# Patient Record
Sex: MALE | Race: WHITE | NOT HISPANIC OR LATINO | Employment: UNEMPLOYED | URBAN - METROPOLITAN AREA
[De-identification: names, ages, dates, MRNs, and addresses within clinical notes are randomized per-mention and may not be internally consistent; named-entity substitution may affect disease eponyms.]

---

## 2017-05-25 ENCOUNTER — TELEPHONE (OUTPATIENT)
Dept: BEHAVIORAL HEALTH | Facility: CLINIC | Age: 27
End: 2017-05-25

## 2017-05-25 ENCOUNTER — HOSPITAL ENCOUNTER (INPATIENT)
Facility: CLINIC | Age: 27
LOS: 12 days | Discharge: HOME HEALTH | DRG: 885 | End: 2017-06-06
Attending: INTERNAL MEDICINE | Admitting: PSYCHIATRY & NEUROLOGY
Payer: MEDICAID

## 2017-05-25 DIAGNOSIS — F29 PSYCHOSIS, UNSPECIFIED PSYCHOSIS TYPE (H): Primary | ICD-10-CM

## 2017-05-25 DIAGNOSIS — F19.10 POLYSUBSTANCE ABUSE (H): ICD-10-CM

## 2017-05-25 DIAGNOSIS — F23 ACUTE PSYCHOSIS (H): ICD-10-CM

## 2017-05-25 PROCEDURE — 99285 EMERGENCY DEPT VISIT HI MDM: CPT | Performed by: INTERNAL MEDICINE

## 2017-05-25 PROCEDURE — 99285 EMERGENCY DEPT VISIT HI MDM: CPT | Mod: Z6 | Performed by: INTERNAL MEDICINE

## 2017-05-25 PROCEDURE — 25000132 ZZH RX MED GY IP 250 OP 250 PS 637: Performed by: CLINICAL NURSE SPECIALIST

## 2017-05-25 PROCEDURE — 12400001 ZZH R&B MH UMMC

## 2017-05-25 PROCEDURE — 25000132 ZZH RX MED GY IP 250 OP 250 PS 637: Performed by: INTERNAL MEDICINE

## 2017-05-25 RX ORDER — LORAZEPAM 1 MG/1
1-4 TABLET ORAL EVERY 30 MIN PRN
Status: DISCONTINUED | OUTPATIENT
Start: 2017-05-25 | End: 2017-05-28

## 2017-05-25 RX ORDER — ACETAMINOPHEN 325 MG/1
650 TABLET ORAL EVERY 4 HOURS PRN
Status: DISCONTINUED | OUTPATIENT
Start: 2017-05-25 | End: 2017-06-06 | Stop reason: HOSPADM

## 2017-05-25 RX ORDER — OLANZAPINE 10 MG/1
10 TABLET ORAL
Status: DISCONTINUED | OUTPATIENT
Start: 2017-05-25 | End: 2017-06-06 | Stop reason: HOSPADM

## 2017-05-25 RX ORDER — ALUMINA, MAGNESIA, AND SIMETHICONE 2400; 2400; 240 MG/30ML; MG/30ML; MG/30ML
30 SUSPENSION ORAL EVERY 4 HOURS PRN
Status: DISCONTINUED | OUTPATIENT
Start: 2017-05-25 | End: 2017-06-06 | Stop reason: HOSPADM

## 2017-05-25 RX ORDER — TRAZODONE HYDROCHLORIDE 50 MG/1
50 TABLET, FILM COATED ORAL
Status: DISCONTINUED | OUTPATIENT
Start: 2017-05-25 | End: 2017-06-06 | Stop reason: HOSPADM

## 2017-05-25 RX ORDER — IBUPROFEN 200 MG
200-400 TABLET ORAL EVERY 4 HOURS PRN
Status: DISCONTINUED | OUTPATIENT
Start: 2017-05-25 | End: 2017-05-28

## 2017-05-25 RX ORDER — HYDROXYZINE HYDROCHLORIDE 25 MG/1
25-50 TABLET, FILM COATED ORAL EVERY 4 HOURS PRN
Status: DISCONTINUED | OUTPATIENT
Start: 2017-05-25 | End: 2017-06-06 | Stop reason: HOSPADM

## 2017-05-25 RX ORDER — OLANZAPINE 10 MG/2ML
10 INJECTION, POWDER, FOR SOLUTION INTRAMUSCULAR
Status: DISCONTINUED | OUTPATIENT
Start: 2017-05-25 | End: 2017-06-06 | Stop reason: HOSPADM

## 2017-05-25 RX ORDER — OLANZAPINE 5 MG/1
10 TABLET, ORALLY DISINTEGRATING ORAL ONCE
Status: COMPLETED | OUTPATIENT
Start: 2017-05-25 | End: 2017-05-25

## 2017-05-25 RX ORDER — BISACODYL 10 MG
10 SUPPOSITORY, RECTAL RECTAL DAILY PRN
Status: DISCONTINUED | OUTPATIENT
Start: 2017-05-25 | End: 2017-06-06 | Stop reason: HOSPADM

## 2017-05-25 RX ORDER — OLANZAPINE 10 MG/1
20 TABLET, ORALLY DISINTEGRATING ORAL AT BEDTIME
Status: DISCONTINUED | OUTPATIENT
Start: 2017-05-25 | End: 2017-05-29

## 2017-05-25 RX ADMIN — IBUPROFEN 400 MG: 200 TABLET, FILM COATED ORAL at 18:33

## 2017-05-25 RX ADMIN — OLANZAPINE 10 MG: 5 TABLET, ORALLY DISINTEGRATING ORAL at 13:10

## 2017-05-25 RX ADMIN — OLANZAPINE 10 MG: 10 TABLET, FILM COATED ORAL at 18:33

## 2017-05-25 RX ADMIN — OLANZAPINE 20 MG: 10 TABLET, ORALLY DISINTEGRATING ORAL at 21:24

## 2017-05-25 RX ADMIN — OLANZAPINE 10 MG: 5 TABLET, ORALLY DISINTEGRATING ORAL at 16:31

## 2017-05-25 RX ADMIN — OLANZAPINE 10 MG: 5 TABLET, ORALLY DISINTEGRATING ORAL at 14:37

## 2017-05-25 ASSESSMENT — ENCOUNTER SYMPTOMS
HALLUCINATIONS: 1
AGITATION: 1
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
## 2017-05-25 NOTE — IP AVS SNAPSHOT
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 family room of the home, but parents stated that they have no guns in the home.\"      On the day of admission, he was on the golf course with his father, \"riding a cart all over the course.\"      Once he arrived in the Emergency Department, continued to convey to the emergency room physician that \"he had been in intermediate, called the [place] and was possibly there because his name was Raul.\"      PREMORBID AND PAST MEDICAL AND PSYCHIATRIC HISTORY:  Does include the history of abusing drugs including THC and Adderall.      THE PATIENT'S PREVIOUS PAST MEDICAL AND PSYCHIATRIC DIAGNOSES:  Include the followin.  " "Hospitalization in  under Dr. Zambrano on station 22.   2.  Joshua Head, PhD,  and Dr. Salazar and Dr. Jay Anders, [facility] until 2016 when he never returned.  The patient indicates he was going 5 days per week for 3 or 4 hours per day over a period of 1 month.      PAST MEDICAL AND SURGICAL HISTORY:  Include the following:  No medical history.  No surgical history.      MEDICATIONS:  None at this time.      ALLERGIES:  Have not been defined.      REVIEW OF SYSTEMS:  From a medical standpoint has been negative, but for the admission history and physical examination.      VITAL SIGNS:  Blood pressure 187/91, heart rate 122, temperature 95.6, oxygen saturation 96.      It should be noted that the patient's symptoms are chemical use and abuse have shown with the following.  The drug screen thus far has been negative for conventional street drugs.  It is positive for cannabinoids.  That was on 2013.  No other updated drug screen apparently available.      IMAGING:  She had x-ray of the knee done in .  No acute fracture or dislocation.  No evidence of fracture.      FAMILY CONSTELLATION AND SOCIAL HISTORY, INCLUDING FAMILY HISTORY:  Single, never , no dependents.  No relationship.  Grew up in a Evangelical family with his mother and father.  The parents have been  for 2 decades.  He was the 3rd to the youngest of 15 children and these include 4 that are \"foster or adoptive children.\"  Sister  in motor vehicle accident when the patient was in his teens.  Mother is an RN and works at [employer] on a full-time basis and is available to the patient.  The patient often resides at this household.  Mother is Catalina.  She is an RN at [employer].  The patient verbalizes anger at her, \"forcing me into a setting like this.\"  Additionally, the patient's father has his own \"business.\"  This includes taking care of adult men and identifies the father is in his " "40s.      HIS CURRENT LIVING SITUATION, EDUCATIONAL, OCCUPATIONAL AND FINANCIAL HISTORY:  Shows the following:  He lives alone in \"broken down apartment in [place].\"  His mother did get him an apartment.  At the time he was working 3 days a week and helping with rent, but really stays with her most of the time.  High school graduate, \"many\" is his answer.  Occupation:  Unemployed now, but for several months was working at [employer] 3 days per week while in college.  College at [employer] and also [employer].  Finances:  Uninsured.  He relies on his parents or other people for financial help.  Legal:  Conducted multiple boyd misdemeanors, last one in 2014.  Ethnic and spiritual:  Views himself as a Adventism.      REVIEW OF SYSTEMS:  From a psychiatric standpoint:  Mood disorder, depression, no manic.  Psychotic symptoms including hallucinations and then delusion.  Chemical dependency in the past, none present.  Impulsive compulsive eating disorder issues not present.  ADHD, developmental delay, organicity not in evidence.      FORMAL MENTAL STATUS EXAMINATION:  Shows the patient tall, readily comes to the interview room and gives a very firm handshake.  His eye contact whereby is \"try to out stare.\"  General behavior seemed to be guarded and oppositional.  His speech is normal rate, rhythm and volume, some degree of intensity in the volume that was reduced articulation.  English is his primary language.  Mood and affect certainly shows some degree of flatness but thus very readily shows irritability and his mood is certainly one of being dysphoric.  His sensorium:  He is alert and oriented in all 3 spheres to person, place and time.  Concentration seems to be poor.  Memory recent and remote is intact.  Intellectual functioning:  He has a fund of knowledge and able to abstract in thinking.  This individual shows limited insight in introspective ability and insight is rather concrete.  Thought " content shows psychotic symptoms of overt paranoid and suspiciousness.  No active psychotic hallucinatory experiences.  Suicidal and homicidal or self-injurious behavior has been denounced.      DIAGNOSES, DSM-5/ICD 10:  Include the followin.  Psychosis, not otherwise specified.   2.  Delusion, paranoid type.   3.  Past history of polysubstance use and abuse.   4.  General anxiety with posttraumatic stress.   5.  Chemical dependency in the past.   6.  Premorbid personality schizoid.      TREATMENT:  The patient has been taken since the time of arrival and has received Zyprexa 20 mg each night at bedtime.  He indicates he feels comfortable and allowed him to get some sleep and rest.      Did offer additionally the patient also to be on p.r.n. Zyprexa Zydis 10 mg and Zyprexa 5 mg b.i.d., gummi vitamins.      The patient is very keen about defining his discharge date and time.  This has been delineated to him.      We will reassess the patient every 24 hours to consider the need for petition for court commitment.      The patient at this point is showing compliance and adherence to his prescribed medications.         LIANA OLIVO MD             D: 2017 14:58   T: 2017 18:11   MT:       Name:     JAIME MORA   MRN:      [id number]        Account:      [id number]   :      1990           Admitted:     [id number]      Document: [id number][DP1.1]         Revision History        User Key Date/Time User Provider Type Action    > DP1.1 2017 10:49 AM Liana Olivo MD Physician Sign     [N/A] 2017  6:12 PM Liana Olivo MD Physician Edit            H&P by Liana Olivo MD at 2017  2:34 PM     Author:  Liana Olivo MD Service:  Psychiatry Author Type:  Physician    Filed:  2017  2:34 PM Date of Service:  2017  2:34 PM Creation Time:  2017  2:34 PM    Status:  Signed :  Liana Olivo MD (Physician)          ADMISSION DONE : DICTATION COMPLETED[DP1.1]      Revision History        User Key Date/Time User Provider Type Action    > DP1.1 5/26/2017  2:34 PM Kirt Sherwood MD Physician Sign                  Discharge Summaries     No notes of this type exist for this encounter.      Consult Notes     No notes of this type exist for this encounter.      Progress Notes - Physician (Notes for yesterday and today)     No notes of this type exist for this encounter.      Procedure Notes     No notes of this type exist for this encounter.      Progress Notes - Therapies (Notes from 06/02/17 through 06/05/17)     No notes of this type exist for this encounter.                                          INTERAGENCY TRANSFER FORM - LAB / IMAGING / EKG / EMG RESULTS   5/25/2017                       [facility] 10NB: [phone]            Unresulted Labs     None      Encounter-Level Documents:     There are no encounter-level documents.      Order-Level Documents:     There are no order-level documents.

## 2017-05-25 NOTE — PHARMACY-ADMISSION MEDICATION HISTORY
"Admission medication history interview status for the 5/25/2017 admission is complete. See Epic admission navigator for allergy information, pharmacy, prior to admission medications and immunization status.     Medication history interview sources:  patient     Changes made to PTA medication list (reason)  Added: multivitamin gummy 2 tab po qday  Deleted: olazapine 5 mg ODT 5 tabs po qHS, nasal dilator strips  Changed: ibuprofen from 400 mg prn to 200-400 mg q4h prn headache    Additional medication history information (including reliability of information, actions taken by pharmacist):  - Patient reports that olazapine did not help his symptoms, only made him \"fat and lazy\"      Prior to Admission medications    Medication Sig Last Dose Taking? Auth Provider   Multiple Vitamins-Minerals (ADULT GUMMY PO) Take 2 tablets by mouth daily  Yes Unknown, Entered By History   IBUPROFEN PO Take 200-400 mg by mouth every 4 hours as needed for other (headache)    Reported, Patient         Medication history completed by: Orlando Juan, PharmD    "

## 2017-05-25 NOTE — PLAN OF CARE
Problem: Psychotic Symptoms  Goal: Psychotic Symptoms  Signs and symptoms of listed problems will be absent or manageable.  26 yo male admitted from the ED on a 72 hour hold. During the clothing search he was threatening with staff. He complains of a head ache and asked for motrin as soon as possible. He has answered some questions for the admit behavioral profile but he states he is in too much pain to continue. His eyes are bright, he does have good eye contract and he states he was on 30mg of zyprexa at bedtime, which he stopped several weeks ago. The intake report states that he is hearing voices, paranoid and delusional. He wants to be taken off the 72 hr. Hold. He has not been threatening to me.   The intake reports states that he has been drinking ETOH. He told me that he has been sober for quite a while. A Utox has been ordered.

## 2017-05-25 NOTE — IP AVS SNAPSHOT
MRN:[id number]                      After Visit Summary   5/25/2017    Guero Carter    MRN: [id number]           Thank you!     Thank you for choosing [facility] for your care. Our goal is always to provide you with excellent care.        Patient Information     Date Of Birth          1990        Designated Caregiver       Most Recent Value    Caregiver    Will someone help with your care after discharge? no      About your hospital stay     You were admitted on:  May 25, 2017 You last received care in the:  [facility] 30NR    You were discharged on:  June 6, 2017       Who to Call     For medical emergencies, please call 911.  For non-urgent questions about your medical care, please call your primary care provider or clinic, None          Attending Provider     Provider Specialty    Stacie Mann MD Internal Medicine    [facility] Kirt Ross MD Psychiatry    Brody Ruby MD Psychiatry       Primary Care Provider    Physician No Ref-Primary      Your next 10 appointments already scheduled     Jun 07, 2017  9:00 AM CDT   Evaluation with AP Braga   [facility] ([facility])    [address] [place] [id number] [phone]            Jun 08, 2017  9:20 AM CDT   Office Visit with Melodie Hobbs PA-C   [facility]    [address] [place] [id number] [phone]           Bring a current list of meds and any records pertaining to this visit.  For Physicals, please bring immunization records and any forms needing to be filled out.  Please arrive 10 minutes early to complete paperwork.              Additional Services     Home Care Referral       **Order classes of: [facility], [facility] and [facility] will route to the Home Care and Hospice Referral Pool.  Home Care or Hospice will then contact the patient to  schedule their appointment.**    If you do not hear from Home Care and Hospice, or you would like to call to schedule, please call the referring place of service that your provider has listed below.  ______________________________________________________________________    Your provider has referred you to: [facility]: [facility] [phone]   [email or web address]    Extended Emergency Contact Information  Primary Emergency Contact: nica carter  Address: [address] [facility] [address]  Home Phone: [phone]  Relation: Mother    Patient Anticipated Discharge Date: 6/06/2017   RN, PT, HHA to begin 24 - 48 hours after discharge.  PLEASE EVALUATE AND TREAT (Evaluation timeline is 24 - 48 hrs. Please call if there is need for a variance to this timeline).    REASON FOR REFERRAL: Assessment & Treatment: RN    ADDITIONAL SERVICES NEEDED: medication monitoring.     OTHER PERTINENT INFORMATION: Patient was last seen by provider on 6/05/2017 for assessment.    Current Outpatient Prescriptions:  [START ON 6/6/2017] OLANZapine (ZYPREXA) 10 MG tablet, Take 1 tablet (10 mg) by mouth every morning, Disp: 30 tablet, Rfl: 0  OLANZapine (ZYPREXA) 15 MG tablet, Take 2 tablets (30 mg) by mouth At Bedtime, Disp: 60 tablet, Rfl: 0      Patient Active Problem List:     Psychiatric disorder     MENTAL HEALTH     Psychosis      Documentation of Face to Face and Certification for Home Health Services    I certify that patient, Guero Carter is under my care and that I, or a Nurse Practitioner or Physician's Assistant working with me, had a face-to-face encounter that meets the physician face-to-face encounter requirements with this patient on: 6/5/2017.    This encounter with the patient was in whole, or in part, for the following medical condition, which is the primary reason for Home Health Care: psychosis.    I certify that, based on my  findings, the following services are medically necessary Home Health Services: Nursing    My clinical findings support the need for the above services because: Nurse is needed: To provide assessment and oversight required in the home to assure adherence to the medical plan due to: history of noncompliance.   Further, I certify that my clinical findings support that this patient is homebound (i.e. absences from home require considerable and taxing effort and are for medical reasons or Buddhism services or infrequently or of short duration when for other reasons)    Based on the above findings, I certify that this patient is confined to the home and needs intermittent skilled nursing care, physical therapy and/or speech therapy.  The patient is under my care, and I have initiated the establishment of the plan of care.  This patient will be followed by a physician who will periodically review the plan of care.    Physician/Provider to provide follow up care: No Ref-Primary, Physician    Responsible PECOS certified Physician at time of discharge    Please be aware that coverage of these services is subject to the terms and limitations of your health insurance plan.  Call member services at your health plan with any benefit or coverage questions.                  Further instructions from your care team       Behavioral Discharge Planning and Instructions    Summary: You were admitted with increasing delusional thoughts, paranoia, and auditory hallucinations. Non-compliant with your medications as well.  During your hospitalization, you met daily with the staff and were encouraged to attend all unit programming. A family meeting was held to discuss concerns and treatment planning. You have now been stabilized on medications and are being discharged today to live with your mother.    Main Diagnosis: Schizophrenia, Paranoid Type vs. Psychotic Disorder; History of THC Abuse    Major Treatments, Procedures and Findings:  Take all medications as prescribed and keep all of the appointments that have been set up for you.    Symptoms to Report: feeling more aggressive, increased confusion, losing more sleep, mood getting worse or thoughts of suicide.    Psychiatry Follow-up:   Dr. Plasencia (Psychiatrist) - New Appointment on June 28th at 9:40am  (MAR & H&P faxed over successfully on 6/5/17).  [facility]  [phone]     Dr. Melodie Hobbs - New Appointment on June 8th at 9:20am  [facility]  [facility]  [address]  [place], [place]  [phone]    [facility] ([phone])  Intake with Therapist Olinda Mitchell on Wednesday, June 7th at 9am  [address] at [address]  [facility] ([phone]) - A Psychiatric Nurse will call you to set up a time to meet with you to discuss medication set-up, questions you may have, etc.  either later in the day today (Tuesday) or Wednesday.    [employer] for Transportation--  Please call [phone] to register and set up your rides once you have your day treatment schedule. Your new Medical Assistance Member ID is [id number].  You need this to register.    Resources:   Crisis Intervention: 443.183.3287 or 422-680-5112 (TTY: 228.278.2211).  Call anytime for help.  National [place] [employer] Mental Illness (www.mn.maritza.org): 212.777.7362 or 664-052-5820.    General Medication Instructions:   See your medication sheet(s) for instructions.   Take all medicines as directed.  Make no changes unless your doctor suggests them.   Go to all your doctor visits.  Be sure to have all your required lab tests. This way, your medicines can be refilled on time.  Do not use any drugs not prescribed by your doctor.  Avoid alcohol.    The treatment team has appreciated the opportunity to work with you.  We wish you the best in the future. If you have any  "questions or concerns our unit number is [phone].     Pending Results     No orders found from 2017 to 2017.            Admission Information     Date & Time Provider Department Dept. Phone    2017 Brody Ruby MD UR 30NR [phone]      Your Vitals Were     Blood Pressure Pulse Temperature Respirations Weight Pulse Oximetry    138/71 65 97  F (36.1  C) (Oral) 16 98.9 kg (218 lb) 96%    BMI (Body Mass Index)                   27.25 kg/m2           [employer] Information     [facility] lets you send messages to your doctor, view your test results, renew your prescriptions, schedule appointments and more. To sign up, go to [email or web address] . Click on \"Log in\" on the left side of the screen, which will take you to the Welcome page. Then click on \"Sign up Now\" on the right side of the page.     You will be asked to enter the access code listed below, as well as some personal information. Please follow the directions to create your username and password.     Your access code is: [email or web address]  Expires: 2017  1:21 PM     Your access code will  in 90 days. If you need help or a new code, please call your [facility] clinic or [phone].        Care EveryWhere ID     This is your Care EveryWhere ID. This could be used by other organizations to access your [facility] medical records  [id number]           Review of your medicines      START taking        Dose / Directions    * OLANZapine 15 MG tablet   Commonly known as:  zyPREXA   Used for:  Psychosis, unspecified psychosis type        Dose:  30 mg   Take 2 tablets (30 mg) by mouth At Bedtime   Quantity:  60 tablet   Refills:  0       * OLANZapine 10 MG tablet   Commonly known as:  zyPREXA   Used for:  Psychosis, unspecified psychosis type        Dose:  10 mg   Take 1 tablet (10 mg) by mouth every morning   Quantity:  30 tablet   Refills:  0       * Notice:  This list has 2 medication(s) that are the same as other medications prescribed for " you. Read the directions carefully, and ask your doctor or other care provider to review them with you.      CONTINUE these medicines which have NOT CHANGED        Dose / Directions    ADULT GUMMY PO        Dose:  2 tablet   Take 2 tablets by mouth daily   Refills:  0       IBUPROFEN PO        Dose:  200-400 mg   Take 200-400 mg by mouth every 4 hours as needed for other (headache)   Refills:  0            Where to get your medicines      These medications were sent to [facility] [place], [place] - [address]  [address] [place] [address], [facility] [address]     Phone:  [phone]     OLANZapine 10 MG tablet    OLANZapine 15 MG tablet                Protect others around you: Learn how to safely use, store and throw away your medicines at www.disposemymeds.org.             Medication List: This is a list of all your medications and when to take them. Check marks below indicate your daily home schedule. Keep this list as a reference.      Medications           Morning Afternoon Evening Bedtime As Needed    ADULT GUMMY PO   Take 2 tablets by mouth daily                                IBUPROFEN PO   Take 200-400 mg by mouth every 4 hours as needed for other (headache)   Last time this was given:  400 mg on 5/28/2017 10:19 AM                                * OLANZapine 15 MG tablet   Commonly known as:  zyPREXA   Take 2 tablets (30 mg) by mouth At Bedtime   Last time this was given:  30 mg on 6/5/2017  8:19 PM                                * OLANZapine 10 MG tablet   Commonly known as:  zyPREXA   Take 1 tablet (10 mg) by mouth every morning   Last time this was given:  30 mg on 6/5/2017  8:19 PM                                * Notice:  This list has 2 medication(s) that are the same as other medications prescribed for you. Read the directions carefully, and ask your doctor or other care provider to review them with you.
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
## 2017-05-25 NOTE — SUMMARY OF CARE
Patient search completed by security; pt wearing scrub top, pt wanded with metal detector and belongings given to security to be stored. Explained to patient that they would be evaluated by a nurse here in the ER and then would go over to the [facility] when a bed is available where they will meet with a doctor and an accessor; plan will be determined from there.

## 2017-05-25 NOTE — SUMMARY OF CARE
Pt states he does not trust security or PA and does not want to be looked at or talked to.  Pt put table in front of door, then shortly after opened the door and is seated in the doorway looking into the hallway.

## 2017-05-25 NOTE — IP AVS SNAPSHOT
30    [address]    [place] [address]    Phone:  [phone]                                       After Visit Summary   5/25/2017    Guero Carter    MRN: [id number]           After Visit Summary Signature Page     I have received my discharge instructions, and my questions have been answered. I have discussed any challenges I see with this plan with the nurse or doctor.    ..........................................................................................................................................  Patient/Patient Representative Signature      ..........................................................................................................................................  Patient Representative Print Name and Relationship to Patient    ..................................................               ................................................  Date                                            Time    ..........................................................................................................................................  Reviewed by Signature/Title    ...................................................              ..............................................  Date                                                            Time

## 2017-05-26 LAB
ALBUMIN SERPL-MCNC: 3.8 G/DL (ref 3.4–5)
ALP SERPL-CCNC: 102 U/L (ref 40–150)
ALT SERPL W P-5'-P-CCNC: 37 U/L (ref 0–70)
ANION GAP SERPL CALCULATED.3IONS-SCNC: 6 MMOL/L (ref 3–14)
AST SERPL W P-5'-P-CCNC: 22 U/L (ref 0–45)
BASOPHILS # BLD AUTO: 0 10E9/L (ref 0–0.2)
BASOPHILS NFR BLD AUTO: 0.5 %
BILIRUB SERPL-MCNC: 0.8 MG/DL (ref 0.2–1.3)
BUN SERPL-MCNC: 12 MG/DL (ref 7–30)
CALCIUM SERPL-MCNC: 9 MG/DL (ref 8.5–10.1)
CHLORIDE SERPL-SCNC: 109 MMOL/L (ref 94–109)
CHOLEST SERPL-MCNC: 192 MG/DL
CO2 SERPL-SCNC: 26 MMOL/L (ref 20–32)
CREAT SERPL-MCNC: 0.74 MG/DL (ref 0.66–1.25)
DIFFERENTIAL METHOD BLD: NORMAL
EOSINOPHIL # BLD AUTO: 0.2 10E9/L (ref 0–0.7)
EOSINOPHIL NFR BLD AUTO: 3.2 %
ERYTHROCYTE [DISTWIDTH] IN BLOOD BY AUTOMATED COUNT: 13.2 % (ref 10–15)
GFR SERPL CREATININE-BSD FRML MDRD: NORMAL ML/MIN/1.7M2
GLUCOSE SERPL-MCNC: 87 MG/DL (ref 70–99)
HCT VFR BLD AUTO: 49.9 % (ref 40–53)
HDLC SERPL-MCNC: 49 MG/DL
HGB BLD-MCNC: 16.6 G/DL (ref 13.3–17.7)
IMM GRANULOCYTES # BLD: 0 10E9/L (ref 0–0.4)
IMM GRANULOCYTES NFR BLD: 0.2 %
LDLC SERPL CALC-MCNC: 113 MG/DL
LYMPHOCYTES # BLD AUTO: 2.2 10E9/L (ref 0.8–5.3)
LYMPHOCYTES NFR BLD AUTO: 35.8 %
MCH RBC QN AUTO: 29.4 PG (ref 26.5–33)
MCHC RBC AUTO-ENTMCNC: 33.3 G/DL (ref 31.5–36.5)
MCV RBC AUTO: 88 FL (ref 78–100)
MONOCYTES # BLD AUTO: 0.5 10E9/L (ref 0–1.3)
MONOCYTES NFR BLD AUTO: 7.7 %
NEUTROPHILS # BLD AUTO: 3.2 10E9/L (ref 1.6–8.3)
NEUTROPHILS NFR BLD AUTO: 52.6 %
NONHDLC SERPL-MCNC: 143 MG/DL
NRBC # BLD AUTO: 0 10*3/UL
NRBC BLD AUTO-RTO: 0 /100
PLATELET # BLD AUTO: 228 10E9/L (ref 150–450)
POTASSIUM SERPL-SCNC: 4.1 MMOL/L (ref 3.4–5.3)
PROT SERPL-MCNC: 7.3 G/DL (ref 6.8–8.8)
RBC # BLD AUTO: 5.65 10E12/L (ref 4.4–5.9)
SODIUM SERPL-SCNC: 141 MMOL/L (ref 133–144)
TRIGL SERPL-MCNC: 149 MG/DL
TSH SERPL DL<=0.005 MIU/L-ACNC: 1.21 MU/L (ref 0.4–4)
WBC # BLD AUTO: 6 10E9/L (ref 4–11)

## 2017-05-26 PROCEDURE — 80061 LIPID PANEL: CPT | Performed by: CLINICAL NURSE SPECIALIST

## 2017-05-26 PROCEDURE — 12400001 ZZH R&B MH UMMC

## 2017-05-26 PROCEDURE — 25000132 ZZH RX MED GY IP 250 OP 250 PS 637: Performed by: PSYCHIATRY & NEUROLOGY

## 2017-05-26 PROCEDURE — 85025 COMPLETE CBC W/AUTO DIFF WBC: CPT | Performed by: CLINICAL NURSE SPECIALIST

## 2017-05-26 PROCEDURE — 25000132 ZZH RX MED GY IP 250 OP 250 PS 637: Performed by: CLINICAL NURSE SPECIALIST

## 2017-05-26 PROCEDURE — 80053 COMPREHEN METABOLIC PANEL: CPT | Performed by: CLINICAL NURSE SPECIALIST

## 2017-05-26 PROCEDURE — 36415 COLL VENOUS BLD VENIPUNCTURE: CPT | Performed by: CLINICAL NURSE SPECIALIST

## 2017-05-26 PROCEDURE — 84443 ASSAY THYROID STIM HORMONE: CPT | Performed by: CLINICAL NURSE SPECIALIST

## 2017-05-26 RX ORDER — OLANZAPINE 5 MG/1
5 TABLET, ORALLY DISINTEGRATING ORAL 2 TIMES DAILY
Status: DISCONTINUED | OUTPATIENT
Start: 2017-05-26 | End: 2017-05-29

## 2017-05-26 RX ADMIN — LORAZEPAM 1 MG: 1 TABLET ORAL at 13:40

## 2017-05-26 RX ADMIN — OLANZAPINE 5 MG: 5 TABLET, ORALLY DISINTEGRATING ORAL at 20:34

## 2017-05-26 ASSESSMENT — ACTIVITIES OF DAILY LIVING (ADL)
ORAL_HYGIENE: INDEPENDENT
LAUNDRY: WITH SUPERVISION
DRESS: SCRUBS (BEHAVIORAL HEALTH)
GROOMING: INDEPENDENT
DRESS: SCRUBS (BEHAVIORAL HEALTH)
GROOMING: INDEPENDENT
ORAL_HYGIENE: INDEPENDENT
LAUNDRY: WITH SUPERVISION
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
## 2017-05-26 NOTE — PROGRESS NOTES
"In milieu to retrieve meals only, otherwise isolative to room, said to staff \"you're probably a nazi or communist\" but thanked same staff for offering to      05/26/17 1434   Behavioral Health   Thinking distractable   Orientation situation, disoriented;person: oriented   Eye Contact at examiner   Mood mood is calm   Physical Appearance/Attire attire appropriate to age and situation   Activity isolative   Speech clear   Psychomotor / Gait balanced;steady   Activities of Daily Living   Hygiene/Grooming independent   Oral Hygiene independent   Dress scrubs (behavioral health)   Laundry with supervision   Room Organization independent   give him toiletries and other supplies.  "

## 2017-05-26 NOTE — PROGRESS NOTES
"Initial Psychosocial Assessment    I have reviewed the chart, met with the patient, and developed Care Plan.      Presenting Problem:  The patient is a 27 year-old male accompanied by his father and admitted with worsening paranoid delusions and auditory hallucinations. Patient has been non-compliant with his medications. His Utox is (+) for Benzodiazepines. Parents reporting that patient feels he is being chased by terrorists and feels threatened. Patient wanted to shoot up the family room of the home but parents state there are no guns in the home.  Patient was out on the golf course with his father yesterday and riding a cart all over the course. Patient told the ED Physician that he had been in a care home called the [facility] and was possibly there because he name was Raul.  Hx of abusing drugs including THC and Adderall. Patient on a 72 Hour Hold placed 5/25/17 at 1300 and will end on Wed 5/31 at 1300).      Met with his mother Catalina who showed up at the door with personal items for him.  Catalina is his biological mother and works as an RN at [employer]. She said that at present, the patient is angry with her for having him admitted to the hospital.  She said that patient has been off of all medications for about six months.   He became very uncontrollable this past weekend so she called his father who took him in.  Father told her he would put the patient to work because \"idleness is the Devil's handiwork.\"  She warned him that patient was psychotic but apparently father did not believe her until he tried to take the patient golfing.  The patient jumped into a golf cart and was driving irratically all over the course.  Catalina did say that if the patient can be stabilized by next week, she will take him home but plans to look for a more structure place for him.  She said that up until a few months ago, patient was at [employer] finishing up a medical tech degree when he suddenly quit.  School kicked him out most likely " "because of his irratic behaviors at the time.      History of Mental Health and Chemical Dependency:  Last here on  with Dr. Zambrano on Station 22.  Had been seeing Psychologist Joshua Head, Dr. Zambrano, Dr. Cruz in [facility] until 2016 when he apparently never returned.     Family Description (Constellation, Family Psychiatric History):  The patient is single, never  and has no children.  He grew up in a Islam household with his mother and father then they .  He is third to the youngest of 15 children made up of adopted and foster care children. One sister  in an MVA when patient was a teen.  Mother is an RN and father remarried and provides foster care for adult men.      Significant Life Events (Illness, Abuse, Trauma, Death):  One sister  in an MVA at age 22 when patient was a teen.      Living Situation:  Patient reports today that he lives alone in \"a broken down apartment in [place].\" His mother said she did get him an apartment and at the time, he was working three days a week and helping with the rent but he really stayed with her most of the time.    Educational Background:  High School graduate with some     Occupational History:  Unemployed now.  Up until several months ago, the patient was working at [employer] 3 days per week while in college.    Financial Status:  Uninsured at present time.  Patient states he relies on parents and other people for financial help.    Legal Issues:  Convicted of multiple boyd misdemeanors last one in .    Ethnic/Cultural Issues:  None    Spiritual Orientation:  Islam     Service History:  None    Social Functioning (organization, interests):  Nothing provided    Current Treatment Providers are:  \"the last psychiatrist I had only wanted to talk to me so I stopped seeing him.\"    Social Service Assessment/Plan:  Patient needs to be restarted on his medications and stabilized.  He will be " encouraged to attend all unit programming and let the staff know if he has questions or concerns.  Obtain collateral information from his family.  He will be encouraged when more stabilized to attend all unit programming.  CTC to ensure he has a comprehensive discharge plan in place.

## 2017-05-26 NOTE — PROGRESS NOTES
05/26/17 0137   Patient Belongings   Patient Belongings other (see comments)   Disposition of Belongings In locker and to security   Belongings Search Yes     In locker:  2 hats  Jacket  Cigarettes  2 pairs of pants  Sunglasses  Shoes  Phone  Belt  Wallet  Bag  3 pairs of shorts  3 shirts  3 pairs of underwear  7 pairs of socks    Headphones    To security:  Amazon gift card  Visa debit card-[id number]  Visa credit card-[phone]    ADMISSION:  I am responsible for any personal items that are not sent to the safe or pharmacy. [facility] is not responsible for loss, theft or damage of any property in my possession.    Patient Signature _____________________ Date/Time _____________________    Staff Signature _______________________ Date/Time _____________________    2nd Staff person, if patient is unable/unwilling to sign  ___________________________________ Date/Time _____________________    DISCHARGE:  My personal items have been returned to me.   Patient Signature _____________________ Date/Time _____________________

## 2017-05-26 NOTE — PLAN OF CARE
Problem: General Plan of Care (Inpatient Behavioral)  Goal: Individualization/Patient Specific Goal (IP Behavioral)  The patient and/or their representative will achieve their patient-specific goals related to the plan of care.    The patient-specific goals include:  Illness Management Recovery model: Objectives    Patient will identify reason(s) for hospitalization from their perspective.  Patient will identify a minimum of three goals for discharge.  Patient will identify a minimum of three triggers that may increase their symptoms.  Patient will identify a minimum of three coping skills they can do to stay well.   Patient will identify their support system to demonstrate readiness for discharge.   Outcome: No Change  Illness Management Recovery model:  Taking Action.     Patient identified the following actions they can take when early warning signs are present in order to prevent relapse:     1. refused  2. refused  3. refused

## 2017-05-26 NOTE — PLAN OF CARE
Problem: General Plan of Care (Inpatient Behavioral)  Goal: Team Discussion  Team Plan:   BEHAVIORAL TEAM DISCUSSION     Continued Stay Criteria/Rationale: New admit     Plan: The patient needs to be restared on his medications and stabilized.  He will be encouraged to attend all programming and report any questions or concerns to staff.      Participants: Dr. Kirt Sherwood; Lauren Langston LICSW: Krystyna Gomez RN     Summary/Recommendation: See Plan     Medical/Physical: See Consult     Progress: Isolating in his room

## 2017-05-26 NOTE — PLAN OF CARE
Problem: Psychotic Symptoms  Goal: Psychotic Symptoms  -Pt will deny SI  -Pt will report absence of self-harm thoughts  -Pt will remain free of self-injurious behaviors on unit  -Pt will report rationale for abstaining from chemical substances  -Pt will participate in 50% of groups on unit    -Pt speech will be regular rate and volume  -Pt will report stabilization of mood  -Pt will verbalize improved sleep  -Pt will maintain appropriate boundaries on unit    -Pt will report absence of AH/VH  -Pt speech will be less remarkable for paranoid/delusional themes  -Pt will verbalize absence of HISigns and symptoms of listed problems will be absent or manageable.   Outcome: No Change  48 hour nursing assessment continues:     Patient has been sleeping. He is calm and isolative. He does not socialize with peers or staff. He has not been visible in the milieu. He does not have a goal today. His affect is flat. He did not attend any groups today. He rates his anxiety and depression a 6. He denies racing thoughts but states he has paranoid thoughts. He states he thinks we are holding him against his will. His concentration he states is low. He is not feeling hopeful. His appetite is fine. He denies SI and SIB. He has no issues with sleeping. He denies pain. His medication makes him drowsy. He states he has no concerns.  He is independent with ADL's. Continue with POC.

## 2017-05-26 NOTE — PROGRESS NOTES
"Patient's mother brought some things.  No release signed for her but she did offer some information:  She confirmed patient has been drinking, and using marijuana.  Concerned that the patient will need a longer stay than provided by a 72 hour hold--will relay to MD and VINAYAK.  She declined to visit with her son as she wants to maintain some boundaries currently--this writer concurred.  But states she will be involved as indicated.  Ate breakfast, no overt tremors noted, skin appears dry: no overt visual signs of withdrawal.  Noon: patient did allow this writer to do apical HR.  No tremors. Ate lunch.  Describes his feelings as \"trapped\".    Lying rigidly on his bed with hands folded. Did not answer when asked if   "

## 2017-05-26 NOTE — PROGRESS NOTES
"The patient's mother Catalina Carter need to have an insurance form completed by Dr. Sherwood and faxed to [employer].  It is called \"Request for Extended Coverage.\"  She did not have the fax number and Marta Oropeza in the  Business Office is working with [facility] to get the right fax number.  Once Dr. Sherwood sees him and fill it out, we can have our HUC fax it on to [employer] if we have the right number by day's end.  I explained to mother that the [facility] business office would work on this as well as we will need to get paid for his stay here.    "

## 2017-05-27 LAB
AMPHETAMINES UR QL SCN: ABNORMAL
BARBITURATES UR QL: ABNORMAL
BENZODIAZ UR QL: ABNORMAL
CANNABINOIDS UR QL SCN: ABNORMAL
COCAINE UR QL: ABNORMAL
ETHANOL UR QL SCN: ABNORMAL
OPIATES UR QL SCN: ABNORMAL
PCP UR QL SCN: ABNORMAL

## 2017-05-27 PROCEDURE — 25000132 ZZH RX MED GY IP 250 OP 250 PS 637: Performed by: PSYCHIATRY & NEUROLOGY

## 2017-05-27 PROCEDURE — 25000132 ZZH RX MED GY IP 250 OP 250 PS 637: Performed by: CLINICAL NURSE SPECIALIST

## 2017-05-27 PROCEDURE — 80307 DRUG TEST PRSMV CHEM ANLYZR: CPT | Performed by: CLINICAL NURSE SPECIALIST

## 2017-05-27 PROCEDURE — 80320 DRUG SCREEN QUANTALCOHOLS: CPT | Performed by: CLINICAL NURSE SPECIALIST

## 2017-05-27 PROCEDURE — 12400001 ZZH R&B MH UMMC

## 2017-05-27 RX ADMIN — TRAZODONE HYDROCHLORIDE 50 MG: 50 TABLET ORAL at 21:55

## 2017-05-27 RX ADMIN — OLANZAPINE 5 MG: 5 TABLET, ORALLY DISINTEGRATING ORAL at 10:45

## 2017-05-27 RX ADMIN — OLANZAPINE 20 MG: 10 TABLET, ORALLY DISINTEGRATING ORAL at 20:43

## 2017-05-27 RX ADMIN — Medication 2 TABLET: at 10:45

## 2017-05-27 RX ADMIN — OLANZAPINE 5 MG: 5 TABLET, ORALLY DISINTEGRATING ORAL at 16:36

## 2017-05-27 ASSESSMENT — ACTIVITIES OF DAILY LIVING (ADL)
DRESS: SCRUBS (BEHAVIORAL HEALTH)
GROOMING: INDEPENDENT
LAUNDRY: WITH SUPERVISION
LAUNDRY: WITH SUPERVISION
ORAL_HYGIENE: INDEPENDENT
ORAL_HYGIENE: INDEPENDENT;PROMPTS
GROOMING: SHOWER;INDEPENDENT
DRESS: SCRUBS (BEHAVIORAL HEALTH)

## 2017-05-27 NOTE — PLAN OF CARE
Problem: General Plan of Care (Inpatient Behavioral)  Goal: Individualization/Patient Specific Goal (IP Behavioral)  The patient and/or their representative will achieve their patient-specific goals related to the plan of care.    The patient-specific goals include:  Illness Management Recovery model: Objectives    Patient will identify reason(s) for hospitalization from their perspective.  Patient will identify a minimum of three goals for discharge.  Patient will identify a minimum of three triggers that may increase their symptoms.  Patient will identify a minimum of three coping skills they can do to stay well.   Patient will identify their support system to demonstrate readiness for discharge.   Illness Management Recovery model:  Ways to [place].     Patient identified the following specific strategies to cope with their symptoms  Pt is not ready to process yet

## 2017-05-27 NOTE — PROGRESS NOTES
"Pt was anxious this morning and was in group and started laughing at someone at the med window. Pt was asked who he would like to meet and pt said \"Putin. He is the most powerful man on earth with lots of weapons. Or Manuel. Or that lachelle from [place].\" Pt said \"I have been here a year and my mother wants me to stay here for ever. I should be free to go out of any door I want\" Pt did visit with mother for about 10 minutes. Pt also gave urine for a utox. Pt later asked for some gummie bears from his locker and said \"I'm sorry I am crabby but I would rather be in hell than here.\" Pt did sit in the lounge for a while but then went back to his room to sleep.     05/27/17 1349   Behavioral Health   Hallucinations auditory;appears responding;denies / not responding to hallucinations   Thinking distractable;delusional;paranoid   Orientation person: oriented;place: oriented;date: oriented   Memory baseline memory   Insight denial of illness;poor   Judgement impaired   Eye Contact at examiner   Affect angry;blunted, flat;sad;tense;full range affect;irritable   Mood anxious;labile;irritable   Physical Appearance/Attire neat   Hygiene well groomed   Suicidality other (see comments)   Self Injury other (see comment)  (denies)   Activity isolative;withdrawn;hyperactive (agitated, impulsive);restless   Speech pressured   Psychomotor / Gait agitated;balanced;steady   Sleep/Rest/Relaxation   Sleep/Rest/Relaxation sleeping between care   Coping/Psychosocial   Verbalized Emotional State anger;anxiety;disbelief;fear;frustration;hopelessness;powerlessness   Psycho Education   Type of Intervention 1:1 intervention   Response refuses   Hours 0.5   Treatment Detail (ways of coping)   Activities of Daily Living   Hygiene/Grooming shower;independent   Oral Hygiene independent;prompts   Dress scrubs (behavioral health)   Laundry with supervision   Room Organization prompts   Behavioral Health Interventions   Psychotic Symptoms maintain safety " precautions;monitor need to revise level of observation;maintain safe secure environment;reality orientation;simple, clear language;decrease environmental stimulation;redirection of intrusive behaviors;redirection of aggressive behaviors;assist patient in developing safety plan;assist patient in following safety plan;encourage nutrition and hydration;encourage participation / independence with adls;provide emotional support   Social and Therapeutic Interventions (Psychotic Symptoms) encourage socialization with peers;encourage participation in therapeutic groups and milieu activities

## 2017-05-28 PROCEDURE — 25000132 ZZH RX MED GY IP 250 OP 250 PS 637: Performed by: PSYCHIATRY & NEUROLOGY

## 2017-05-28 PROCEDURE — 25000132 ZZH RX MED GY IP 250 OP 250 PS 637: Performed by: CLINICAL NURSE SPECIALIST

## 2017-05-28 PROCEDURE — 12400001 ZZH R&B MH UMMC

## 2017-05-28 RX ORDER — IBUPROFEN 800 MG/1
800 TABLET, FILM COATED ORAL EVERY 8 HOURS PRN
Status: DISCONTINUED | OUTPATIENT
Start: 2017-05-28 | End: 2017-06-06 | Stop reason: HOSPADM

## 2017-05-28 RX ORDER — SUMATRIPTAN 50 MG/1
50 TABLET, FILM COATED ORAL DAILY PRN
Status: DISCONTINUED | OUTPATIENT
Start: 2017-05-28 | End: 2017-06-06 | Stop reason: HOSPADM

## 2017-05-28 RX ADMIN — SUMATRIPTAN 50 MG: 50 TABLET, FILM COATED ORAL at 11:36

## 2017-05-28 RX ADMIN — OLANZAPINE 5 MG: 5 TABLET, ORALLY DISINTEGRATING ORAL at 14:28

## 2017-05-28 RX ADMIN — Medication 2 TABLET: at 10:18

## 2017-05-28 RX ADMIN — IBUPROFEN 400 MG: 200 TABLET, FILM COATED ORAL at 10:19

## 2017-05-28 RX ADMIN — OLANZAPINE 20 MG: 10 TABLET, ORALLY DISINTEGRATING ORAL at 20:06

## 2017-05-28 RX ADMIN — OLANZAPINE 5 MG: 5 TABLET, ORALLY DISINTEGRATING ORAL at 10:19

## 2017-05-28 ASSESSMENT — ACTIVITIES OF DAILY LIVING (ADL)
GROOMING: INDEPENDENT
ORAL_HYGIENE: INDEPENDENT
GROOMING: INDEPENDENT
DRESS: SCRUBS (BEHAVIORAL HEALTH)
LAUNDRY: WITH SUPERVISION
DRESS: SCRUBS (BEHAVIORAL HEALTH);INDEPENDENT
ORAL_HYGIENE: INDEPENDENT
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
## 2017-05-28 NOTE — PLAN OF CARE
"Problem: Psychotic Symptoms  Goal: Psychotic Symptoms  -Pt will deny SI  -Pt will report absence of self-harm thoughts  -Pt will remain free of self-injurious behaviors on unit  -Pt will report rationale for abstaining from chemical substances  -Pt will participate in 50% of groups on unit    -Pt speech will be regular rate and volume  -Pt will report stabilization of mood  -Pt will verbalize improved sleep  -Pt will maintain appropriate boundaries on unit    -Pt will report absence of AH/VH  -Pt speech will be less remarkable for paranoid/delusional themes  -Pt will verbalize absence of HISigns and symptoms of listed problems will be absent or manageable.   Outcome: No Change  48 hour nursing observation      Polysubstance abuse [F19.10]  Acute psychosis [F23]     Admit Date: 5/25/2017     Patient evaluation continues. Assessed mood,anxiety,thoughts and behavior. Patient is not progressing towards goals. Patient is encouraged to participate in groups and assisted to develop healthy coping skills.       /71  Pulse 65  Temp 95.1  F (35.1  C)  Resp 16  SpO2 96%     Patient reports depression level of 2 and anxiety level of  0 with 10 being the worst. Patient's affect is flat, blunted, guarded. Patient denies SI, denies SIB, denies HI and when asked if feeling hopeful about the future, pt replied, \"no,  because I feel like you are going to keep me here forever\".  \"I have a scarred memory and I believe in brainwashing\".  \"I feel enslaved here by you guys\".  When writer asked pt if he knew why he was brought to the hospital, pt replied, \"because I hated everyone\".  When asked if he is still hating everyone, pt replied, \"not now\".  Pt's mother visited for short period this AM and mother reported pt gave her a hug when she left.  Mother made comment that she thought that was a good sign.  Father visited this afternoon and seemed to go fairly well. Patient states concentration is \"bad because of the meds you are " "giving me\" but denied racing thoughts.  Patient denies auditory or visual hallucinations but remains delusional with very poor insight and denial of MH issues. Patient reports sleeping 10 hours last night and appetite \"good\".       Patient is compliant with medications and side effects reported are as noted above re: affecting his conc from his perspective. Patient mostly isolative to his room.  Pt displayed no agitation and cooperative. Pt c/o HA and given Motrin 400 mg at 1020 with no relief-pain was getting  worse, pt holding his head in his hands at the desk & very sensitive to light.  Pt states he does get HA's at times, \"about once a week\" but doesn't recall every being told he has migraines.  Dr Sherwood notified and given Imitrex 50 mg at 1140 with good relief. ADLs are good, showers daily.  Refer to daily team meeting notes for individualized plan of care. Nursing will continue to observe and assess.                   "

## 2017-05-28 NOTE — PLAN OF CARE
Problem: General Plan of Care (Inpatient Behavioral)  Goal: Individualization/Patient Specific Goal (IP Behavioral)  The patient and/or their representative will achieve their patient-specific goals related to the plan of care.    The patient-specific goals include:  Illness Management Recovery model: Objectives    Patient will identify reason(s) for hospitalization from their perspective.  Patient will identify a minimum of three goals for discharge.  Patient will identify a minimum of three triggers that may increase their symptoms.  Patient will identify a minimum of three coping skills they can do to stay well.   Patient will identify their support system to demonstrate readiness for discharge.   Illness Management Recovery model:  Self-Reflection & Planning.     Assessed patient's progress completing forms related to Illness Management Recovery (including Personal Plan of Care, Adult Coping Plan, and My Support and Coping Plan) and assisted as needed.     Encouraged patient to continue to consider triggers, wellness strategies, early warning signs, feedback from others, actions to take to prevent relapse, and coping strategies as part of a plan to remain well after leaving the hospital.     Pt unable to process this information at this time due to delusional thoughts.  Staff will re-attempt at a later date.     CANDELARIO LOFTON RN-5/28/2017

## 2017-05-29 PROCEDURE — 12400001 ZZH R&B MH UMMC

## 2017-05-29 PROCEDURE — 25000132 ZZH RX MED GY IP 250 OP 250 PS 637: Performed by: PSYCHIATRY & NEUROLOGY

## 2017-05-29 PROCEDURE — 25000132 ZZH RX MED GY IP 250 OP 250 PS 637: Performed by: CLINICAL NURSE SPECIALIST

## 2017-05-29 RX ORDER — OLANZAPINE 10 MG/1
10 TABLET, ORALLY DISINTEGRATING ORAL EVERY MORNING
Status: DISCONTINUED | OUTPATIENT
Start: 2017-05-30 | End: 2017-06-05

## 2017-05-29 RX ORDER — OLANZAPINE 15 MG/1
30 TABLET, ORALLY DISINTEGRATING ORAL AT BEDTIME
Status: DISCONTINUED | OUTPATIENT
Start: 2017-05-29 | End: 2017-06-05

## 2017-05-29 RX ADMIN — Medication 2 TABLET: at 09:40

## 2017-05-29 RX ADMIN — OLANZAPINE 5 MG: 5 TABLET, ORALLY DISINTEGRATING ORAL at 14:29

## 2017-05-29 RX ADMIN — OLANZAPINE 30 MG: 15 TABLET, ORALLY DISINTEGRATING ORAL at 20:13

## 2017-05-29 RX ADMIN — SUMATRIPTAN 50 MG: 50 TABLET, FILM COATED ORAL at 10:31

## 2017-05-29 RX ADMIN — TRAZODONE HYDROCHLORIDE 50 MG: 50 TABLET ORAL at 20:36

## 2017-05-29 RX ADMIN — OLANZAPINE 5 MG: 5 TABLET, ORALLY DISINTEGRATING ORAL at 09:40

## 2017-05-29 ASSESSMENT — ACTIVITIES OF DAILY LIVING (ADL)
ORAL_HYGIENE: INDEPENDENT
GROOMING: INDEPENDENT
LAUNDRY: UNABLE TO COMPLETE
DRESS: INDEPENDENT

## 2017-05-29 NOTE — PROGRESS NOTES
"Patient mother came to visit. She asked to speak to his nurse. This writer stated I am his nurse. She asked me \"what is your name?\". This writer stated \"Sindi\". She stated she wanted to go buy him a pop and to let her out. This writer stated I could not leave the unit for safety reasons the [facility] will let you out. She stated \"you need to let me out\". I again told her the [facility] would let you out. \"I need to speak with you\". I stated she could but I could not leave the unit for safety reasons. She left the unit and came back and approached me and stated \"Who are you?\" I again stated I was Sindi. She then said \" I know you are irritated with me but I want to tell you something\". This writer stated I did not know why she thought I was irritated  with her. \"Well you seem busy\" \"I have 15 children but you probably already know that don't you. One of my children Jhonny Carter found out Guero was here and said he was going to kill him so don't let him visit. She then walked away.   "

## 2017-05-29 NOTE — PROGRESS NOTES
"Pt has been calm all shift. Pt's sister visited; as she was leaving she said that this is the best she's seen him in a long time. The visit seemed to go well; they played cards. Pt made a couple odd statements in community meeting but not necessarily delusional. Pt said that he had a couple bad dreams last night: \"I dreamt [place] burned down\" and then \"I dreamt that I wanted to throw a baby against the wall\". He said this after watching a video about a woman with post-partum who had those same thoughts. Pt denies SI.     05/29/17 4550   Behavioral Health   Hallucinations denies / not responding to hallucinations   Thinking distractable;delusional   Orientation person: oriented;place: oriented;date: oriented;time: oriented   Memory baseline memory   Insight denial of illness;poor   Judgement impaired   Eye Contact at examiner   Affect (neutral with minimal range)   Mood mood is calm   Physical Appearance/Attire appears stated age;attire appropriate to age and situation   Hygiene well groomed   Suicidality (denies)   Self Injury (denies)   Activity (WDL) WDL   Activity (minimal socializing with peers and staff unless approached)   Speech clear;coherent   Medication Sensitivity no stated side effects;no observed side effects   Psychomotor / Gait balanced;steady   Psycho Education   Type of Intervention 1:1 intervention   Response unavailable   Hours 0.5   Treatment Detail (triggers)   Behavioral Health Interventions   Psychotic Symptoms maintain safety precautions;monitor need to revise level of observation;maintain safe secure environment;reality orientation;simple, clear language;encourage nutrition and hydration;encourage participation / independence with adls;provide emotional support;establish therapeutic relationship;assist with developing & utilizing healthy coping strategies;build upon strengths;assess patient response to medication;assess medication adherance   Social and Therapeutic Interventions (Psychotic " Symptoms) encourage socialization with peers;encourage effective boundaries with peers;encourage participation in therapeutic groups and milieu activities

## 2017-05-29 NOTE — PROGRESS NOTES
"Pt was visible in the milieu with peers for most of this shift. Pt denies SI/SIB, depression, and anxiety at this time. Pt reports, \"I'm a lot calmer today. I've accepted that I can't leave.\" Pt is less irritable and more flat today.       05/28/17 2081   Behavioral Health   Hallucinations denies / not responding to hallucinations   Thinking distractable   Orientation place: oriented;date: oriented;time: oriented   Memory baseline memory   Insight poor   Judgement impaired   Affect blunted, flat   Mood mood is calm   Physical Appearance/Attire attire appropriate to age and situation;appears stated age   Hygiene well groomed   Suicidality other (see comments)  (Denies)   Self Injury other (see comment)  (Denies)   Activity other (see comment)  (Visible in milieu, sitting in lounge with peers)   Speech clear;coherent   Medication Sensitivity no stated side effects   Psychomotor / Gait balanced;steady   Psycho Education   Type of Intervention 1:1 intervention   Response participates, initiates socially appropriate   Hours 0.5   Treatment Detail Self-Reflection & Planning, MH Check-in   Activities of Daily Living   Hygiene/Grooming independent   Oral Hygiene independent   Dress scrubs (behavioral health)   Laundry with supervision   Room Organization independent   Behavioral Health Interventions   Psychotic Symptoms maintain safety precautions;monitor need to revise level of observation;maintain safe secure environment;reality orientation;simple, clear language;decrease environmental stimulation   Social and Therapeutic Interventions (Psychotic Symptoms) encourage socialization with peers;encourage effective boundaries with peers;encourage participation in therapeutic groups and milieu activities     "

## 2017-05-30 PROCEDURE — 25000132 ZZH RX MED GY IP 250 OP 250 PS 637: Performed by: CLINICAL NURSE SPECIALIST

## 2017-05-30 PROCEDURE — 25000132 ZZH RX MED GY IP 250 OP 250 PS 637: Performed by: PSYCHIATRY & NEUROLOGY

## 2017-05-30 PROCEDURE — 12400001 ZZH R&B MH UMMC

## 2017-05-30 RX ADMIN — Medication 2 TABLET: at 09:08

## 2017-05-30 RX ADMIN — OLANZAPINE 30 MG: 15 TABLET, ORALLY DISINTEGRATING ORAL at 21:44

## 2017-05-30 RX ADMIN — OLANZAPINE 10 MG: 10 TABLET, ORALLY DISINTEGRATING ORAL at 09:08

## 2017-05-30 ASSESSMENT — ACTIVITIES OF DAILY LIVING (ADL)
LAUNDRY: WITH SUPERVISION
ORAL_HYGIENE: INDEPENDENT
DRESS: STREET CLOTHES
GROOMING: INDEPENDENT

## 2017-05-30 NOTE — PROGRESS NOTES
"Mother (who is an RN) on the unit and upset that patient is being given Zyprexa.  \"That medication doesn't work.  My son gained 50lbs on it as well.\"  Wants the doctor to try something else because she feels it isn't helping him.  Wants him referred to the [employer] [facility] which this writer can do once his insurance is in place.  She planned to call Marta in the [employer] to let Marta know that she had gotten the [employer] form to the office on time.  Wants the doctor to give her a call (Catalina Carter at [phone]).  "

## 2017-05-30 NOTE — PLAN OF CARE
"Problem: Psychotic Symptoms  Goal: Psychotic Symptoms  -Pt will deny SI  -Pt will report absence of self-harm thoughts  -Pt will remain free of self-injurious behaviors on unit  -Pt will report rationale for abstaining from chemical substances  -Pt will participate in 50% of groups on unit    -Pt speech will be regular rate and volume  -Pt will report stabilization of mood  -Pt will verbalize improved sleep  -Pt will maintain appropriate boundaries on unit    -Pt will report absence of AH/VH  -Pt speech will be less remarkable for paranoid/delusional themes  -Pt will verbalize absence of HISigns and symptoms of listed problems will be absent or manageable.   Outcome: No Change  Pt mostly isolative to his room, mom visited this morning to help pt sign in consent for treatment. Pt did appeared irritable on/off while mom here and super vigilante on what mom was discussing with staff. Appears easily frustrated by mom reading consent form over and over and insisting she talk  to  and that she understands form well before pt could sign. After mom left, pt appears much calmer though remains isolative with c/o headache. Denies all mental health symptoms but tells writer will go to treatment so \"I can move on with my life and get a job like I did last time\". Reports feels a \"little hopeful\". Denies SI, SIB. No overt delusional comments. Denies s/e from medications today.      "

## 2017-05-30 NOTE — PROGRESS NOTES
[facility]   Dr. CHAPARRITA OLIVO'S  Psychiatric Progress Note    Patient:  Guero Carter   Medical Record Number:  [id number]  Room:  02/N102-01  :  1990  Date Seen:  May 29, 2017        Interim History:   The patient's care was discussed with the treatment team and chart notes were reviewed.    Seen with x2 brothers ;Magan and Ed; they are prepared to be available and provide support   Patient is willing to sign on voluntary basis   Has been in agreement to stay on for minimum of x8 days   Is aware of the fact that for now he is converted to voluntary basis ; thus the hold is Dc     Is electing that I not call the mother     Is still struggling with level of overt paranoia and low trust   Is requesting to transfer to PCP that he does and can trust ; patient encouraged to pursue this through the staff ;     Psychiatric ROS:  Mood:depressed and anxious  Sleep:No concerns, sleeps well through night  Appetite:decreased  Eating:normal  Energy Level:LOW  Concentration/Memory Problems:Yes  Suicidal Thoughts:No  Homicidal Thoughts:No  Psychotic Symptoms: Yes marked degree of paranoia  Medication Side Effects:No  Medication Compliance:Yes   Physical Complaints:normal         Medications:     Current Facility-Administered Medications   Medication     OLANZapine zydis (zyPREXA) ODT tab 30 mg     [START ON 2017] OLANZapine zydis (zyPREXA) ODT tab 10 mg     SUMAtriptan (IMITREX) tablet 50 mg     ibuprofen (ADVIL/MOTRIN) tablet 800 mg     GUMMY VITAMINS & MINERALS chewable tablet 2 tablet     hydrOXYzine (ATARAX) tablet 25-50 mg     acetaminophen (TYLENOL) tablet 650 mg     alum & mag hydroxide-simethicone (MYLANTA ES/MAALOX  ES) suspension 30 mL     magnesium hydroxide (MILK OF MAGNESIA) suspension 30 mL     bisacodyl (DULCOLAX) Suppository 10 mg     traZODone (DESYREL) tablet 50 mg     OLANZapine (zyPREXA) tablet 10 mg    Or     OLANZapine (zyPREXA) injection 10 mg      nicotine polacrilex (NICORETTE) gum 2 mg             Allergies:   No Known Allergies         Psychiatric Examination:   /71  Pulse 65  Temp 96.2  F (35.7  C)  Resp 16  SpO2 96%  Weight is 0 lbs 0 oz  There is no height or weight on file to calculate BMI.    Appearance:  adequately groomed  Attitude:  cooperative and guarded  Eye Contact:  intense  Mood:  angry and anxious  Affect:  intensity is exaggerated  Speech:  clear, coherent  Psychomotor Behavior:  no evidence of tardive dyskinesia, dystonia, or tics  Throught Process:  evidence of thought blocking present and illogical  Associations:  no loose associations  Thought Content:  no auditory hallucinations present and no visual hallucinations present  Insight:  limited  Judgement:  limited  Oriented to:  time, person, and place  Attention Span and Concentration:  limited  Recent and Remote Memory:  limited  Gait:Slow    Risk/Potential for Dangerousness:  Multiple Active Diagnoses:LOW  Self Care:LOW  Suicide:LOW  Assault:LOW  Self Injurious Behaviors:LOW  Inappropriate Sexual Behavior:LOW         Labs:   No results found for this or any previous visit (from the past 24 hour(s)).       Impression:   This is a 27 year old male  Per admission          DIagnoses:   DIAGNOSES, DSM-5/ICD 10:  Include the followin.  Psychosis, not otherwise specified.   2.  Delusion, paranoid type.   3.  Past history of polysubstance use and abuse.   4.  General anxiety with posttraumatic stress.   5.  Chemical dependency in the past.   6.  Premorbid personality schizoid.    ICD-10-CM    1. Acute psychosis F23 Drug abuse screen 8 urine     Comprehensive metabolic panel     CBC with platelets differential     TSH with free T4 reflex and/or T3 as indicated     Lipid panel   2. Polysubstance abuse F19.10                 Plan:     Med Changes as Follows:none  Discussed Risks/Benefits/Side Effects/Alternatives: YES  Able to give informed consent:  YES   Precautions:none     Code:1  Legal Status:off the hold ;trial of voluntary   Expected Disposition:per patient and staff    Kirt Sherwood

## 2017-05-30 NOTE — PROGRESS NOTES
"Patient did have a \"goodshift\" , stated daily goal to \" attend every group, socialize,get better,improve and not to be derpressed\"   Pt. plans towards discharge include\" waiting to talk to a doctor and CTC\".  Pt .attended, participate in groups,socialized and  was visible in the milieu.   Pt appears normal for age, alert and calm  Pt indicated feeling depressed, anxious and paranoid  Patient had a poor  concentration  Patient is cooperative, pleasant and calm, affect is subdued,flat but is hopeful.   Patient denies pain. Patient ate 100%.  Patient denies current or recent suicidal ideation ,Pt denies SIB  HI: denies current or recent homicidal ideation or behaviors.  Hallucinations: NO  auditory and visual hallucination  Patient is progressing toward goals.   Concerns (explain) - \"Making a living without someone feeling sympathy for me due to my mental illness\"  Patient evaluation continues as patient is encouraged to participate in groups an develop healthy coping skills.   05/29/17 9925   Behavioral Health   Hallucinations denies / not responding to hallucinations   Thinking distractable;paranoid;poor concentration   Orientation person: oriented;place: oriented;date: oriented;time: oriented   Memory confabulation;baseline memory   Insight admits / accepts;poor   Judgement impaired   Eye Contact at examiner   Affect blunted, flat;full range affect   Mood depressed;anxious;mood is calm   Physical Appearance/Attire attire appropriate to age and situation;neat   Suicidality (Pt denies SI/SIB)   Self Injury (Pt denies SI/SIB)   Activity (Pt participated in grps,socialized, visible in the milieu)   Speech tangential;clear;coherent   Medication Sensitivity no stated side effects;no observed side effects   Psychomotor / Gait balanced;steady   Psycho Education   Type of Intervention 1:1 intervention   Response participates, initiates socially appropriate   Hours 0.5   Activities of Daily Living   Hygiene/Grooming " independent   Oral Hygiene independent   Dress independent   Laundry unable to complete   Activity   Activity Level of Assistance independent   Behavioral Health Interventions   Psychotic Symptoms encourage nutrition and hydration;encourage participation / independence with adls;provide emotional support;establish therapeutic relationship;assist with developing & utilizing healthy coping strategies;build upon strengths

## 2017-05-30 NOTE — PLAN OF CARE
Problem: General Plan of Care (Inpatient Behavioral)  Goal: Individualization/Patient Specific Goal (IP Behavioral)  The patient and/or their representative will achieve their patient-specific goals related to the plan of care.    The patient-specific goals include:  Illness Management Recovery model: Objectives    Patient will identify reason(s) for hospitalization from their perspective.  Patient will identify a minimum of three goals for discharge.  Patient will identify a minimum of three triggers that may increase their symptoms.  Patient will identify a minimum of three coping skills they can do to stay well.   Patient will identify their support system to demonstrate readiness for discharge.   Outcome: No Change  Illness Management Recovery model:  Wellness Strategies.     Patient identified the following actions/activities they can do to stay well.     1. Go to treatment  2. Get a job  3. Move on with my life.
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
## 2017-05-31 PROCEDURE — 12400001 ZZH R&B MH UMMC

## 2017-05-31 PROCEDURE — 25000132 ZZH RX MED GY IP 250 OP 250 PS 637: Performed by: CLINICAL NURSE SPECIALIST

## 2017-05-31 PROCEDURE — 25000132 ZZH RX MED GY IP 250 OP 250 PS 637: Performed by: PSYCHIATRY & NEUROLOGY

## 2017-05-31 RX ADMIN — Medication 2 TABLET: at 10:33

## 2017-05-31 RX ADMIN — OLANZAPINE 10 MG: 10 TABLET, ORALLY DISINTEGRATING ORAL at 10:33

## 2017-05-31 RX ADMIN — OLANZAPINE 30 MG: 15 TABLET, ORALLY DISINTEGRATING ORAL at 19:49

## 2017-05-31 ASSESSMENT — ACTIVITIES OF DAILY LIVING (ADL)
ORAL_HYGIENE: INDEPENDENT
DRESS: STREET CLOTHES;INDEPENDENT
LAUNDRY: WITH SUPERVISION
HYGIENE/GROOMING: HANDWASHING;SHOWER;INDEPENDENT

## 2017-05-31 NOTE — PROGRESS NOTES
"[facility]   Dr. CHAPARRITA OLIVO'S  Psychiatric Progress Note    Patient:  Guero Carter   Medical Record Number:  [id number]  Room:  02/N102-  :  1990  Date Seen:  May 31, 2017        Interim History:   The patient's care was discussed with the treatment team and chart notes were reviewed.    Patient seen in room ;then brought down to interview room     Allowing mother to join ; mother had plans to call and come in to meet ; has not shown    Has been calling ; with mother voicing concern that the zyprexa has not been effective in the past       Patient offers that the zypexa has allowed him to fall asleep and get some rest     That \" his mind is feeling calmer and not so racing \"   That in fact he does feel more at peace and is ready to start a new day :    did apoligize for being so negative in the last meeting :   With patient correcting the fact that he feels he cannot trust people :   He does emphasize the following :      He feels that generally and overall that he can trust them ; that includes x2 brother : Ronaldo and Analisa ;and that his mother \" I feel fine with my mother that she is trying to do her best \"     Has been attending some of the groups ;    One day went to them all     Has missed some in the morning ;     Has requested that staff actively engage him and that he is more sleepy in that he is on more day time medications ;    As to meeting with mother ; patient mother on the phone ;  Is open to meeting on unit ; in next x2 day ;   note mothers concern      Psychiatric ROS:  Mood:depressed and anxious  Sleep:No concerns, sleeps well through night  Appetite:decreased  Eating:normal  Energy Level:LOW  Concentration/Memory Problems:Yes  Suicidal Thoughts:No  Homicidal Thoughts:No  Psychotic Symptoms: Yes marked degree of paranoia  Medication Side Effects:No  Medication Compliance:Yes   Physical Complaints:normal         Medications:     Current " Facility-Administered Medications   Medication     OLANZapine zydis (zyPREXA) ODT tab 30 mg     OLANZapine zydis (zyPREXA) ODT tab 10 mg     SUMAtriptan (IMITREX) tablet 50 mg     ibuprofen (ADVIL/MOTRIN) tablet 800 mg     GUMMY VITAMINS & MINERALS chewable tablet 2 tablet     hydrOXYzine (ATARAX) tablet 25-50 mg     acetaminophen (TYLENOL) tablet 650 mg     alum & mag hydroxide-simethicone (MYLANTA ES/MAALOX  ES) suspension 30 mL     magnesium hydroxide (MILK OF MAGNESIA) suspension 30 mL     bisacodyl (DULCOLAX) Suppository 10 mg     traZODone (DESYREL) tablet 50 mg     OLANZapine (zyPREXA) tablet 10 mg    Or     OLANZapine (zyPREXA) injection 10 mg     nicotine polacrilex (NICORETTE) gum 2 mg             Allergies:   No Known Allergies         Psychiatric Examination:   /71  Pulse 65  Temp 97.5  F (36.4  C)  Resp 16  SpO2 96%  Weight is 0 lbs 0 oz  There is no height or weight on file to calculate BMI.    Appearance:  adequately groomed  Attitude:  cooperative and guarded  Eye Contact:  intense  Mood:  angry and anxious  Affect:  intensity is exaggerated  Speech:  clear, coherent  Psychomotor Behavior:  no evidence of tardive dyskinesia, dystonia, or tics  Throught Process:  evidence of thought blocking present and illogical  Associations:  no loose associations  Thought Content:  no auditory hallucinations present and no visual hallucinations present  Insight:  limited  Judgement:  limited  Oriented to:  time, person, and place  Attention Span and Concentration:  limited  Recent and Remote Memory:  limited  Gait:Slow    Risk/Potential for Dangerousness:  Multiple Active Diagnoses:LOW  Self Care:LOW  Suicide:LOW  Assault:LOW  Self Injurious Behaviors:LOW  Inappropriate Sexual Behavior:LOW         Labs:   No results found for this or any previous visit (from the past 24 hour(s)).       Impression:   This is a 27 year old male  Per admission          DIagnoses:   DIAGNOSES, DSM-5/ICD 10:  Include the  followin.  Psychosis, not otherwise specified.   2.  Delusion, paranoid type.   3.  Past history of polysubstance use and abuse.   4.  General anxiety with posttraumatic stress.   5.  Chemical dependency in the past.   6.  Premorbid personality schizoid.    ICD-10-CM    1. Acute psychosis F23 Drug abuse screen 8 urine     Comprehensive metabolic panel     CBC with platelets differential     TSH with free T4 reflex and/or T3 as indicated     Lipid panel   2. Polysubstance abuse F19.10                 Plan:     Med Changes as Follows:none  Discussed Risks/Benefits/Side Effects/Alternatives: YES  Able to give informed consent:  YES   Precautions:none    Code:1  Legal Status:off the hold ;trial of voluntary   Expected Disposition:per patient and staff    Kirt Sherwood

## 2017-05-31 NOTE — PROGRESS NOTES
05/30/17 2973   Behavioral Health   Hallucinations denies / not responding to hallucinations   Thinking paranoid;distractable   Orientation person: oriented;place: oriented   Memory baseline memory   Insight admits / accepts   Judgement impaired   Eye Contact at examiner   Affect blunted, flat   Mood depressed   Physical Appearance/Attire attire appropriate to age and situation   Hygiene well groomed   Suicidality other (see comments)  (denies currently)   Self Injury other (see comment)  (denies currently)   Activity withdrawn   Speech coherent;clear     Pt was intermittently in his room and in the milieu. Pt was mostly withdrawn with minimal social interaction. Pt appeared to be exhibiting some paranoia. Mood was calm and affect is flat. Pt denies SI/SIB. No behavioral concerns.

## 2017-05-31 NOTE — PROGRESS NOTES
Pt has been very isolative and quiet today, coming out of his room only for meals. Pt declined a 1:1 with his staff but did deny SI and SIB thoughts. Pt also denied hallucinations. Pt's affect has been blunted and never changing.     05/31/17 1516   Behavioral Health   Hallucinations denies / not responding to hallucinations   Thinking poor concentration;distractable   Orientation person: oriented;place: oriented   Memory baseline memory   Insight poor   Judgement impaired   Eye Contact at examiner  (brief)   Affect blunted, flat   Mood mood is calm   Physical Appearance/Attire appears stated age;attire appropriate to age and situation   Hygiene (adequate)   Suicidality (denies)   Self Injury (denies)   Activity isolative;withdrawn   Speech coherent;clear   Medication Sensitivity no stated side effects;no observed side effects   Psychomotor / Gait balanced;steady   Psycho Education   Type of Intervention 1:1 intervention   Response (declined)   Hours 0.5   Treatment Detail (warning signs)   Behavioral Health Interventions   Psychotic Symptoms maintain safety precautions;monitor need to revise level of observation;maintain safe secure environment;reality orientation;simple, clear language;encourage nutrition and hydration;encourage participation / independence with adls;provide emotional support;establish therapeutic relationship;assist with developing & utilizing healthy coping strategies;assess patient response to medication;assess medication adherance;monitor confusion, memory loss, decision making ability and reorient / intervent as needed   Social and Therapeutic Interventions (Psychotic Symptoms) encourage socialization with peers;encourage effective boundaries with peers;encourage participation in therapeutic groups and milieu activities

## 2017-06-01 PROCEDURE — 12400001 ZZH R&B MH UMMC

## 2017-06-01 PROCEDURE — 25000132 ZZH RX MED GY IP 250 OP 250 PS 637: Performed by: PSYCHIATRY & NEUROLOGY

## 2017-06-01 PROCEDURE — 25000132 ZZH RX MED GY IP 250 OP 250 PS 637: Performed by: CLINICAL NURSE SPECIALIST

## 2017-06-01 RX ADMIN — OLANZAPINE 10 MG: 10 TABLET, ORALLY DISINTEGRATING ORAL at 09:33

## 2017-06-01 RX ADMIN — Medication 2 TABLET: at 09:32

## 2017-06-01 RX ADMIN — OLANZAPINE 30 MG: 15 TABLET, ORALLY DISINTEGRATING ORAL at 19:06

## 2017-06-01 ASSESSMENT — ACTIVITIES OF DAILY LIVING (ADL)
LAUNDRY: WITH SUPERVISION
DRESS: INDEPENDENT
GROOMING: PROMPTS
ORAL_HYGIENE: PROMPTS
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
## 2017-06-01 NOTE — PROGRESS NOTES
[facility]   Dr. CHAPARRITA OLIVO'S  Psychiatric Progress Note    Patient:  Guero Carter   Medical Record Number:  [id number]  Room:  N102/N102-01  :  1990  Date Seen:  2017        Interim History:   The patient's care was discussed with the treatment team and chart notes were reviewed.    Patient seen independently ; also conjoint meeting held ; that included patient then patient mother ;  Note mother sister available for support with patient electing for this individual not be part of the meeting   SW  Of the unit joined in latter part of meeting ;     Out lined at the meeting included the following fact :   Patient had agreed to sign on voluntary basis ;   Patient has been in agreement to remain in hospital for period of x8/9 days to prepare for discharge ( started 17)   That he is accepting of all the medications prescribed ; and attend all of the adjunctive therapies of the unit   That he will follow up on the recommendation for Day treatment program at  starting 17   That he will follow up with out patient mental health providers ;including psychiatry and therapist     That he had been in agreement for supervision to his medications ; including the support that had been offered to include x2 brother Luh and Ronaldo     Mother had been concerned as to the lack of benefit from the zyprexa and that previously this too had been ineffective ;   In this regard one is open to converting to second neuroleptic ; with patient in full agreement     Mother is concern for placement at the time of discharge ; with her offering her household ;  With proviso of total sobriety ; and full adherence and full compliance     Note patient remains in contentious stance during most of the interactions with his mother ;   Did mention that patient has asked for alternative psychiatrist ; in last x1 visit ;thus to seek second opinion and asked for staff to schedule second  opinion and also opportunity for DR BK Vilchis to handle case .     Psychiatric ROS:  Mood:depressed, anxious, labile and with ongoing fluctuation to patient presentation   Sleep:No concerns, sleeps well through night  Appetite:normal  Eating:normal  Energy Level:LOW  Concentration/Memory Problems:Yes  Suicidal Thoughts:No  Homicidal Thoughts:No  Psychotic Symptoms: No: demonstrating residual concern in paranoidal manner ;   Medication Side Effects:No  Medication Compliance:Yes   Physical Complaints:normal         Medications:     Current Facility-Administered Medications   Medication     OLANZapine zydis (zyPREXA) ODT tab 30 mg     OLANZapine zydis (zyPREXA) ODT tab 10 mg     SUMAtriptan (IMITREX) tablet 50 mg     ibuprofen (ADVIL/MOTRIN) tablet 800 mg     GUMMY VITAMINS & MINERALS chewable tablet 2 tablet     hydrOXYzine (ATARAX) tablet 25-50 mg     acetaminophen (TYLENOL) tablet 650 mg     alum & mag hydroxide-simethicone (MYLANTA ES/MAALOX  ES) suspension 30 mL     magnesium hydroxide (MILK OF MAGNESIA) suspension 30 mL     bisacodyl (DULCOLAX) Suppository 10 mg     traZODone (DESYREL) tablet 50 mg     OLANZapine (zyPREXA) tablet 10 mg    Or     OLANZapine (zyPREXA) injection 10 mg     nicotine polacrilex (NICORETTE) gum 2 mg             Allergies:   No Known Allergies         Psychiatric Examination:   /71  Pulse 65  Temp 96.1  F (35.6  C)  Resp 16  Wt 98.9 kg (218 lb)  SpO2 96%  BMI 27.25 kg/m2  Weight is 218 lbs 0 oz  Body mass index is 27.25 kg/(m^2).    Appearance:  adequately groomed and appeared as age stated  Attitude:  cooperative and guarded  Eye Contact:  intense  Mood:  angry, anxious and sad   Affect:  intensity is exaggerated  Speech:  pressured speech  Psychomotor Behavior:  no evidence of tardive dyskinesia, dystonia, or tics  Throught Process:  tangental and seems to perseverate on the same issues ;   Associations:  no loose associations  Thought Content:  no auditory hallucinations  present, no visual hallucinations present and obsessions present  Insight:  limited  Judgement:  limited  Oriented to:  time, person, and place  Attention Span and Concentration:  fair  Recent and Remote Memory:  fair  Gait:Normal    Risk/Potential for Dangerousness:  Multiple Active Diagnoses:LOW  Self Care:MODERATE  Suicide:LOW  Assault:LOW  Self Injurious Behaviors:LOW  Inappropriate Sexual Behavior:LOW         Labs:   No results found for this or any previous visit (from the past 24 hour(s)).       Impression:   This is a 27 year old male  Has been fully compliant and in fully adherence ; yet with residual symptoms of paranoia and then distrust ; with hostility directed at the family members ;in particular the mother .          DIagnoses:       ICD-10-CM    1. Acute psychosis F23 Drug abuse screen 8 urine     Comprehensive metabolic panel     CBC with platelets differential     TSH with free T4 reflex and/or T3 as indicated     Lipid panel   2. Polysubstance abuse F19.10    DIAGNOSES, DSM-5/ICD 10:  Include the followin.  Psychosis, not otherwise specified.   2.  Delusion, paranoid type.   3.  Past history of polysubstance use and abuse.   4.  General anxiety with posttraumatic stress.   5.  Chemical dependency in the past.   6.  Premorbid personality schizoid.      ICD-10-CM     1. Acute psychosis F23 Drug abuse screen 8 urine       Comprehensive metabolic panel       CBC with platelets differential       TSH with free T4 reflex and/or T3 as indicated       Lipid panel   2. Polysubstance abuse F19.10                    Plan:     Med Changes as Follows:none  Discussed Risks/Benefits/Side Effects/Alternatives: YES  Able to give informed consent:  YES   Precautions:none    Code:1  Legal Status:remains on voluntary basis   Expected Disposition:per second opinion ; foresee transferring care to alternative PCP /psychiatrist      Kirt Sherwood

## 2017-06-01 NOTE — PLAN OF CARE
"Problem: Psychotic Symptoms  Goal: Psychotic Symptoms  -Pt will deny SI  -Pt will report absence of self-harm thoughts  -Pt will remain free of self-injurious behaviors on unit  -Pt will report rationale for abstaining from chemical substances  -Pt will participate in 50% of groups on unit    -Pt speech will be regular rate and volume  -Pt will report stabilization of mood  -Pt will verbalize improved sleep  -Pt will maintain appropriate boundaries on unit    -Pt will report absence of AH/VH  -Pt speech will be less remarkable for paranoid/delusional themes  -Pt will verbalize absence of HISigns and symptoms of listed problems will be absent or manageable.   Outcome: No Change  Patient up for meals only, otherwise sleeping and resting in bed all morning. Brother came to visit at lunchtime and patient momentarily brightened with this encounter. Mood reported as \"fine\". Affect flat, tense, guarded, slightly irritable (more irritable this am, more pleasant after brother visited). He denies any current AH or VH. He denies any SI or self harm ideation. No overt paranoid statements made. He denied that sedation and sleeping all day is a SE of medications. \"I wasn't really sleeping before I came in, only four hours or less a night, so\", explaining that he feels he might be \"catching up\". Stated appetite intact. He verbalized that he hopes to find out about his discharge plan in the meeting this afternoon with his mother and the MD.    "

## 2017-06-01 NOTE — PROGRESS NOTES
Patient's mother requesting a structured day program for patient after discharge.  Generated an order to the [facility] today since the patient now has MA.  Dr. Sherwood set up a meeting with the patient's mother and two brothers at 4:30pm today to discuss progress and medications.     Order sent in for a [facility] intake.  Now scheduled for next Wednesday, Jun 7th at 9am with Olinda Mitchell.  If patient is still here, she will see him on the unit otherwise he will have to come back in.

## 2017-06-02 PROCEDURE — 99207 ZZC CDG-MDM COMPONENT: MEETS LOW - DOWN CODED: CPT | Performed by: PSYCHIATRY & NEUROLOGY

## 2017-06-02 PROCEDURE — 90847 FAMILY PSYTX W/PT 50 MIN: CPT

## 2017-06-02 PROCEDURE — 25000132 ZZH RX MED GY IP 250 OP 250 PS 637: Performed by: CLINICAL NURSE SPECIALIST

## 2017-06-02 PROCEDURE — 99232 SBSQ HOSP IP/OBS MODERATE 35: CPT | Performed by: PSYCHIATRY & NEUROLOGY

## 2017-06-02 PROCEDURE — 25000132 ZZH RX MED GY IP 250 OP 250 PS 637: Performed by: PSYCHIATRY & NEUROLOGY

## 2017-06-02 PROCEDURE — 12400001 ZZH R&B MH UMMC

## 2017-06-02 RX ADMIN — OLANZAPINE 10 MG: 10 TABLET, ORALLY DISINTEGRATING ORAL at 08:43

## 2017-06-02 RX ADMIN — OLANZAPINE 30 MG: 15 TABLET, ORALLY DISINTEGRATING ORAL at 20:05

## 2017-06-02 RX ADMIN — Medication 2 TABLET: at 08:43

## 2017-06-02 ASSESSMENT — ACTIVITIES OF DAILY LIVING (ADL)
DRESS: INDEPENDENT
GROOMING: INDEPENDENT
ORAL_HYGIENE: INDEPENDENT

## 2017-06-02 NOTE — PROGRESS NOTES
Spoke with patient's mother Catalina who called for an update on the second opinion.  Let her know that Dr. Vilchis saw the patient and felt he was doing well on the Zyprexa so had no plans to change medications.  Let her know that we will order  Home Health Nursing to set up the patient's medications and she was agreeable.  Mother plans to come in on Monday at 2pm to meet with patient in presence of this writer to have him sign a behavioral contract.  Patient said he can leave on Tuesday and she wants to make sure she has his signed contract prior to him arriving home.     Issues with  Home Health who are at capacity for psychiatric home care.  Patient's have to be seeing a  Primary Care.  Set up the patient with Dr. Melodie Hobbs at [facility] on Thursday, June 8th at 9:20am.  Had multiple discussions with patient's mother Catalina who is an RN at [employer].  She had given us a referral to a psychiatrist in the [facility] but their intake would not take patient because he did was not on his mother's insurance.  Let Catalina know about this.  Told her patient has the appt at the end of next week with the [facility] and she said she would like to try and get him into a psychiatrist at [facility] which would be ideal.  This writer had called around to psychiatric providers but most had waiting lists for MA patients or they were not accepting new patients at this time.

## 2017-06-02 NOTE — PROGRESS NOTES
06/02/17 1532   Behavioral Health   Hallucinations denies / not responding to hallucinations   Thinking paranoid;poor concentration   Orientation person: oriented;date: oriented;place: oriented;time: oriented   Memory baseline memory   Insight poor   Judgement impaired   Eye Contact at examiner   Affect blunted, flat;tense   Mood mood is calm   Physical Appearance/Attire attire appropriate to age and situation;appears stated age   Hygiene well groomed   Suicidality other (see comments)  (denies currently)   Self Injury other (see comment)  (denies currently)   Activity isolative;withdrawn   Speech coherent;clear   Medication Sensitivity no stated side effects;no observed side effects   Psychomotor / Gait balanced;steady     Pt was withdrawn and isolative today. Pt's mood was calm, and affect flat and tense. Pt denies mental health symptoms. No SI/SIB. Pt reports that the plan is for him to be discharged Tuesday of next week.

## 2017-06-03 PROCEDURE — 12400001 ZZH R&B MH UMMC

## 2017-06-03 PROCEDURE — 25000132 ZZH RX MED GY IP 250 OP 250 PS 637: Performed by: CLINICAL NURSE SPECIALIST

## 2017-06-03 PROCEDURE — 25000132 ZZH RX MED GY IP 250 OP 250 PS 637: Performed by: PSYCHIATRY & NEUROLOGY

## 2017-06-03 RX ADMIN — OLANZAPINE 30 MG: 15 TABLET, ORALLY DISINTEGRATING ORAL at 20:08

## 2017-06-03 RX ADMIN — Medication 2 TABLET: at 08:29

## 2017-06-03 RX ADMIN — OLANZAPINE 10 MG: 10 TABLET, ORALLY DISINTEGRATING ORAL at 08:29

## 2017-06-03 NOTE — PROGRESS NOTES
"[facility], [place]   Psychiatric Progress Note        Interim History:   The patient's care was discussed with the treatment team during the daily team meeting and/or staff's chart notes were reviewed.  Staff report patient has been isolative, calm, cooperative.  I spoke with pt's CTC and Dr. Sherwood prior to completing the 2nd opinion.  Dr. Sherwood believes pt has improved considerably since admission and is reluctant to change his meds (as pt's mother wishes) given the plan to discharge into Day Tx next week.  Pt's mother apparently worries that Zyprexa will cause weight gain like it did 2 years ago, which may lead pt to stop taking it (again, as is what happened 2 years ago; thus this is a reasonable concern).  Pt has never tried any other antipsychotics.  Pt tells me today that he likes the Zyprexa and is feeling well currently.  When asked what has improved on the Zyprexa, he says \"I'm sleeping better; I was sleeping really badly for most of the last year.\"  He says also \"my mood is more calm.\"  When asked about paranoia, pt denies but says when he wasn't sleeping well he had poor memory and couldn't finish tasks.  When asked if he is worried about weight gain, he acknowledges that this was a problem 2 years ago but he says \"I was living in a building that had free donuts every morning and I was eating way too many of them.\"  He says he plans to eat salads, fruits and vegetables and \"leave out deserts\" so he does not believe the weight gain will be as much of a problem.  He says he also exercises (push ups, situps, biking).  He prefers to stay on Zyprexa rather than switch to another medication.  He says he will attend the Day Tx program here, says he found the Day Tx program at [facility] \"somewhat helpful\" and so he anticipates the same.  The patient had no further complaints or requests.          Medications:       OLANZapine zydis  30 mg Oral At Bedtime     OLANZapine zydis  " 10 mg Oral QAM     GUMMY VITAMINS & MINERALS  2 tablet Oral Daily          Allergies:   No Known Allergies       Labs:   No results found for this or any previous visit (from the past 24 hour(s)).       Psychiatric Examination:     /71  Pulse 65  Temp 96.1  F (35.6  C)  Resp 16  Wt 98.9 kg (218 lb)  SpO2 96%  BMI 27.25 kg/m2  Weight is 218 lbs 0 oz  Body mass index is 27.25 kg/(m^2).                Sitting Orthostatic BP: 140/85      Sitting Orthostatic Pulse: 58 bpm      Standing Orthostatic BP: 136/88      Standing Orthostatic Pulse: 83 bpm         Appearance: alert  Attitude:  cooperative  Eye Contact:  fair  Mood:  anxious  Affect:  constricted and mood congruent  Speech:  no speech abnormalities  Psychomotor Behavior:  no evidence of tardive dyskinesia, dystonia, or tics, and intact station, gait and muscle tone, some fidgeting  Throught Process:  goal oriented  Associations:  no loose associations  Thought Content:  no evidence of suicidal ideation or homicidal ideation and no evidence of psychotic thought  Insight:  fair  Judgement:  Intact  Language: Appropriate  Fund of Knowledge: Average  Oriented to:  time, person, and place  Attention Span and Concentration:  intact  Recent and Remote Memory:  intact              Precautions:     Behavioral Orders   Procedures     Assault precautions     Code 1 - Restrict to Unit     Routine Programming     As clinically indicated     Status 15     Every 15 minutes.          DIagnoses:     Schizophrenia vs psychotic disorder NOS  Alcohol and cannabis abuse by history         Plan:     Continue Zyprexa for psychosis; pt appears to improved significantly since admission and based on trajectory of improvement it seems likely that he will be ready to transition to the Day Tx program as planned next week.  There, his medication compliance and weight can be monitored and the medication can be adjusted as needed.  A change in meds at this point could destabilize pt  just prior to his planned discharge, and pt is not willing to stay longer to accomplish a med change (pt does not want a med change at all as he feels the Zyprexa is working well and he plans to control his weight with healthy food choices and exercise).

## 2017-06-03 NOTE — PLAN OF CARE
Problem: General Plan of Care (Inpatient Behavioral)  Goal: Individualization/Patient Specific Goal (IP Behavioral)  The patient and/or their representative will achieve their patient-specific goals related to the plan of care.    The patient-specific goals include:  Illness Management Recovery model: Objectives    Patient will identify reason(s) for hospitalization from their perspective.  Patient will identify a minimum of three goals for discharge.  Patient will identify a minimum of three triggers that may increase their symptoms.  Patient will identify a minimum of three coping skills they can do to stay well.   Patient will identify their support system to demonstrate readiness for discharge.   Outcome: No Change  Illness Management Recovery model:  Ways to [place].     Patient identified the following specific strategies to cope with their symptoms:     1. Out pt services  2. read  3. sleep

## 2017-06-04 PROCEDURE — 25000132 ZZH RX MED GY IP 250 OP 250 PS 637: Performed by: PSYCHIATRY & NEUROLOGY

## 2017-06-04 PROCEDURE — 25000132 ZZH RX MED GY IP 250 OP 250 PS 637: Performed by: CLINICAL NURSE SPECIALIST

## 2017-06-04 PROCEDURE — 12400001 ZZH R&B MH UMMC

## 2017-06-04 RX ADMIN — OLANZAPINE 10 MG: 10 TABLET, ORALLY DISINTEGRATING ORAL at 08:51

## 2017-06-04 RX ADMIN — Medication 2 TABLET: at 08:51

## 2017-06-04 RX ADMIN — OLANZAPINE 30 MG: 15 TABLET, ORALLY DISINTEGRATING ORAL at 19:13

## 2017-06-04 ASSESSMENT — ACTIVITIES OF DAILY LIVING (ADL)
RETIRED_COMMUNICATION: 0-->UNDERSTANDS/COMMUNICATES WITHOUT DIFFICULTY
GROOMING: INDEPENDENT
LAUNDRY: WITH SUPERVISION
SWALLOWING: 0-->SWALLOWS FOODS/LIQUIDS WITHOUT DIFFICULTY
TRANSFERRING: 0-->INDEPENDENT
BATHING: 0-->INDEPENDENT
PRIOR_FUNCTIONAL_LEVEL_COMMENT: INDEP
TOILETING: 0-->INDEPENDENT
FALL_HISTORY_WITHIN_LAST_SIX_MONTHS: NO
ORAL_HYGIENE: INDEPENDENT
COGNITION: 0 - NO COGNITION ISSUES REPORTED
DRESS: INDEPENDENT
DRESS: 0-->INDEPENDENT
RETIRED_EATING: 0-->INDEPENDENT
AMBULATION: 0-->INDEPENDENT

## 2017-06-04 NOTE — PLAN OF CARE
"Problem: Psychotic Symptoms  Goal: Psychotic Symptoms  -Pt will deny SI  -Pt will report absence of self-harm thoughts  -Pt will remain free of self-injurious behaviors on unit  -Pt will report rationale for abstaining from chemical substances  -Pt will participate in 50% of groups on unit    -Pt speech will be regular rate and volume  -Pt will report stabilization of mood  -Pt will verbalize improved sleep  -Pt will maintain appropriate boundaries on unit    -Pt will report absence of AH/VH  -Pt speech will be less remarkable for paranoid/delusional themes  -Pt will verbalize absence of HISigns and symptoms of listed problems will be absent or manageable.   Outcome: No Change  Pt had no goal for today.  Pts plans on discharging on Tuesday to [facility]. Pt states he will move there but would rather have his own apartment. Pt states he will out pt services. Pt did not attend group and has kept to himself.  Denies depression and states \"not really\" for anxiety.  Denies psychotic symptoms. Concentration \"ok\" and no racing thoughts. When pt stated no to being hopeful and went on to say that he did want to move in with his mother and he wanted to be more independent and feels he could be.  Pt denies SI and SIB.  Appetite and sleep are\"ok\" Pt has a headache but denies any need for medications or intervention.  States side effects of \"sleepiness\" then goes on say he wants to sleep because he was not sleeping well prior to admission.  Medication compliant.      "

## 2017-06-04 NOTE — PLAN OF CARE
"Problem: Psychotic Symptoms  Goal: Psychotic Symptoms  -Pt will deny SI  -Pt will report absence of self-harm thoughts  -Pt will remain free of self-injurious behaviors on unit  -Pt will report rationale for abstaining from chemical substances  -Pt will participate in 50% of groups on unit    -Pt speech will be regular rate and volume  -Pt will report stabilization of mood  -Pt will verbalize improved sleep  -Pt will maintain appropriate boundaries on unit    -Pt will report absence of AH/VH  -Pt speech will be less remarkable for paranoid/delusional themes  -Pt will verbalize absence of HISigns and symptoms of listed problems will be absent or manageable.   Outcome: Improving     RN ASSESSMENT     Met with patient to complete admission profile. Pt was cooperative and pleasant. Denies dep, anx, hallucinations, delusional thinking, racing or intrusive thoughts. States his sleep is much improved since starting Zyprexa and he is grateful for that and feels it is contributing to his recovery. Admits to having delusional thoughts and paranoia on admission, specifically that \"everyone was targeting me\" and he states he is able to recognize them as such presently. Pt seems sad about the prospect of living with mother at discharge, stating \"it's hard because I was living in my own apartment, now I'll be with her, and I won't have any money of my own.\" Also states he has some conflict with the sister currently also living with their mother and feels she infringes on his boundaries by taking the space he wants. He states \"they're redoing the basement for me, but I'll bet you anything she will try to take it from me.\" States she did so with his upstairs bedroom in the past. Encouraged pt to try and work out mutually agreeable plan with both mother and sister regarding living spaces. When asked what makes him happy in life, pt stated \"nothing really, shooting guns.\" States he does not have access to guns however. When pressed, " "he states he also likes \"dancing, music and girls.\" States he has no hobbies and no friends and was encouraged to begin trying to reach out and establish a social network after discharge. Affect flat. Stated at one point \"I believe in equalism, that's a word I invented. It means everybody deserves the same thing.\" Reassurance and support offered. Pt is med-compliant. Appetite, sleep, hygiene good. Continue with current treatment plan and recommendations. Continue to monitor and reassess symptoms. Monitor response to medications. Monitor progress towards treatment goals. Encourage groups and participation.      "

## 2017-06-04 NOTE — PLAN OF CARE
Problem: General Plan of Care (Inpatient Behavioral)  Goal: Individualization/Patient Specific Goal (IP Behavioral)  The patient and/or their representative will achieve their patient-specific goals related to the plan of care.    The patient-specific goals include:  Illness Management Recovery model: Objectives    Patient will identify reason(s) for hospitalization from their perspective.  Patient will identify a minimum of three goals for discharge.  Patient will identify a minimum of three triggers that may increase their symptoms.  Patient will identify a minimum of three coping skills they can do to stay well.   Patient will identify their support system to demonstrate readiness for discharge.   Outcome: No Change  Illness Management Recovery model:  Self-Reflection & Planning.     Assessed patient's progress completing forms related to Illness Management Recovery (including Personal Plan of Care, Adult Coping Plan, and My Support and Coping Plan) and assisted as needed.     Encouraged patient to continue to consider triggers, wellness strategies, early warning signs, feedback from others, actions to take to prevent relapse, and coping strategies as part of a plan to remain well after leaving the hospital.

## 2017-06-05 PROCEDURE — 12400001 ZZH R&B MH UMMC

## 2017-06-05 PROCEDURE — 25000132 ZZH RX MED GY IP 250 OP 250 PS 637: Performed by: CLINICAL NURSE SPECIALIST

## 2017-06-05 PROCEDURE — 25000132 ZZH RX MED GY IP 250 OP 250 PS 637: Performed by: PSYCHIATRY & NEUROLOGY

## 2017-06-05 PROCEDURE — 99238 HOSP IP/OBS DSCHRG MGMT 30/<: CPT | Performed by: PSYCHIATRY & NEUROLOGY

## 2017-06-05 RX ORDER — OLANZAPINE 15 MG/1
30 TABLET ORAL AT BEDTIME
Qty: 60 TABLET | Refills: 0 | Status: SHIPPED | OUTPATIENT
Start: 2017-06-05 | End: 2017-08-04

## 2017-06-05 RX ORDER — OLANZAPINE 10 MG/1
10 TABLET ORAL EVERY MORNING
Qty: 30 TABLET | Refills: 0 | Status: SHIPPED | OUTPATIENT
Start: 2017-06-06 | End: 2017-08-04

## 2017-06-05 RX ORDER — OLANZAPINE 15 MG/1
30 TABLET ORAL AT BEDTIME
Status: DISCONTINUED | OUTPATIENT
Start: 2017-06-05 | End: 2017-06-06 | Stop reason: HOSPADM

## 2017-06-05 RX ORDER — OLANZAPINE 10 MG/1
10 TABLET ORAL EVERY MORNING
Status: DISCONTINUED | OUTPATIENT
Start: 2017-06-06 | End: 2017-06-06 | Stop reason: HOSPADM

## 2017-06-05 RX ADMIN — OLANZAPINE 30 MG: 15 TABLET, FILM COATED ORAL at 20:19

## 2017-06-05 RX ADMIN — Medication 2 TABLET: at 08:49

## 2017-06-05 RX ADMIN — OLANZAPINE 10 MG: 10 TABLET, ORALLY DISINTEGRATING ORAL at 08:54

## 2017-06-05 NOTE — PROGRESS NOTES
Met with the patient and his mother to go over discharge appointments and her expectations for him while he lives with her.  This writer has set up a PCP appointment 6/8 through [facility] so he can access [facility] Psychiatric medication set-up.  Patient has a  Day Treatment Intake set up for 6/7.  Patient cannot return to [facility] until he writes an appeal letter to Dr. Hernandez since he self-terminated care to say why he did that and why he wants to come back.  Dr. Hernandez will decide if he can return to their offices.  Will also provide the patient with the [employer] phone number so he can arrange for rides to and from day programming.  Dr. Ruby ordered the discharge medications and put in an order for  home health.  This writer called  Home Health and they have the order and will call the patient and his mother either later tomorrow or Thursday to set up a time to come to the house.  Provided  Home Health with mother's address which is [address] [place] [address]  [phone] phone of [phone] (# on facesheet).  Patient's mother called unit to report she had gotten him in to see Psychiatrist Dr. Plasencia at [facility]. On June 28th at 9:48am and requested we send the MAR and H&P by fax to [phone].  They were successfully faxed over.
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
## 2017-06-05 NOTE — DISCHARGE INSTRUCTIONS
Behavioral Discharge Planning and Instructions    Summary: You were admitted with increasing delusional thoughts, paranoia, and auditory hallucinations. Non-compliant with your medications as well.  During your hospitalization, you met daily with the staff and were encouraged to attend all unit programming. A family meeting was held to discuss concerns and treatment planning. You have now been stabilized on medications and are being discharged today to live with your mother.    Main Diagnosis: Schizophrenia, Paranoid Type vs. Psychotic Disorder; History of THC Abuse    Major Treatments, Procedures and Findings: Take all medications as prescribed and keep all of the appointments that have been set up for you.    Symptoms to Report: feeling more aggressive, increased confusion, losing more sleep, mood getting worse or thoughts of suicide.    Psychiatry Follow-up:   Dr. Plasencia (Psychiatrist) - New Appointment on June 28th at 9:40am  (MAR & H&P faxed over successfully on 6/5/17).  [facility]  [phone]     Dr. Melodie Hobbs - New Appointment on June 8th at 9:20am  [facility]  Professional Building [facility]  [address]  [place], [place]  [phone]    [facility] ([phone])  Intake with Therapist Olinda Mitchell on Wednesday, June 7th at 9am  [address] at [facility] [address]  [facility] ([phone]) - A Psychiatric Nurse will call you to set up a time to meet with you to discuss medication set-up, questions you may have, etc.  either later in the day today (Tuesday) or Wednesday.    [employer] for Transportation--  Please call [phone] to register and set up your rides once you have your day treatment schedule. Your new Medical Assistance Member ID is [id number].  You need this to register.    Resources:   Crisis Intervention: 647.733.2095 or 607-515-9614 (TTY:  992.680.6183).  Call anytime for help.  National [place] on Mental Illness (www.[place].maritza.org): 106.290.3560 or 734-353-0624.    General Medication Instructions:   See your medication sheet(s) for instructions.   Take all medicines as directed.  Make no changes unless your doctor suggests them.   Go to all your doctor visits.  Be sure to have all your required lab tests. This way, your medicines can be refilled on time.  Do not use any drugs not prescribed by your doctor.  Avoid alcohol.    The treatment team has appreciated the opportunity to work with you.  We wish you the best in the future. If you have any questions or concerns our unit number is [phone].

## 2017-06-05 NOTE — PROGRESS NOTES
Patient's mother Catalina coming at 2:30pm to meet with patient and this writer.  She wants him to sign a behavioral contract since he will be living with her.  He has a new PCP appointment with [facility] with Dr. Melodie Hobbs on 6/8.   We needed this so he can access [facility] medication management since they are only accepting patients who have PCP at [place] at this time.  Patient has [facility] Intake at 9am on Wednesday 6/7

## 2017-06-05 NOTE — PROGRESS NOTES
Mostly isolative to room--however was agreeable to roommate, and now somewhat more visible. Denies mental health concerns--no overt delusional statements for this writer.  Pleasant and cooperative; planning for discharge in the morning.

## 2017-06-06 VITALS
DIASTOLIC BLOOD PRESSURE: 71 MMHG | TEMPERATURE: 97 F | SYSTOLIC BLOOD PRESSURE: 138 MMHG | HEART RATE: 65 BPM | OXYGEN SATURATION: 96 % | BODY MASS INDEX: 27.25 KG/M2 | WEIGHT: 218 LBS | RESPIRATION RATE: 16 BRPM

## 2017-06-06 PROCEDURE — 25000132 ZZH RX MED GY IP 250 OP 250 PS 637: Performed by: PSYCHIATRY & NEUROLOGY

## 2017-06-06 PROCEDURE — 25000132 ZZH RX MED GY IP 250 OP 250 PS 637: Performed by: CLINICAL NURSE SPECIALIST

## 2017-06-06 RX ADMIN — Medication 1 TABLET: at 09:26

## 2017-06-06 RX ADMIN — OLANZAPINE 10 MG: 10 TABLET, FILM COATED ORAL at 09:26
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
## 2017-06-06 NOTE — PLAN OF CARE
Pt was discharged from stn.30 this morning picked up by his father.  Discharge teaching, including f/u care and medication teaching, completed with pt and pt verbalized understanding.  Pt sent home with all his belongings.  Pt reports feeling safe to return home.  Pt denies SI/SIB/HI.
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
## 2017-06-06 NOTE — PROGRESS NOTES
Updated attending Dr. Ruby about incident on night shift and pt subsequent transfer to [facility] 30.
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
## 2017-06-07 ENCOUNTER — BEH TREATMENT PLAN (OUTPATIENT)
Dept: BEHAVIORAL HEALTH | Facility: CLINIC | Age: 27
End: 2017-06-07

## 2017-06-07 NOTE — PROGRESS NOTES
Acknowledgement of Current Treatment Plan       I have reviewed my treatment plan with my therapist / counselor on 8/10/2017. I agree with the plan as it is written in the electronic health record.    Name Signature   Guero Carter    Name of Therapist / Counselor    Olinda Davis, MSW, [facility]

## 2017-06-08 ENCOUNTER — OFFICE VISIT (OUTPATIENT)
Dept: FAMILY MEDICINE | Facility: CLINIC | Age: 27
End: 2017-06-08
Payer: MEDICAID

## 2017-06-08 VITALS
WEIGHT: 226.6 LBS | HEIGHT: 75 IN | BODY MASS INDEX: 28.17 KG/M2 | TEMPERATURE: 97.1 F | OXYGEN SATURATION: 96 % | DIASTOLIC BLOOD PRESSURE: 84 MMHG | SYSTOLIC BLOOD PRESSURE: 135 MMHG | HEART RATE: 96 BPM

## 2017-06-08 DIAGNOSIS — F20.9 SCHIZOPHRENIA, UNSPECIFIED TYPE (H): Primary | ICD-10-CM

## 2017-06-08 PROCEDURE — 99202 OFFICE O/P NEW SF 15 MIN: CPT | Performed by: PHYSICIAN ASSISTANT

## 2017-06-08 NOTE — MR AVS SNAPSHOT
After Visit Summary   6/8/2017    Guero Carter    MRN: [id number]           Patient Information     Date Of Birth          1990        Visit Information        Provider Department      6/8/2017 9:20 AM Melodie Hobbs PA-C [facility]        Today's Diagnoses     Schizophrenia, unspecified type (H)    -  1      Care Instructions    Follow up with psychiatry  Schedule general physical  Return to clinic for any new or worsening symptoms or go to ER Urgent care in off hours            Follow-ups after your visit        Additional Services     MENTAL HEALTH REFERRAL       Your provider has referred you to: [facility]: Psychiatry Clinic [facility] [phone]   [email or web address]    All scheduling is subject to the client's specific insurance plan & benefits, provider/location availability, and provider clinical specialities.  Please arrive 15 minutes early for your first appointment and bring your completed paperwork.    Please be aware that coverage of these services is subject to the terms and limitations of your health insurance plan.  Call member services at your health plan with any benefit or coverage questions.                  Who to contact     If you have questions or need follow up information about today's clinic visit or your schedule please contact [facility] directly at [phone].  Normal or non-critical lab and imaging results will be communicated to you by MyChart, letter or phone within 4 business days after the clinic has received the results. If you do not hear from us within 7 days, please contact the clinic through MyChart or phone. If you have a critical or abnormal lab result, we will notify you by phone as soon as possible.  Submit refill requests through [employer] or call your pharmacy and they will forward the refill request to us. Please allow 3 business days for your refill to be completed.  "         Additional Information About Your Visit        MyChart Information     [employer] lets you send messages to your doctor, view your test results, renew your prescriptions, schedule appointments and more. To sign up, go to [email or web address] . Click on \"Log in\" on the left side of the screen, which will take you to the Welcome page. Then click on \"Sign up Now\" on the right side of the page.     You will be asked to enter the access code listed below, as well as some personal information. Please follow the directions to create your username and password.     Your access code is: [email or web address]  Expires: 2017  1:21 PM     Your access code will  in 90 days. If you need help or a new code, please call your [facility] clinic or [phone].        Care EveryWhere ID     This is your Care EveryWhere ID. This could be used by other organizations to access your [facility] medical records  [id number]        Your Vitals Were     Pulse Temperature Height Pulse Oximetry BMI (Body Mass Index)       96 97.1  F (36.2  C) (Oral) 6' 3\" (1.905 m) 96% 28.32 kg/m2        Blood Pressure from Last 3 Encounters:   17 135/84   17 138/71   10/11/13 135/89    Weight from Last 3 Encounters:   17 226 lb 9.6 oz (102.8 kg)   17 218 lb (98.9 kg)   10/10/13 174 lb 8 oz (79.2 kg)              We Performed the Following     MENTAL HEALTH REFERRAL        Primary Care Provider    Physician No Ref-Primary       No address on file        Thank you!     Thank you for choosing [facility]  for your care. Our goal is always to provide you with excellent care. Hearing back from our patients is one way we can continue to improve our services. Please take a few minutes to complete the written survey that you may receive in the mail after your visit with us. Thank you!             Your Updated Medication List - Protect others around you: Learn how to safely use, store and throw away your medicines at " www.disposemymeds.org.          This list is accurate as of: 6/8/17 10:49 AM.  Always use your most recent med list.                   Brand Name Dispense Instructions for use    ADULT GUMMY PO      Take 2 tablets by mouth daily       IBUPROFEN PO      Take 200-400 mg by mouth every 4 hours as needed for other (headache)       * OLANZapine 15 MG tablet    zyPREXA    60 tablet    Take 2 tablets (30 mg) by mouth At Bedtime       * OLANZapine 10 MG tablet    zyPREXA    30 tablet    Take 1 tablet (10 mg) by mouth every morning       * Notice:  This list has 2 medication(s) that are the same as other medications prescribed for you. Read the directions carefully, and ask your doctor or other care provider to review them with you.

## 2017-06-08 NOTE — PATIENT INSTRUCTIONS
Follow up with psychiatry  Schedule general physical  Return to clinic for any new or worsening symptoms or go to ER Urgent care in off hours

## 2017-06-08 NOTE — NURSING NOTE
"Chief Complaint   Patient presents with     ER F/U       Initial /90  Pulse 98  Temp 97.1  F (36.2  C) (Oral)  Ht 6' 3\" (1.905 m)  Wt 226 lb 9.6 oz (102.8 kg)  SpO2 98%  BMI 28.32 kg/m2 Estimated body mass index is 28.32 kg/(m^2) as calculated from the following:    Height as of this encounter: 6' 3\" (1.905 m).    Weight as of this encounter: 226 lb 9.6 oz (102.8 kg).  Medication Reconciliation: complete     Nicolasa Main MA      "

## 2017-06-08 NOTE — NURSING NOTE
Pt has an appointment set up with psychiatrist at [facility] [phone] Dr. Plasencia for 6/28/17 at 9:40. Reviewed appointment information with pt.    hSyla Baez RN  [facility]

## 2017-06-08 NOTE — PROGRESS NOTES
"  SUBJECTIVE:                                                    Guero Carter is a 27 year old male who presents to clinic today for the following health issues:    ED/UC Followup:    Facility:  [facility] ED  Date of visit: 5/25/17  Reason for visit: \"Delusional thinking\"  Current Status: \"Fine\"     No complaints or concerns. Tolerating medications well.   Doesn't have psychiatry appointment set up  Used to be seen a [facility] but doesn't want to be seen there anymore  No SI/HI      Problem list and histories reviewed & adjusted, as indicated.  Additional history: as documented    ROS:  C: NEGATIVE for fever, chills, change in weight  E/M: NEGATIVE for ear, mouth and throat problems  R: NEGATIVE for significant cough or SOB  CV: NEGATIVE for chest pain, palpitations or peripheral edema    Patient Active Problem List   Diagnosis     Psychiatric disorder     MENTAL HEALTH     Psychosis     No past surgical history on file.    Social History   Substance Use Topics     Smoking status: Current Some Day Smoker     Smokeless tobacco: Not on file     Alcohol use Yes      Comment: Patient drinks alcohol weekly     No family history on file.        Problem list, Medication list, Allergies, and Medical/Social/Surgical histories reviewed in [facility] and updated as appropriate.  Labs reviewed in EPIC    OBJECTIVE:                                                    /90  Pulse 98  Temp 97.1  F (36.2  C) (Oral)  Ht 6' 3\" (1.905 m)  Wt 226 lb 9.6 oz (102.8 kg)  SpO2 98%  BMI 28.32 kg/m2 Body mass index is 28.32 kg/(m^2).   GENERAL:: healthy, alert and no distress         ASSESSMENT/PLAN:                                                        ICD-10-CM    1. Schizophrenia, unspecified type (H) F20.9 MENTAL HEALTH REFERRAL     The majority of time was spent trying to coordinate care with a psychiatrist. He is now scheduled with a psychiatrist at [facility]. He had not other complaints or concerns. Return for general " "physical to address blood pressure and weight. Return to clinic for any new or worsening symptoms or go to ER Urgent care in off hours  > 20 minutes were spent with the patient and / or family present in education and / or counseling regarding the above issues.  This represented more than 50% of the time spent interacting with the patient during this visit.       Patient Instructions   Follow up with psychiatry  Schedule general physical  Return to clinic for any new or worsening symptoms or go to ER Urgent care in off hours          Estimated body mass index is 28.32 kg/(m^2) as calculated from the following:    Height as of this encounter: 6' 3\" (1.905 m).    Weight as of this encounter: 226 lb 9.6 oz (102.8 kg).       [facility]  [facility]    "

## 2017-06-09 ENCOUNTER — TELEPHONE (OUTPATIENT)
Dept: FAMILY MEDICINE | Facility: CLINIC | Age: 27
End: 2017-06-09

## 2017-06-09 NOTE — TELEPHONE ENCOUNTER
Home care nurse Olinda needs updated orders for med set up starting on Monday    Olinda can be reached @ [phone] fei

## 2017-06-12 ENCOUNTER — TELEPHONE (OUTPATIENT)
Dept: NURSING | Facility: CLINIC | Age: 27
End: 2017-06-12

## 2017-06-12 ENCOUNTER — HOSPITAL ENCOUNTER (OUTPATIENT)
Dept: BEHAVIORAL HEALTH | Facility: CLINIC | Age: 27
End: 2017-06-12
Attending: PSYCHIATRY & NEUROLOGY
Payer: COMMERCIAL

## 2017-06-12 PROCEDURE — H2012 BEHAV HLTH DAY TREAT, PER HR: HCPCS

## 2017-06-12 PROCEDURE — 97150 GROUP THERAPEUTIC PROCEDURES: CPT | Mod: GO

## 2017-06-12 NOTE — TELEPHONE ENCOUNTER
Alberto Henderson with [facility] called requesting ok to admit to home care for skilled nursing visit. Called [phone] with verbal ok per RN protocol. Closing encounter. No further action needed.    Shyla Baez RN  [facility] [place]

## 2017-06-13 ENCOUNTER — HOSPITAL ENCOUNTER (OUTPATIENT)
Dept: BEHAVIORAL HEALTH | Facility: CLINIC | Age: 27
End: 2017-06-13
Attending: PSYCHIATRY & NEUROLOGY
Payer: COMMERCIAL

## 2017-06-13 PROCEDURE — H2012 BEHAV HLTH DAY TREAT, PER HR: HCPCS

## 2017-06-13 PROCEDURE — 97150 GROUP THERAPEUTIC PROCEDURES: CPT | Mod: GO

## 2017-06-13 ASSESSMENT — ANXIETY QUESTIONNAIRES
IF YOU CHECKED OFF ANY PROBLEMS ON THIS QUESTIONNAIRE, HOW DIFFICULT HAVE THESE PROBLEMS MADE IT FOR YOU TO DO YOUR WORK, TAKE CARE OF THINGS AT HOME, OR GET ALONG WITH OTHER PEOPLE: NOT DIFFICULT AT ALL
6. BECOMING EASILY ANNOYED OR IRRITABLE: NOT AT ALL
1. FEELING NERVOUS, ANXIOUS, OR ON EDGE: NOT AT ALL
GAD7 TOTAL SCORE: 0
3. WORRYING TOO MUCH ABOUT DIFFERENT THINGS: NOT AT ALL
5. BEING SO RESTLESS THAT IT IS HARD TO SIT STILL: NOT AT ALL
7. FEELING AFRAID AS IF SOMETHING AWFUL MIGHT HAPPEN: NOT AT ALL
2. NOT BEING ABLE TO STOP OR CONTROL WORRYING: NOT AT ALL

## 2017-06-13 ASSESSMENT — PATIENT HEALTH QUESTIONNAIRE - PHQ9: 5. POOR APPETITE OR OVEREATING: NOT AT ALL

## 2017-06-13 NOTE — PROGRESS NOTES
Adult Mental Health Outpatient Group Therapy Progress Note     Client Initial Individualized Goals for Treatment: I was recommended to the program by inpatient team.  I would be open to working on the delusional thoughts.  I would like to prepare for a job.    See Initial Treatment suggestions for the client during the time between Diagnostic Assessment and completion of the Master Individualized Treatment Plan.    Treatment Goals:     As above     Area of Treatment Focus:  Symptom Management, Personal Safety and Community Resources/Discharge Planning    Therapeutic Interventions/Treatment Strategies:  Support, Feedback, Safety Assessments and Cognitive Behavioral Therapy    Response to Treatment Strategies:  Accepted Feedback, Gave Feedback and Listened    Name of Group:  Group Psychotherapy     Description and Outcome:  Delroy stated that he had calm mood, energy level at 7 on a 10 point scale, and 10 hours of sleep.  He denied difficulty with paranoia, hallucinations, or delusional thoughts.  Client denied suicidal ideation, intent and plan. He stated that he is looking forward to seeing his sister and dad when they visit him this week.  Writer reviewed group guidelines, confidentiality, and limitations of confidentiality.    Client would benefit from additional opportunities to practice and implement content from this session.  Client shared current symptoms levels.  He did not request feedback today about his concerns.  He was able to share anxiety coping skills with peers.     Is this a Weekly Review of the Progress on the Treatment Plan?  No

## 2017-06-14 VITALS — BODY MASS INDEX: 28.88 KG/M2 | HEIGHT: 74 IN | WEIGHT: 225 LBS

## 2017-06-14 ASSESSMENT — ANXIETY QUESTIONNAIRES: GAD7 TOTAL SCORE: 0

## 2017-06-14 ASSESSMENT — PATIENT HEALTH QUESTIONNAIRE - PHQ9: SUM OF ALL RESPONSES TO PHQ QUESTIONS 1-9: 0

## 2017-06-14 NOTE — PROGRESS NOTES
Adult Mental Health Outpatient Group Therapy Progress Note     Client Initial Individualized Goals for Treatment: I was recommended to the program by inpatient team.  I would be open to working on the delusional thoughts.  I would like to prepare for a job.    See Initial Treatment suggestions for the client during the time between Diagnostic Assessment and completion of the Master Individualized Treatment Plan.    Treatment Goals:     see above.     Area of Treatment Focus:  Symptom Management, Develop / Improve Independent Living Skills and Develop Socialization / Interpersonal Relationship Skills    Therapeutic Interventions/Treatment Strategies:  Support, Feedback, Safety Assessments, Structured Activity and Problem Solving    Response to Treatment Strategies:  Accepted Feedback, Gave Feedback, Listened, Focused on Goals, Attentive and Accepted Support    Name of Group:  OT Clinic     Description and Outcome:  Pt attended and participated in a structured occupational therapy group where intervention focused on coping through structured hands-on activities to improve function in valued roles, routines, and independent living skills. Client had a constricted affect in session. He selected a familiar calming activity with minimal encouragement. Adequate task focus to complete task. Interacted with mostly one word responses when addressed directly from writer. Client would benefit from additional opportunities to practice and implement content from this session. Client addressed ITP goal number initial goal in this session.     Is this a Weekly Review of the Progress on the Treatment Plan?  No

## 2017-06-14 NOTE — PROGRESS NOTES
RN Review of Medical History / Physical Health Screen  Outpatient Behavioral Programs      CLIENT'S NAME: Guero Carter  MRN:   [id number]  :   1990 AGE:27 year old SEX: male    DATE OF DIAGNOSTIC ASSESSMENT: 17  DATE OF ADMISSION: 17   PROGRAM: Day Treatment (DT)      Following admission, the RN reviewed the following:    - Medical History / Physical Health Screen completed during the DA noted above.  - Immediate Health Concerns as noted on the Initial Individual Treatment Plan.    Client height and weight recorded by RN in epic: yes    BMI Review:  Was the patient informed of BMI? yes      Findings  28.95 Above       RN Recommendations include: RN to provide general education around health and wellness to maintain BMI and talk to PCP for further assessment if needed.    Cece Braga  2017

## 2017-06-15 ENCOUNTER — HOSPITAL ENCOUNTER (OUTPATIENT)
Dept: BEHAVIORAL HEALTH | Facility: CLINIC | Age: 27
End: 2017-06-15
Attending: PSYCHIATRY & NEUROLOGY
Payer: COMMERCIAL

## 2017-06-15 PROCEDURE — 97150 GROUP THERAPEUTIC PROCEDURES: CPT | Mod: GO

## 2017-06-15 PROCEDURE — H2012 BEHAV HLTH DAY TREAT, PER HR: HCPCS

## 2017-06-15 NOTE — PROGRESS NOTES
Adult Mental Health Outpatient Group Therapy Progress Note     Client Initial Individualized Goals for Treatment: I was recommended to the program by inpatient team.  I would be open to working on the delusional thoughts.  I would like to prepare for a job.    See Initial Treatment suggestions for the client during the time between Diagnostic Assessment and completion of the Master Individualized Treatment Plan.    Treatment Goals:     see above.     Area of Treatment Focus:  Symptom Management, Develop / Improve Independent Living Skills and Develop Socialization / Interpersonal Relationship Skills    Therapeutic Interventions/Treatment Strategies:  Support, Feedback, Safety Assessments, Structured Activity and Problem Solving    Response to Treatment Strategies:  Accepted Feedback, Gave Feedback, Listened, Focused on Goals, Attentive and Accepted Support    Name of Group:  OT Clinic     Description and Outcome:  Pt attended and participated in a structured occupational therapy group where intervention focused on coping through structured hands-on activities to improve function in valued roles, routines, and independent living skills. Client had a restricted affect in session. He independently selected a familiar activity and focused on it with variable task focus. Interacted minimally with peers in session. Offered short, generic responses to questions from this writer. Client would benefit from additional opportunities to practice and implement content from this session. Client addressed initial goal in this session.    Is this a Weekly Review of the Progress on the Treatment Plan?  No

## 2017-06-15 NOTE — PROGRESS NOTES
"Adult Mental Health Outpatient Group Therapy Progress Note     Client Initial Individualized Goals for Treatment: I was recommended to the program by inpatient team.  I would be open to working on the delusional thoughts.  I would like to prepare for a job.    See Initial Treatment suggestions for the client during the time between Diagnostic Assessment and completion of the Master Individualized Treatment Plan.    Treatment Goals:     As above     Area of Treatment Focus:  Symptom Management, Personal Safety and Community Resources/Discharge Planning    Therapeutic Interventions/Treatment Strategies:  Support, Feedback, Safety Assessments and Cognitive Behavioral Therapy    Response to Treatment Strategies:  Accepted Feedback, Gave Feedback and Listened    Name of Group:  Group Psychotherapy     Description and Outcome:  Delroy stated that he had calm mood, 10 hours of sleep, some daytime tiredness, and some increased appetite. He denied difficulty with delusional thoughts. However, he did share that he did not like going to parties as he \"knows the statistics about parties that others do not know\".  He shared that there is a high percentage of bad things happening at parties including being bullied, assaulted, drugged, or raped.  Client denied suicidal ideation, intent and plan. He stated that he is looking forward to \"taking this month off then starting to look for a job\".  He stated that he ws frustrated when his mom asked him to not only abstain from drinking alcohol in her home, as they had agreed, but also from using alcohol elsewhere.  Client stated that he finds alcohol \"takes the edge off\" and his misses it.     Client would benefit from additional opportunities to practice and implement content from this session.  Client shared current symptoms and interpersonal stressors.  Client denied having delusional thoughts then shared concerns about safety risks involved in attending parties.      Is this a Weekly " Review of the Progress on the Treatment Plan?  Yes.      Are Treatment Plan Goals being addressed?  Yes, continue treatment goals      Are Treatment Plan Strategies to Address Goals Effective?  Yes, continue treatment strategies      Are there any current contracts in place?  No

## 2017-06-16 NOTE — PROGRESS NOTES
Adult Mental Health Outpatient Group Therapy Progress Note     Client Initial Individualized Goals for Treatment: I was recommended to the program by inpatient team.  I would be open to working on the delusional thoughts.  I would like to prepare for a job.     See Initial Treatment suggestions for the client during the time between Diagnostic Assessment and completion of the Master Individualized Treatment Plan.     Treatment Goals:      See above      Area of Treatment Focus:  Symptom Management, Personal Safety and Community Resources/Discharge Planning     Therapeutic Interventions/Treatment Strategies:  Support, Feedback, Safety Assessments and Cognitive Behavioral Therapy     Response to Treatment Strategies:  Accepted Feedback, Gave Feedback and Listened     Name of Group:  Group Psychotherapy      Description and Outcome:  Client was new to group, he reports that he had an overall good weekend stating he spent most of it with his father doing outside yard work. He reports that his sleep has been good and he is beginning to look for possible employment opportunities.  Client was fairly quite during group, did not provide much feedback to peers, will likely need time to get to know peers better.  He was open to feedback/suggestion from other peers. Client would benefit from additional opportunities to practice and implement content from this session.    Is this a Weekly Review of the Progress on the Treatment Plan?  No

## 2017-06-19 ENCOUNTER — HOSPITAL ENCOUNTER (OUTPATIENT)
Dept: BEHAVIORAL HEALTH | Facility: CLINIC | Age: 27
End: 2017-06-19
Attending: PSYCHIATRY & NEUROLOGY
Payer: COMMERCIAL

## 2017-06-19 PROCEDURE — H2012 BEHAV HLTH DAY TREAT, PER HR: HCPCS

## 2017-06-19 PROCEDURE — 97150 GROUP THERAPEUTIC PROCEDURES: CPT | Mod: GO

## 2017-06-20 ENCOUNTER — HOSPITAL ENCOUNTER (OUTPATIENT)
Dept: BEHAVIORAL HEALTH | Facility: CLINIC | Age: 27
End: 2017-06-20
Attending: PSYCHIATRY & NEUROLOGY
Payer: COMMERCIAL

## 2017-06-20 PROCEDURE — 97150 GROUP THERAPEUTIC PROCEDURES: CPT | Mod: GO

## 2017-06-20 PROCEDURE — H2012 BEHAV HLTH DAY TREAT, PER HR: HCPCS

## 2017-06-20 NOTE — PROGRESS NOTES
Occupational Therapy Assessment     Patient: Guero Carter    MRN: [id number]     :1990    Age: 27 year old    Sex:male     Assessment     Mood: Euthymic    Affect: Flat    Thought Content:  Delusions  Paranoia    Verbal Content: No observed deficiencies    Concentration: Distracted    Energy Level:  Needs encouragement    Memory:  Delayed & immediate recall intact    Following Directions: Independently follows multi-step directions    Decision Making:  Needs choices limited    Motivation/Procrastination:  Needs external cues to initiate activities  Needs prompts to initiate activities  Procrastinates engaging in activties    Planning & Problem Solving:  Needs assistance  Needs structure    Judgment:  Recognizes errors    Frustration/Stress Management:  Independently identifies stressors/symptoms  Needs assistance to manage frustrations/identify & apply coping skills    Self Awareness:  Demonstrates/espressess negative view of self    Interpersonal Skills: Difficulty/ineffective in expressing feelings  Difficulty/ineffective in managing conflict  Demonstrates/reports difficulty with trust /personal boundaries    Social Supports:  Identifies adequate social supports  Large family Identifies parents and siblings as positive supports    Time Management:  Needs assistance to organize use of time and/or structure daily activities effectively    Leisure:  Difficulty planning & following through with leisure activities  Would like to find more enjoyable activities    Self-Care:  Needs assistance to establish plan for adequate nutrition, sleep, exercise, ADL's    Home Management:  Dependent on others for managing meal preparation, cleaning, laundry, organization, personal, financial paperowrk  Recently moved into mother's basement, is satisfied with this situation    Community Resources:  Needs assisstnace to identify & follow-through with use of resources &  transportation  Finances are a barriers, would like money to be able to use his car    Employment & Education:  Unemployed  Client is very focused on exploring job options    Additional Comments/Plan of Treatment Functional Goals:  Writer provided an orientation to OT Life Skills group and clinic and administered the [place] Occupational Performance Measure (COPM). The COPM is an individualized measure designed to detect self-perceived change in occupational performance over time. Using a likert scale of 1-10 with 1 being low and 10 being high, results below for client's self-perceptions in occupational areas that are important to them.     Delroy identified the following growth areas regarding their occupational performance.    1. Exploring job options  2. Eating well  3. Improving exercise routine    In collaboration with occupational therapist, Delroy assessed their perceived performance and satisfaction in the above listed growth areas using the likert scale referenced previously and collaborated with therapist to develop life skills goal focused on addressing the above identified occupational performance problems.    COPM initial results (mean performance and satisfaction scores):    Performance score: 4.0     Satisfaction score: 3.0      OTR/L Signature: MILLY Gonzales/L    Date/Time: 6/20/2017

## 2017-06-20 NOTE — PROGRESS NOTES
"Adult Mental Health Outpatient Group Therapy Progress Note     Client Initial Individualized Goals for Treatment: I was recommended to the program by inpatient team.  I would be open to working on the delusional thoughts.  I would like to prepare for a job.    See Initial Treatment suggestions for the client during the time between Diagnostic Assessment and completion of the Master Individualized Treatment Plan.    Treatment Goals:     As above     Area of Treatment Focus:  Symptom Management, Personal Safety and Community Resources/Discharge Planning    Therapeutic Interventions/Treatment Strategies:  Support, Feedback, Safety Assessments and Cognitive Behavioral Therapy    Response to Treatment Strategies:  Accepted Feedback, Gave Feedback and Listened    Name of Group:  Group Psychotherapy     Description and Outcome:  Delroy stated that he had calm mood.  Affect was flat.  He denied difficulty with paranoia, hallucinations, or delusional thoughts.  Client denied suicidal ideation, intent and plan. He stated that he enjoyed working with siblings to paint and prepare new bedroom in the basement of his Mom's home.  He stated that he felt pleased about his efforts.  He voiced frustration at requirement to not drink alcohol while in his Mom's home, but added that he is willing to comply.  Writer reviewed the benefit from abstinence from alcohol while getting newly treated for a psychotic episode.  Writer reviewed need to work with psychiatrist about recommendations for future abstinence form alcohol or \"safe\" level of alcohol use.    Client would benefit from additional opportunities to practice and implement content from this session.  Client expressed conflicting feelings and beliefs about benefits from abstaining from alcohol to assist with management of psychotic symptoms.      Is this a Weekly Review of the Progress on the Treatment Plan?  No  "

## 2017-06-20 NOTE — PROGRESS NOTES
Adult Mental Health Outpatient Group Therapy Progress Note     Client Initial Individualized Goals for Treatment: I was recommended to the program by inpatient team.  I would be open to working on the delusional thoughts.  I would like to prepare for a job.    See Initial Treatment suggestions for the client during the time between Diagnostic Assessment and completion of the Master Individualized Treatment Plan.    Treatment Goals:     see above.     Area of Treatment Focus:  Symptom Management, Develop / Improve Independent Living Skills and Develop Socialization / Interpersonal Relationship Skills    Therapeutic Interventions/Treatment Strategies:  Support, Feedback, Safety Assessments, Structured Activity and Problem Solving    Response to Treatment Strategies:  Accepted Feedback, Gave Feedback, Listened, Focused on Goals, Attentive and Accepted Support    Name of Group:  OT Clinic     Description and Outcome:  Pt attended and participated in a structured occupational therapy group where intervention focused on coping through structured hands-on activities to improve function in valued roles, routines, and independent living skills. Client had a calm, even affect. He selected a novel multi step task and focused with fair task focus on this activity. Completed COPM to identify areas of occupational improvement. Details to follow in OT assessment. Client would benefit from additional opportunities to practice and implement content from this session. Client addressed initial goal in this session.     Is this a Weekly Review of the Progress on the Treatment Plan?  Yes.      Are Treatment Plan Goals being addressed?  Yes, continue treatment goals      Are Treatment Plan Strategies to Address Goals Effective?  Yes, continue treatment strategies      Are there any current contracts in place?  No

## 2017-06-20 NOTE — PROGRESS NOTES
Group Therapy Progress Notes     Area of Treatment Focus:  Symptom Management, Community Resources/Discharge Planning, Abstinence/Relapse Prevention and Develop / Improve Independent Living Skills    Therapeutic Interventions/Treatment Strategies:  Support, Feedback, Clarification and Education    Response to Treatment Strategies:  Accepted Feedback, Gave Feedback, Listened, Focused on Goals, Attentive, Accepted Support and Alert    Name of Group:  Psychotherapy     Progress Note  Guero reports feeling good about having his own space and privacy at his mother's house. He looks forward to looking for a job and using his training. He reports using several different strategies to try to ease anxiety when it comes and manage symptoms. He is also staying active through sports activities. He says that if he experiences stigma from others, he keeps in mind that he is loved and that there are kind people who see him as a person, not an illness.          Is this a Weekly Review of the Progress on the Treatment Plan?  No

## 2017-06-21 NOTE — PROGRESS NOTES
Adult Mental Health Outpatient Group Therapy Progress Note     Client Initial Individualized Goals for Treatment:  I was recommended to the program by inpatient team.  I would be open to working on the delusional thoughts.  I would like to prepare for a job.       See Initial Treatment suggestions for the client during the time between Diagnostic Assessment and completion of the Master Individualized Treatment Plan.    Treatment Goals:     See above    Area of Treatment Focus:  Symptom Management    Therapeutic Interventions/Treatment Strategies:  Support, Feedback, Safety Assessments, Structured Activity and Education    Response to Treatment Strategies:  Accepted Feedback, Listened, Attentive, Accepted Support and Alert    Name of Group:  Self Support Skills     Description and Outcome:  Client presented with calm,even mood.Good focus and concentration during a discussion related to communication skills. Thought process clear,goal directed and reality based.  Client would benefit from additional opportunities to practice and implement content from this session in his interpersonal relationships.    Is this a Weekly Review of the Progress on the Treatment Plan?  Yes.      Are Treatment Plan Goals being addressed?  Yes, continue treatment goals      Are Treatment Plan Strategies to Address Goals Effective?  Yes, continue treatment strategies      Are there any current contracts in place?  No

## 2017-06-22 NOTE — PROGRESS NOTES
Adult Mental Health Outpatient Group Therapy Progress Note     Client Initial Individualized Goals for Treatment: I was recommended to the program by inpatient team.  I would be open to working on the delusional thoughts.  I would like to prepare for a job.    See Initial Treatment suggestions for the client during the time between Diagnostic Assessment and completion of the Master Individualized Treatment Plan.    Treatment Goals:     see above.     Area of Treatment Focus:  Symptom Management, Develop / Improve Independent Living Skills and Develop Socialization / Interpersonal Relationship Skills    Therapeutic Interventions/Treatment Strategies:  Support, Feedback, Safety Assessments, Structured Activity and Problem Solving    Response to Treatment Strategies:  Accepted Feedback, Gave Feedback, Listened, Focused on Goals, Attentive and Accepted Support    Name of Group:  [facility]     Description and Outcome:  Pt attended and participated in a structured occupational therapy group where intervention focused on coping through structured hands-on activities to improve function in valued roles, routines, and independent living skills. Client presented to group with a calm, even affect. He reported that he is interested in exploring taking the certification exam for a certificate program he previously completed. Developed a plan to explore this. Client plans to bring any information that he has from the school he attended to next session. Client demonstrated understanding of session content by problem solving to address vocational plans/problems. Client addressed initial goal in this session.    Is this a Weekly Review of the Progress on the Treatment Plan?  No

## 2017-06-22 NOTE — PROGRESS NOTES
Adult Mental Health Outpatient Group Therapy Progress Note     Client Initial Individualized Goals for Treatment: I was recommended to the program by inpatient team.  I would be open to working on the delusional thoughts.  I would like to prepare for a job.    See Initial Treatment suggestions for the client during the time between Diagnostic Assessment and completion of the Master Individualized Treatment Plan.    Treatment Goals:     see above.     Area of Treatment Focus:  Symptom Management, Develop / Improve Independent Living Skills and Develop Socialization / Interpersonal Relationship Skills    Therapeutic Interventions/Treatment Strategies:  Support, Feedback, Safety Assessments, Structured Activity and Problem Solving    Response to Treatment Strategies:  Accepted Feedback, Gave Feedback, Listened, Focused on Goals, Attentive and Accepted Support    Name of Group:  [facility]     Description and Outcome:  Pt attended and participated in a structured occupational therapy group where intervention focused on coping through structured hands-on activities to improve function in valued roles, routines, and independent living skills. Client had a calm, even affect in session. He demonstrated greater range of affect today compared to last week. He worked on a gratitude activity to show his appreciation for his mother's support. Reported that he had a lot of family time over the weekend and this was enjoyable for him. Client demonstrated understanding of session content by increased range of affect, goal focused activity in session. Client addressed initial goal in this session.     Is this a Weekly Review of the Progress on the Treatment Plan?  No

## 2017-06-26 ENCOUNTER — HOSPITAL ENCOUNTER (OUTPATIENT)
Dept: BEHAVIORAL HEALTH | Facility: CLINIC | Age: 27
End: 2017-06-26
Attending: PSYCHIATRY & NEUROLOGY
Payer: COMMERCIAL

## 2017-06-26 PROCEDURE — H2012 BEHAV HLTH DAY TREAT, PER HR: HCPCS

## 2017-06-26 PROCEDURE — 97150 GROUP THERAPEUTIC PROCEDURES: CPT | Mod: GO

## 2017-06-27 ENCOUNTER — HOSPITAL ENCOUNTER (OUTPATIENT)
Dept: BEHAVIORAL HEALTH | Facility: CLINIC | Age: 27
End: 2017-06-27
Attending: PSYCHIATRY & NEUROLOGY
Payer: COMMERCIAL

## 2017-06-27 PROCEDURE — 97150 GROUP THERAPEUTIC PROCEDURES: CPT | Mod: GO

## 2017-06-27 PROCEDURE — H2012 BEHAV HLTH DAY TREAT, PER HR: HCPCS

## 2017-06-27 NOTE — PROGRESS NOTES
"Adult Mental Health Outpatient Group Therapy Progress Note     Client Initial Individualized Goals for Treatment: I was recommended to the program by inpatient team.  I would be open to working on the delusional thoughts.  I would like to prepare for a job.    See Initial Treatment suggestions for the client during the time between Diagnostic Assessment and completion of the Master Individualized Treatment Plan.    Treatment Goals:     As above     Area of Treatment Focus:  Symptom Management, Personal Safety and Community Resources/Discharge Planning    Therapeutic Interventions/Treatment Strategies:  Support, Feedback, Safety Assessments and Cognitive Behavioral Therapy    Response to Treatment Strategies:  Accepted Feedback, Gave Feedback and Listened    Name of Group:  Group Psychotherapy     Description and Outcome:  Delroy denied difficulty with paranoia, hallucinations, or delusional thoughts.  Client denied suicidal ideation, intent and plan. He stated that his mood was \"melancholy\", and he rated it at a 5 on a 10 point scale.  He stated that he is not enjoying activities as much as he would like.  He shared interest in attending [employer] and a July 4th social event, but is concerned that it will not be as enjoyable as before.  Peers offered feedback about creating an event that works with current level of motivation and mood.  Delroy stated that he was practicing skills to limit \"frightening stimulus\" by avoiding watching scary movies.  He stated that he is limiting himself to cartoons and documentaries on streaming TV services to help with this.      Client was able to identify level of mood and symptoms.  HE was able to participate in generating alternatives that would allow him to participate in holiday activities.      Is this a Weekly Review of the Progress on the Treatment Plan?  No  "

## 2017-06-27 NOTE — PROGRESS NOTES
Adult Mental Health Outpatient Group Therapy Progress Note     Client Initial Individualized Goals for Treatment: I was recommended to the program by inpatient team.  I would be open to working on the delusional thoughts.  I would like to prepare for a job.    See Initial Treatment suggestions for the client during the time between Diagnostic Assessment and completion of the Master Individualized Treatment Plan.    Treatment Goals:     As above     Area of Treatment Focus:  Symptom Management, Personal Safety and Community Resources/Discharge Planning    Therapeutic Interventions/Treatment Strategies:  Support, Feedback, Safety Assessments and Cognitive Behavioral Therapy    Response to Treatment Strategies:  Accepted Feedback, Gave Feedback and Listened    Name of Group:  Group Psychotherapy     Description and Outcome:  Delroy stated that he had calm mood.  Affect was flat.  He denied difficulty with paranoia, hallucinations, or delusional thoughts.  Client denied suicidal ideation, intent and plan. Delroy stated that he enjoyed finishing moving into his new basement bedroom at his Mom's house.  He stated that he then spent the remainder of the weekend at his Dad's home.  He stated that he was able to enjoy helping his Dad with home and lawn maintenance projects.  He stated that his concentration was adequate for this task.  He stated that he had some difficulty with daytime sleep, sleeping 12 hours one day.  He stated that he got 10 hours of sleep the other two days, which felt better for him.      Client reported using structured activities to help him increase daytime awake hours and manage sleepiness.    Is this a Weekly Review of the Progress on the Treatment Plan?  No

## 2017-06-27 NOTE — PROGRESS NOTES
Adult Mental Health Outpatient Group Therapy Progress Note     Client Initial Individualized Goals for Treatment: I was recommended to the program by inpatient team.  I would be open to working on the delusional thoughts.  I would like to prepare for a job.    See Initial Treatment suggestions for the client during the time between Diagnostic Assessment and completion of the Master Individualized Treatment Plan.    Treatment Goals:     see above.     Area of Treatment Focus:  Symptom Management, Develop / Improve Independent Living Skills and Develop Socialization / Interpersonal Relationship Skills    Therapeutic Interventions/Treatment Strategies:  Support, Feedback, Safety Assessments, Structured Activity and Problem Solving    Response to Treatment Strategies:  Accepted Feedback, Gave Feedback, Listened, Focused on Goals, Attentive and Accepted Support    Name of Group:  [facility]     Description and Outcome:  Pt attended and participated in a structured occupational therapy group where intervention focused on coping through structured hands-on activities to improve function in valued roles, routines, and independent living skills. Client had a calm, even affect in session. He demonstrated greater range of affect since last week. He had more spontaneous speech with peers in session, was self directed with a goal focused activity in session. Client demonstrated understanding of session content by increased independence in session. Client addressed initial goal in this session.       Is this a Weekly Review of the Progress on the Treatment Plan?  No

## 2017-06-28 NOTE — PROGRESS NOTES
Adult Mental Health Outpatient Group Therapy Progress Note     Client Initial Individualized Goals for Treatment: I was recommended to the program by inpatient team.  I would be open to working on the delusional thoughts.  I would like to prepare for a job.    See Initial Treatment suggestions for the client during the time between Diagnostic Assessment and completion of the Master Individualized Treatment Plan.    Treatment Goals:     see above.     Area of Treatment Focus:  Symptom Management, Develop / Improve Independent Living Skills and Develop Socialization / Interpersonal Relationship Skills    Therapeutic Interventions/Treatment Strategies:  Support, Feedback, Safety Assessments, Structured Activity and Problem Solving    Response to Treatment Strategies:  Accepted Feedback, Gave Feedback, Listened, Focused on Goals, Attentive and Accepted Support    Name of Group:  [facility]     Description and Outcome:  Pt attended and participated in a structured occupational therapy group where intervention focused on coping through structured hands-on activities to improve function in valued roles, routines, and independent living skills. Client had a constricted affect in session. He identified a novel multi step activity to focus on during session. He independently planned and problem solved activity using only written directions to assist. Interacted minimally with peers in session. Reported that he forgot to look for information from college regarding certification exam. Client demonstrated understanding of session content by increased independence in session. Client addressed initial goal in this session.     Is this a Weekly Review of the Progress on the Treatment Plan?  No

## 2017-06-29 ENCOUNTER — TELEPHONE (OUTPATIENT)
Dept: BEHAVIORAL HEALTH | Facility: CLINIC | Age: 27
End: 2017-06-29

## 2017-06-30 NOTE — TELEPHONE ENCOUNTER
Clients mother Catalina called and client will not be here today due to a psychiatry appt, he will miss group and treatment planning meeting at 1245. Reminded clients mother that program is closed Monday/Tuesday due to holiday.

## 2017-07-06 ENCOUNTER — HOSPITAL ENCOUNTER (OUTPATIENT)
Dept: BEHAVIORAL HEALTH | Facility: CLINIC | Age: 27
End: 2017-07-06
Attending: PSYCHIATRY & NEUROLOGY
Payer: COMMERCIAL

## 2017-07-06 PROCEDURE — H2012 BEHAV HLTH DAY TREAT, PER HR: HCPCS

## 2017-07-06 PROCEDURE — 97150 GROUP THERAPEUTIC PROCEDURES: CPT | Mod: GO

## 2017-07-06 NOTE — PROGRESS NOTES
Adult Mental Health Outpatient Group Therapy Progress Note     Client Initial Individualized Goals for Treatment: I was recommended to the program by inpatient team.  I would be open to working on the delusional thoughts.  I would like to prepare for a job.    See Initial Treatment suggestions for the client during the time between Diagnostic Assessment and completion of the Master Individualized Treatment Plan.    Treatment Goals:     As above     Area of Treatment Focus:  Symptom Management, Personal Safety and Community Resources/Discharge Planning    Therapeutic Interventions/Treatment Strategies:  Support, Feedback, Safety Assessments and Cognitive Behavioral Therapy    Response to Treatment Strategies:  Accepted Feedback, Gave Feedback and Listened    Name of Group:  Group Psych[facility]     Description and Outcome:  Delroy denied difficulty with paranoia, hallucinations, or delusional thoughts.  Client denied suicidal ideation, intent and plan.  He stated that he was able to attend a large family gathering despite feeling uncomfortable in that setting.  He stated that he would feel more comfortable if he was working or in class currently.  He stated that he was able to say hello to people and was able to leave with his Dad and another relative as planned.  He stated that he helped with lawn chores.  Motivation was somewhat improved.  He denied distress.      Client was able to identify level of mood and symptoms.  Client was able to identify coping skills used.      Is this a Weekly Review of the Progress on the Treatment Plan?  Yes.      Are Treatment Plan Goals being addressed?  Yes, continue treatment goals      Are Treatment Plan Strategies to Address Goals Effective?  Yes, continue treatment strategies      Are there any current contracts in place?  No

## 2017-07-06 NOTE — TREATMENT PLAN
Individualized Treatment Plan     Date of Plan: 17    Name: Guero Carter MRN: [id number]    : 1990    Programs:  Day Treatment (DT)     Clinical Track (if applicable):  3C    DSM5 Diagnosis  295.90 Schizophrenia   Alcohol and cannabis abuse by history    Team Members Contributing to Plan:  Olinda Davis, Melodie Ramírez  and Cece Braga    Client Strengths:  caring, creative, educated, empathetic, goal-focused, good listener, intelligent, motivated, open to learning, open to suggestions / feedback, supportive, wants to learn, willing to relate to others, work history and longevity    Client Participation in Plan:  Contributed to goals and plan   Attended individual treatment plan meeting on 17, 8/10/17  Agrees with plan   Received copy of treatment plan     Areas of Vulnerability:  Delusional thoughts     Long-Term Goals:  Knowledge about illness and management of symptoms   Maintenance of personal safety   Maintenance of sobriety     Abuse Prevention Plan:  Safe, therapeutic environment   Safety coping plan as needed   Education regarding illness and skill development   Coordination with care providers     Discharge Criteria:  Satisfactory progress toward treatment goals   Improvement re: identified problems and symptoms   Ability to continue recovery at next level of service   Has a discharge plan in place   Has safety/coping plan in place   Ability to maintain sobriety  Regular attendance as scheduled     Tentative discharge date: 17    Areas of Treatment Focus            Area of Treatment Focus:   Personal Safety  Start Date:    17    Goal:  Target Date: 8/10/17, 17 Status: Active  Client will notify staff when needing assistance to develop or implement a coping plan to manage suicidal or self injurious urges.      Progress:  8/10/17: No safety concerns.  17:        Treatment Strategies:   Engage in safety planning when indicated  Facilitate increased self awareness  Teach  adaptive coping skills and communication skills  Use reality based supportive approach        Area of Treatment Focus:   Symptom Stabilization and Management  Start Date:    7/6/17    Goal:                                         Target Date: 8/10/17, 8/31/17 Status: Completed  Client will use time in OT to explore employment options and needs.      Progress:   8/17/17:  This goal had been met, Delroy has met with [employer] and is working with a  on finding employment.        Treatment Strategies:   Facilitate increased self awareness  Provide feedback about social skills  Teach adaptive coping skills and communication skills  Use reality based supportive approach      Area of Treatment Focus:   Symptom Stabilization and Management  Start Date:    8/10/17    Goal:  Target Date: 8/31/17 Status: Active  Delroy will use time in OT to identify ways to add structure to his down time outside of work and appointments.      Progress:   8/31/17:        Treatment Strategies:   Facilitate increased self awareness  Use reality based supportive approach      Area of Treatment Focus:   Symptom Stabilization and Management  Start Date:    7/6/17    Goal:  Target Date: 8/10/17, 8/31/17Status: Completed  Client will use time in Group Therapy to identify level of symptoms and coping skills to manage them.      Progress:   8/10/17:  Goal met, Delroy has done a good job of identifying symptoms and skills to manage mood and psychotic symptoms which Delroy feels is fairly stable at this time.        Treatment Strategies:   Facilitate increased self awareness  Provide education regarding mental health symptoms and coping skills to manage them  Teach adaptive coping skills and communication skills  Use reality based supportive approach      Area of Treatment Focus:   Symptom Stabilization and Management  Start Date:    8/10/17    Goal:  Target Date: 8/31/17 Status: Active  Delroy will use time in Group Therapy to discuss a relapse management  plan including a stress management plan.      Progress:   8/31/17:        Treatment Strategies:   Facilitate increased self awareness  Teach adaptive coping skills and communication skills  Use reality based supportive approach      Area of Treatment Focus:   Community Resources / Support and Discharge Planning  Start Date:    7/6/17    Goal:  Target Date: 8/10/17, 8/31/17 Status: Active  Delroy will begin to develop an aftercare plan by exploring community resources such as [facility] support group and the  [facility] program for individual therapy and assistance with employment options.       Progress:  8/10/17:  Delroy has established with [facility] and plans to work with them with  and individual therapy, Delroy also switched to medication management with [facility]. Delroy is still encouraged to check out a [facility] connection group.  8/31/17:        Treatment Strategies:   Assist clients in establishing / strengthening support network  Assist with discharge planning  Facilitate increased self awareness

## 2017-07-06 NOTE — ADDENDUM NOTE
Encounter addended by: Cece Braga RN on: 7/6/2017 10:07 AM<BR>     Actions taken: Flowsheet data copied forward, Flowsheet accepted

## 2017-07-10 ENCOUNTER — HOSPITAL ENCOUNTER (OUTPATIENT)
Dept: BEHAVIORAL HEALTH | Facility: CLINIC | Age: 27
End: 2017-07-10
Attending: PSYCHIATRY & NEUROLOGY
Payer: COMMERCIAL

## 2017-07-10 PROCEDURE — 97150 GROUP THERAPEUTIC PROCEDURES: CPT | Mod: GO

## 2017-07-10 PROCEDURE — H2012 BEHAV HLTH DAY TREAT, PER HR: HCPCS

## 2017-07-11 ENCOUNTER — HOSPITAL ENCOUNTER (OUTPATIENT)
Dept: BEHAVIORAL HEALTH | Facility: CLINIC | Age: 27
End: 2017-07-11
Attending: PSYCHIATRY & NEUROLOGY
Payer: COMMERCIAL

## 2017-07-11 PROCEDURE — 97150 GROUP THERAPEUTIC PROCEDURES: CPT | Mod: GO

## 2017-07-11 PROCEDURE — H2012 BEHAV HLTH DAY TREAT, PER HR: HCPCS

## 2017-07-11 NOTE — PROGRESS NOTES
Adult Mental Health Outpatient Group Therapy Progress Note     Client Initial Individualized Goals for Treatment: I was recommended to the program by inpatient team.  I would be open to working on the delusional thoughts.  I would like to prepare for a job.    See Initial Treatment suggestions for the client during the time between Diagnostic Assessment and completion of the Master Individualized Treatment Plan.    Treatment Goals:     see above.     Area of Treatment Focus:  Symptom Management, Develop / Improve Independent Living Skills and Develop Socialization / Interpersonal Relationship Skills    Therapeutic Interventions/Treatment Strategies:  Support, Feedback, Safety Assessments, Structured Activity and Problem Solving    Response to Treatment Strategies:  Accepted Feedback, Gave Feedback, Listened, Focused on Goals, Attentive and Accepted Support    Name of Group:  [facility]     Description and Outcome:  Pt attended and participated in a structured occupational therapy group where intervention focused on coping through structured hands-on activities to improve function in valued roles, routines, and independent living skills. Client had a calm, even affect in session. He was self directed with a novel multi step task. Adequate focus and problem solving to independently complete activity. Client demonstrated understanding of session content by increased independence in session. Client addressed initial goal in this session.    Is this a Weekly Review of the Progress on the Treatment Plan?  Yes.      Are Treatment Plan Goals being addressed?  Yes, continue treatment goals      Are Treatment Plan Strategies to Address Goals Effective?  Yes, continue treatment strategies      Are there any current contracts in place?  No

## 2017-07-11 NOTE — PROGRESS NOTES
"Adult Mental Health Outpatient Group Therapy Progress Note     Client Initial Individualized Goals for Treatment: I was recommended to the program by inpatient team.  I would be open to working on the delusional thoughts.  I would like to prepare for a job.    See Initial Treatment suggestions for the client during the time between Diagnostic Assessment and completion of the Master Individualized Treatment Plan.    Treatment Goals:     As above     Area of Treatment Focus:  Symptom Management, Personal Safety and Community Resources/Discharge Planning    Therapeutic Interventions/Treatment Strategies:  Support, Feedback, Safety Assessments and Cognitive Behavioral Therapy    Response to Treatment Strategies:  Accepted Feedback, Gave Feedback and Listened    Name of Group:  Group Psychotherapy     Description and Outcome:  Delroy denied difficulty with paranoia, hallucinations, or delusional thoughts.  Client denied suicidal ideation, intent and plan. He stated that his mood was less depressed.   He stated that he was able to complete yard maintenance and landscaping tasks at both parent's homes over the weekend.  He stated that he otherwise \"did not do much\".  He stated that his energy continued to be low in the morning.      Client was able to identify level of mood and symptoms.He would benefit from additional strategies to mange low energy and low motivation.    Is this a Weekly Review of the Progress on the Treatment Plan?  No  "

## 2017-07-12 NOTE — PROGRESS NOTES
Adult Mental Health Outpatient Group Therapy Progress Note     Client Initial Individualized Goals for Treatment: I was recommended to the program by inpatient team.  I would be open to working on the delusional thoughts.  I would like to prepare for a job.    See Initial Treatment suggestions for the client during the time between Diagnostic Assessment and completion of the Master Individualized Treatment Plan.    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  He stated that he is having adequate concentration and energy to complete these tasks.  denied difficulty with paranoia, hallucinations, or delusional thoughts.  Client denied suicidal ideation, intent and plan. He stated that his mood was less depressed.      Client was able to identify level of mood and symptoms.He would benefit from additional strategies to mange low energy and low motivation.    Is this a Weekly Review of the Progress on the Treatment Plan?  No

## 2017-07-12 NOTE — PROGRESS NOTES
Adult Mental Health Outpatient Group Therapy Progress Note     Client Initial Individualized Goals for Treatment: I was recommended to the program by inpatient team.  I would be open to working on the delusional thoughts.  I would like to prepare for a job.    See Initial Treatment suggestions for the client during the time between Diagnostic Assessment and completion of the Master Individualized Treatment Plan.    Treatment Goals:    1. Client will notify staff when needing assistance to develop or implement a coping plan to manage suicidal or self injurious urges.    2. Client will use time in OT to explore employment options and needs.    3. Client will use time in Group Therapy to identify level of symptoms and coping skills to manage them.    4. Delroy will begin to develop an aftercare plan by exploring community resources such as [employer] support group and the  [employer] NAVIGATE program for individual therapy and assistance with employment options.      Area of Treatment Focus:  Symptom Management, Develop / Improve Independent Living Skills and Develop Socialization / Interpersonal Relationship Skills    Therapeutic Interventions/Treatment Strategies:  Support, Feedback, Safety Assessments, Structured Activity and Problem Solving    Response to Treatment Strategies:  Accepted Feedback, Gave Feedback, Listened, Focused on Goals, Attentive and Accepted Support    Name of Group:  OT Clinic     Description and Outcome:  Pt attended and participated in a structured occupational therapy group where intervention focused on coping through structured hands-on activities to improve function in valued roles, routines, and independent living skills. Client presented to group with a calm, even affect. He was self directed with a novel multi step task. Fair problem solve during activity. Client demonstrated understanding of session content by improved independent problem solving during session. Client addressed ITP goal number  2 in this session.     Is this a Weekly Review of the Progress on the Treatment Plan?  No

## 2017-07-13 ENCOUNTER — HOSPITAL ENCOUNTER (OUTPATIENT)
Dept: BEHAVIORAL HEALTH | Facility: CLINIC | Age: 27
End: 2017-07-13
Attending: PSYCHIATRY & NEUROLOGY
Payer: COMMERCIAL

## 2017-07-13 PROCEDURE — H2012 BEHAV HLTH DAY TREAT, PER HR: HCPCS

## 2017-07-13 PROCEDURE — 97150 GROUP THERAPEUTIC PROCEDURES: CPT | Mod: GO

## 2017-07-13 NOTE — PROGRESS NOTES
Adult Mental Health Outpatient Group Therapy Progress Note     Client Initial Individualized Goals for Treatment: I was recommended to the program by inpatient team.  I would be open to working on the delusional thoughts.  I would like to prepare for a job.    See Initial Treatment suggestions for the client during the time between Diagnostic Assessment and completion of the Master Individualized Treatment Plan.    Treatment Goals:     As above     Area of Treatment Focus:  Symptom Management, Personal Safety and Community Resources/Discharge Planning    Therapeutic Interventions/Treatment Strategies:  Support, Feedback, Safety Assessments and Cognitive Behavioral Therapy    Response to Treatment Strategies:  Accepted Feedback, Gave Feedback and Listened    Name of Group:  Group Psychotherapy     Description and Outcome:  Delroy denied difficulty with paranoia, hallucinations, or delusional thoughts.  Client denied suicidal ideation, intent and plan.  Delroy stated that he had some temporary work over the weekend.  He stated that he also cleaned his room and planned to work on it over the weekend.  He stated that he was having trouble with boredom.  When writer prompted client to identify other interests he stated that he wanted to build a drawer to convert a shelf into a dresser.  He stated that he would like to decrease the amount of time he is sleeping as he is sleeping between 11-12 hours per day.  Writer suggested strategies to decrease sleep time.   Motivation was somewhat improved.  He denied distress.      Client was able to identify level of mood and symptoms.  Client was able to identify coping skills used.      Is this a Weekly Review of the Progress on the Treatment Plan?  Yes.      Are Treatment Plan Goals being addressed?  Yes, continue treatment goals      Are Treatment Plan Strategies to Address Goals Effective?  Yes, continue treatment strategies      Are there any current contracts in place?  No

## 2017-07-13 NOTE — PROGRESS NOTES
Adult Mental Health Outpatient Group Therapy Progress Note     Client Initial Individualized Goals for Treatment: I was recommended to the program by inpatient team.  I would be open to working on the delusional thoughts.  I would like to prepare for a job.    See Initial Treatment suggestions for the client during the time between Diagnostic Assessment and completion of the Master Individualized Treatment Plan.    Treatment Goals:     See above     Area of Treatment Focus:  Symptom Management    Therapeutic Interventions/Treatment Strategies:  Support, Feedback, Safety Assessments, Structured Activity and Education    Response to Treatment Strategies:  Accepted Feedback, Listened, Attentive, Accepted Support and Alert    Name of Group:  OT Clinic     Description and Outcome:  Client presented with calm,even mood .Good focus and concentration while working on a structured task. Minimal interaction with peers. Pleasant and friendly upon approach.  Client would benefit from additional opportunities to practice and implement content from this session in his everyday life and mental health recovery.    Is this a Weekly Review of the Progress on the Treatment Plan?  Yes.      Are Treatment Plan Goals being addressed?  Yes, continue treatment goals      Are Treatment Plan Strategies to Address Goals Effective?  Yes, continue treatment strategies      Are there any current contracts in place?  No

## 2017-07-17 ENCOUNTER — HOSPITAL ENCOUNTER (OUTPATIENT)
Dept: BEHAVIORAL HEALTH | Facility: CLINIC | Age: 27
End: 2017-07-17
Attending: PSYCHIATRY & NEUROLOGY
Payer: COMMERCIAL

## 2017-07-17 PROCEDURE — H2012 BEHAV HLTH DAY TREAT, PER HR: HCPCS

## 2017-07-17 PROCEDURE — 97150 GROUP THERAPEUTIC PROCEDURES: CPT | Mod: GO

## 2017-07-18 ENCOUNTER — TELEPHONE (OUTPATIENT)
Dept: PSYCHIATRY | Facility: CLINIC | Age: 27
End: 2017-07-18

## 2017-07-18 ENCOUNTER — HOSPITAL ENCOUNTER (OUTPATIENT)
Dept: BEHAVIORAL HEALTH | Facility: CLINIC | Age: 27
End: 2017-07-18
Attending: PSYCHIATRY & NEUROLOGY
Payer: COMMERCIAL

## 2017-07-18 PROCEDURE — H2012 BEHAV HLTH DAY TREAT, PER HR: HCPCS

## 2017-07-18 PROCEDURE — 97150 GROUP THERAPEUTIC PROCEDURES: CPT | Mod: GO

## 2017-07-18 NOTE — PROGRESS NOTES
"  Adult Mental Health Outpatient Group Therapy Progress Note     Client Initial Individualized Goals for Treatment: I was recommended to the program by inpatient team.  I would be open to working on the delusional thoughts.  I would like to prepare for a job.    See Initial Treatment suggestions for the client during the time between Diagnostic Assessment and completion of the Master Individualized Treatment Plan.    Treatment Goals:    1. Client will notify staff when needing assistance to develop or implement a coping plan to manage suicidal or self injurious urges.    2. Client will use time in OT to explore employment options and needs.    3. Client will use time in Group Therapy to identify level of symptoms and coping skills to manage them.    4. Delroy will begin to develop an aftercare plan by exploring community resources such as [employer] support group and the  [employer] NAVIGATE program for individual therapy and assistance with employment options.      Area of Treatment Focus:  Symptom Management, Develop / Improve Independent Living Skills and Develop Socialization / Interpersonal Relationship Skills    Therapeutic Interventions/Treatment Strategies:  Support, Feedback, Safety Assessments, Structured Activity and Problem Solving    Response to Treatment Strategies:  Accepted Feedback, Gave Feedback, Listened, Focused on Goals, Attentive and Accepted Support    Name of Group:  OT Clinic     Description and Outcome:  Pt attended and participated in a structured occupational therapy group where intervention focused on coping through structured hands-on activities to improve function in valued roles, routines, and independent living skills. Client had low energy in session. He was self directed with a novel multi step task in session. He demonstrated fair task focus during session. He stated that he did not do much over the weekend. He stated that he has been \"browsing\" jobs but is not interested in applying for any " yet. Client demonstrated understanding of session content by improved ability to problem solve during novel task. Client addressed ITP goal number 2 in this session.     Is this a Weekly Review of the Progress on the Treatment Plan?  No

## 2017-07-18 NOTE — PROGRESS NOTES
Adult Mental Health Outpatient Group Therapy Progress Note     Client Initial Individualized Goals for Treatment: I was recommended to the program by inpatient team.  I would be open to working on the delusional thoughts.  I would like to prepare for a job.    See Initial Treatment suggestions for the client during the time between Diagnostic Assessment and completion of the Master Individualized Treatment Plan.    Treatment Goals:     As above     Area of Treatment Focus:  Symptom Management, Personal Safety and Community Resources/Discharge Planning    Therapeutic Interventions/Treatment Strategies:  Support, Feedback, Safety Assessments and Cognitive Behavioral Therapy    Response to Treatment Strategies:  Accepted Feedback, Gave Feedback and Listened    Name of Group:  Group Psychotherapy     Description and Outcome:  Delroy reported wanting to return to full time employment as soon as possible.  He stated that his mother has asked him to wait to go back to work full time until the end of this program.  He stated that he was okay to wait but is anxious to have a regular income.  He reported excessive sleep last night with low energy today.  He  denied difficulty with paranoia, hallucinations, or delusional thoughts.  Client denied suicidal ideation, intent and plan. He stated that his mood was less depressed.      Client actively used to group to share symptoms.  He worked on skills to learn and practice skills to manage anxiety and worry.    Is this a Weekly Review of the Progress on the Treatment Plan?  No

## 2017-07-18 NOTE — PROGRESS NOTES
Adult Mental Health Outpatient Group Therapy Progress Note     Client Initial Individualized Goals for Treatment: I was recommended to the program by inpatient team.  I would be open to working on the delusional thoughts.  I would like to prepare for a job.    See Initial Treatment suggestions for the client during the time between Diagnostic Assessment and completion of the Master Individualized Treatment Plan.    Treatment Goals:     As above     Area of Treatment Focus:  Symptom Management, Personal Safety and Community Resources/Discharge Planning    Therapeutic Interventions/Treatment Strategies:  Support, Feedback, Safety Assessments and Cognitive Behavioral Therapy    Response to Treatment Strategies:  Accepted Feedback, Gave Feedback and Listened    Name of Group:  Group Psychotherapy     Description and Outcome:  Delroy reported not having the opportunity to help with [employer] projects due to the hot weather.  He reported some improvement in ability to wake up in the morning.  He stated that overall his energy level was improving.  He reported that his mood was good.  He stated that he did have one episode of high anxiety when worried about being able to get a job and pay for car insurance.  He stated that he was having a high level of worry with catastrophic thinking.  Writer provided education information on skills to manage worry and catastrophic thinking.  He  denied difficulty with paranoia, hallucinations, or delusional thoughts.  Client denied suicidal ideation, intent and plan. He stated that his mood was less depressed.      Client actively used to group to share symptoms.  He worked on skills to learn and practice skills to manage anxiety and worry.    Is this a Weekly Review of the Progress on the Treatment Plan?  No

## 2017-07-19 ENCOUNTER — TELEPHONE (OUTPATIENT)
Dept: PSYCHIATRY | Facility: CLINIC | Age: 27
End: 2017-07-19

## 2017-07-19 NOTE — TELEPHONE ENCOUNTER
[facility] Outreach  A Part of the [facility] First Episode of Psychosis Program     Patient Name: Guero Carter  /Age:  1990 (27 year old)    Writer received call from Karis Carter, client's mother, following up on phone call yesterday re NAVIGATE services. She reported speaking with client last night and that he is interested in the program and that he is willing to transfer prescriber services to [facility], reporting he only met with [facility] [place] [place] APRN 1x. She said client is on 20 mg zyprexa at bedtime and that he has been hesitant to switch medications. She reported he is still sleeping but that writer can call him later this morning. She described improvements she is seeing in his symptoms of paranoia and delusions, though they are still present. She reported he had to leave college due to his mental health symptoms and knows he eventually wants to return to school. Discussed how having structure is helpful and how day treatment has been helpful with this. She expressed that client will want to start SEE services right away. Writer reported plan to talk with client about starting services as well as coordinate with day treatment staff about the transition. Reported writer will discuss his referral with team and have the clinic call them to schedule an intake.     Writer left  msg for client regarding the above and left contact information, requesting call back.     Savannah Patel MSW, LGSW  [facility] Individual Resiliency Trainer

## 2017-07-19 NOTE — TELEPHONE ENCOUNTER
NAVIGATE Outreach  A Part of the [facility] First Episode of Psychosis Program     Patient Name: Guero aCrter  /Age:  1990 (27 year old)    Contacted client to discuss eligibility and interest in NAVIGATE program. Client's mother Catalina, answered the phone and reported client was not home and is currently at day treatment. She reported some knowledge of NAVIGATE and writer provided further information on the services.   NAVIGATE Services:  -Medication Management (Psychiatry)  -Individual Resiliency Training/IRT (Counseling)  -Supported Employment and Education/SEE  -Family Psychoeducation and Support  -Case Management/CM    Asked about client's medication history. Catalina reported client was prescribed medications for the past 4 years but was not taking them consistently at all. She said he just started taking them consistently during his last hospitalization about 4 weeks ago. She reported writer should attempt to call client tomorrow as he will be home. Reported Wed and  are best days for appts for him since he does not have day treatment those days. She reported he is doing the best he has been in a long time. She reported he is very interested in working with SEE services re getting a job and eventually going back to school and may need some assistance with finding housing eventually. She also reported she thinks he will want to keep his current prescriber, an APRN at [facility]. Writer reported plan to call client tomorrow to discuss his interest in services. Provided contact info.     Savannah Patel, MSW, LGSW  [facility] Individual Resiliency Trainer

## 2017-07-20 ENCOUNTER — HOSPITAL ENCOUNTER (OUTPATIENT)
Dept: BEHAVIORAL HEALTH | Facility: CLINIC | Age: 27
End: 2017-07-20
Attending: PSYCHIATRY & NEUROLOGY
Payer: COMMERCIAL

## 2017-07-20 PROCEDURE — H2012 BEHAV HLTH DAY TREAT, PER HR: HCPCS

## 2017-07-20 PROCEDURE — 97150 GROUP THERAPEUTIC PROCEDURES: CPT | Mod: GO

## 2017-07-20 NOTE — PROGRESS NOTES
Adult Mental Health Outpatient Group Therapy Progress Note     Client Initial Individualized Goals for Treatment: I was recommended to the program by inpatient team.  I would be open to working on the delusional thoughts.  I would like to prepare for a job.    See Initial Treatment suggestions for the client during the time between Diagnostic Assessment and completion of the Master Individualized Treatment Plan.    Treatment Goals:     As above     Area of Treatment Focus:  Symptom Management, Personal Safety and Community Resources/Discharge Planning    Therapeutic Interventions/Treatment Strategies:  Support, Feedback, Safety Assessments and Cognitive Behavioral Therapy    Response to Treatment Strategies:  Accepted Feedback, Gave Feedback and Listened    Name of Group:  Group Psychotherapy     Description and Outcome:  Delroy denied difficulty with paranoia, hallucinations, or delusional thoughts.  Client denied suicidal ideation, intent and plan.  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  He stated that he enjoyed watching the thunderstorms and rain yesterday.  He reported overall low energy and low motivation. He denied distress.      Client was able to identify level of mood and symptoms.  Client was able to identify coping skills used.      Is this a Weekly Review of the Progress on the Treatment Plan?  Yes.      Are Treatment Plan Goals being addressed?  Yes, continue treatment goals      Are Treatment Plan Strategies to Address Goals Effective?  Yes, continue treatment strategies      Are there any current contracts in place?  No

## 2017-07-21 DIAGNOSIS — Z53.9 DIAGNOSIS NOT YET DEFINED: Primary | ICD-10-CM

## 2017-07-21 PROCEDURE — G0179 MD RECERTIFICATION HHA PT: HCPCS | Performed by: FAMILY MEDICINE

## 2017-07-21 NOTE — PROGRESS NOTES
Adult Mental Health Outpatient Group Therapy Progress Note     Client Initial Individualized Goals for Treatment: I was recommended to the program by inpatient team.  I would be open to working on the delusional thoughts.  I would like to prepare for a job.    See Initial Treatment suggestions for the client during the time between Diagnostic Assessment and completion of the Master Individualized Treatment Plan.    Treatment Goals:    1. Client will notify staff when needing assistance to develop or implement a coping plan to manage suicidal or self injurious urges.    2. Client will use time in OT to explore employment options and needs.    3. Client will use time in Group Therapy to identify level of symptoms and coping skills to manage them.    4. Delroy will begin to develop an aftercare plan by exploring community resources such as [employer] support group and the  [employer] program for individual therapy and assistance with employment options.      Area of Treatment Focus:  Symptom Management, Develop / Improve Independent Living Skills and Develop Socialization / Interpersonal Relationship Skills    Therapeutic Interventions/Treatment Strategies:  Support, Feedback, Safety Assessments, Structured Activity and Problem Solving    Response to Treatment Strategies:  Accepted Feedback, Gave Feedback, Listened, Focused on Goals, Attentive and Accepted Support    Name of Group:  OT Clinic     Description and Outcome:  Pt attended and participated in a structured occupational therapy group where intervention focused on coping through structured hands-on activities to improve function in valued roles, routines, and independent living skills. Client reported concern for boredom this weekend. He spent time problem solving to identify activities to increase structure. He identified yard work, and calling a sibling to play tennis or basketball are appealing to him. Fair task focus with ongoing goal focused activity in  session. Client demonstrated understanding of session content by increased independence in session. Client addressed ITP goal number 2 in this session.     Is this a Weekly Review of the Progress on the Treatment Plan?  Yes.      Are Treatment Plan Goals being addressed?  Yes, continue treatment goals      Are Treatment Plan Strategies to Address Goals Effective?  Yes, continue treatment strategies      Are there any current contracts in place?  No

## 2017-07-21 NOTE — PROGRESS NOTES
Psychiatry staffing: case discussed  Diagnosis:   Schizophrenia, here about 2 months, stabilizing and doing well.    Current Outpatient Prescriptions   Medication     OLANZapine (ZYPREXA) 10 MG tablet     Multiple Vitamins-Minerals (ADULT GUMMY PO)     IBUPROFEN PO     No current facility-administered medications for this encounter.      Past Medical History:   Diagnosis Date     Schizophrenia (H)

## 2017-07-24 ENCOUNTER — HOSPITAL ENCOUNTER (OUTPATIENT)
Dept: BEHAVIORAL HEALTH | Facility: CLINIC | Age: 27
End: 2017-07-24
Attending: PSYCHIATRY & NEUROLOGY
Payer: COMMERCIAL

## 2017-07-24 PROCEDURE — H2012 BEHAV HLTH DAY TREAT, PER HR: HCPCS

## 2017-07-24 PROCEDURE — 97150 GROUP THERAPEUTIC PROCEDURES: CPT | Mod: GO

## 2017-07-24 NOTE — PROGRESS NOTES
"Group Therapy Progress Notes     Area of Treatment Focus:  Symptom Management, Personal Safety and Develop / Improve Independent Living Skills    Therapeutic Interventions/Treatment Strategies:  Support, Redirection, Feedback, Safety Assessments, Structured Activity, Clarification, Education and Motivational Enhancement Therapy    Response to Treatment Strategies:  Accepted Feedback, Gave Feedback, Listened, Focused on Goals, Accepted Support, Alert and Demonstrates Behavior Change    Name of Group:  Group Psychotherapy    Progress Note  Denied suicidal thoughts. Delroy reported he has been eating an increase in \"junk food\" and will try to eat fruit in the afternoon rather than something with sugar. He reported that he has been sleeping more, up to 12 hours. He is doing callisthenics at his home for exercise. His mom and Tayior are helping him with finding a new job. He will work on creating more structure and less isolation for himself by inviting family members to do things with him.     Is this a Weekly Review of the Progress on the Treatment Plan?  No  "

## 2017-07-25 ENCOUNTER — HOSPITAL ENCOUNTER (OUTPATIENT)
Dept: BEHAVIORAL HEALTH | Facility: CLINIC | Age: 27
End: 2017-07-25
Attending: PSYCHIATRY & NEUROLOGY
Payer: COMMERCIAL

## 2017-07-25 PROCEDURE — H2012 BEHAV HLTH DAY TREAT, PER HR: HCPCS

## 2017-07-25 PROCEDURE — 97150 GROUP THERAPEUTIC PROCEDURES: CPT | Mod: GO

## 2017-07-26 NOTE — PROGRESS NOTES
Adult Mental Health Outpatient Group Therapy Progress Note     Client Initial Individualized Goals for Treatment: I was recommended to the program by inpatient team.  I would be open to working on the delusional thoughts.  I would like to prepare for a job.     See Initial Treatment suggestions for the client during the time between Diagnostic Assessment and completion of the Master Individualized Treatment Plan.     Treatment Goals:     1. Client will notify staff when needing assistance to develop or implement a coping plan to manage suicidal or self injurious urges.     2. Client will use time in OT to explore employment options and needs.     3. Client will use time in Group Therapy to identify level of symptoms and coping skills to manage them.     4. Derloy will begin to develop an aftercare plan by exploring community resources such as [employer] support group and the  [employer] program for individual therapy and assistance with employment options.      Area of Treatment Focus:  Symptom Management, Personal Safety and Develop / Improve Independent Living Skills    Therapeutic Interventions/Treatment Strategies:  Support, Feedback, Safety Assessments, Structured Activity and Problem Solving    Response to Treatment Strategies:  Accepted Feedback, Gave Feedback, Listened, Focused on Goals, Attentive, Accepted Support and Alert    Name of Group:  OT Clinic      Description and Outcome:  Delroy  attended and participated in a structured occupational therapy group where intervention focused on coping through structured hands-on activities.  Delroy selected and began work on a new structured task of his own design.  He was assertive in asking for assistance as needed.  He was organized in his work and had good attention to detail. He was generally quiet in group.  He demonstrated understanding of session content by engaging in mindful activity to help manage symptoms and stressors.       Is this a Weekly Review of the  Progress on the Treatment Plan?  No

## 2017-07-26 NOTE — ADDENDUM NOTE
Encounter addended by: Yamilet Flaherty OTR/L on: 7/26/2017  2:27 PM<BR>     Actions taken: Sign clinical note

## 2017-07-26 NOTE — PROGRESS NOTES
"Adult Mental Health Outpatient Group Therapy Progress Note     Client Initial Individualized Goals for Treatment: I was recommended to the program by inpatient team.  I would be open to working on the delusional thoughts.  I would like to prepare for a job.    See Initial Treatment suggestions for the client during the time between Diagnostic Assessment and completion of the Master Individualized Treatment Plan.    Treatment Goals:  See above     Area of Treatment Focus:  Symptom Management, Develop / Improve Independent Living Skills and Physical Health  And discharge planning    Therapeutic Interventions/Treatment Strategies:  Support, Feedback and Problem Solving    Response to Treatment Strategies:  Accepted Feedback, Gave Feedback, Listened and Focused on Goals    Name of Group:  Psychotherapy     Description and Outcome:  Delroy reported he was \"doing alright.\"  He reported sleeping 10 hours despite desire to sleep about 8 hours.  However, he noted this was less sleep than usual.  Aided in identifying skills used to reduce hours of sleep to 10 and provided suggestions as to how he might continue to reduce sleep.  He reported he wants to start looking for a job soon but stated his mom wanted him to wait until he was done with day treatment.  Encouraged Delroy to consider applying for jobs before he leaves so he has some positive structure lined up upon discharge.  He reported symptoms are low.  He noted eating is balanced, he is exercising, doing yard work, and spending time with his brother.  He did not endorse safety concerns.     Client verbalized understanding of session content by stating plan to begin exploring job opportunities to help prepare for discharge.      Is this a Weekly Review of the Progress on the Treatment Plan?  No    "

## 2017-07-26 NOTE — PROGRESS NOTES
Adult Mental Health Outpatient Group Therapy Progress Note     Client Initial Individualized Goals for Treatment: I was recommended to the program by inpatient team.  I would be open to working on the delusional thoughts.  I would like to prepare for a job.    See Initial Treatment suggestions for the client during the time between Diagnostic Assessment and completion of the Master Individualized Treatment Plan.    Treatment Goals:    1. Client will notify staff when needing assistance to develop or implement a coping plan to manage suicidal or self injurious urges.    2. Client will use time in OT to explore employment options and needs.    3. Client will use time in Group Therapy to identify level of symptoms and coping skills to manage them.    4. Delroy will begin to develop an aftercare plan by exploring community resources such as [employer] support group and the  [employer] program for individual therapy and assistance with employment options.      Area of Treatment Focus:  Symptom Management, Develop / Improve Independent Living Skills and Develop Socialization / Interpersonal Relationship Skills    Therapeutic Interventions/Treatment Strategies:  Support, Feedback, Safety Assessments, Structured Activity and Problem Solving    Response to Treatment Strategies:  Accepted Feedback, Gave Feedback, Listened, Focused on Goals, Attentive and Accepted Support    Name of Group:  OT Clinic     Description and Outcome:  Pt attended and participated in a structured occupational therapy group where intervention focused on coping through structured hands-on activities to improve function in valued roles, routines, and independent living skills. Client had a constricted affect in session. He reported feeling tired. With a verbal cue, he selected a novel problem solving activity. Fair task focus throughout session. Client verbalized understanding of session content by identifying ways he structured his weekend. Client  addressed ITP goal number 2 in this session.    Is this a Weekly Review of the Progress on the Treatment Plan?  No

## 2017-07-27 ENCOUNTER — HOSPITAL ENCOUNTER (OUTPATIENT)
Dept: BEHAVIORAL HEALTH | Facility: CLINIC | Age: 27
End: 2017-07-27
Attending: PSYCHIATRY & NEUROLOGY
Payer: COMMERCIAL

## 2017-07-27 PROCEDURE — 97150 GROUP THERAPEUTIC PROCEDURES: CPT | Mod: GO

## 2017-07-27 PROCEDURE — H2012 BEHAV HLTH DAY TREAT, PER HR: HCPCS

## 2017-07-27 NOTE — PROGRESS NOTES
Adult Mental Health Outpatient Group Therapy Progress Note     Client Initial Individualized Goals for Treatment: I was recommended to the program by inpatient team.  I would be open to working on the delusional thoughts.  I would like to prepare for a job.    See Initial Treatment suggestions for the client during the time between Diagnostic Assessment and completion of the Master Individualized Treatment Plan.    Treatment Goals:     As above     Area of Treatment Focus:  Symptom Management, Personal Safety and Community Resources/Discharge Planning    Therapeutic Interventions/Treatment Strategies:  Support, Feedback, Safety Assessments and Cognitive Behavioral Therapy    Response to Treatment Strategies:  Accepted Feedback, Gave Feedback and Listened    Name of Group:  Group Psychotherapy     Description and Outcome:  Delroy denied difficulty with paranoia, hallucinations, or delusional thoughts.  Client denied suicidal ideation, intent and plan.  Delroy shared plans to help with building photo frames for a relatives upcoming wedding.  He stated that he was anxious about pursuing work, but plans to apply to a temporary agency to look for work closer to discharge from this program.      Client was able to identify level of mood and symptoms.  Client was able to identify coping skills used.      Is this a Weekly Review of the Progress on the Treatment Plan?  Yes.      Are Treatment Plan Goals being addressed?  Yes, continue treatment goals      Are Treatment Plan Strategies to Address Goals Effective?  Yes, continue treatment strategies      Are there any current contracts in place?  No

## 2017-07-27 NOTE — PROGRESS NOTES
Adult Mental Health Outpatient Group Therapy Progress Note     Client Initial Individualized Goals for Treatment: I was recommended to the program by inpatient team.  I would be open to working on the delusional thoughts.  I would like to prepare for a job.    See Initial Treatment suggestions for the client during the time between Diagnostic Assessment and completion of the Master Individualized Treatment Plan.    Treatment Goals:    1. Client will notify staff when needing assistance to develop or implement a coping plan to manage suicidal or self injurious urges.    2. Client will use time in OT to explore employment options and needs.    3. Client will use time in Group Therapy to identify level of symptoms and coping skills to manage them.    4. Delroy will begin to develop an aftercare plan by exploring community resources such as [employer] support group and the  "[employer]ATE program for individual therapy and assistance with employment options.      Area of Treatment Focus:  Symptom Management, Develop / Improve Independent Living Skills and Develop Socialization / Interpersonal Relationship Skills    Therapeutic Interventions/Treatment Strategies:  Support, Feedback, Safety Assessments, Structured Activity and Problem Solving    Response to Treatment Strategies:  Accepted Feedback, Gave Feedback, Listened, Focused on Goals, Attentive and Accepted Support    Name of Group:  OT Clinic     Description and Outcome:  Pt attended and participated in a structured occupational therapy group where intervention focused on coping through structured hands-on activities to improve function in valued roles, routines, and independent living skills. Client had a constricted affect in session. He was self directed with a structured individual activity. He interacted minimally with peers in session. Reported feeling low energy today. He reported that overall his sleep is improving, sleeping an average of ten hours per night  versus 12 hours previously. Variable task focus throughout session. Client verbalized understanding of session content by reporting improvement in sleep. Client addressed ITP goal number 2 in this session.     Is this a Weekly Review of the Progress on the Treatment Plan?  No

## 2017-07-27 NOTE — PROGRESS NOTES
Adult Mental Health Outpatient Group Therapy Progress Note     Client Initial Individualized Goals for Treatment: I was recommended to the program by inpatient team.  I would be open to working on the delusional thoughts.  I would like to prepare for a job.    See Initial Treatment suggestions for the client during the time between Diagnostic Assessment and completion of the Master Individualized Treatment Plan.    Treatment Goals:    1. Client will notify staff when needing assistance to develop or implement a coping plan to manage suicidal or self injurious urges.    2. Client will use time in OT to explore employment options and needs.    3. Client will use time in Group Therapy to identify level of symptoms and coping skills to manage them.    4. Delroy will begin to develop an aftercare plan by exploring community resources such as [employer] support group and the  [employer] program for individual therapy and assistance with employment options.      Area of Treatment Focus:  Symptom Management, Develop / Improve Independent Living Skills and Develop Socialization / Interpersonal Relationship Skills    Therapeutic Interventions/Treatment Strategies:  Support, Feedback, Safety Assessments, Structured Activity and Problem Solving    Response to Treatment Strategies:  Accepted Feedback, Gave Feedback, Listened, Focused on Goals, Attentive and Accepted Support    Name of Group:  OT Clinic     Description and Outcome:  Pt attended and participated in a structured occupational therapy group where intervention focused on coping through structured hands-on activities to improve function in valued roles, routines, and independent living skills. Client had a constricted affect in session. He vacillated on activity options for considerable time in session, was able to selected a novel activity with cue from writer. Was able to accurately follow a two step directive in session. Attentive to details of task. Client would  benefit from additional opportunities to practice and implement content from this session. Client addressed ITP goal number 2 in this session.     Is this a Weekly Review of the Progress on the Treatment Plan?  Yes.      Are Treatment Plan Goals being addressed?  Yes, continue treatment goals      Are Treatment Plan Strategies to Address Goals Effective?  Yes, continue treatment strategies      Are there any current contracts in place?  No

## 2017-07-28 ENCOUNTER — APPOINTMENT (OUTPATIENT)
Dept: PSYCHIATRY | Facility: CLINIC | Age: 27
End: 2017-07-28
Payer: COMMERCIAL

## 2017-07-28 ENCOUNTER — OFFICE VISIT (OUTPATIENT)
Dept: PSYCHIATRY | Facility: CLINIC | Age: 27
End: 2017-07-28
Attending: NURSE PRACTITIONER
Payer: COMMERCIAL

## 2017-07-28 ENCOUNTER — OFFICE VISIT (OUTPATIENT)
Dept: PSYCHIATRY | Facility: CLINIC | Age: 27
End: 2017-07-28
Attending: SOCIAL WORKER
Payer: COMMERCIAL

## 2017-07-28 DIAGNOSIS — F20.9 SCHIZOPHRENIA (H): Primary | ICD-10-CM

## 2017-07-28 DIAGNOSIS — F29 PSYCHOSIS, UNSPECIFIED PSYCHOSIS TYPE (H): Primary | ICD-10-CM

## 2017-07-28 NOTE — Clinical Note
Amandeep Arrieta,   This is the DA for a new NAVIGATE patient who is scheduled to see you Friday 8/4 at 10:30AM. Friendly reminder that the billing code for that visit will be a new med visit. He recently saw an [facility] APRN and you can see her note in Care Everywhere. He is hoping to finish up day treatment at the end of August. He is also scheduled to see Micah this week.   Please let me know if you have questions! Thanks, Denise

## 2017-07-28 NOTE — PROGRESS NOTES
NAVIGATE Care Coordination  A Part of the [facility] First Episode of Psychosis Program     Patient Name: Guero Carter  /Age:  1990 (27 year old)     1. Type of contact:    Introduction to IRT as part of client's intake evaluation with NAVIGATE Director, Denise Rogers    2. People present:   Client: Yes  NAVIGATE Director/Family Clinician: ADELFO Hodges, LICSW  NAVIGATE IRT: ADELFO Terrell, LGYOSI/writer  NAVIGATE Supported : Felix Forrester    3. Total number of persons who participated in contact: 4, including NAVIGATE team member(s)    4. Length of Actual Contact: 15 minutes, Record Minutes Travel Time: 0 minutes    5. Location of contact:  [facility], [place]       6. Assessment/Progress Note:     Writer joined client's intake evaluation session with NAVIGATE Director, Denise Rogers, along with NAVIGATE SEE, Felix Forrester, to introduce self and IRT. Client reported he would be interested in meeting for IRT on a monthly basis and agreed to meet with  for first appt next week, Thursday 8/3. Client reported he has a month left of day treatment and that he likes the variety of the schedule in the program.     Please see prescriber's documentation for further details.     7. Plan/Referrals:     Milesr will meet client on 8/3 at 11:30am at the [facility] for first IRT session.     This is a non-billable encounter as it was solely for the purposes of outreach and/or care coordination.      Savannah Patel

## 2017-07-28 NOTE — MR AVS SNAPSHOT
After Visit Summary   7/28/2017    Guero Carter    MRN: [id number]           Patient Information     Date Of Birth          1990        Visit Information        Provider Department      7/28/2017 10:00 AM Denise Rogers, [facility]        Today's Diagnoses     Schizophrenia (H)    -  1       Follow-ups after your visit        Your next 10 appointments already scheduled     Jul 31, 2017  1:00 PM CDT   Return Visit with ADULT  DAY 3C [facility] ([facility])    [address] [place] [address]   [phone]            Aug 01, 2017  1:00 PM CDT   Return Visit with ADULT  DAY 3C [facility] ([facility])    [address] [place] [address]   [phone]            Aug 03, 2017 11:30 AM CDT   Navigate Psychotherapy with Savannah Patel, [facility]   Psychiatry Clinic ([facility])    [address]  [address] [facility] [address]   [phone]            Aug 03, 2017  1:00 PM CDT   Return Visit with ADULT  DAY 3C [facility] ([facility])    [facility] [address] [address]   [phone]            Aug 04, 2017 10:30 AM CDT   Navigate New with LOTTIE Jon CNP   Psychiatry Clinic ([facility])    [address]  [address] [facility] [address]   [phone]            Aug 07, 2017  1:00 PM CDT   [facility] Visit with ADULT [address] [facility] ([facility])    [address] [place] [address]   [phone]            Aug 08, 2017  1:00 PM CDT   Return Visit with ADULT [address] [facility] ([facility])    [place] [address] [address]   [phone]            Aug 10, 2017  1:00 PM CDT   Return Visit with ADULT  DAY 3C   [facility] ([facility])    [place] [address] [address]   [phone]            Aug 14, 2017  1:00 PM CDT   Return Visit with ADULT  DAY 3C   [facility] ([facility])    [place] [address] [address]   [phone]            Aug 15, 2017  1:00 PM CDT   Return Visit with ADULT  DAY 3C   [facility] ([facility])    [place] [address] [address]   [phone]              Who to contact     Please call your clinic at [phone] to:    Ask questions about your health    Make or cancel appointments    Discuss your medicines    Learn about your test results    Speak to your doctor   If you have compliments or concerns about an experience at your clinic, or if you wish to file a complaint, please contact [facility] Patient Relations at [phone] or email us at [email or web address]         Additional Information About Your Visit        Pixafyhart Information     VivoText is an electronic gateway that provides easy, online access to your medical records. With VivoText, you can request a clinic appointment, read your test results, renew a prescription or communicate with your care team.     To sign up for [employer] visit the website at [email or web address]   You will be asked to enter the access code listed below, as well as some personal information. Please follow the directions to create your username and password.     Your access code is: [email or web address]  Expires: 2017  1:21 PM     Your access code will  in 90 days. If you need help or a new code, please contact your  [facility] or call [phone] for assistance.        Care EveryWhere ID     This is your Care EveryWhere ID. This could be used by other organizations to access your [facility] medical records  [id number]         Blood Pressure from Last 3 Encounters:   06/08/17 135/84   05/28/17 138/71   10/11/13 135/89    Weight from Last 3 Encounters:   06/14/17 102.1 kg (225 lb)   06/08/17 102.8 kg (226 lb 9.6 oz)   06/06/17 98.9 kg (218 lb)              Today, you had the following     No orders found for display       Primary Care Provider    Physician No Ref-Primary       No address on file        Equal Access to Services     MARI VELASCO : Libertad Amaya, mehnaz navarrete, anselmo blue, moe garcia . So [facility] [phone].    ATENCIÓN: Si habla español, tiene a bruce disposición servicios gratuitos de asistencia lingüística. Llame al [phone].    We comply with applicable federal civil rights laws and [place] laws. We do not discriminate on the basis of race, color, national origin, age, disability sex, sexual orientation or gender identity.            Thank you!     Thank you for choosing PSYCHIATRY CLINIC  for your care. Our goal is always to provide you with excellent care. Hearing back from our patients is one way we can continue to improve our services. Please take a few minutes to complete the written survey that you may receive in the mail after your visit with us. Thank you!             Your Updated Medication List - Protect others around you: Learn how to safely use, store and throw away your medicines at www.disposemymeds.org.          This list is accurate as of: 7/28/17 11:59 PM.  Always use your most recent med list.                   Brand Name Dispense Instructions for use Diagnosis    ADULT GUMMY PO      Take 2 tablets by mouth daily        IBUPROFEN PO      Take 200-400 mg by mouth every 4 hours as needed for other  (headache)        OLANZapine 10 MG tablet    zyPREXA    30 tablet    Take 1 tablet (10 mg) by mouth every morning    Psychosis, unspecified psychosis type

## 2017-07-28 NOTE — MR AVS SNAPSHOT
After Visit Summary   7/28/2017    Guero Carter    MRN: [id number]           Patient Information     Date Of Birth          1990        Visit Information        Provider Department      7/28/2017 11:30 AM [facility]        Today's Diagnoses     Psychosis, unspecified psychosis type    -  1       Follow-ups after your visit        Your next 10 appointments already scheduled     Jul 31, 2017  1:00 PM CDT   Return Visit with ADULT  DAY [address] [place] [address] [facility] ([facility])    [address] [place] [address]   [phone]            Aug 01, 2017  1:00 PM CDT   Return Visit with ADULT  DAY [address] [place] [address] [facility] ([facility])    [address] [place] [address]   [phone]            Aug 03, 2017  1:00 PM CDT   Return Visit with ADULT  DAY [address] [place] [address] [facility] ([facility])    [address] [place] [address]   [phone]            Aug 07, 2017  1:00 PM CDT   Return Visit with ADULT  DAY [address] [place] [address] [facility] ([facility])    [address] [place] [address]   [phone]            Aug 08, 2017  1:00 PM CDT   Return Visit with ADULT  DAY [address] [place] [address] [facility] ([facility])    [address] [place] [address]   [phone]            Aug 10, 2017  1:00 PM CDT   Return Visit with ADULT [address] [facility] ([facility])    [address] [place] [address]   [phone]            Aug 14, 2017  1:00 PM CDT   Return Visit with ADULT [address] [facility] ([facility] [place]  [facility])    [place] [address] [address]   [phone]            Aug 15, 2017  1:00 PM CDT   Return Visit with ADULT  DAY 3C   [facility] ([facility])    [place] [address] [address]   [phone]            Aug 17, 2017  1:00 PM CDT   Return Visit with ADULT  DAY 3C   [facility] ([facility])    [place] [address] [address]   [phone]            Aug 21, 2017  1:00 PM CDT   Return Visit with ADULT  DAY 3C   [facility] ([facility])    [place] [address] [address]   [phone]              Who to contact     Please call your clinic at [phone] to:    Ask questions about your health    Make or cancel appointments    Discuss your medicines    Learn about your test results    Speak to your doctor   If you have compliments or concerns about an experience at your clinic, or if you wish to file a complaint, please contact [facility] Patient Relations at [phone] or email us at [email or web address]         Additional Information About Your Visit        mPowahart Information     [employer] is an electronic gateway that provides easy, online access to your medical records. With [employer], you can request a clinic appointment, read your test results, renew a prescription or communicate with your care team.     To sign up for [email or web address] visit the website at [email or web address]   You will be asked to enter the access code listed below, as well as some personal information. Please follow the directions to create your username and password.     Your access code is: [email or web address]  Expires: 2017  1:21 PM     Your access code will  in 90 days. If you need help or a new code, please contact  your [facility] or call [phone] for assistance.        Care EveryWhere ID     This is your Care EveryWhere ID. This could be used by other organizations to access your [facility] medical records  [id number]         Blood Pressure from Last 3 Encounters:   06/08/17 135/84   05/28/17 138/71   10/11/13 135/89    Weight from Last 3 Encounters:   06/14/17 102.1 kg (225 lb)   06/08/17 102.8 kg (226 lb 9.6 oz)   06/06/17 98.9 kg (218 lb)              Today, you had the following     No orders found for display       Primary Care Provider    Physician No Ref-Primary       No address on file        Equal Access to Services     MARI VELASCO : Libertad Amaya, mehnaz navarrete, anselmo blue, moe garcia . So [facility] [phone].    ATENCIÓN: Si habla español, tiene a bruce disposición servicios gratuitos de asistencia lingüística. Llame al [phone].    We comply with applicable federal civil rights laws and [place] laws. We do not discriminate on the basis of race, color, national origin, age, disability sex, sexual orientation or gender identity.            Thank you!     Thank you for choosing [facility]  for your care. Our goal is always to provide you with excellent care. Hearing back from our patients is one way we can continue to improve our services. Please take a few minutes to complete the written survey that you may receive in the mail after your visit with us. Thank you!             Your Updated Medication List - Protect others around you: Learn how to safely use, store and throw away your medicines at www.disposemymeds.org.          This list is accurate as of: 7/28/17 12:30 PM.  Always use your most recent med list.                   Brand Name Dispense Instructions for use Diagnosis    ADULT GUMMY PO      Take 2 tablets by mouth daily        IBUPROFEN PO      Take 200-400 mg by mouth every 4 hours as needed for  other (headache)        OLANZapine 10 MG tablet    zyPREXA    30 tablet    Take 1 tablet (10 mg) by mouth every morning    Psychosis, unspecified psychosis type
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
## 2017-07-31 ENCOUNTER — HOSPITAL ENCOUNTER (OUTPATIENT)
Dept: BEHAVIORAL HEALTH | Facility: CLINIC | Age: 27
End: 2017-07-31
Attending: PSYCHIATRY & NEUROLOGY
Payer: COMMERCIAL

## 2017-07-31 PROCEDURE — H2012 BEHAV HLTH DAY TREAT, PER HR: HCPCS

## 2017-07-31 PROCEDURE — 97150 GROUP THERAPEUTIC PROCEDURES: CPT | Mod: GO

## 2017-08-01 ENCOUNTER — TELEPHONE (OUTPATIENT)
Dept: PSYCHIATRY | Facility: CLINIC | Age: 27
End: 2017-08-01

## 2017-08-01 ENCOUNTER — HOSPITAL ENCOUNTER (OUTPATIENT)
Dept: BEHAVIORAL HEALTH | Facility: CLINIC | Age: 27
End: 2017-08-01
Attending: PSYCHIATRY & NEUROLOGY
Payer: COMMERCIAL

## 2017-08-01 PROCEDURE — H2012 BEHAV HLTH DAY TREAT, PER HR: HCPCS

## 2017-08-01 PROCEDURE — 97150 GROUP THERAPEUTIC PROCEDURES: CPT | Mod: GO

## 2017-08-01 NOTE — PROGRESS NOTES
Adult Mental Health Outpatient Group Therapy Progress Note     Client Initial Individualized Goals for Treatment: I was recommended to the program by inpatient team.  I would be open to working on the delusional thoughts.  I would like to prepare for a job.    See Initial Treatment suggestions for the client during the time between Diagnostic Assessment and completion of the Master Individualized Treatment Plan.    Treatment Goals:    1. Client will notify staff when needing assistance to develop or implement a coping plan to manage suicidal or self injurious urges.    2. Client will use time in OT to explore employment options and needs.    3. Client will use time in Group Therapy to identify level of symptoms and coping skills to manage them.    4. Delroy will begin to develop an aftercare plan by exploring community resources such as [facility] support group and the  [employer] program for individual therapy and assistance with employment options.      Area of Treatment Focus:  Symptom Management, Develop / Improve Independent Living Skills and Develop Socialization / Interpersonal Relationship Skills    Therapeutic Interventions/Treatment Strategies:  Support, Feedback, Safety Assessments, Structured Activity and Problem Solving    Response to Treatment Strategies:  Accepted Feedback, Gave Feedback, Listened, Focused on Goals, Attentive and Accepted Support    Name of Group:  OT Clinic     Description and Outcome:  Pt attended and participated in a structured occupational therapy group where intervention focused on coping through structured hands-on activities to improve function in valued roles, routines, and independent living skills. Client had a calm,even affect in session. He reported feeling tired today. He was self directed with an ongoing goal focused activity in session. Attentive to details of the task. In second half of session, client learned a novel multi step sequencing task with verbal direction.  Client demonstrated understanding of session content by active engagement in session. Client addressed ITP goal number 2 in this session.     Is this a Weekly Review of the Progress on the Treatment Plan?  No

## 2017-08-02 NOTE — PROGRESS NOTES
Adult Mental Health Outpatient Group Therapy Progress Note     Client Initial Individualized Goals for Treatment: I was recommended to the program by inpatient team.  I would be open to working on the delusional thoughts.  I would like to prepare for a job.    See Initial Treatment suggestions for the client during the time between Diagnostic Assessment and completion of the Master Individualized Treatment Plan.    Treatment Goals:    1. Client will notify staff when needing assistance to develop or implement a coping plan to manage suicidal or self injurious urges.    2. Client will use time in OT to explore employment options and needs.    3. Client will use time in Group Therapy to identify level of symptoms and coping skills to manage them.    4. Delroy will begin to develop an aftercare plan by exploring community resources such as [employer] support group and the  [employer] program for individual therapy and assistance with employment options.      Area of Treatment Focus:  Symptom Management, Develop / Improve Independent Living Skills and Develop Socialization / Interpersonal Relationship Skills    Therapeutic Interventions/Treatment Strategies:  Support, Feedback, Safety Assessments, Structured Activity and Problem Solving    Response to Treatment Strategies:  Accepted Feedback, Gave Feedback, Listened, Focused on Goals, Attentive and Accepted Support    Name of Group:  OT Clinic     Description and Outcome:  Pt attended and participated in a structured occupational therapy group where intervention focused on coping through structured hands-on activities to improve function in valued roles, routines, and independent living skills. Client had a calm,even affect in session. 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 He is beginning to meet with a vocational  through the [employer] program. Continued work on a structured goal focused activity. Client verbalized  understanding of session content by providing an update of his vocational goals. Client addressed ITP goal number 2 in this session.    Is this a Weekly Review of the Progress on the Treatment Plan?  No

## 2017-08-02 NOTE — TELEPHONE ENCOUNTER
NAVIGATE SEE Incoming Telephone Call  For Supported Employment & Education    NAVIGATE Enrollee: Gueor Carter (1990)     MRN: [id number]  Date of Call: 8/1/2017  Contacted: Yes    Discussed:   Guero called to reschedule community appointment, saying that he would feel more comfortable meeting at the [facility]. Appointment rescheduled for next Monday, August 7.     Felix Forrester

## 2017-08-03 ENCOUNTER — HOSPITAL ENCOUNTER (OUTPATIENT)
Dept: BEHAVIORAL HEALTH | Facility: CLINIC | Age: 27
End: 2017-08-03
Attending: PSYCHIATRY & NEUROLOGY
Payer: COMMERCIAL

## 2017-08-03 ENCOUNTER — OFFICE VISIT (OUTPATIENT)
Dept: PSYCHIATRY | Facility: CLINIC | Age: 27
End: 2017-08-03
Attending: SOCIAL WORKER
Payer: COMMERCIAL

## 2017-08-03 DIAGNOSIS — F20.9 SCHIZOPHRENIA, UNSPECIFIED TYPE (H): Primary | ICD-10-CM

## 2017-08-03 PROCEDURE — 97150 GROUP THERAPEUTIC PROCEDURES: CPT | Mod: GO

## 2017-08-03 PROCEDURE — H2012 BEHAV HLTH DAY TREAT, PER HR: HCPCS

## 2017-08-03 NOTE — PROGRESS NOTES
Adult Mental Health Outpatient Group Therapy Progress Note     Client Initial Individualized Goals for Treatment: I was recommended to the program by inpatient team.  I would be open to working on the delusional thoughts.  I would like to prepare for a job.    See Initial Treatment suggestions for the client during the time between Diagnostic Assessment and completion of the Master Individualized Treatment Plan.    Treatment Goals:     As above     Area of Treatment Focus:  Symptom Management, Personal Safety and Community Resources/Discharge Planning    Therapeutic Interventions/Treatment Strategies:  Support, Feedback, Safety Assessments and Cognitive Behavioral Therapy    Response to Treatment Strategies:  Accepted Feedback, Gave Feedback and Listened    Name of Group:  Group Psychotherapy     Description and Outcome:  Delroy reported continuing to have improved concentration, motivation, and eagerness to prepare for return to employment.  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 He  denied difficulty with paranoia, hallucinations, or delusional thoughts.  Client denied suicidal ideation, intent and plan. He stated that his mood was less depressed.      Client actively used to group to share symptoms.  He worked on skills to learn and practice skills to manage anxiety and worry.    Is this a Weekly Review of the Progress on the Treatment Plan?  No

## 2017-08-03 NOTE — PROGRESS NOTES
Adult Mental Health Outpatient Group Therapy Progress Note     Client Initial Individualized Goals for Treatment: I was recommended to the program by inpatient team.  I would be open to working on the delusional thoughts.  I would like to prepare for a job.    See Initial Treatment suggestions for the client during the time between Diagnostic Assessment and completion of the Master Individualized Treatment Plan.    Treatment Goals:     As above     Area of Treatment Focus:  Symptom Management, Personal Safety and Community Resources/Discharge Planning    Therapeutic Interventions/Treatment Strategies:  Support, Feedback, Safety Assessments and Cognitive Behavioral Therapy    Response to Treatment Strategies:  Accepted Feedback, Gave Feedback and Listened    Name of Group:  Group Psychotherapy     Description and Outcome:  Delroy reported improved energy and motivation.  He reported improved concentration.  He stated that he was excited about starting to work with the [facility].  He is looking forward to the  sessions.  He asked if he could discharge earlier if he gets a job before his scheduled discharge date.   Writer reviewed options to do a reduced schedule to overlap or to discharge early.  He  denied difficulty with paranoia, hallucinations, or delusional thoughts.  Client denied suicidal ideation, intent and plan. He stated that his mood was less depressed.      Client actively used to group to share symptoms.  He worked on skills to learn and practice skills to manage anxiety and worry.    Is this a Weekly Review of the Progress on the Treatment Plan?  No

## 2017-08-03 NOTE — PROGRESS NOTES
Adult Mental Health Outpatient Group Therapy Progress Note     Client Initial Individualized Goals for Treatment: I was recommended to the program by inpatient team.  I would be open to working on the delusional thoughts.  I would like to prepare for a job.    See Initial Treatment suggestions for the client during the time between Diagnostic Assessment and completion of the Master Individualized Treatment Plan.    Treatment Goals:     As above     Area of Treatment Focus:  Symptom Management, Personal Safety and Community Resources/Discharge Planning    Therapeutic Interventions/Treatment Strategies:  Support, Feedback, Safety Assessments and Cognitive Behavioral Therapy    Response to Treatment Strategies:  Accepted Feedback, Gave Feedback and Listened    Name of Group:  Group Psychotherapy     Description and Outcome:  Delroy denied difficulty with paranoia, hallucinations, or delusional thoughts.  Client denied suicidal ideation, intent and plan.  Delroy stated that he had an appointment with the [facility] program prior to this session, which he enjoyed.  He reported excitement about preparing for empolyment.   He reported that he was having more energy and motivation.  Concentration is improved.  Client's affect was more full and he reacted to humor in the group.    Writer reviewed guidelines and expectations for social contact outside of group time with group members.    Client was able to identify level of mood and symptoms.  Client was able to identify coping skills used.      Is this a Weekly Review of the Progress on the Treatment Plan?  Yes.      Are Treatment Plan Goals being addressed?  Yes, continue treatment goals      Are Treatment Plan Strategies to Address Goals Effective?  Yes, continue treatment strategies      Are there any current contracts in place?  No

## 2017-08-03 NOTE — MR AVS SNAPSHOT
After Visit Summary   8/3/2017    Guero Carter    MRN: [id number]           Patient Information     Date Of Birth          1990        Visit Information        Provider Department      8/3/2017 11:30 AM Savannah Patel LGSW [facility]        Today's Diagnoses     Schizophrenia, unspecified type (H)    -  1       Follow-ups after your visit        Your next 10 appointments already scheduled     Aug 10, 2017  1:00 PM CDT   Return Visit with ADULT  DAY 3C   [facility] ([facility])    [facility] [address] [address]   [phone]            Aug 14, 2017 11:30 AM CDT   [facility] ([facility]    [address] [place] [address]  [facility] [address]   [phone]            Aug 14, 2017  1:00 PM CDT   Return Visit with ADULT  DAY [address] [place] [address] [facility] ([facility])    [address] [address]   [phone]            Aug 15, 2017  1:00 PM CDT   Return Visit with ADULT  DAY 3C   [facility] ([facility])    [address] [address]   [phone]            Aug 17, 2017 11:30 AM CDT   Navigate Psychotherapy with KHRIS Terrell   Psychiatry Clinic ([facility])    [address] [place] [address]  [facility] [address]   [phone]            Aug 17, 2017  1:00 PM CDT   Return Visit with ADULT [address] [facility] ([facility])    [address] [address]   [phone]            Aug 21, 2017  1:00 PM CDT   Return Visit with ADULT [address] [facility] ([facility]  [place])    [place] [address] [address]   [phone]            Aug 22, 2017  1:00 PM CDT   Return Visit with ADULT  DAY 3C   [facility] ([facility])    [place] [address] [address]   [phone]            Aug 24, 2017  1:00 PM CDT   Return Visit with ADULT  DAY 3C   [facility] ([facility])    [place] [address] [address]   [phone]            Aug 28, 2017  1:00 PM CDT   Return Visit with ADULT  DAY 3C   [facility] ([facility])    [place] [address] [address]   [phone]              Who to contact     Please call your clinic at [phone] to:    Ask questions about your health    Make or cancel appointments    Discuss your medicines    Learn about your test results    Speak to your doctor   If you have compliments or concerns about an experience at your clinic, or if you wish to file a complaint, please contact [facility] Patient Relations at [phone] or email us at [email or web address]         Additional Information About Your Visit        Interactivohart Information     Bueroservice24t is an electronic gateway that provides easy, online access to your medical records. With Klatcher, you can request a clinic appointment, read your test results, renew a prescription or communicate with your care team.     To sign up for B[employer]24[employer] visit the website at [email or web address]   You will be asked to enter the access code listed below, as well as some personal information. Please follow the directions to create your username and password.     Your access code is: [email or web address]  Expires: 2017  1:21 PM     Your access code will  in 90 days. If you need help or a new code, please contact your [facility] [facility] or call [phone] for assistance.        Care EveryWhere ID     This is your Care EveryWhere ID. This could be used by other organizations to access your [place] medical records  [id number]         Blood Pressure from Last 3 Encounters:   08/04/17 (!) 146/98   06/08/17 135/84   05/28/17 138/71    Weight from Last 3 Encounters:   08/04/17 109 kg (240 lb 6.4 oz)   06/14/17 102.1 kg (225 lb)   06/08/17 102.8 kg (226 lb 9.6 oz)              Today, you had the following     No orders found for display       Primary Care Provider    Physician No Ref-Primary       No address on file        Equal Access to Services     MARI VELASCO : Libertad Amaya, mehnaz navarrete, anselmo kaalmanas blue, moe garcia . So [facility] [phone].    ATENCIÓN: Si habla español, tiene a bruce disposición servicios gratuitos de asistencia lingüística. Llame al [phone].    We comply with applicable federal civil rights laws and [place] laws. We do not discriminate on the basis of race, color, national origin, age, disability sex, sexual orientation or gender identity.            Thank you!     Thank you for choosing PSYCHIATRY CLINIC  for your care. Our goal is always to provide you with excellent care. Hearing back from our patients is one way we can continue to improve our services. Please take a few minutes to complete the written survey that you may receive in the mail after your visit with us. Thank you!             Your Updated Medication List - Protect others around you: Learn how to safely use, store and throw away your medicines at www.disposemymeds.org.          This list is accurate as of: 8/3/17 11:59 PM.  Always use your most recent med list.                   Brand Name Dispense Instructions for use Diagnosis    ADULT GUMMY PO      Take 2 tablets by mouth daily        IBUPROFEN PO      Take 200-400 mg by mouth every 4 hours as needed for other  (headache)        OLANZapine 10 MG tablet    zyPREXA    30 tablet    Take 1 tablet (10 mg) by mouth every morning    Psychosis, unspecified psychosis type

## 2017-08-04 ENCOUNTER — OFFICE VISIT (OUTPATIENT)
Dept: PSYCHIATRY | Facility: CLINIC | Age: 27
End: 2017-08-04
Attending: NURSE PRACTITIONER
Payer: COMMERCIAL

## 2017-08-04 VITALS
WEIGHT: 240.4 LBS | BODY MASS INDEX: 30.87 KG/M2 | SYSTOLIC BLOOD PRESSURE: 146 MMHG | DIASTOLIC BLOOD PRESSURE: 98 MMHG | HEART RATE: 75 BPM

## 2017-08-04 DIAGNOSIS — F20.3 UNDIFFERENTIATED SCHIZOPHRENIA (H): Primary | ICD-10-CM

## 2017-08-04 PROCEDURE — 99212 OFFICE O/P EST SF 10 MIN: CPT | Mod: ZF

## 2017-08-04 RX ORDER — OLANZAPINE 15 MG/1
15 TABLET ORAL AT BEDTIME
Qty: 30 TABLET | Refills: 0 | Status: SHIPPED | OUTPATIENT
Start: 2017-08-04 | End: 2017-09-05

## 2017-08-04 ASSESSMENT — ANXIETY QUESTIONNAIRES
3. WORRYING TOO MUCH ABOUT DIFFERENT THINGS: NOT AT ALL
7. FEELING AFRAID AS IF SOMETHING AWFUL MIGHT HAPPEN: NOT AT ALL
2. NOT BEING ABLE TO STOP OR CONTROL WORRYING: NOT AT ALL
5. BEING SO RESTLESS THAT IT IS HARD TO SIT STILL: NOT AT ALL
IF YOU CHECKED OFF ANY PROBLEMS ON THIS QUESTIONNAIRE, HOW DIFFICULT HAVE THESE PROBLEMS MADE IT FOR YOU TO DO YOUR WORK, TAKE CARE OF THINGS AT HOME, OR GET ALONG WITH OTHER PEOPLE: NOT DIFFICULT AT ALL
1. FEELING NERVOUS, ANXIOUS, OR ON EDGE: NOT AT ALL

## 2017-08-04 ASSESSMENT — PATIENT HEALTH QUESTIONNAIRE - PHQ9
SUM OF ALL RESPONSES TO PHQ QUESTIONS 1-9: 0
5. POOR APPETITE OR OVEREATING: NOT AT ALL

## 2017-08-04 NOTE — PROGRESS NOTES
Adult Mental Health Outpatient Group Therapy Progress Note     Client Initial Individualized Goals for Treatment: I was recommended to the program by inpatient team.  I would be open to working on the delusional thoughts.  I would like to prepare for a job.    See Initial Treatment suggestions for the client during the time between Diagnostic Assessment and completion of the Master Individualized Treatment Plan.    Treatment Goals:     See above     Area of Treatment Focus:  Symptom Management    Therapeutic Interventions/Treatment Strategies:  Support, Feedback, Safety Assessments, Structured Activity and Education    Response to Treatment Strategies:  Accepted Feedback, Listened, Attentive, Accepted Support and Alert    Name of Group:  OT Clinic     Description and Outcome:  Client presented with calm,even mood .Good focus and concentration while working on a structured task.Thought process clear,goal directed and reality based. Per check in client repeorted that his mood/thought process was good. Per his goal client talked that he mentioned the [facility] program(A comprehensive early treatment program for people with first episode psychosis). He reported no specific issues or stressors. Client reported no specific weekend plans.  Client would benefit from additional opportunities to practice and implement content from this session in his everyday life and mental health recovery.    Is this a Weekly Review of the Progress on the Treatment Plan?  Yes.      Are Treatment Plan Goals being addressed?  Yes, continue treatment goals      Are Treatment Plan Strategies to Address Goals Effective?  Yes, continue treatment strategies      Are there any current contracts in place?  No

## 2017-08-04 NOTE — NURSING NOTE
Chief Complaint   Patient presents with     Psychiatric Evaluation     Schizophrenia     Reviewed allergies, smoking status, and pharmacy preference  Obtained weight, blood pressure and heart rate
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
## 2017-08-05 ASSESSMENT — PATIENT HEALTH QUESTIONNAIRE - PHQ9: SUM OF ALL RESPONSES TO PHQ QUESTIONS 1-9: 0

## 2017-08-07 ENCOUNTER — OFFICE VISIT (OUTPATIENT)
Dept: PSYCHIATRY | Facility: CLINIC | Age: 27
End: 2017-08-07
Payer: COMMERCIAL

## 2017-08-07 ENCOUNTER — HOSPITAL ENCOUNTER (OUTPATIENT)
Dept: BEHAVIORAL HEALTH | Facility: CLINIC | Age: 27
End: 2017-08-07
Attending: PSYCHIATRY & NEUROLOGY
Payer: COMMERCIAL

## 2017-08-07 DIAGNOSIS — F20.3 UNDIFFERENTIATED SCHIZOPHRENIA (H): Primary | ICD-10-CM

## 2017-08-07 PROCEDURE — H2012 BEHAV HLTH DAY TREAT, PER HR: HCPCS

## 2017-08-07 PROCEDURE — 97150 GROUP THERAPEUTIC PROCEDURES: CPT | Mod: GO

## 2017-08-07 NOTE — MR AVS SNAPSHOT
After Visit Summary   8/7/2017    Guero Carter    MRN: [id number]           Patient Information     Date Of Birth          1990        Visit Information        Provider Department      8/7/2017 11:00 AM [facility]        Today's Diagnoses     Undifferentiated schizophrenia (H)    -  1       Follow-ups after your visit        Your next 10 appointments already scheduled     Aug 10, 2017  1:00 PM CDT   Return Visit with ADULT MH DAY 3C [facility] ([facility])    [address] [place] [address]   [phone]            Aug 14, 2017  1:00 PM CDT   Return Visit with ADULT MH DAY 3C [facility] ([facility])    [address] [place] [address]   [phone]            Aug 15, 2017  1:00 PM CDT   Return Visit with ADULT MH DAY 3C [facility] ([facility])    [address] [place] [address]   [phone]            Aug 17, 2017 11:30 AM CDT   Navigate Psychotherapy with KHRIS Terrell   Psychiatry Clinic ([facility])    [address]  [address] [facility] [address]   [phone]            Aug 17, 2017  1:00 PM CDT   Return Visit with ADULT MH DAY 3C [facility] ([facility])    [address] [place] [address]   [phone]            Aug 21, 2017  1:00 PM CDT   [facility] Visit with [facility]ULT [address] [facility] ([facility])    [address] [place] [address]   [phone]            Aug 22, 2017  1:00 PM CDT   Return Visit with ADULT [address] [facility] ([facility])    [place] [address] [address]   [phone]            Aug 24, 2017  1:00 PM CDT   Return Visit with ADULT  DAY 3C   [facility] ([facility])    [place] [address] [address]   [phone]            Aug 28, 2017  1:00 PM CDT   Return Visit with ADULT  DAY 3C   [facility] ([facility])    [place] [address] [address]   [phone]            Aug 29, 2017  1:00 PM CDT   Return Visit with ADULT  DAY 3C   [facility] ([facility])    [place] [address] [address]   [phone]              Who to contact     Please call your clinic at [phone] to:    Ask questions about your health    Make or cancel appointments    Discuss your medicines    Learn about your test results    Speak to your doctor   If you have compliments or concerns about an experience at your clinic, or if you wish to file a complaint, please contact [facility] Patient Relations at [phone] or email us at [email or web address]         Additional Information About Your Visit        Mygenihart Information     [employer] is an electronic gateway that provides easy, online access to your medical records. With [employer], you can request a clinic appointment, read your test results, renew a prescription or communicate with your care team.     To sign up for [employer] visit the website at [email or web address]   You will be asked to enter the access code listed below, as well as some personal information. Please follow the directions to create your username and password.     Your access code is: [email or web address]  Expires: 2017  1:21 PM     Your access code will  in 90 days. If you need help or a new code, please contact your  [facility] or call [phone] for assistance.        Care EveryWhere ID     This is your Care EveryWhere ID. This could be used by other organizations to access your [facility] medical records  [id number]         Blood Pressure from Last 3 Encounters:   08/04/17 (!) 146/98   06/08/17 135/84   05/28/17 138/71    Weight from Last 3 Encounters:   08/04/17 109 kg (240 lb 6.4 oz)   06/14/17 102.1 kg (225 lb)   06/08/17 102.8 kg (226 lb 9.6 oz)              Today, you had the following     No orders found for display       Primary Care Provider    Physician No Ref-Primary       No address on file        Equal Access to Services     MARI VELASCO : Libertad Amaya, wanoe navarrete, qaybaaron kaalmada mirza, moe garcia . So [facility] [phone].    ATENCIÓN: Si habla español, tiene a bruce disposición servicios gratuitos de asistencia lingüística. Llame al [phone].    We comply with applicable federal civil rights laws and [place] laws. We do not discriminate on the basis of race, color, national origin, age, disability sex, sexual orientation or gender identity.            Thank you!     Thank you for choosing PSYCHIATRY CLINIC  for your care. Our goal is always to provide you with excellent care. Hearing back from our patients is one way we can continue to improve our services. Please take a few minutes to complete the written survey that you may receive in the mail after your visit with us. Thank you!             Your Updated Medication List - Protect others around you: Learn how to safely use, store and throw away your medicines at www.disposemymeds.org.          This list is accurate as of: 8/7/17 11:59 PM.  Always use your most recent med list.                   Brand Name Dispense Instructions for use Diagnosis    ADULT GUMMY PO      Take 2 tablets by mouth daily        IBUPROFEN PO      Take 200-400 mg by mouth every 4 hours as needed for  other (headache)        OLANZapine 15 MG tablet    zyPREXA    30 tablet    Take 1 tablet (15 mg) by mouth At Bedtime    Undifferentiated schizophrenia (H)

## 2017-08-08 ENCOUNTER — HOSPITAL ENCOUNTER (OUTPATIENT)
Dept: BEHAVIORAL HEALTH | Facility: CLINIC | Age: 27
End: 2017-08-08
Attending: PSYCHIATRY & NEUROLOGY
Payer: COMMERCIAL

## 2017-08-08 PROCEDURE — H2012 BEHAV HLTH DAY TREAT, PER HR: HCPCS

## 2017-08-08 PROCEDURE — 97150 GROUP THERAPEUTIC PROCEDURES: CPT | Mod: GO

## 2017-08-08 NOTE — PROGRESS NOTES
Adult Mental Health Outpatient Group Therapy Progress Note     Client Initial Individualized Goals for Treatment: I was recommended to the program by inpatient team.  I would be open to working on the delusional thoughts.  I would like to prepare for a job.    See Initial Treatment suggestions for the client during the time between Diagnostic Assessment and completion of the Master Individualized Treatment Plan.    Treatment Goals:     As above     Area of Treatment Focus:  Symptom Management, Personal Safety and Community Resources/Discharge Planning    Therapeutic Interventions/Treatment Strategies:  Support, Feedback, Safety Assessments and Cognitive Behavioral Therapy    Response to Treatment Strategies:  Accepted Feedback, Gave Feedback and Listened    Name of Group:  Group Psychotherapy     Description and Outcome:  Delroy reported continuing to have improved concentration, motivation, and eagerness to prepare for return to employment.  He stated that the yard work never ends, but he is enjoying some of the tasks.   He is looking forward to meeting his brother for golf or a movie.  He  denied difficulty with paranoia, hallucinations, or delusional thoughts.  Client denied suicidal ideation, intent and plan. He stated that his mood was less depressed.      Client actively used to group to share symptoms.  He worked on skills to learn and practice skills to manage anxiety and worry.    Is this a Weekly Review of the Progress on the Treatment Plan?  No

## 2017-08-08 NOTE — ADDENDUM NOTE
Encounter addended by: Cece Braga RN on: 8/8/2017  8:36 AM<BR>     Actions taken: Flowsheet data copied forward, Flowsheet accepted

## 2017-08-08 NOTE — PROGRESS NOTES
Adult Mental Health Outpatient Group Therapy Progress Note     Client Initial Individualized Goals for Treatment: I was recommended to the program by inpatient team.  I would be open to working on the delusional thoughts.  I would like to prepare for a job.    See Initial Treatment suggestions for the client during the time between Diagnostic Assessment and completion of the Master Individualized Treatment Plan.    Treatment Goals:    1. Client will notify staff when needing assistance to develop or implement a coping plan to manage suicidal or self injurious urges.    2. Client will use time in OT to explore employment options and needs.    3. Client will use time in Group Therapy to identify level of symptoms and coping skills to manage them.    4. Delroy will begin to develop an aftercare plan by exploring community resources such as [employer] support group and the  [employer] program for individual therapy and assistance with employment options.      Area of Treatment Focus:  Symptom Management, Develop / Improve Independent Living Skills and Develop Socialization / Interpersonal Relationship Skills    Therapeutic Interventions/Treatment Strategies:  Support, Feedback, Safety Assessments, Structured Activity and Problem Solving    Response to Treatment Strategies:  Accepted Feedback, Gave Feedback, Listened, Focused on Goals, Attentive and Accepted Support    Name of Group:  OT Clinic     Description and Outcome:  Pt attended and participated in a structured occupational therapy group where intervention focused on coping through structured hands-on activities to improve function in valued roles, routines, and independent living skills. Client presented to group with a calm, even affect. He was self directed with an ongoing goal focused activity in session. He was attentive to details of the task. Discussed positive structure he had over the weekend including dog sitting and completing yard work. Client  demonstrated understanding of session content by greater range of affect and improved task focus in session. Client addressed ITP goal number 2 in this session.     Is this a Weekly Review of the Progress on the Treatment Plan?  No

## 2017-08-08 NOTE — PROGRESS NOTES
Adult Mental Health Outpatient Group Therapy Progress Note     Client Initial Individualized Goals for Treatment: I was recommended to the program by inpatient team.  I would be open to working on the delusional thoughts.  I would like to prepare for a job.    See Initial Treatment suggestions for the client during the time between Diagnostic Assessment and completion of the Master Individualized Treatment Plan.    Treatment Goals:     As above     Area of Treatment Focus:  Symptom Management, Personal Safety and Community Resources/Discharge Planning    Therapeutic Interventions/Treatment Strategies:  Support, Feedback, Safety Assessments and Cognitive Behavioral Therapy    Response to Treatment Strategies:  Accepted Feedback, Gave Feedback and Listened    Name of Group:  Group Psychotherapy     Description and Outcome:  Delroy reported continuing to have improved concentration, motivation, and eagerness to prepare for return to employment.  He stated that he enjoyed the meeting with Felix from the [employer] program earlier today to help him prepare for employment.  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  He  denied difficulty with paranoia, hallucinations, or delusional thoughts.  Client denied suicidal ideation, intent and plan. He stated that his mood was less depressed.      Client actively used to group to share symptoms.  He worked on skills to learn and practice skills to manage anxiety and worry.    Is this a Weekly Review of the Progress on the Treatment Plan?  No

## 2017-08-09 NOTE — PROGRESS NOTES
Adult Mental Health Outpatient Group Therapy Progress Note     Client Initial Individualized Goals for Treatment: I was recommended to the program by inpatient team.  I would be open to working on the delusional thoughts.  I would like to prepare for a job.    See Initial Treatment suggestions for the client during the time between Diagnostic Assessment and completion of the Master Individualized Treatment Plan.    Treatment Goals:    1. Client will notify staff when needing assistance to develop or implement a coping plan to manage suicidal or self injurious urges.    2. Client will use time in OT to explore employment options and needs.    3. Client will use time in Group Therapy to identify level of symptoms and coping skills to manage them.    4. Delroy will begin to develop an aftercare plan by exploring community resources such as [employer] support group and the  [employer] program for individual therapy and assistance with employment options.      Area of Treatment Focus:  Symptom Management, Develop / Improve Independent Living Skills and Develop Socialization / Interpersonal Relationship Skills    Therapeutic Interventions/Treatment Strategies:  Support, Feedback, Safety Assessments, Structured Activity and Problem Solving    Response to Treatment Strategies:  Accepted Feedback, Gave Feedback, Listened, Focused on Goals, Attentive and Accepted Support    Name of Group:  OT Clinic     Description and Outcome:  Pt attended and participated in a structured occupational therapy group where intervention focused on coping through structured hands-on activities to improve function in valued roles, routines, and independent living skills. Client presented to group with a calm, even affect. He was self directed with an ongoing goal focused activity, focusing on it through completion. He was attentive to details of the task. He started a novel mindfulness task and reported it to be relaxing. He developed a plan to  use next OT session to prepare for meeting with vocational training with [employer] program. Client demonstrated understanding of session content by adequate focus to complete a goal. Client addressed ITP goal number 2 in this session.    Is this a Weekly Review of the Progress on the Treatment Plan?  No

## 2017-08-10 ENCOUNTER — HOSPITAL ENCOUNTER (OUTPATIENT)
Dept: BEHAVIORAL HEALTH | Facility: CLINIC | Age: 27
End: 2017-08-10
Attending: PSYCHIATRY & NEUROLOGY
Payer: COMMERCIAL

## 2017-08-10 PROCEDURE — 97150 GROUP THERAPEUTIC PROCEDURES: CPT | Mod: GO

## 2017-08-10 PROCEDURE — H2012 BEHAV HLTH DAY TREAT, PER HR: HCPCS

## 2017-08-10 ASSESSMENT — PATIENT HEALTH QUESTIONNAIRE - PHQ9: SUM OF ALL RESPONSES TO PHQ QUESTIONS 1-9: 0

## 2017-08-10 NOTE — PROGRESS NOTES
"Adult Mental Health Outpatient Group Therapy Progress Note     Client Initial Individualized Goals for Treatment: I was recommended to the program by inpatient team.  I would be open to working on the delusional thoughts.  I would like to prepare for a job.    See Initial Treatment suggestions for the client during the time between Diagnostic Assessment and completion of the Master Individualized Treatment Plan.    Treatment Goals:     As above     Area of Treatment Focus:  Symptom Management, Personal Safety and Community Resources/Discharge Planning    Therapeutic Interventions/Treatment Strategies:  Support, Feedback, Safety Assessments and Cognitive Behavioral Therapy    Response to Treatment Strategies:  Accepted Feedback, Gave Feedback and Listened    Name of Group:  Group Psychotherapy     Description and Outcome:  Delroy denied difficulty with paranoia, hallucinations, or delusional thoughts.  Client denied suicidal ideation, intent and plan.  Delroy stated that he \"felt lazy\" when not working.  Writer reviewed ways for client to decrease negative self-judgment and focus on productive unpaid tasks he is completing.  Client reported feeling optimistic about job search preparations and work with the [employer] program.      Client was able to identify level of mood and symptoms.  Client was able to identify coping skills used.      Is this a Weekly Review of the Progress on the Treatment Plan?  Yes.      Are Treatment Plan Goals being addressed?  Yes, continue treatment goals      Are Treatment Plan Strategies to Address Goals Effective?  Yes, continue treatment strategies      Are there any current contracts in place?  No      "

## 2017-08-14 ENCOUNTER — HOSPITAL ENCOUNTER (OUTPATIENT)
Dept: BEHAVIORAL HEALTH | Facility: CLINIC | Age: 27
End: 2017-08-14
Attending: PSYCHIATRY & NEUROLOGY
Payer: COMMERCIAL

## 2017-08-14 ENCOUNTER — OFFICE VISIT (OUTPATIENT)
Dept: PSYCHIATRY | Facility: CLINIC | Age: 27
End: 2017-08-14
Payer: COMMERCIAL

## 2017-08-14 DIAGNOSIS — F20.3 UNDIFFERENTIATED SCHIZOPHRENIA (H): Primary | ICD-10-CM

## 2017-08-14 PROCEDURE — 97150 GROUP THERAPEUTIC PROCEDURES: CPT | Mod: GO

## 2017-08-14 PROCEDURE — H2012 BEHAV HLTH DAY TREAT, PER HR: HCPCS

## 2017-08-14 NOTE — PROGRESS NOTES
Adult Mental Health Outpatient Group Therapy Progress Note         Client Initial Individualized Goals for Treatment: I was recommended to the program by inpatient team.  I would be open to working on the delusional thoughts.  I would like to prepare for a job.     See Initial Treatment suggestions for the client during the time between Diagnostic Assessment and completion of the Master Individualized Treatment Plan.     Treatment Goals:      As above      Area of Treatment Focus:  Symptom Management, Personal Safety and Community Resources/Discharge Planning     Therapeutic Interventions/Treatment Strategies:  Support, Feedback, Safety Assessments and Cognitive Behavioral Therapy     Response to Treatment Strategies:  Accepted Feedback, Gave Feedback and Listened     Name of Group:  Group Psychotherapy      Description and Outcome:  Delroy reported being safe today.  He  denied difficulty with paranoia, hallucinations, or delusional thoughts.  Client denied suicidal ideation, intent and plan.  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  .  Client reported feeling optimistic about job search preparations and work with the [employer] program.  He discussed anxiety and depression levels with the group and they problem-solved together on different strategies.     Client was able to identify level of mood and symptoms.  Client was able to identify coping skills used.       Is this a Weekly Review of the Progress on the Treatment Plan?  Yes.       Are Treatment Plan Goals being addressed?  Yes, continue treatment goals        Are Treatment Plan Strategies to Address Goals Effective?  Yes, continue treatment strategies        Are there any current contracts in place?  No

## 2017-08-14 NOTE — MR AVS SNAPSHOT
After Visit Summary   8/14/2017    Guero Carter    MRN: [id number]           Patient Information     Date Of Birth          1990        Visit Information        Provider Department      8/14/2017 11:30 AM [facility]        Today's Diagnoses     Undifferentiated schizophrenia (H)    -  1       Follow-ups after your visit        Your next 10 appointments already scheduled     Aug 22, 2017  1:00 PM CDT   Return Visit with ADULT MH DAY 3C [facility] ([facility])    [address] [place] [id number]   [phone]            Aug 24, 2017  1:00 PM CDT   Return Visit with ADULT MH DAY 3[facility] ([facility])    [address] [place] [address]   [phone]            Aug 28, 2017 11:30 AM CDT   Navigate See with Felix Forrester   Psychiatry Clinic ([facility])    [address] [facility] [address]   [phone]            Aug 28, 2017  1:00 PM CDT   Return Visit with ADULT MH DAY 3C [facility] ([facility])    [address] [place] [address]   [phone]            Aug 29, 2017  1:00 PM CDT   Return Visit with ADULT MH DAY 3C [facility] ([facility])    [address] [place] [address]   [phone]            Aug 31, 2017 11:30 AM CDT   Navigate Psychotherapy with KHRIS Terrell   Psychiatry Clinic ([facility])    [address] [facility] [address]   [phone]              Who to contact     Please call your clinic at [phone] to:    Ask questions about your health    Make or cancel appointments    Discuss your  medicines    Learn about your test results    Speak to your doctor   If you have compliments or concerns about an experience at your clinic, or if you wish to file a complaint, please contact [facility] Patient Relations at [phone] or email us at [email or web address]         Additional Information About Your Visit        [email or web address] Information     [employer] is an electronic gateway that provides easy, online access to your medical records. With [employer], you can request a clinic appointment, read your test results, renew a prescription or communicate with your care team.     To sign up for [employer]usion visit the website at [email or web address]   You will be asked to enter the access code listed below, as well as some personal information. Please follow the directions to create your username and password.     Your access code is: [email or web address]  Expires: 2017  1:21 PM     Your access code will  in 90 days. If you need help or a new code, please contact your [facility] Clinic or call [phone] for assistance.        Care EveryWhere ID     This is your Care EveryWhere ID. This could be used by other organizations to access your [facility] medical records  [id number]         Blood Pressure from Last 3 Encounters:   17 (!) 146/98   17 135/84   17 138/71    Weight from Last 3 Encounters:   17 109 kg (240 lb 6.4 oz)   17 102.1 kg (225 lb)   17 102.8 kg (226 lb 9.6 oz)              Today, you had the following     No orders found for display       Primary Care Provider    Physician No Ref-Primary       No address on file        Equal Access to Services     MARI VELASCO : Hadii ralph Amaya, mehnaz navarrete, moe terry. So [facility] [phone].    ATENCIÓN: Si habla español, tiene a bruce disposición servicios gratuitos de asistencia lingüística. Llame al  [phone].    We comply with applicable federal civil rights laws and [place] laws. We do not discriminate on the basis of race, color, national origin, age, disability sex, sexual orientation or gender identity.            Thank you!     Thank you for choosing PSYCHIATRY CLINIC  for your care. Our goal is always to provide you with excellent care. Hearing back from our patients is one way we can continue to improve our services. Please take a few minutes to complete the written survey that you may receive in the mail after your visit with us. Thank you!             Your Updated Medication List - Protect others around you: Learn how to safely use, store and throw away your medicines at www.disposemymeds.org.          This list is accurate as of: 8/14/17 11:59 PM.  Always use your most recent med list.                   Brand Name Dispense Instructions for use Diagnosis    ADULT GUMMY PO      Take 2 tablets by mouth daily        IBUPROFEN PO      Take 200-400 mg by mouth every 4 hours as needed for other (headache)        OLANZapine 15 MG tablet    zyPREXA    30 tablet    Take 1 tablet (15 mg) by mouth At Bedtime    Undifferentiated schizophrenia (H)

## 2017-08-14 NOTE — PROGRESS NOTES
Adult Mental Health Outpatient Group Therapy Progress Note     Client Initial Individualized Goals for Treatment: I was recommended to the program by inpatient team.  I would be open to working on the delusional thoughts.  I would like to prepare for a job.    See Initial Treatment suggestions for the client during the time between Diagnostic Assessment and completion of the Master Individualized Treatment Plan.    Treatment Goals:    1. Client will notify staff when needing assistance to develop or implement a coping plan to manage suicidal or self injurious urges.    2. Client will use time in OT to explore employment options and needs.    3. Client will use time in Group Therapy to identify level of symptoms and coping skills to manage them.    4. Delroy will begin to develop an aftercare plan by exploring community resources such as [employer] support group and the  [employer] program for individual therapy and assistance with employment options.      Area of Treatment Focus:  Symptom Management, Develop / Improve Independent Living Skills and Develop Socialization / Interpersonal Relationship Skills    Therapeutic Interventions/Treatment Strategies:  Support, Feedback, Safety Assessments, Structured Activity and Problem Solving    Response to Treatment Strategies:  Accepted Feedback, Gave Feedback, Listened, Focused on Goals, Attentive and Accepted Support    Name of Group:  OT Clinic     Description and Outcome:  Pt attended and participated in a structured occupational therapy group where intervention focused on coping through structured hands-on activities to improve function in valued roles, routines, and independent living skills. Client had a calm, even affect in session. He independently initiated a novel multi step task in session. He assertively requested assistance as needed to complete activity. Fair task focus. Reviewed his goal for upcoming meeting with [employer] . Client  demonstrated understanding of session content by improved task focus and problem solving during session. Client addressed ITP goal number 2 in this session.     Is this a Weekly Review of the Progress on the Treatment Plan?  Yes.      Are Treatment Plan Goals being addressed?  Yes, continue treatment goals      Are Treatment Plan Strategies to Address Goals Effective?  Yes, continue treatment strategies      Are there any current contracts in place?  No

## 2017-08-15 ENCOUNTER — HOSPITAL ENCOUNTER (OUTPATIENT)
Dept: BEHAVIORAL HEALTH | Facility: CLINIC | Age: 27
End: 2017-08-15
Attending: PSYCHIATRY & NEUROLOGY
Payer: COMMERCIAL

## 2017-08-15 PROCEDURE — H2012 BEHAV HLTH DAY TREAT, PER HR: HCPCS

## 2017-08-15 PROCEDURE — 97150 GROUP THERAPEUTIC PROCEDURES: CPT | Mod: GO

## 2017-08-15 NOTE — PROGRESS NOTES
Adult Mental Health Outpatient Group Therapy Progress Note           Client Initial Individualized Goals for Treatment: I was recommended to the program by inpatient team.  I would be open to working on the delusional thoughts.  I would like to prepare for a job.      See Initial Treatment suggestions for the client during the time between Diagnostic Assessment and completion of the Master Individualized Treatment Plan.      Treatment Goals:       As above      Area of Treatment Focus:  Symptom Management, Personal Safety and Community Resources/Discharge Planning      Therapeutic Interventions/Treatment Strategies:  Support, Feedback, Safety Assessments and Cognitive Behavioral Therapy      Response to Treatment Strategies:  Accepted Feedback, Gave Feedback and Listened      Name of Group:  Group Psychotherapy       Description and Outcome:  Delroy reported being safe today.  He  denied difficulty with paranoia, hallucinations, or delusional thoughts.  Client denied suicidal ideation, intent and plan.  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    Client reported feeling optimistic about job search preparations and work with the [employer] program.  He discussed anxiety and depression levels with the group and they problem-solved together on different strategies.  He reported that he works out during the week at his home, keeps his appointments, is eating health foods, concentration is ok, takes his medications as prescribed, watches sports on TV, uses mindfulness to help calm his mind, and plans to clean his room.  He stated that he feels bored, lately, and then reported feeling anxious about job hunting.       Client was able to identify level of mood and symptoms.  Client was able to identify coping skills used.        Is this a Weekly Review of the Progress on the Treatment Plan?  Yes.        Are Treatment Plan Goals being addressed?  Yes, continue treatment  goals          Are Treatment Plan Strategies to Address Goals Effective?  Yes, continue treatment strategies          Are there any current contracts in place?  No

## 2017-08-15 NOTE — PROGRESS NOTES
Adult Mental Health Outpatient Group Therapy Progress Note     Client Initial Individualized Goals for Treatment: I was recommended to the program by inpatient team.  I would be open to working on the delusional thoughts.  I would like to prepare for a job.    See Initial Treatment suggestions for the client during the time between Diagnostic Assessment and completion of the Master Individualized Treatment Plan.    Treatment Goals:    1. Client will notify staff when needing assistance to develop or implement a coping plan to manage suicidal or self injurious urges.    2. Client will use time in OT to explore employment options and needs.    3. Client will use time in Group Therapy to identify level of symptoms and coping skills to manage them.    4. Delroy will begin to develop an aftercare plan by exploring community resources such as [employer] support group and the  [facility] [employer] program for individual therapy and assistance with employment options.      Area of Treatment Focus:  Symptom Management, Develop / Improve Independent Living Skills and Develop Socialization / Interpersonal Relationship Skills    Therapeutic Interventions/Treatment Strategies:  Support, Feedback, Safety Assessments, Structured Activity and Problem Solving    Response to Treatment Strategies:  Accepted Feedback, Gave Feedback, Listened, Focused on Goals, Attentive and Accepted Support    Name of Group:  OT Clinic     Description and Outcome:  Pt attended and participated in a structured occupational therapy group where intervention focused on coping through structured hands-on activities to improve function in valued roles, routines, and independent living skills. Client had a calm, even affect. Demonstrated full range of affect in session. Self directed with goal focused activity. He stated that he brought the wrong form to his employment meeting. Initially some negative talk around this but he was able to identify how he was able to make  the meeting useful despite this. He had fair task focus throughout session. Writer encouraged client to consider aftercare supports to promote wellness. Client demonstrated understanding of session content by challenging negative thoughts and increased independence in session. Client addressed ITP goal number 2 in this session.     Is this a Weekly Review of the Progress on the Treatment Plan?  No

## 2017-08-16 NOTE — PROGRESS NOTES
Adult Mental Health Outpatient Group Therapy Progress Note     Client Initial Individualized Goals for Treatment: I was recommended to the program by inpatient team.  I would be open to working on the delusional thoughts.  I would like to prepare for a job.    See Initial Treatment suggestions for the client during the time between Diagnostic Assessment and completion of the Master Individualized Treatment Plan.    Treatment Goals:    1. Client will notify staff when needing assistance to develop or implement a coping plan to manage suicidal or self injurious urges.    2. Client will use time in OT to explore employment options and needs.    3. Client will use time in Group Therapy to identify level of symptoms and coping skills to manage them.    4. Delroy will begin to develop an aftercare plan by exploring community resources such as [facility] support group and the  [facility] program for individual therapy and assistance with employment options.      Area of Treatment Focus:  Symptom Management, Develop / Improve Independent Living Skills and Develop Socialization / Interpersonal Relationship Skills    Therapeutic Interventions/Treatment Strategies:  Support, Feedback, Safety Assessments, Structured Activity and Problem Solving    Response to Treatment Strategies:  Accepted Feedback, Gave Feedback, Listened, Focused on Goals, Attentive and Accepted Support    Name of Group:  OT Clinic     Description and Outcome:  Pt attended and participated in a structured occupational therapy group where intervention focused on coping through structured hands-on activities to improve function in valued roles, routines, and independent living skills. Client had a calm, even affect. Self directed on a novel multi step task. He learned a novel activity with written direction. Attentive to details of the task. Client demonstrated understanding of session content by adequate concentration and problem solving to learn new task  independently. Client addressed ITP goal number 2 in this session.     Is this a Weekly Review of the Progress on the Treatment Plan?  No

## 2017-08-16 NOTE — PROGRESS NOTES
[facility] NAVIGATE Program Treatment Plan Summary  A Part of the [facility] First Episode of Psychosis Program     NAVIGATE Enrollee: Guero Carter  /Age:  1990 (27 year old)    Date of Treatment Plan: 2017   90-Day Review Date: 2017  Date of Initial Service: 2017    Participants at Collaborative Treatment Planning Meeting:   Client   NAVIGATE IRT Clinician: ADELFO Terrell, KHRIS     1. DSM-V Diagnosis (include numeric code)  schizophrenia, unspecified type; F20.9    2. Current symptoms and circumstances that substantiate the diagnosis:  Diagnosis of schizophrenia seems supported by present positive symptoms (delusional thoughts, suspiciousness, possible response to internal stimuli) and negative symptoms (avolition, affective flattening, anhedonia, alogia, apathy), as well as past positive (AH, delusional thoughts) and negative symptoms (avolition, affective flattening, anhedonia, alogia, apathy). Guero did not report hx of mood disorder symptoms such as depressive and manic symptoms.     3. How symptoms and/or behaviors are affecting level of function:   Guero had evidence of social and academic decline, including conflict with family members and a failed grade and discontinuation of college courses.    4. Risk Assessment:  Suicide:  Assessed Level of Immediate Risk: Low- per chart review, Guero presented with SI when hospitalized in   Ideation: No  Plan:  No  Means: No  Intent: No    Homicide/Violence:  Assessed Level of Immediate Risk: Low- hx of aggressivness toward mom prior to hospitalization  Ideation: No  Plan: No  Means: No  Intent: No    If on a medication, please include name and dosage:    Current Outpatient Prescriptions   Medication     OLANZapine (ZYPREXA) 15 MG tablet     Multiple Vitamins-Minerals (ADULT GUMMY PO)     IBUPROFEN PO     No current facility-administered medications for this visit.        5. Treatment Goals    Domain: Illness Management & Recovery  Goal:  Identify and engage possible areas of improvement related to +/- symptoms, ability to manage illness, medications, and/or substance use/abuse, SI/SIB/HI    Objectives & Target Date     - Continue with mediation recommendations; Target date: 11/17/2017    - Complete a safety plan with therapist and share with support system; Target date: 11/17/2017  - Verbally report and demonstrate an increased sense of hope for self; Target date: 11/17/2017      - Identify positive traits and talents about self; Target date: 11/17/2017   - Identify negative automatic thoughts and replace them with positive self-talk messages to build self-esteem ; Target date: 11/17/2017  - Verbalize an understanding of the underlying needs, conflicts, and emotions that support irrational beliefs; Target date: 11/17/2017    - Verbally identify the stressors that contribute to the reactive psychosis ; Target date: 11/17/2017  - Report a diminishing or absence of hallucinations and/or delusions ; Target date: 11/17/2017  - Develop and implement relapse prevention strategies for managing possible future episodes of psychosis; Target date: 11/17/2017    - Increase communication with the parents, resulting in feeling attended to and understood; Target date: 11/17/2017  - Family members verbalize increased understanding of an knowledge about Guero s illness and treatment ; Target date: 11/17/2017  - Family members increase positive support of Guero to reduce the chances of acute exacerbation of the psychotic episode ; Target date: 11/17/2017  - Family members share their feelings of guilt, frustration, and fear associated with Guero s mental illness ; Target date: 11/17/2017    - Family members identify specific activities for Guero that will facilitate development of positive self-esteem ; Target date: 11/17/2017  - Family members verbalize realistic expectations and discipline methods Guero; Target date: 11/17/2017  - Family members verbally  reinforce Guero's active attempts to build self-esteem ; Target date: 11/17/2017    Strengths   - Sobriety  - Capacity to love and be loved    - Hope, Optimism, & future-mindedness    - Honest, Authentic, Genuine    - Industry, diligence, & perseverance    - Medication adherence (P)  - Supportive family, recovery environment (P)    Barriers   - Drug/Alcohol use- history  - Functional decline, large degree of illness-related deterioration  - Isolation (S)  - Male (S)  - Recent loss of social support (S)  - SI- history  - Single (S)  - Symptoms of psychosis, positive (delusions, hallucinations)  - Symptoms of psychosis, negative (flat affect, avolition, anhedonia, alogia, and/or apathy)  - Symptoms of psychosis, cognitive (memory, attention and concentration, and/or executive functioning difficulties)  - Treatment Non-Adherence - history    Provider & Intervention   IRT  - Motivational Interviewing (Connect info and skills with personal goals, Promote hope and positive expectations, Explore pros and cons of change, Re-frame experiences in a positive light)  - Educational Teaching Strategies (Review written material/education on: Assessment/Initial Goal Setting, Education about Psychosis, Relapse Prevention Planning, Processing the Psychotic Episode, Developing Resiliency -Standard Sessions, Building a Bridging to Your Goals, Dealing with Negative Feelings, Coping with Symptoms, Substance Use, Having Fun and Developing Good Relationships, Making Choices about Smoking, Nutrition and Exercise, Developing Resiliency)  - CBT (Reinforcement and shaping, Social skills training, Relapse prevention planning, Coping skills training, Relaxation training, Cognitive restructuring, Behavioral tailoring)  Prescriber  - Medication Evaluation   - Ongoing Medication Management  - Side Effect Monitoring  - Medication Education  - Adherence Monitoring  - Lab work  Family Therapy  - Motivational Interviewing (Connect info and skills with  personal goals, Promote hope and positive expectations, Explore pros and cons of change, Re-frame experiences in a positive light)  - Educational Teaching Strategies (Review written material/education on: Psychosis, Medications for psychosis, Coping with stress, Strategies to build resiliency, Relapse prevention planning, Developing a collaboration with mental health professionals, Effective communication, A relative s guide to supporting recovery from psychosis, Basic facts about alcohol and drugs)  - CBT (Reinforcement and shaping, Social skills training, Relapse prevention planning, Coping skills training, Relaxation training, Cognitive restructuring, Behavioral tailoring)      Domain: Health & Basic Living Needs  Goal: Identify and engage possible areas of improvement related to basic needs being met and maintaining or improving overall health and well-being    Objectives & Target Date     - Verbalize an accurate understanding of factors influencing eating, health, overweight, and obesity; Target Date: 2/17/2018  - Identify changes in daily lifestyle activity conducive to improved health and good weight management; Target Date: 2/17/2018  - Learn and implement healthy nutritional practices; Target Date: 2/17/2018  - Reestablish a consistent eating and sleeping pattern ; Target Date: 2/17/2018  - Establish a plan to increase physical exercise; Target Date: 2/17/2018    - Disclose any history of substance use that may contribute to and complicate the treatment of the psychosis; Target Date: 2/17/2018  - Explore motivation for treatment toward making a commitment to change ; Target Date: 2/17/2018  - Verbalize increased knowledge of substance use and the process of recovery  ; Target Date: 2/17/2018  - Verbalize a commitment to a harm reduction approach to using substances ; Target Date: 2/17/2018  - Verbalize an understanding of personal, social, and family factors that can contribute to development of chemical  dependence and pose risks for relapse ; Target Date: 2/17/2018  - Identify and make changes in social relationships that will support recovery  ; Target Date: 2/17/2018  - Implement relapse prevention strategies for managing possible future situations with high risk for relapse ; Target Date: 2/17/2018    - Family members verbally reinforce Guero's active attempts to decrease substance by providing praise, encouragement, and affirmations ; Target Date: 2/17/2018    Strengths  - Absence of drug alcohol use currently (P)  - Caution, Prudence, & Discretion    - Hope, Optimism, & future-mindedness    - Industry, diligence, & perseverance    - Medication adherence (P)  - Supportive friends, family, recovery environment (P)    Barriers   - Drug/Alcohol abuse- history  - Recent loss of social support (S)  - Symptoms of psychosis, positive (delusions, hallucinations)  - Symptoms of psychosis, negative (flat affect, avolition, anhedonia, alogia, and/or apathy)  - Symptoms of psychosis, cognitive (memory, attention and concentration, and/or executive functioning difficulties)  - Treatment Non-Adherence -history      Provider & Intervention   IRT  - Motivational Interviewing   - Educational Teaching Strategies (Review written material/education on: Substance Use, Nutrition and Exercise, Developing Resiliency)  - CBT  Prescriber  - Medication Evaluation   - Ongoing Medication Management  - Side Effect Monitoring  - Medication Education  - Adherence Monitoring  - Lab work  Family Therapy  - Motivational Interviewing  - Educational Teaching Strategies ( Basic facts about alcohol and drugs)  - CBT   Domain: Family & Other Supports  Goal: Identify and engage possible areas of improvement related to engaging family, friends and other supports    Objectives & Target Date     - Identify strengths and interests that can be used to initiate social contacts and develop peer friendships; Target date: 11/17/17   - Identify, challenge, and  "replace fearful self-talk and beliefs with reality-based, positive self-talk and beliefs: Target date: 11/17/17  - Learn and implement calming and coping strategies to manage anxiety symptoms and focus attention usefully during moments of social anxiety: Target date: 11/17/17  - Strengthen the social support network with friends by initiating social contact and participating in social activities with peers: Target date: 11/17/17    - Consider participating in family therapy: Target date: 11/17/17  - Increase communication with the parents, resulting in feeling attended to and understood: Target date: 11/17/17    - Participate in group therapy  : Target date: 11/17/17  - Increase participation in interpersonal or peer group activities : Target date: 11/17/17    - Family members demonstrate support for Guero as he/she participates in treatment: Target date: 11/17/17    Strengths    - Capacity to love and be loved    - Hope, Optimism, & future-mindedness    - Kind & Generous    - Supportive family, recovery environment (P)    Barriers  - Isolation  - Recent loss of social support (S)  - Symptoms of psychosis, positive (delusions, hallucinations)  - Symptoms of psychosis, negative (flat affect, avolition, anhedonia, alogia, and/or apathy)  - Symptoms of psychosis, cognitive (memory, attention and concentration, and/or executive functioning difficulties)    Provider & Intervention   IRT  - Motivational Interviewing  - Educational Teaching Strategies (Review written material/education on:  Having Fun and Developing Good Relationships)  - CBT   Family Therapy  - Motivational Interviewing   - Educational Teaching Strategies   - CBT   SEE  - Employment Assistance & Supports (Information gathering/planning re: \"benefits applications\" or \"work incentive planning\", Job searching, Interview preparation/skills training, Complete resume, Complete applications, Follow along supports for requesting accommodations, development and " use of natural supports, problem solving difficulties, stress management, develop/use of coping skills for symptoms, develop/use of coping skills for cognitive difficulties, social skills training)    Domain: Social, Academic, & Employment  Goal: Identify and engage possible areas of improvement related to education, employment, and social activities     Objectives & Target Date  - Explore areas of interest for continued educational opportunities : Target date: 11/17/17    - Explore areas of interest for employment purposes: Target date: 11/17/17  - Obtain/maintain gainful employment : Target date: 11/17/17    - Increase participation in interpersonal or peer group activities: Target date: 11/17/17  - Increase frequency of completion of school/work assignments, chores, and household responsibilities: Target date: 11/17/17       Strengths     - Hope, Optimism, & future-mindedness     - Industry, diligence, & perseverance    - Medication adherence (P)  - Supportive family, recovery environment (P)   - Work history/experience   - motivated to return to employment    Barriers   - Symptoms of psychosis, positive (delusions, hallucinations)  - Symptoms of psychosis, negative (flat affect, avolition, anhedonia, alogia, and/or apathy)  - Symptoms of psychosis, cognitive (memory, attention and concentration, and/or executive functioning difficulties)  - Treatment Non-Adherence -hx   - Hx of drug and alcohol abuse    Provider & Intervention   SEE  - Career Inventory  - Goal Setting  - Identify Level of Disclosure   - Education Assistance & Supports (Discuss/plan use of student disability services, School searching, Interview preparation/skills training, Complete forms/applications, Follow along supports for requesting accommodations, development and use of natural supports, problem solving difficulties, stress management, develop/use of coping skills for symptoms, develop/use of coping skills for cognitive difficulties,  "social skills training)  - Employment Assistance & Supports (Information gathering/planning re: \"benefits applications\" or \"work incentive planning\", Job searching, Interview preparation/skills training, Complete resume, Complete applications, Follow along supports for requesting accommodations, development and use of natural supports, problem solving difficulties, stress management, develop/use of coping skills for symptoms, develop/use of coping skills for cognitive difficulties, social skills training)  IRT  - Motivational Interviewing (Connect info and skills with personal goals, Promote hope and positive expectations, Explore pros and cons of change, Re-frame experiences in a positive light)  - Educational Teaching Strategies (Review written material/education on: Building a Bridging to Your Goals, Dealing with Negative Feelings, Coping with Symptoms)  - CBT    6. Frequency of Sessions: Weekly    7. Expected duration of treatment:  2-4x monthly SEE services for 6 months; 1-2x monthly prescriber services for 6 months; 2-4x per month IRT services for 6 months followed by 12-18 months monthly sessions; Consider family education 1-4x/month for 6 months    8. Participants in therapy plan (family, friends, support network): Family members as able      See scanned document for Acknowledgement of Current Treatment Plan    Regulatory Guidelines for Updating Treatment Plan  [place] Medical Assistance: Reviewed & signed at least every 90 days  Medicare:  Update per policy    "

## 2017-08-17 ENCOUNTER — HOSPITAL ENCOUNTER (OUTPATIENT)
Dept: BEHAVIORAL HEALTH | Facility: CLINIC | Age: 27
End: 2017-08-17
Attending: PSYCHIATRY & NEUROLOGY
Payer: COMMERCIAL

## 2017-08-17 ENCOUNTER — BEH TREATMENT PLAN (OUTPATIENT)
Dept: PSYCHIATRY | Facility: CLINIC | Age: 27
End: 2017-08-17

## 2017-08-17 ENCOUNTER — OFFICE VISIT (OUTPATIENT)
Dept: PSYCHIATRY | Facility: CLINIC | Age: 27
End: 2017-08-17
Attending: PSYCHOLOGIST
Payer: COMMERCIAL

## 2017-08-17 DIAGNOSIS — F20.9 SCHIZOPHRENIA, UNSPECIFIED TYPE (H): Primary | ICD-10-CM

## 2017-08-17 PROCEDURE — H2012 BEHAV HLTH DAY TREAT, PER HR: HCPCS

## 2017-08-17 PROCEDURE — 97150 GROUP THERAPEUTIC PROCEDURES: CPT | Mod: GO

## 2017-08-17 NOTE — MR AVS SNAPSHOT
After Visit Summary   8/17/2017    Guero Carter    MRN: [id number]           Patient Information     Date Of Birth          1990        Visit Information        Provider Department      8/17/2017 11:30 AM [facility] Patel LGS[facility]        Today's Diagnoses     Schizophrenia, unspecified type (H)    -  1       Follow-ups after your visit        Your next 10 appointments already scheduled     Aug 21, 2017  1:00 PM CDT   Return Visit with ADULT MH DAY 3C [facility] ([facility])    [address] [place] [id number]   [phone]            Aug 22, 2017  1:00 PM CDT   Return Visit with ADULT [facility] DAY [facility] ([facility])    [address] [place] [address]   [phone]            Aug 24, 2017  1:00 PM CDT   Return Visit with ADULT MH DAY [facility] ([facility])    [address] [place] [address]   [phone]            Aug 28, 2017  1:00 PM CDT   Return Visit with ADULT MH DAY 3[facility] ([facility])    [address] [place] [address]   [phone]            Aug 29, 2017  1:00 PM CDT   Return Visit with ADULT MH DAY 3[facility] ([facility])    [address] [place] [id number] [phone]            Aug 31, 2017 11:30 AM CDT   Navigate Psychotherapy with KHRIS Terrell   [facility] ([facility])    [address] [facility] [id number] [phone]              Who to contact     Please call your clinic at [phone] to:    Ask questions about your health    Make or cancel  appointments    Discuss your medicines    Learn about your test results    Speak to your doctor   If you have compliments or concerns about an experience at your clinic, or if you wish to file a complaint, please contact [facility] Patient Relations at [phone] or email us at [email or web address]         Additional Information About Your Visit        Upfront Chromatographyhart Information     [place] [employer] is an electronic gateway that provides easy, online access to your medical records. With [employer], you can request a clinic appointment, read your test results, renew a prescription or communicate with your care team.     To sign up for [employer] visit the website at [email or web address]   You will be asked to enter the access code listed below, as well as some personal information. Please follow the directions to create your username and password.     Your access code is: [email or web address]  Expires: 2017  1:21 PM     Your access code will  in 90 days. If you need help or a new code, please contact your [facility] or call [phone] for assistance.        Care EveryWhere ID     This is your Care EveryWhere ID. This could be used by other organizations to access your [place] medical records  [id number]         Blood Pressure from Last 3 Encounters:   17 (!) 146/98   17 135/84   17 138/71    Weight from Last 3 Encounters:   17 109 kg (240 lb 6.4 oz)   17 102.1 kg (225 lb)   17 102.8 kg (226 lb 9.6 oz)              Today, you had the following     No orders found for display       Primary Care Provider    Physician No Ref-Primary       No address on file        Equal Access to Services     MARI VELASCO : Libert[place] Amaya, mehnaz navarrete, moe terry. So [facility] [phone].    ATENCIÓN: Si habla español, tiene a bruce disposición servicios gratuitos de  asistencia lingüística. Elda al [phone].    We comply with applicable federal civil rights laws and [place] laws. We do not discriminate on the basis of race, color, national origin, age, disability sex, sexual orientation or gender identity.            Thank you!     Thank you for choosing PSYCHIATRY CLINIC  for your care. Our goal is always to provide you with excellent care. Hearing back from our patients is one way we can continue to improve our services. Please take a few minutes to complete the written survey that you may receive in the mail after your visit with us. Thank you!             Your Updated Medication List - Protect others around you: Learn how to safely use, store and throw away your medicines at www.disposemymeds.org.          This list is accurate as of: 8/17/17 11:59 PM.  Always use your most recent med list.                   Brand Name Dispense Instructions for use Diagnosis    ADULT GUMMY PO      Take 2 tablets by mouth daily        IBUPROFEN PO      Take 200-400 mg by mouth every 4 hours as needed for other (headache)        OLANZapine 15 MG tablet    zyPREXA    30 tablet    Take 1 tablet (15 mg) by mouth At Bedtime    Undifferentiated schizophrenia (H)

## 2017-08-18 NOTE — PROGRESS NOTES
Adult Mental Health Outpatient Group Therapy Progress Note     Client Initial Individualized Goals for Treatment: I was recommended to the program by inpatient team.  I would be open to working on the delusional thoughts.  I would like to prepare for a job.    See Initial Treatment suggestions for the client during the time between Diagnostic Assessment and completion of the Master Individualized Treatment Plan.    Treatment Goals:    1. Client will notify staff when needing assistance to develop or implement a coping plan to manage suicidal or self injurious urges.    2. Client will use time in OT to explore employment options and needs.    3. Client will use time in Group Therapy to identify level of symptoms and coping skills to manage them.    4. Delroy will begin to develop an aftercare plan by exploring community resources such as [employer] support group and the  [employer] program for individual therapy and assistance with employment options.      Area of Treatment Focus:  Symptom Management, Develop / Improve Independent Living Skills and Develop Socialization / Interpersonal Relationship Skills    Therapeutic Interventions/Treatment Strategies:  Support, Feedback, Safety Assessments, Structured Activity and Problem Solving    Response to Treatment Strategies:  Accepted Feedback, Gave Feedback, Listened, Focused on Goals, Attentive and Accepted Support    Name of Group:  OT Clinic     Description and Outcome:  Pt attended and participated in a structured occupational therapy group where intervention focused on coping through structured hands-on activities to improve function in valued roles, routines, and independent living skills. Client had a calm, even affect in session. He independently selected a novel multi step task in session. He learned novel technique with verbal written instructions and demonstration. He demonstrated adequate task focus throughout session. He was attentive to details of the task.  Client demonstrated understanding of session content by high degree of independence with increasingly complex task in session. Client addressed ITP goal number 2 in this session.     Is this a Weekly Review of the Progress on the Treatment Plan?  Yes.      Are Treatment Plan Goals being addressed?  Yes, continue treatment goals      Are Treatment Plan Strategies to Address Goals Effective?  Yes, continue treatment strategies      Are there any current contracts in place?  No

## 2017-08-21 ENCOUNTER — OFFICE VISIT (OUTPATIENT)
Dept: PSYCHIATRY | Facility: CLINIC | Age: 27
End: 2017-08-21
Payer: COMMERCIAL

## 2017-08-21 ENCOUNTER — HOSPITAL ENCOUNTER (OUTPATIENT)
Dept: BEHAVIORAL HEALTH | Facility: CLINIC | Age: 27
End: 2017-08-21
Attending: PSYCHIATRY & NEUROLOGY
Payer: COMMERCIAL

## 2017-08-21 ENCOUNTER — TELEPHONE (OUTPATIENT)
Dept: PSYCHIATRY | Facility: CLINIC | Age: 27
End: 2017-08-21

## 2017-08-21 DIAGNOSIS — F20.9 SCHIZOPHRENIA, UNSPECIFIED TYPE (H): Primary | ICD-10-CM

## 2017-08-21 PROCEDURE — 97150 GROUP THERAPEUTIC PROCEDURES: CPT | Mod: GO

## 2017-08-21 PROCEDURE — H2012 BEHAV HLTH DAY TREAT, PER HR: HCPCS

## 2017-08-21 NOTE — PROGRESS NOTES
Adult Mental Health Outpatient Group Therapy Progress Note     Client Initial Individualized Goals for Treatment: I was recommended to the program by inpatient team.  I would be open to working on the delusional thoughts.  I would like to prepare for a job.    See Initial Treatment suggestions for the client during the time between Diagnostic Assessment and completion of the Master Individualized Treatment Plan.    Treatment Goals:     As above     Area of Treatment Focus:  Symptom Management, Personal Safety and Community Resources/Discharge Planning    Therapeutic Interventions/Treatment Strategies:  Support, Feedback, Safety Assessments and Cognitive Behavioral Therapy    Response to Treatment Strategies:  Accepted Feedback, Gave Feedback and Listened    Name of Group:  Group Psychotherapy     Description and Outcome:  Delroy states he doesn't have a lot to share today. He reports a stable mood, and no concerning symptoms. 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 He will be visiting with his father for the weekend and looking forward to that. 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 He did not express any concerns or stress. He did not report any safety concerns today.     Client was able to identify level of mood and symptoms.  Client was able to identify coping skills used.      Is this a Weekly Review of the Progress on the Treatment Plan?  No

## 2017-08-21 NOTE — PROGRESS NOTES
NAVIGATE SEE Progress Note   For Supported Employment & Education     NAVIGATE Enrollee: Guero Carter (1990)     MRN: [id number]  Date:  8/21/2017  Any Client Contact?: Yes  Any Status Changes?: No  Clinician: LIANET Supported Employment & , Felix Forrester  Client contact in the past 30 days? Yes     Client Status Update      1a. Education - Guero Carter is interested in education: No                         1A1. Orientation to SEE services completed                         1A3. Client completed Career Profile             1b. Employment - Guero Carter is interested in employment: Yes                         1B1. Orientation to SEE services completed                         1B3. Client completed Career Profile     2. People present:                          Client: Guero Carter               NAVIGATE Supported : Felix Forrester     3. Total number of persons who participated in contact: 1     4. Length of Actual Contact: 60 minutes, Record Minutes Travel Time: 30 minutes     5. [facility]      6. Brief description of session, contact, or client status (include: strategies, interventions, client reaction to meeting, next steps, etc.):  Writer met with Guero for third weekly visit at [facility]. 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 Writer offered meeting agenda to include contacting the school to gather more information related to retrieving his certificate, finalizing resume, and finishing career and education inventory. Guero was amenable to this plan, so the meeting began with writer contacting [facility] to obtain more information regarding printing a new certificate and finding out when the certification expires. After talking to two different staff at the [facility], including the [employer], Margarita Garcia,  "who reported that the certification does not . The  suggested going to the records department to request a new copy of certification, which is in room .  offered to meet Guero at the school to help NAVIGATE the department and to offer extra support, but he decided to go alone before the next meeting.      and Guero reviewed resume and added \"Hobbies and Interests\" section to fill more gaps in the resume. Writer also assisted Guero in registering for a new (more professional sounding) [employer] account to add to his resume. The remaining minutes of the the meeting were used to complete the career and education inventory, which included the self-assessment portion. In response to the question, \"what do you think other people might misunderstand about you?\" Guero said, some people think that I might look or be angry because of my squinty face and eyes, but really I am a nice lachelle. Both shared a laugh in response to this question. In terms of what areas he needs the most help in, from his SEE, Guero said, continue to help motivate me to obtain employment and education, it's been helpful so far. Guero reported no Alcohol or marijuana use in his inventory.         7. Completion of mutually agreed upon client task from previous meeting:  Not Completed - Guero was tasked with bringing in his Central Service Certification, but forgot.      8. Orientation and Treatment Planning:  SEE orientation meeting  Developing SEE treatment plan goals      9. Assessment:  Gathering SEE information/inventory regarding work and/or education history, skills, goals, and preferences with client     10. Placement:                         N/A     11. Follow Along Supports:                          N/A     12. Mutually agreed upon client task for next meeting:                          Guero will attempt to go to [facility] to retrieve certification for next week.       13. Next Meeting Scheduled for: Monday, " August 28     Felix MARTINI   Supported Employment &

## 2017-08-21 NOTE — MR AVS SNAPSHOT
After Visit Summary   8/21/2017    Guero Carter    MRN: [id number]           Patient Information     Date Of Birth          1990        Visit Information        Provider Department      8/21/2017 11:30 AM [facility]        Today's Diagnoses     Schizophrenia, unspecified type (H)    -  1       Follow-ups after your visit        Your next 10 appointments already scheduled     Aug 22, 2017  1:00 PM CDT   Return Visit with ADULT [address] [facility] ([facility])    [address] [place] [address]   [phone]            Aug 24, 2017  1:00 PM CDT   Return Visit with ADULT MH DAY 3C [facility] ([facility])    [address] [place] [address]   [phone]            Aug 28, 2017 11:30 AM CDT   Navigate See with Felix Forrester   Psychiatry Clinic ([facility])    [address] [facility] [address]   [phone]            Aug 28, 2017  1:00 PM CDT   Return Visit with ADULT MH DAY 3C [facility] ([facility])    [address] [place] [address]   [phone]            Aug 29, 2017  1:00 PM CDT   Return Visit with ADULT MH DAY 3C [facility] ([facility])    [address] [place] [address]   [phone]            Aug 31, 2017 11:30 AM CDT   Navigate Psychotherapy with KHRIS Terrell   Psychiatry Clinic ([facility])    [address] [place] [address] [facility] [address]   [phone]              Who to contact     Please call your clinic at [phone] to:    Ask questions about your health    Make or cancel appointments    Discuss your  medicines    Learn about your test results    Speak to your doctor   If you have compliments or concerns about an experience at your clinic, or if you wish to file a complaint, please contact [facility] Patient Relations at [phone] or email us at [email or web address]         Additional Information About Your Visit        [employer] Operations Information     Glydet is an electronic gateway that provides easy, online access to your medical records. With Glad to Have You, you can request a clinic appointment, read your test results, renew a prescription or communicate with your care team.     To sign up for [employer] Have You visit the website at [email or web address]   You will be asked to enter the access code listed below, as well as some personal information. Please follow the directions to create your username and password.     Your access code is: [email or web address]  Expires: 2017  1:21 PM     Your access code will  in 90 days. If you need help or a new code, please contact your [facility] Clinic or call [phone] for assistance.        Care EveryWhere ID     This is your Care EveryWhere ID. This could be used by other organizations to access your [facility] medical records  [id number]         Blood Pressure from Last 3 Encounters:   17 (!) 146/98   17 135/84   17 138/71    Weight from Last 3 Encounters:   17 109 kg (240 lb 6.4 oz)   17 102.1 kg (225 lb)   17 102.8 kg (226 lb 9.6 oz)              Today, you had the following     No orders found for display       Primary Care Provider    Physician No Ref-Primary       No address on file        Equal Access to Services     MARI VELASCO : Hadii ralph Amaya, mehnaz navarrete, moe terry. So [facility] [phone].    ATENCIÓN: Si habla español, tiene a bruce disposición servicios gratuitos de asistencia lingüística. Llame al  [phone].    We comply with applicable federal civil rights laws and [place] laws. We do not discriminate on the basis of race, color, national origin, age, disability sex, sexual orientation or gender identity.            Thank you!     Thank you for choosing PSYCHIATRY CLINIC  for your care. Our goal is always to provide you with excellent care. Hearing back from our patients is one way we can continue to improve our services. Please take a few minutes to complete the written survey that you may receive in the mail after your visit with us. Thank you!             Your Updated Medication List - Protect others around you: Learn how to safely use, store and throw away your medicines at www.disposemymeds.org.          This list is accurate as of: 8/21/17  4:06 PM.  Always use your most recent med list.                   Brand Name Dispense Instructions for use Diagnosis    ADULT GUMMY PO      Take 2 tablets by mouth daily        IBUPROFEN PO      Take 200-400 mg by mouth every 4 hours as needed for other (headache)        OLANZapine 15 MG tablet    zyPREXA    30 tablet    Take 1 tablet (15 mg) by mouth At Bedtime    Undifferentiated schizophrenia (H)

## 2017-08-21 NOTE — TELEPHONE ENCOUNTER
"NAVIGATE SEE Incoming Telephone Call  For Supported Employment & Education    NAVIGATE Enrollee: Guero Carter (1990)     MRN: [id number]  Date of Call: 8/21/2017  Contacted: Yes    Discussed:   Guero's mother, Catalina, contacted me with a concern regarding her son. First, she reported only having my number, so requested that I pass the information on to the rest of the team. Ultimately, she is worried that Guero is drinking again as she found an empty bottle of E&J amos in the trash; and furthermore, that he has not been accurately reporting his past dependency of marijuana and alcohol. She reported that at one point, he was drinking an entire bottle of E&J amos each day. Catalina seemed distraught over the phone, mostly because she does not want Guero to find out that she called about his drinking. I validated her concern, but let her know that per the nature of our interdisciplinary team, it might be helpful to schedule a meeting with the entire NAVIGATE team to address this issue. She agrees and is especially concerned that he will get worse once he stops attending the  day treatment program. Also, she is concerned that he recently changed medication with the stipulation that he does not drink.     Catalina went on to explain that she believes Guero has not been not actively engaged in his SEE meetings, as he contacts her right before the meeting to scrounge together his work materials (i.e., Central Service materials and resume). I explained that we spoke this morning about going to [facility] [employer] together to acquire his certification, and she thinks there's a chance that he never actually completed the program. Even though \"he is doing better and helping around the house,\" she is concerned that his drinking might negatively affect his progression.     I explained that we will talk as a team and try to find a time for everyone to meet before Guero exits the day treatment program.     Felix MARTINI " - SEE

## 2017-08-21 NOTE — PROGRESS NOTES
"Adult Mental Health Outpatient Group Therapy Progress Note     Client Initial Individualized Goals for Treatment: I was recommended to the program by inpatient team.  I would be open to working on the delusional thoughts.  I would like to prepare for a job.    See Initial Treatment suggestions for the client during the time between Diagnostic Assessment and completion of the Master Individualized Treatment Plan.    Treatment Goals:     As above     Area of Treatment Focus:  Symptom Management, Personal Safety and Community Resources/Discharge Planning    Therapeutic Interventions/Treatment Strategies:  Support, Feedback, Safety Assessments and Cognitive Behavioral Therapy    Response to Treatment Strategies:  Accepted Feedback, Gave Feedback and Listened    Name of Group:  Group Psychotherapy     Description and Outcome:  Delroy says he had a calm weekend. He did have difficulty falling asleep last night. He says it happens on and off. He has been consistently taking his medication at 8:00 every day. He said he was excited because he met with Felix from [employer] today and updated his resume. He will start applying in the next week. He is looking for factory work. Delroy denied any safety concerns for today. His overall mood feels \"good\".     Client was able to identify level of mood and symptoms.  Client was able to identify coping skills used.      Is this a Weekly Review of the Progress on the Treatment Plan?  No      "

## 2017-08-21 NOTE — PROGRESS NOTES
NAVIGATE SEE Progress Note   For Supported Employment & Education    NAVIGATE Enrollee: Guero Carter (1990)     MRN: [id number]  Date:  8/14/2017  Any Client Contact?: Yes  Any Status Changes?: No  Clinician: LIANET Supported Employment & , Felix Forrester  Client contact in the past 30 days? Yes    Client Status Update     1a. Education - Guero Carter is interested in education: No   1A1. Orientation to SEE services completed   1A2. Client working on completing Career Profile    1b. Employment - Guero Carter is interested in employment: Yes   1B1. Orientation to SEE services completed   1B2. Client working on completing Career Profile    2. People present:    Client: Guero Carter  LIANET Supported : Felix Forrester    3. Total number of persons who participated in contact: 1    4. Length of Actual Contact: 60 minutes, Record Minutes Travel Time: 30 minutes    5. Location of contact:  Psychiatry [facility]     6. Brief description of session, contact, or client status (include: strategies, interventions, client reaction to meeting, next steps, etc.):  Milesr met with Guero for second weekly meeting at [facility]. Meeting agenda included reviewing Central Service Certification, updating resume, and discussing job opportunities, if time allowed. Guero first brought in a certificate that had some information regarding [employer], including a website login and password information. Writer assumed this to be the correct certificate, until navigating the site for a few minutes and realizing that Guero brought in the wrong certificate. After reviewing his transcripts, it became clear that Guero completed the classes for the Central Service Technician certification back in the summer of 2015, but there is no evidence of a certification or completion of the course, especially in order to show future employers. Writer gave Guero credit for  bringing in a packet of information, but suggested that he go home to look for the correct certificate for next meeting.     The remainder of this meeting was devoted to gathering all of the proper information of past work and educational experience to complete a resume. Most of this discussion included targeting employment dates, job tasks, contact information, and extracting work experiences that were not present on the resume. A lot was accomplished to edit the rough draft resume. Writer agreed to bring a final/rough draft of the resume for next meeting to review. Guero agreed to bring the correct certification.     7. Completion of mutually agreed upon client task from previous meeting:  Partially Completed - Guero was tasked with bringing in his Central Service Certification, but brought in the wrong form.     8. Orientation and Treatment Planning:  SEE orientation meeting  Developing SEE treatment plan goals    9. Assessment:  Gathering SEE information/inventory regarding work and/or education history, skills, goals, and preferences with client    10. Placement:   N/A    11. Follow Along Supports:    N/A    12. Mutually agreed upon client task for next meeting:    Guero will try to bring the correct certification for next week.      13. Next Meeting Scheduled for: Monday, August 21    Felix MARTINI   Supported Employment &

## 2017-08-22 ENCOUNTER — HOSPITAL ENCOUNTER (OUTPATIENT)
Dept: BEHAVIORAL HEALTH | Facility: CLINIC | Age: 27
End: 2017-08-22
Attending: PSYCHIATRY & NEUROLOGY
Payer: COMMERCIAL

## 2017-08-22 PROCEDURE — 97150 GROUP THERAPEUTIC PROCEDURES: CPT | Mod: GO

## 2017-08-22 PROCEDURE — H2012 BEHAV HLTH DAY TREAT, PER HR: HCPCS

## 2017-08-22 NOTE — PROGRESS NOTES
Adult Mental Health Outpatient Group Therapy Progress Note     Client Initial Individualized Goals for Treatment: I was recommended to the program by inpatient team.  I would be open to working on the delusional thoughts.  I would like to prepare for a job.    See Initial Treatment suggestions for the client during the time between Diagnostic Assessment and completion of the Master Individualized Treatment Plan.    Treatment Goals:    1. Client will notify staff when needing assistance to develop or implement a coping plan to manage suicidal or self injurious urges.    2. Client will use time in OT to explore employment options and needs.    3. Client will use time in Group Therapy to identify level of symptoms and coping skills to manage them.    4. Delroy will begin to develop an aftercare plan by exploring community resources such as [employer] support group and the  [employer] program for individual therapy and assistance with employment options.      Area of Treatment Focus:  Symptom Management, Develop / Improve Independent Living Skills and Develop Socialization / Interpersonal Relationship Skills    Therapeutic Interventions/Treatment Strategies:  Support, Feedback, Safety Assessments, Structured Activity and Problem Solving    Response to Treatment Strategies:  Accepted Feedback, Gave Feedback, Listened, Focused on Goals, Attentive and Accepted Support    Name of Group:  OT Clinic     Description and Outcome:  Pt attended and participated in a structured occupational therapy group where intervention focused on coping through structured hands-on activities to improve function in valued roles, routines, and independent living skills. Client had a calm, even affect. Identified structure that supported wellness over the weekend. Stated that he feels positive about progress he has made in Navigate program with the . Reported stable mood in session. Client demonstrated understanding of  session content by progressing towards employment goals, consistently adding structure to his schedule. Client addressed ITP goal number 2 in this session.    Is this a Weekly Review of the Progress on the Treatment Plan?  No

## 2017-08-22 NOTE — PROGRESS NOTES
Adult Mental Health Outpatient Group Therapy Progress Note           Client Initial Individualized Goals for Treatment: I was recommended to the program by inpatient team.  I would be open to working on the delusional thoughts.  I would like to prepare for a job.      See Initial Treatment suggestions for the client during the time between Diagnostic Assessment and completion of the Master Individualized Treatment Plan.      Treatment Goals:       As above      Area of Treatment Focus:  Symptom Management, Personal Safety and Community Resources/Discharge Planning      Therapeutic Interventions/Treatment Strategies:  Support, Feedback, Safety Assessments and Cognitive Behavioral Therapy      Response to Treatment Strategies:  Accepted Feedback, Gave Feedback and Listened      Name of Group:  Group Psychotherapy       Description and Outcome:  Delroy reported being safe today.  He  denied difficulty with paranoia, hallucinations, or delusional thoughts.  Client denied suicidal ideation, intent and plan.  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    Client reported feeling optimistic about job search preparations and work with the [employer] program.  He stated that he went to [facility] to get a letter stating that he completed classes.  He stated that he went to bed at 10 pm, but didn't fall asleep till 11:30 pm, and said this was an improvement from previous nights.  He reported that his family is a support for him.       He discussed anxiety and depression levels with the group and they problem-solved together on different strategies.  He reported that he works out during the week at his home, keeps his appointments, is eating health foods, concentration is ok, takes his medications as prescribed, watches sports on TV, uses mindfulness to help calm his mind.  He stated that he doesn't have any friends and the group talked about how to meet  people.      Client was able to identify level of mood and symptoms.  Client was able to identify coping skills used.        Is this a Weekly Review of the Progress on the Treatment Plan?  Yes.        Are Treatment Plan Goals being addressed?  Yes, continue treatment goals          Are Treatment Plan Strategies to Address Goals Effective?  Yes, continue treatment strategies          Are there any current contracts in place?  No

## 2017-08-23 ENCOUNTER — TELEPHONE (OUTPATIENT)
Dept: PSYCHIATRY | Facility: CLINIC | Age: 27
End: 2017-08-23

## 2017-08-23 NOTE — TELEPHONE ENCOUNTER
[facility] Outreach  A Part of the [facility] First Episode of Psychosis Program     Patient Name: Guero Carter  /Age:  1990 (27 year old)    Consulted with [facility] team members LIANET Menjivar and LIANET Terrell MSW, LGYOSI about Guero's mom, Jonas's concerns re: Guero's substance use and follow through with services. Jonas had contacted Felix 17. Please see LIANET Menjivar's note 17 for details. Jonas LVM for LIANET Terrell MSW, KHRIS 17.    Writer LVZITA for Jonas 17 in detail including reason for call and writer's availability and contact information.     Denise Rogers, [facility]  [facility] Director & Family Clinician

## 2017-08-23 NOTE — PROGRESS NOTES
Adult Mental Health Outpatient Group Therapy Progress Note     Client Initial Individualized Goals for Treatment: I was recommended to the program by inpatient team.  I would be open to working on the delusional thoughts.  I would like to prepare for a job.    See Initial Treatment suggestions for the client during the time between Diagnostic Assessment and completion of the Master Individualized Treatment Plan.    Treatment Goals:    1. Client will notify staff when needing assistance to develop or implement a coping plan to manage suicidal or self injurious urges.    2. Client will use time in OT to explore employment options and needs.    3. Client will use time in Group Therapy to identify level of symptoms and coping skills to manage them.    4. Delroy will begin to develop an aftercare plan by exploring community resources such as [employer] support group and the  [employer] program for individual therapy and assistance with employment options.      Area of Treatment Focus:  Symptom Management, Develop / Improve Independent Living Skills and Develop Socialization / Interpersonal Relationship Skills    Therapeutic Interventions/Treatment Strategies:  Support, Feedback, Safety Assessments, Structured Activity and Problem Solving    Response to Treatment Strategies:  Accepted Feedback, Gave Feedback, Listened, Focused on Goals, Attentive and Accepted Support    Name of Group:  OT Clinic     Description and Outcome:  Pt attended and participated in a structured occupational therapy group where intervention focused on coping through structured hands-on activities to improve function in valued roles, routines, and independent living skills. Client had a calm, even affect throughout session. Self directed with ongoing goal focused activity. Attended through completion. Problem solved a barrier to completion with min A from writer. Occasionally socialized with peers in session. Client demonstrated understanding of  session content by following through with task. Client addressed ITP goal number 2 in this session.    Is this a Weekly Review of the Progress on the Treatment Plan?  No

## 2017-08-24 ENCOUNTER — HOSPITAL ENCOUNTER (OUTPATIENT)
Dept: BEHAVIORAL HEALTH | Facility: CLINIC | Age: 27
End: 2017-08-24
Attending: PSYCHIATRY & NEUROLOGY
Payer: COMMERCIAL

## 2017-08-24 PROCEDURE — H2012 BEHAV HLTH DAY TREAT, PER HR: HCPCS

## 2017-08-24 PROCEDURE — 97150 GROUP THERAPEUTIC PROCEDURES: CPT | Mod: GO

## 2017-08-24 NOTE — PROGRESS NOTES
Adult Mental Health Outpatient Group Therapy Progress Note           Client Initial Individualized Goals for Treatment: I was recommended to the program by inpatient team.  I would be open to working on the delusional thoughts.  I would like to prepare for a job.      See Initial Treatment suggestions for the client during the time between Diagnostic Assessment and completion of the Master Individualized Treatment Plan.      Treatment Goals:       As above      Area of Treatment Focus:  Symptom Management, Personal Safety and Community Resources/Discharge Planning      Therapeutic Interventions/Treatment Strategies:  Support, Feedback, Safety Assessments and Cognitive Behavioral Therapy      Response to Treatment Strategies:  Accepted Feedback, Gave Feedback and Listened      Name of Group:  Group Psychotherapy       Description and Outcome:  Delroy reported being safe today.  He  denied difficulty with paranoia, hallucinations, or delusional thoughts.  Client denied suicidal ideation, intent and plan.  He reported doing chores around the house to stay busy and that he plans to go to a movie with his brothers.  He reported no psychotic symptoms, but did notice low energy. He stated that his medication makes him feel more mellow.  He stated that he has appointments on Monday and Thursday next week in the mornings.        Client was able to identify level of mood and symptoms.  Client was able to identify coping skills used.        Is this a Weekly Review of the Progress on the Treatment Plan?  Yes.        Are Treatment Plan Goals being addressed?  Yes, continue treatment goals          Are Treatment Plan Strategies to Address Goals Effective?  Yes, continue treatment strategies          Are there any current contracts in place?  No

## 2017-08-25 NOTE — TELEPHONE ENCOUNTER
"[facility] Outreach  A Part of the [facility] First Episode of Psychosis Program     Patient Name: Guero Carter  /Age:  1990 (27 year old)    Exchanged voicemail messages with Guero's mom, Jonas. Able to reach her by phone 17. She reiterated that she found an empty bottle of amos in the garbage 1.5 weeks ago. She then informed Guero she would no longer pay him to assist with household tasks because she is worried he would buy alcohol and stated, \"I know you did.\" She said she was willing to continue to pay him if he showed receipts for when/how he spent his money. He responded, \"I'm an adult.\" Despite suspected alcohol use, mom reported there are no other signs Guero has been drinking, he seems to be doing well, and is \"the nicest and most helpful he's ever been.\" She also acknowledged her intent in calling was not to \"gang up\" on Guero, rather, help her be as helpful and supportive to Guero as possible.      Due to Jonas's limited availability at the time of writer's call, schedule a follow-up conversation 17 at 1:00pm to discuss future communication strategies.     Denise Rogers [facility] Care[facility]I  [facility] Director & Family Clinician    "

## 2017-08-25 NOTE — PROGRESS NOTES
Psychiatry staffing: case discussed  Diagnosis:  Schizophrenia, doing well.      Current Outpatient Prescriptions   Medication     OLANZapine (ZYPREXA) 15 MG tablet     Multiple Vitamins-Minerals (ADULT GUMMY PO)     IBUPROFEN PO     No current facility-administered medications for this encounter.      Past Medical History:   Diagnosis Date     Schizophrenia (H)

## 2017-08-28 ENCOUNTER — OFFICE VISIT (OUTPATIENT)
Dept: PSYCHIATRY | Facility: CLINIC | Age: 27
End: 2017-08-28
Attending: NURSE PRACTITIONER
Payer: COMMERCIAL

## 2017-08-28 ENCOUNTER — HOSPITAL ENCOUNTER (OUTPATIENT)
Dept: BEHAVIORAL HEALTH | Facility: CLINIC | Age: 27
End: 2017-08-28
Attending: PSYCHIATRY & NEUROLOGY
Payer: COMMERCIAL

## 2017-08-28 DIAGNOSIS — F20.9 SCHIZOPHRENIA, UNSPECIFIED TYPE (H): Primary | ICD-10-CM

## 2017-08-28 PROCEDURE — 97150 GROUP THERAPEUTIC PROCEDURES: CPT | Mod: GO

## 2017-08-28 PROCEDURE — H2012 BEHAV HLTH DAY TREAT, PER HR: HCPCS

## 2017-08-28 NOTE — DISCHARGE SUMMARY
Adult Mental Health Intensive Outpatient Discharge Summary/Instructions      Patient: Guero Carter MRN: [id number]   : 1990 Age: 27 year old Sex: male     Admission Date: 17  Discharge Date: 17  Diagnosis: 295.90 Schizophrenia   Alcohol and cannabis abuse by history    Focus of Treatment / Progress    Personal Safety: Delroy denied suicidal ideation at discharge.     * Follow your safety plan     * Call crisis lines as needed:    [facility] [phone] [facility] [phone]  [facility] [phone] Crisis Connection [phone]  [facility] [phone] [facility]E [phone]  [facility] [phone] National Suicide Prevention [phone]  [facility] [phone] Suicide Prevention [phone]  [facility] [phone]    Managing symptoms of:  In occupational therapy groups, Delroy focused on goals related to exploring his work interests and needs. He actively problem solved to add meaningful structure to his weekly routine and problem solved employment related barriers. Delroy completed the [place] Occupational Performance Measure (COPM) to assess perceived occupational performance. Pre/post scores for perceived performance and satisfaction in valued occupations improved by 3.4 and 6.0, respectively, on a ten point likert scale.      In group psychotherapy skills, Delroy learned and reported practicing skills to manage psychotic symptoms and mood symptoms.  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    Community support/health:  Guero is utilizing [facility] services with [facility] for individual therapy, psychiatry, and supported employment education. Guero has a mother who is supportive and has  engaged with client in the [facility]igate program.    Managing Symptoms and Preventing Relapse    * Go to all of your appointments    * Take all medications as directed.      * Carry a current list if medication with you    * Do not use illicit (street) drugs.  Avoid alcohol    * Report these symptoms to your care team. These are early signs of relapse:   Thoughts of suicide   Losing more sleep, a lot of daytime sleeping   Increased confusion   Mood getting worse   Feeling more aggressive   Other:  Increase in Paranoia or Delusions, urges to use substances like alcohol or cannabis.    *Use these skills daily:  Do something physically active, focus on healthy sleep hygiene, schedule an activity with family, asking for help. Structuring your time, remaining sober.    Copy of summary sent to: Available in EPIC for review.    Follow up with psychiatrist / main caregiver: Meenakshi Mcdaniel [facility]  Next visit: Client should schedule a follow up.    Follow up with your therapist:   Savannah Patel with [facility]  Next visit: 8/31/17 1130AM    Go to group therapy and / or support groups at:   Consider Tuesday group at [facility], or [facility] Young Adult Support Groups (see handout)    See your medical doctor about:  Your yearly physical, your next annual labs are due 5/2018, and for any other medical concerns that arise.    Other: Delroy plans to continue living in his mom's basement apartment. 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 Your team appreciates the opportunity of working with you and wishes you the best of luck. Please feel free to give us a call in the future with any further questions [phone].    Client Signature:_______________________  Date / Time:___________  Staff Signature:________________________   Date / Time:___________

## 2017-08-28 NOTE — MR AVS SNAPSHOT
After Visit Summary   2017    Guero Carter    MRN: [id number]           Patient Information     Date Of Birth          1990        Visit Information        Provider Department      2017 11:30 AM [facility]        Today's Diagnoses     Schizophrenia, unspecified type (H)    -  1       Follow-ups after your visit        Your next 10 appointments already scheduled     Sep 07, 2017  1:00 PM CDT   Navigate Psychotherapy with KHRIS Terrell   [facility] ([facility])    [address]  [facility] [address] [phone]              Who to contact     Please call your clinic at [phone] to:    Ask questions about your health    Make or cancel appointments    Discuss your medicines    Learn about your test results    Speak to your doctor   If you have compliments or concerns about an experience at your clinic, or if you wish to file a complaint, please contact [facility] Patient Relations at [phone] or email us at [email or web address]         Additional Information About Your Visit        "[employer]"hart Information     Wish[employer]ie is an electronic gateway that provides easy, online access to your medical records. With WishGenie, you can request a clinic appointment, read your test results, renew a prescription or communicate with your care team.     To sign up for WishGenie visit the website at [email or web address]   You will be asked to enter the access code listed below, as well as some personal information. Please follow the directions to create your username and password.     Your access code is: [email or web address]  Expires: 2017  8:41 AM     Your access code will  in 90 days. If you need help or a new code, please contact your [facility] Clinic or call [phone] for assistance.        Care EveryWhere ID     This is your Care EveryWhere ID. This could  be used by other organizations to access your [place] medical records  [id number]         Blood Pressure from Last 3 Encounters:   09/05/17 (!) 135/91   08/04/17 (!) 146/98   06/08/17 135/84    Weight from Last 3 Encounters:   09/05/17 110.4 kg (243 lb 6.4 oz)   08/04/17 109 kg (240 lb 6.4 oz)   06/14/17 102.1 kg (225 lb)              Today, you had the following     No orders found for display       Primary Care Provider    Physician No Ref-Primary       No address on file        Equal Access to Services     [facility]: Hadii ralph Amaya, wanoe navarrete, anselmo kaalmanas blue, moe garcia . So [facility] [phone].    ATENCIÓN: Si habla español, tiene a bruce disposición servicios gratuitos de asistencia lingüística. Llame al [phone].    We comply with applicable federal civil rights laws and [place] laws. We do not discriminate on the basis of race, color, national origin, age, disability sex, sexual orientation or gender identity.            Thank you!     Thank you for choosing PSYCHIATRY CLINIC  for your care. Our goal is always to provide you with excellent care. Hearing back from our patients is one way we can continue to improve our services. Please take a few minutes to complete the written survey that you may receive in the mail after your visit with us. Thank you!             Your Updated Medication List - Protect others around you: Learn how to safely use, store and throw away your medicines at www.disposemymeds.org.          This list is accurate as of: 8/28/17 11:59 PM.  Always use your most recent med list.                   Brand Name Dispense Instructions for use Diagnosis    ADULT GUMMY PO      Take 2 tablets by mouth daily        IBUPROFEN PO      Take 200-400 mg by mouth every 4 hours as needed for other (headache)

## 2017-08-29 ENCOUNTER — HOSPITAL ENCOUNTER (OUTPATIENT)
Dept: BEHAVIORAL HEALTH | Facility: CLINIC | Age: 27
End: 2017-08-29
Attending: PSYCHIATRY & NEUROLOGY
Payer: COMMERCIAL

## 2017-08-29 PROCEDURE — H2012 BEHAV HLTH DAY TREAT, PER HR: HCPCS

## 2017-08-29 PROCEDURE — 97150 GROUP THERAPEUTIC PROCEDURES: CPT | Mod: GO

## 2017-08-29 NOTE — PROGRESS NOTES
Adult Mental Health Outpatient Group Therapy Progress Note     Client Initial Individualized Goals for Treatment: I was recommended to the program by inpatient team.  I would be open to working on the delusional thoughts.  I would like to prepare for a job.    See Initial Treatment suggestions for the client during the time between Diagnostic Assessment and completion of the Master Individualized Treatment Plan.    Treatment Goals:    1. Client will notify staff when needing assistance to develop or implement a coping plan to manage suicidal or self injurious urges.    2. Client will use time in OT to explore employment options and needs.    3. Client will use time in Group Therapy to identify level of symptoms and coping skills to manage them.    4. Delroy will begin to develop an aftercare plan by exploring community resources such as [employer] support group and the  [employer] program for individual therapy and assistance with employment options.      Area of Treatment Focus:  Symptom Management, Develop / Improve Independent Living Skills and Develop Socialization / Interpersonal Relationship Skills    Therapeutic Interventions/Treatment Strategies:  Support, Feedback, Safety Assessments, Structured Activity and Problem Solving    Response to Treatment Strategies:  Accepted Feedback, Gave Feedback, Listened, Focused on Goals, Attentive and Accepted Support    Name of Group:  OT Clinic     Description and Outcome:  Pt attended and participated in a structured occupational therapy group where intervention focused on coping through structured hands-on activities to improve function in valued roles, routines, and independent living skills. Client had a calm, even affect in session. Worked independently on a novel multi step task in session. Good task focus throughout session. Attentive to details of the task. Client demonstrated understanding of session content by independent in session. Client addressed ITP goal  number 2 in this session.     Is this a Weekly Review of the Progress on the Treatment Plan?  Yes.      Are Treatment Plan Goals being addressed?  Yes, continue treatment goals      Are Treatment Plan Strategies to Address Goals Effective?  Yes, continue treatment strategies      Are there any current contracts in place?  No

## 2017-08-30 ENCOUNTER — VIRTUAL VISIT (OUTPATIENT)
Dept: PSYCHIATRY | Facility: CLINIC | Age: 27
End: 2017-08-30

## 2017-08-30 DIAGNOSIS — F20.9 SCHIZOPHRENIA (H): Primary | ICD-10-CM

## 2017-08-30 NOTE — MR AVS SNAPSHOT
After Visit Summary   8/30/2017    Guero Carter    MRN: [id number]           Patient Information     Date Of Birth          1990        Visit Information        Provider Department      8/30/2017 1:00 PM Denise Rogers, [facility]        Today's Diagnoses     Schizophrenia (H)    -  1       Follow-ups after your visit        Your next 10 appointments already scheduled     Sep 05, 2017  9:00 AM CDT   Navigate Return with LOTTIE Jon CNP   Psychiatry Clinic ([facility])    [address]  [address] [facility] [address]   [phone]            Sep 07, 2017  1:00 PM CDT   [facility] Psychotherapy with Savannah Patel [facility] ([facility])    [address]  [facility] [address] [phone]              Who to contact     Please call your clinic at [phone] to:    Ask questions about your health    Make or cancel appointments    Discuss your medicines    Learn about your test results    Speak to your doctor   If you have compliments or concerns about an experience at your clinic, or if you wish to file a complaint, please contact [facility] Physicians Patient Relations at [phone] or email us at [email or web address]         Additional Information About Your Visit        MyChart Information     [employer] is an electronic gateway that provides easy, online access to your medical records. With [employer], you can request a clinic appointment, read your test results, renew a prescription or communicate with your care team.     To sign up for [employer] visit the website at [email or web address]   You will be asked to enter the access code listed below, as well as some personal information. Please follow the directions to create your username and password.     Your access code is: [email or web address]  Expires: 12/4/2017  8:41 AM     Your access code will   in 90 days. If you need help or a new code, please contact your [facility] Clinic or call [phone] for assistance.        Care EveryWhere ID     This is your Care EveryWhere ID. This could be used by other organizations to access your [place] medical records  [id number]         Blood Pressure from Last 3 Encounters:   17 (!) 146/98   17 135/84   17 138/71    Weight from Last 3 Encounters:   17 109 kg (240 lb 6.4 oz)   17 102.1 kg (225 lb)   17 102.8 kg (226 lb 9.6 oz)              Today, you had the following     No orders found for display       Primary Care Provider    Physician No Ref-Primary       No address on file        Equal Access to Services     MARI VELASCO : Hadii ralph gallegoo Soprerna, waaxda luqadaha, qaybta kaalmada adesinaiyansa, moe garcia . So [facility] [phone].    ATENCIÓN: Si habla español, tiene a bruce disposición servicios gratuitos de asistencia lingüística. Llame al [phone].    We comply with applicable federal civil rights laws and [place] laws. We do not discriminate on the basis of race, color, national origin, age, disability sex, sexual orientation or gender identity.            Thank you!     Thank you for choosing [facility] PSYCHIATRY  for your care. Our goal is always to provide you with excellent care. Hearing back from our patients is one way we can continue to improve our services. Please take a few minutes to complete the written survey that you may receive in the mail after your visit with us. Thank you!             Your Updated Medication List - Protect others around you: Learn how to safely use, store and throw away your medicines at www.disposemymeds.org.          This list is accurate as of: 17 11:59 PM.  Always use your most recent med list.                   Brand Name Dispense Instructions for use Diagnosis    ADULT GUMMY PO      Take 2 tablets by mouth daily         IBUPROFEN PO      Take 200-400 mg by mouth every 4 hours as needed for other (headache)        OLANZapine 15 MG tablet    zyPREXA    30 tablet    Take 1 tablet (15 mg) by mouth At Bedtime    Undifferentiated schizophrenia (H)

## 2017-08-30 NOTE — PROGRESS NOTES
Adult Mental Health Outpatient Group Therapy Progress Note     Client Initial Individualized Goals for Treatment: I was recommended to the program by inpatient team.  I would be open to working on the delusional thoughts.  I would like to prepare for a job.    See Initial Treatment suggestions for the client during the time between Diagnostic Assessment and completion of the Master Individualized Treatment Plan.    Treatment Goals:     As above     Area of Treatment Focus:  Symptom Management, Personal Safety and Community Resources/Discharge Planning    Therapeutic Interventions/Treatment Strategies:  Support, Feedback, Safety Assessments and Cognitive Behavioral Therapy    Response to Treatment Strategies:  Accepted Feedback, Gave Feedback and Listened    Name of Group:  Group Psychotherapy     Description and Outcome:  Delroy denied difficulty with paranoia, hallucinations, or delusional thoughts.  Client denied suicidal ideation, intent and plan.  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  He stated that he next planned to apply to a temporary agency.  He stated that he is looking forward to completing the program on Thursday.      Client was able to identify level of mood and symptoms.  Client was able to identify coping skills used.      Is this a Weekly Review of the Progress on the Treatment Plan?  No

## 2017-08-30 NOTE — PROGRESS NOTES
Adult Mental Health Outpatient Group Therapy Progress Note     Client Initial Individualized Goals for Treatment: I was recommended to the program by inpatient team.  I would be open to working on the delusional thoughts.  I would like to prepare for a job.    See Initial Treatment suggestions for the client during the time between Diagnostic Assessment and completion of the Master Individualized Treatment Plan.    Treatment Goals:    1. Client will notify staff when needing assistance to develop or implement a coping plan to manage suicidal or self injurious urges.    2. Client will use time in OT to explore employment options and needs.    3. Client will use time in Group Therapy to identify level of symptoms and coping skills to manage them.    4. Delroy will begin to develop an aftercare plan by exploring community resources such as [facility] support group and the  [employer] program for individual therapy and assistance with employment options.      Area of Treatment Focus:  Symptom Management, Develop / Improve Independent Living Skills and Develop Socialization / Interpersonal Relationship Skills    Therapeutic Interventions/Treatment Strategies:  Support, Feedback, Safety Assessments, Structured Activity and Problem Solving    Response to Treatment Strategies:  Accepted Feedback, Gave Feedback, Listened, Focused on Goals, Attentive and Accepted Support    Name of Group:  OT Clinic     Description and Outcome:  Pt attended and participated in a structured occupational therapy group where intervention focused on coping through structured hands-on activities to improve function in valued roles, routines, and independent living skills. Client had a calm, even affect throughout session. 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 He sated that he woe up before 9 AM. He was self directed with a goal focused activity in session. Client demonstrated understanding of  session content by following through with employment related goals. Client addressed ITP goal number 2 in this session.     Is this a Weekly Review of the Progress on the Treatment Plan?  No

## 2017-08-30 NOTE — PROGRESS NOTES
NAVIGATE Clinician Contact & Progress Note   For Family Education Program     NAVIGATE Enrollee: Guero Carter (: 1990)     MRN: [id number]  Date:  17  Diagnosis(es):   Schizophrenia  Clinician: NAVIGATE Director & Family Clinician, AP Hernandez      1. Type of contact: (majority of time spent)  Family Session     2. People present:   Family Clinician/Writer  Client: No  Significant Other/Family/Friend: Mother      3. Total number of persons who participated in contact: 2 including writer     4. Length of Actual Contact: 55 minutes, Record Minutes Travel Time: 0 minutes     5. Location of contact:  Telephone     6. Did the family complete the home practice option(s) from the previous session: NA     7. Motivational Teaching Strategies:  Connect info and skills with personal goals  Promote hope and positive expectations  Explore pros and cons of change  Re-frame experiences in positive light     8. Educational Teaching Strategies:  Review of written material/education  Relate information to client's experience  Ask questions to check comprehension  Break down information into small chunks  Adopt client's language     9. CBT Teaching Strategies:  Reinforcement and shaping (positive feedback for steps towards goals, gains in knowledge & skills, follow-through on home assignments)     Relapse prevention planning (review of stressors, early warning signs, written plan to respond to signs, rehearse plan)     Coping skills training (review current coping skills, increase currently used skills, model new skill, role play new skill, feedback, plan home practice)     10. Psychoeducational Topic(s) Addressed:  Just the Facts - Communication  Just the Facts - Relapse Prevention     11. Techniques utilized:   [place] announced at beginning of session  Review of goal  Review of previous phone conversations with writer and NAVIGATE team  Present new material  Help family choose a home practice  option  Summarize progress made in current session  Illicit client/family feedback     12. Assessment/Progress Note:     Spoke with Guero s mom, Catalina by phone to follow-up re: her previously identified concerns about Guero willoughby substance use. She had reported finding an empty bottle of alcohol in the garbage at home despite having informed Guero there was no alcohol or drug use while living at her house, otherwise she would not let me live with her.     Catalina reported Guero has not shown previously identified signs of alcohol use. She described him as currently helpful around the house and proactive at looking for employment. She admitted she is uncertain if Guero is currently drinking. She reported that Guero s alcohol and drug use in the past has caused his psychotic symptoms to escalate. Her fear is Guero will return to drinking and relapse. She identified warning signs to include alcohol consumption, increased cigarette use, isolation, poor sleep, nightmares, and an increase in psychotic thoughts. Catalina reported risk for substance use is high due to family members  current use of alcohol and marijuana and a family history of chemical dependency. Catalina is particularly worried about Guero s substance use at her daughter/Guero s sister s wedding on Saturday 9/9/17 where she knows there will be alcohol and highly suspects marijuana as well. Discussed potential communication strategies for Catalina to use as a proactive means to engage Guero in a discussion about use presently and at the wedding. Catalina reported feeling comfortable will having a conversation directly with Guero about this. For home practice, suggested Catalina reevaluate the expectations she has put on Guero and whether or not she feels she can follow through with the pre-identified consequences (ie asking Guero to move out of her home if he uses alcohol or drugs). Offered suggestion that she could reframe the expectation of substance use  to something along the lines of  You can continue to live with me so long as you are open and honest about substance use and are willing to talk to me about it honestly when I have concerns.  She added,  and are willing to talk with the NAVIGATE team openly about use.     Briefly spoke about the correlation between stress and symptoms severity. Catalina identified some stressors for Guero to include personal finances, employment, sister s recent diagnosis of rhuemetoid arthritis and subsequent medical interventions (note: this is the sister getting ), and another sister s court date 9/10/17 for a DUI (note: this is the sister living with mom and Gureo). Catalina also identified family members as stressful in that they are critical of Guero s mental health and substance use. Responded to Catalina s questions of how to respond to family in these moments. Suggested a general statement and allowing Guero to dictate how many details are shared and with whom.     Catalina hopes Guero continues in the [facility]IGATE program by seeing KHRIS Terrell for IRT weekly and HUMAIRA Menjivar for 1-2x/week. She hopes he keeps his goals realistic and worries FT work may be too much to start. She identified potential goals for Guero to include increased insight into warning signs as apart of relapse prevention planning. She believes Guero could also benefit from assistance in reviewing his finances and creating a budget; reportedly he is motivated to start using his car again which will require accruing a new expense in the form of car insurance and gas.     Catalina also shared a recent interaction with Guero following a SEE appt where Guero stated to her,  Did you know Schizophrenia is a disability?  She stated she has not been informed of a formal diagnosis and is uncertain Guero has. Writer reported Guero self-identified as having Schizophrenia at our first appt. She stated Guero seemed  discouraged  to hear  Schizophrenia is a disability.     Catalina expressed appreciation for [facility]IG[facility] services and family support. She present as readily engaged in conversation and forthcoming with information. She seemed routinely observant of Guero and motivated to implement newly learned material with regard to communication. She identified interest in further education about psychosis and participating in the [facility] Family Program. She reported intent to speak with other family members about the family program as well.     13. Plan/Referrals:      Will meet with family weekly as schedule allows for evidence based family psychoeducation and therapeutic support aimed at maximizing Zahida's opportunity for recovery from psychosis.     Will update KHRIS Terrell that Guero s mom is willing and able to attend a treatment planning meeting on Wednesday 9/13/17 after she finishes work at 3:00PM.      Denise Rogers [facility] Center[facility]   NAVIGATE Director & Family Clinician

## 2017-08-31 ENCOUNTER — HOSPITAL ENCOUNTER (OUTPATIENT)
Dept: BEHAVIORAL HEALTH | Facility: CLINIC | Age: 27
End: 2017-08-31
Attending: PSYCHIATRY & NEUROLOGY
Payer: COMMERCIAL

## 2017-08-31 ENCOUNTER — OFFICE VISIT (OUTPATIENT)
Dept: PSYCHIATRY | Facility: CLINIC | Age: 27
End: 2017-08-31
Attending: SOCIAL WORKER
Payer: COMMERCIAL

## 2017-08-31 DIAGNOSIS — F20.9 SCHIZOPHRENIA, UNSPECIFIED TYPE (H): Primary | ICD-10-CM

## 2017-08-31 PROCEDURE — H2012 BEHAV HLTH DAY TREAT, PER HR: HCPCS

## 2017-08-31 PROCEDURE — 97150 GROUP THERAPEUTIC PROCEDURES: CPT | Mod: GO

## 2017-08-31 ASSESSMENT — PATIENT HEALTH QUESTIONNAIRE - PHQ9
SUM OF ALL RESPONSES TO PHQ QUESTIONS 1-9: 0
5. POOR APPETITE OR OVEREATING: NOT AT ALL

## 2017-08-31 ASSESSMENT — ANXIETY QUESTIONNAIRES
GAD7 TOTAL SCORE: 0
7. FEELING AFRAID AS IF SOMETHING AWFUL MIGHT HAPPEN: NOT AT ALL
5. BEING SO RESTLESS THAT IT IS HARD TO SIT STILL: NOT AT ALL
1. FEELING NERVOUS, ANXIOUS, OR ON EDGE: NOT AT ALL
2. NOT BEING ABLE TO STOP OR CONTROL WORRYING: NOT AT ALL
3. WORRYING TOO MUCH ABOUT DIFFERENT THINGS: NOT AT ALL
6. BECOMING EASILY ANNOYED OR IRRITABLE: NOT AT ALL

## 2017-08-31 NOTE — MR AVS SNAPSHOT
After Visit Summary   2017    Guero Carter    MRN: [id number]           Patient Information     Date Of Birth          1990        Visit Information        Provider Department      2017 11:30 AM Savannah Patel LGSW [facility]        Today's Diagnoses     Schizophrenia, unspecified type (H)    -  1       Follow-ups after your visit        Your next 10 appointments already scheduled     Sep 05, 2017  9:00 AM CDT   Navigate Return with LOTTIE Jon CNP   Psychiatry Clinic ([facility])    [address]  [address] [facility] [id number] [phone]              Who to contact     Please call your clinic at [phone] to:    Ask questions about your health    Make or cancel appointments    Discuss your medicines    Learn about your test results    Speak to your doctor   If you have compliments or concerns about an experience at your clinic, or if you wish to file a complaint, please contact [facility] Patient Relations at [phone] or email us at [email or web address]         Additional Information About Your Visit        MyChart Information     [facility] is an electronic gateway that provides easy, online access to your medical records. With [facility], you can request a clinic appointment, read your test results, renew a prescription or communicate with your care team.     To sign up for [employer] visit the website at [email or web address]   You will be asked to enter the access code listed below, as well as some personal information. Please follow the directions to create your username and password.     Your access code is: [email or web address]  Expires: 2017  1:21 PM     Your access code will  in 90 days. If you need help or a new code, please contact your [facility] or call [phone] for assistance.        Care EveryWhere ID     This is your  Care EveryWhere ID. This could be used by other organizations to access your [place] medical records  [id number]         Blood Pressure from Last 3 Encounters:   08/04/17 (!) 146/98   06/08/17 135/84   05/28/17 138/71    Weight from Last 3 Encounters:   08/04/17 109 kg (240 lb 6.4 oz)   06/14/17 102.1 kg (225 lb)   06/08/17 102.8 kg (226 lb 9.6 oz)              Today, you had the following     No orders found for display       Primary Care Provider    Physician No Ref-Primary       No address on file        Equal Access to Services     [facility]: Hadii ralph Amaya, wanoe navarrete, anselmo blue, moe garcia . So [facility] [phone].    ATENCIÓN: Si habla español, tiene a bruce disposición servicios gratuitos de asistencia lingüística. Llame al [phone].    We comply with applicable federal civil rights laws and [place] laws. We do not discriminate on the basis of race, color, national origin, age, disability sex, sexual orientation or gender identity.            Thank you!     Thank you for choosing PSYCHIATRY CLINIC  for your care. Our goal is always to provide you with excellent care. Hearing back from our patients is one way we can continue to improve our services. Please take a few minutes to complete the written survey that you may receive in the mail after your visit with us. Thank you!             Your Updated Medication List - Protect others around you: Learn how to safely use, store and throw away your medicines at www.disposemymeds.org.          This list is accurate as of: 8/31/17 11:59 PM.  Always use your most recent med list.                   Brand Name Dispense Instructions for use Diagnosis    ADULT GUMMY PO      Take 2 tablets by mouth daily        IBUPROFEN PO      Take 200-400 mg by mouth every 4 hours as needed for other (headache)        OLANZapine 15 MG tablet    zyPREXA    30 tablet    Take 1 tablet (15 mg) by mouth At Bedtime     Undifferentiated schizophrenia (H)

## 2017-08-31 NOTE — PROGRESS NOTES
Adult Mental Health Outpatient Group Therapy Progress Note     Client Initial Individualized Goals for Treatment: I was recommended to the program by inpatient team.  I would be open to working on the delusional thoughts.  I would like to prepare for a job.    See Initial Treatment suggestions for the client during the time between Diagnostic Assessment and completion of the Master Individualized Treatment Plan.    Treatment Goals:     As above     Area of Treatment Focus:  Symptom Management, Personal Safety and Community Resources/Discharge Planning    Therapeutic Interventions/Treatment Strategies:  Support, Feedback, Safety Assessments and Cognitive Behavioral Therapy    Response to Treatment Strategies:  Accepted Feedback, Gave Feedback and Listened    Name of Group:  Group Psychotherapy     Description and Outcome:  Delroy denied difficulty with paranoia, hallucinations, or delusional thoughts.  Client denied suicidal ideation, intent and plan.  Delroy stated that he completed applications for a job at [employer].  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  He stated that he is continuing to have calm mood, adequate concentration, adequate energy, and adequate motivation.  He stated that he is looking forward to completing the program on Thursday.      Client was able to identify level of mood and symptoms.  Client was able to identify coping skills used.      Is this a Weekly Review of the Progress on the Treatment Plan?  No

## 2017-08-31 NOTE — Clinical Note
FYI- recent IRT session with client. Client reports continued sobriety and motivation to continue. He continues to present somewhat guarded but is opening up more about past symptoms.  Savannah

## 2017-08-31 NOTE — PROGRESS NOTES
NAVIGATE Clinician Contact & Progress Note  For Individual Resiliency Training (IRT)  A Part of the [facility] First Episode of Psychosis Program    NAVIGATE Enrollee: Guero Carter (1990)     MRN: [id number]  Date:  8/31/17  Diagnosis: Schizophrenia, unspecified type; F20.9  Clinician: LIANET Individual Resiliency Trainer, KHRIS Terrell     1. Type of contact: (majority of time spent)  IRT Session      2. People present:   Client  NAVIGATE IRT/writer    3. Total number of persons who participated in contact: 2    4. Length of Actual Contact: 55 minutes, Record Minutes Travel Time: 40 minutes    [facility]     6. Did the client complete the home practice option(s) from the previous session: yes    7. Motivational Teaching Strategies:  Connect info and skills with personal goals  Promote hope and positive expectations  Explore pros and cons of change  Re-frame experiences in positive light    8. Educational Teaching Strategies:  Review of written material/education  Relate information to client's experience  Ask questions to check comprehension  Break down information into small chunks  Adopt client's language    9. CBT Teaching Strategies:  Reinforcement and shaping (positive feedback for steps towards goals, gains in knowledge & skills, follow-through on home assignments)    10. IRT Module(s) Addressed:    Module 2 - Assessment/Initial Goal Setting    11. Techniques utilized:   [place] announced at beginning of session  Review of homework  Review of previous meeting  Present new material  Summarize progress made in current session    12. Mental Status Exam:    Alertness: oriented  Appearance: well groomed  Behavior/Demeanor: cooperative, pleasant and calm, with good  eye contact   Speech: normal  Language: intact. Preferred language identified as English.  Psychomotor: normal or unremarkable  Mood: description consistent with euthymia  Affect: flat; was  "congruent to mood; was congruent to content  Thought Process/Associations: unremarkable  Thought Content:  Reports none;  Denies suicidal ideation and violent ideation  Perception:  Reports none;  Denies auditory hallucinations and visual hallucinations  Insight: limited  Judgment: adequate for safety  Cognition: does  appear grossly intact; formal cognitive testing was not done  Suicidal ideation: denies SI, denies intent,  and denies plan  Homicidal Ideation: denies    13. Assessment/Progress Note:     Writer met client at the [facility] for IRT session. Client reported it was his last day of day treatment at  and he was feeling ready to be done. He reported it has been \"relaxing\" and that he is ready to find a job now. Client frequently brought conversation back to his goal of finding his job throughout the session. Writer validated client's motivation and reviewed how client is utilizing strengths toward this goal and in other ways in his daily life. 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 Client reported motivation for the pay. He also reported applying at [employer]. Client reported his mood has been \"bland but good.\" He reported having some anxiety about finding a job but that he is managing. Client denied AH, VH, SI, or HI. He also denied substance use and reported motivation for sobriety as it decreases weight gain. Client also reported taking medication every night. He said it helps him sleep and helps with racing thoughts and mood. He then mentioned that it helped him when he was not sure about reality. Denied any current questions about reality.    Writer and client spent the rest of session completing Module 2 on Assessment and Goal Setting. Client engaged in conversation about satisfaction of areas of life. He reported he was very satisfied with family relationships as he feels accepted and " "supported. He said he is moderately satisfied with friendships, though would like to \"eventually\" have more friendships. He discussed how he hopes to meet people who respect his sobriety, potentially at work when he finds a job. He identified sports, music, movies, and building furniture/projects as enjoyable activities. Though he is moderately satisfied in the following areas, these are also areas he would like to change: living situations, spirituality (go to [employer] more often as it \"helps me be a good person\"), finances, intimate relationships (eventually), education (to finish degree at [employer] in [employer]), and physical health. Writer then led client through the IRT goal planning worksheet:  Long-term goal: to find and keep a full-time job making at least $15/hour.  Short-term goals: apply for jobs (apply to 5 jobs/week, meet with SEE, update resume, get and prepare for interviews); get car ready to drive (tabs and insurance); save money for school, car, housing (save weekly amt once salary is known, make weekly budget, talk to dad about money); and improve physical activity (work out 2-3x per week for 30 minutes, drink more water, limit soda to 1/day).    Writer helped client schedule follow up appt with Meenakshi Mcdaniel for medication follow up.    14. Plan/Referrals:     Client will meet Meenakshi Mcdaniel at 9am on 9/5. Client will meet writer on 9/7 at 1pm at [facility].     Savannah Patel YOSI    NAVIGATE Individual Resiliency Trainer    Attestation:    I did not see this patient directly. This patient is discussed with me in individual clinical social work supervision, and I agree with the plan as documented.     ADELFO Hodges, [facility], September 1, 2017    "

## 2017-08-31 NOTE — PROGRESS NOTES
Adult Mental Health Outpatient Group Therapy Progress Note     Client Initial Individualized Goals for Treatment: I was recommended to the program by inpatient team.  I would be open to working on the delusional thoughts.  I would like to prepare for a job.    See Initial Treatment suggestions for the client during the time between Diagnostic Assessment and completion of the Master Individualized Treatment Plan.    Treatment Goals:    1. Client will notify staff when needing assistance to develop or implement a coping plan to manage suicidal or self injurious urges.    2. Client will use time in OT to explore employment options and needs.    3. Client will use time in Group Therapy to identify level of symptoms and coping skills to manage them.    4. Delroy will begin to develop an aftercare plan by exploring community resources such as [facility] support group and the  [employer] program for individual therapy and assistance with employment options.      Area of Treatment Focus:  Symptom Management, Develop / Improve Independent Living Skills and Develop Socialization / Interpersonal Relationship Skills    Therapeutic Interventions/Treatment Strategies:  Support, Feedback, Safety Assessments, Structured Activity and Problem Solving    Response to Treatment Strategies:  Accepted Feedback, Gave Feedback, Listened, Focused on Goals, Attentive and Accepted Support    Name of Group:  OT Clinic     Description and Outcome:  Pt attended and participated in a structured occupational therapy group where intervention focused on coping through structured hands-on activities to improve function in valued roles, routines, and independent living skills. Client had a calm, even affect throughout session. Client reported stable sleep routine. Sleep closer to 9 hours lately. Waking up prior to alarm between 8 and 9. 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 Reports feeling prepared for discharge on  Thursday. Discussed potential support groups for aftercare. Client demonstrated understanding of session content by continued follow through with employment goals.     Is this a Weekly Review of the Progress on the Treatment Plan?  No

## 2017-09-01 ASSESSMENT — ANXIETY QUESTIONNAIRES: GAD7 TOTAL SCORE: 0

## 2017-09-05 ENCOUNTER — OFFICE VISIT (OUTPATIENT)
Dept: PSYCHIATRY | Facility: CLINIC | Age: 27
End: 2017-09-05
Attending: NURSE PRACTITIONER
Payer: COMMERCIAL

## 2017-09-05 VITALS
HEART RATE: 74 BPM | DIASTOLIC BLOOD PRESSURE: 91 MMHG | WEIGHT: 243.4 LBS | BODY MASS INDEX: 31.25 KG/M2 | SYSTOLIC BLOOD PRESSURE: 135 MMHG

## 2017-09-05 DIAGNOSIS — F20.3 UNDIFFERENTIATED SCHIZOPHRENIA (H): ICD-10-CM

## 2017-09-05 PROCEDURE — 99212 OFFICE O/P EST SF 10 MIN: CPT | Mod: ZF

## 2017-09-05 RX ORDER — OLANZAPINE 15 MG/1
15 TABLET ORAL AT BEDTIME
Qty: 30 TABLET | Refills: 0 | Status: SHIPPED | OUTPATIENT
Start: 2017-09-05 | End: 2017-10-02

## 2017-09-05 NOTE — PROGRESS NOTES
LIANET SEE Progress Note   For Supported Employment & Education    NAVIGATE Enrollee: Guero Carter (1990)     MRN: [id number]  Date:  8/28/2017  Any Client Contact?: Yes  Any Status Changes?: No  Clinician: LIANET Supported Employment & , Felix Forrester  Client contact in the past 30 days? Yes    Client Status Update     1a. Education - Guero Carter is interested in education: Yes, but not until he finds full-time employment and saves up enough money to move out of his mother's house.    1A1. Orientation to SEE services completed   1A2. Client working on completing Career Profileg   1A6. Client visited school or education program   1A7. Client had in person contact with school personnel    1b. Employment - Guero Carter is interested in employment: Yes   1B1. Orientation to SEE services completed   1B2. Client working on completing Career Profile   1B4. Client developed employment goals    2. People present:   LIANET Supported : Felix Forrester    3. Total number of persons who participated in contact: 1    4. Length of Actual Contact: 60 minutes, Record Minutes Travel Time: 30 minutes    5. Location of contact:  [facility], [place]     6. Brief description of session, contact, or client status (include: strategies, interventions, client reaction to meeting, next steps, etc.):  Writer met with Guero for weekly appointment at the [facility] to discuss employment opportunities. 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 Guero was able to order a new copy of the award for his job search and proof to potential employers. Writer confirmed these requirements and praised Guero for taking the initiative to gather the  information necessary to move forward with his job search. Milesr validated Guero's comprable actions ans desire to work in the field. Guero also expressed interest in finding employment in [employer] field ([employer]).     Although  and Guero set up a new [employer] account at the previous meeting, Guero was unable to access his new account, so he made another [employer] account instead: [email or web address]. 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  encouraged Guero to follow-up with a  attached to the job description before the next meeting; otherwise, the next meeting will be spent applying to jobs, writing cover letters, and follow-up procedure.     7. Completion of mutually agreed upon client task from previous meeting:  Completed    8. Orientation and Treatment Planning:  Developing SEE treatment plan goals    9. Assessment:  Assisting client to visit work or school setting to develop client preferences and goals regarding work and/or school    10. Placement:   N/A    11. Follow Along Supports:    N/A    12. Mutually agreed upon client task for next meeting:    To apply to at least three jobs.     13. Next Meeting Scheduled for: Wednesday, September 6 at 9:00 am.    Felix MARTINI   Supported Employment &

## 2017-09-05 NOTE — MR AVS SNAPSHOT
After Visit Summary   2017    Guero Carter    MRN: [id number]           Patient Information     Date Of Birth          1990        Visit Information        Provider Department      2017 9:00 AM Meenakshi Mcdaniel APRN [facility]        Today's Diagnoses     Undifferentiated schizophrenia (H)           Follow-ups after your visit        Follow-up notes from your care team     Return in about 4 weeks (around 10/3/2017), or if symptoms worsen or fail to improve.      Who to contact     Please call your clinic at [phone] to:    Ask questions about your health    Make or cancel appointments    Discuss your medicines    Learn about your test results    Speak to your doctor   If you have compliments or concerns about an experience at your clinic, or if you wish to file a complaint, please contact [facility] Patient Relations at [phone] or email us at [email or web address]         Additional Information About Your Visit        MyChart Information     Happy [employer]t is an electronic gateway that provides easy, online access to your medical records. With Ambassador, you can request a clinic appointment, read your test results, renew a prescription or communicate with your care team.     To sign up for Happy [employer]t visit the website at [email or web address]   You will be asked to enter the access code listed below, as well as some personal information. Please follow the directions to create your username and password.     Your access code is: [email or web address]  Expires: 2017  8:41 AM     Your access code will  in 90 days. If you need help or a new code, please contact your [facility] or call [phone] for assistance.        Care EveryWhere ID     This is your Care EveryWhere ID. This could be used by other organizations to access your [place] medical records  [id number]        Your Vitals Were     Pulse BMI  (Body Mass Index)                74 31.25 kg/m2           Blood Pressure from Last 3 Encounters:   09/05/17 (!) 135/91   08/04/17 (!) 146/98   06/08/17 135/84    Weight from Last 3 Encounters:   09/05/17 110.4 kg (243 lb 6.4 oz)   08/04/17 109 kg (240 lb 6.4 oz)   06/14/17 102.1 kg (225 lb)              Today, you had the following     No orders found for display         Where to get your medicines      These medications were sent to [facility] - [address], [place] [facility], [facility] [address]     Phone:  [phone]     OLANZapine 15 MG tablet          Primary Care Provider    Physician No Ref-Primary       No address on file        Equal Access to Services     MARI VELASCO : Libertad Amaya, wanoe luqbev, qagerald kaalmanas blue, moe farr. So [facility] [phone].    ATENCIÓN: Si habla español, tiene a bruce disposición servicios gratuitos de asistencia lingüística. Llame al [phone].    We comply with applicable federal civil rights laws and [place] laws. We do not discriminate on the basis of race, color, national origin, age, disability sex, sexual orientation or gender identity.            Thank you!     Thank you for choosing PSYCHIATRY CLINIC  for your care. Our goal is always to provide you with excellent care. Hearing back from our patients is one way we can continue to improve our services. Please take a few minutes to complete the written survey that you may receive in the mail after your visit with us. Thank you!             Your Updated Medication List - Protect others around you: Learn how to safely use, store and throw away your medicines at www.disposemymeds.org.          This list is accurate as of: 9/5/17 11:59 PM.  Always use your most recent med list.                   Brand Name Dispense Instructions for use Diagnosis    ADULT GUMMY PO      Take 2 tablets by mouth daily         IBUPROFEN PO      Take 200-400 mg by mouth every 4 hours as needed for other (headache)        OLANZapine 15 MG tablet    zyPREXA    30 tablet    Take 1 tablet (15 mg) by mouth At Bedtime    Undifferentiated schizophrenia (H)

## 2017-09-05 NOTE — NURSING NOTE
Chief Complaint   Patient presents with     Recheck Medication           Undifferentiated schizophrenia    Reviewed allergies, smoking status, and pharmacy preference  Administered abuse screening question  Obtained weight, blood pressure and heart rate

## 2017-09-06 ENCOUNTER — ALLIED HEALTH/NURSE VISIT (OUTPATIENT)
Dept: PSYCHIATRY | Facility: CLINIC | Age: 27
End: 2017-09-06

## 2017-09-06 DIAGNOSIS — F20.3 UNDIFFERENTIATED SCHIZOPHRENIA (H): Primary | ICD-10-CM

## 2017-09-06 NOTE — PROGRESS NOTES
LIANET SEE Progress Note   For Supported Employment & Education    NAVIGATE Enrollee: Guero Carter (1990)     MRN: [id number]  Date:  9/6/2017  Any Client Contact?: Yes  Any Status Changes?: No  Clinician: LIANET Supported Employment & , Felix Forrester  Client contact in the past 30 days? Yes    Client Status Update     1a. Education - Guero Carter is interested in education: No and might be interested after he saves up enough money from full-time employment to move into own apartment.   1A1. Orientation to SEE services completed   1A3. Client completed Career Profile    1b. Employment - Guero Carter is interested in employment: Yes   1B1. Orientation to SEE services completed   1B3. Client completed Career Profile   1B4. Client developed employment goals   1B8. 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     2. People present:    Client: Guero Carter   LIANET Supported : Felix Forrester    3. Total number of persons who participated in contact: 1    4. Length of Actual Contact: 120 minutes, Record Minutes Travel Time: 30 minutes    5. Location of contact:  Community: [facility] [employer] [facility] [place]    6. Brief description of session, contact, or client status (include: strategies, interventions, client reaction to meeting, next steps, etc.):  Writer met with Guero for weekly meeting for the first time in the community, as he no longer has a commitment to the day program at the Psychiatry clinic in [place]. The meeting took place at [employer] [facility] [place], a few blocks from his house. The meeting agenda included comprising a cover letter template for email and future job applications, discussing the follow-up procedure for the multiple jobs applied to, and spending the remainder of the  time focusing on upcoming interview. Guero mentioned that he applied to five other positions on the [employer] website, as well as a  position at the [employer] in [place]. 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    Writer observed Guero as engaged and motivated, smiling on a few occasions at jokes made by .  and Guero worked together to create and complete a cover letter template. Guero confirmed with his mother that he could use the car for the interview on with [employer] on Friday morning. He also asked if it would be a good idea to talk to his sister about the interview, as he thinks that she works at the same hospital.  validated his inquiry and strongly encouraged and coached Guero to tactfully utilize as many networking connections to leverage potential job opportunities. Guero took a lot of notes throughout the meeting and asked appropriate questions related to interview process. Writer provided interview materials and helped to plan and prepare for potential questions with short mock interview examples. Writer encouraged Guero to come up with real scenarios from his previous job experiences to answer some of the hypothetical behavioral based questions, and he agreed to practice, and even suggested that he would watch [employer] video samples. Writer also encouraged Guero to write a post-interview thank you email to the representative who sent the interview request, or the person who interviews him.     The meeting was very productive and Guero left feeling excited about his interview and even joked that he will need to send writer a thank you as well. The plan is to meet again on Monday morning (regularly scheduled meeting times) to discuss interview and to continue to apply to jobs, until he has  an official offer of employment.     7. Completion of mutually agreed upon client task from previous meeting:  Completed    8. Orientation and Treatment Planning:  Pursuing current SEE goals     9. Assessment:  Assisting client to visit work or school setting to develop client preferences and goals regarding work and/or school    Assessing client's need for follow-along supports    10. Placement:   N/A    11. Follow Along Supports:    N/A    12. Mutually agreed upon client task for next meeting:    Guero will create an account on [employer] website, as well as one more health-related account.     13. Next Meeting Scheduled for: Monday, September 11 at 11:30.     Felix MARTINI   Supported Employment &

## 2017-09-07 ENCOUNTER — OFFICE VISIT (OUTPATIENT)
Dept: PSYCHIATRY | Facility: CLINIC | Age: 27
End: 2017-09-07

## 2017-09-07 DIAGNOSIS — F20.3 UNDIFFERENTIATED SCHIZOPHRENIA (H): Primary | ICD-10-CM

## 2017-09-07 NOTE — MR AVS SNAPSHOT
After Visit Summary   2017    Guero Carter    MRN: [id number]           Patient Information     Date Of Birth          1990        Visit Information        Provider Department      2017 1:00 PM Savannah Patel LGSW [facility]        Today's Diagnoses     Undifferentiated schizophrenia (H)    -  1       Follow-ups after your visit        Who to contact     Please call your clinic at [phone] to:    Ask questions about your health    Make or cancel appointments    Discuss your medicines    Learn about your test results    Speak to your doctor   If you have compliments or concerns about an experience at your clinic, or if you wish to file a complaint, please contact [facility] Patient Relations at [phone] or email us at [email or web address]         Additional Information About Your Visit        MyChart Information     [employer]ured Health is an electronic gateway that provides easy, online access to your medical records. With [employer]ured Health, you can request a clinic appointment, read your test results, renew a prescription or communicate with your care team.     To sign up for [employer] visit the website at [email or web address]   You will be asked to enter the access code listed below, as well as some personal information. Please follow the directions to create your username and password.     Your access code is: [email or web address]  Expires: 2017  8:41 AM     Your access code will  in 90 days. If you need help or a new code, please contact your [facility] or call [phone] for assistance.        Care EveryWhere ID     This is your Care EveryWhere ID. This could be used by other organizations to access your [place] medical records  [id number]         Blood Pressure from Last 3 Encounters:   17 135/84   17 138/71   10/11/13 135/89    Weight from Last 3 Encounters:   17 225 lb (102.1 kg)    06/08/17 226 lb 9.6 oz (102.8 kg)   06/06/17 218 lb (98.9 kg)              Today, you had the following     No orders found for display       Primary Care Provider    Physician No Ref-Primary       No address on file        Equal Access to Services     MARI ROD : Hadii ralph ku juliáno Sonasimali, waaxda luqadaha, qaybta kaalmada adefaisal, moe carrero laAtiyashreya . So [facility] [phone].    ATENCIÓN: Si habla español, tiene a bruce disposición servicios gratuitos de asistencia lingüística. Llame al [phone].    We comply with applicable federal civil rights laws and [place] laws. We do not discriminate on the basis of race, color, national origin, age, disability sex, sexual orientation or gender identity.            Thank you!     Thank you for choosing [facility] PSYCHIATRY  for your care. Our goal is always to provide you with excellent care. Hearing back from our patients is one way we can continue to improve our services. Please take a few minutes to complete the written survey that you may receive in the mail after your visit with us. Thank you!             Your Updated Medication List - Protect others around you: Learn how to safely use, store and throw away your medicines at www.disposemymeds.org.          This list is accurate as of: 9/7/17  2:20 PM.  Always use your most recent med list.                   Brand Name Dispense Instructions for use Diagnosis    ADULT GUMMY PO      Take 2 tablets by mouth daily        IBUPROFEN PO      Take 200-400 mg by mouth every 4 hours as needed for other (headache)        OLANZapine 15 MG tablet    zyPREXA    30 tablet    Take 1 tablet (15 mg) by mouth At Bedtime    Undifferentiated schizophrenia (H)

## 2017-09-07 NOTE — PROGRESS NOTES
NAVIGATE Clinician Contact & Progress Note  For Individual Resiliency Training (IRT)  A Part of the [facility] First Episode of Psychosis Program    NAVIGATE Enrollee: Guero Carter (1990)     MRN: [id number]  Date:  9/07/17  Diagnosis: undifferentiated schizophrenia; F20.3  Clinician: LIANET Individual Resiliency Trainer, KHRIS Terrell     1. Type of contact: (majority of time spent)  IRT Session      2. People present:   Client  NAVIGATE IRT/writer    3. Total number of persons who participated in contact: 2    4. Length of Actual Contact: 50 minutes, Record Minutes Travel Time: 0 minutes    5. Location of contact:  Psychiatry Clinic, [facility]    6. Did the client complete the home practice option(s) from the previous session: Yes    7. Motivational Teaching Strategies:  Connect info and skills with personal goals  Promote hope and positive expectations  Explore pros and cons of change  Re-frame experiences in positive light    8. Educational Teaching Strategies:  Review of written material/education  Relate information to client's experience  Ask questions to check comprehension  Break down information into small chunks  Adopt client's language    9. CBT Teaching Strategies:  Reinforcement and shaping (positive feedback for steps towards goals, gains in knowledge & skills, follow-through on home assignments)    Coping skills training (review current coping skills, increase currently used skills, model new skill, role play new skill, feedback, plan home practice)    10. IRT Module(s) Addressed:  Module 3 - Education about Psychosis    11. Techniques utilized:   [place] announced at beginning of session  Review of homework  Review of goal  Review of previous meeting  Present new material  Help client choose a home practice option  Summarize progress made in current session    12. Mental Status Exam:    Alertness: alert  and oriented  Appearance: well groomed  Behavior/Demeanor: cooperative, pleasant,  "calm and guarded, with good  eye contact writer noticed incongruent smiling on 3 occasions  Speech: normal  Language: intact. Preferred language identified as English.  Psychomotor: normal or unremarkable  Mood: description consistent with euthymia  Affect: blunted; was not congruent to mood; was congruent to content  Thought Process/Associations: unremarkable  Thought Content:  Reports none;  Denies suicidal and violent ideation  Perception:  Reports none;  Denies none  Insight: fair  Judgment: adequate for safety  Cognition: does  appear grossly intact; formal cognitive testing was not done  Suicidal ideation: denies SI, denies intent,  and denies plan  Homicidal Ideation: denies    13. Assessment/Progress Note:     Writer met client for IRT session at the [facility] clinic. 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 He reported the position will be full time and he has been preparing for the interview. He said he is looking forward to the interview but plans to continue to apply for jobs as back up. Client reported finishing day treatment last week, 8/31 and that he has been busy with helping his mom prepare for his sister's wedding this weekend. Client joked that he is ready for the wedding to be over because it is a lot of work preparing. Discussed client's strength of family relationships and support and how he values helping his family. Discussed how there will be drinking at the reception and client reported plan to \"probably\" have 1 beer. He reported no concerns about drinking and said he knows he will only have one as he plans to drive home that night and \"I don't drink and drive.\" He reported continued sobriety but commented on a few occasions during session how he plans to drink again after he gets a job and his own housing. After completing the first topic in Module 3 \"What is Psychosis\" client did report he thinks drinking could have made " "his delusional thinking worse prior to his last hospitalization. Client reported his mood has been good and \"hopeful\" about getting a job and he denied SI or HI. PHQ-9 score was 0. Reviewed IRT goal plan. Client reported progress on applying for jobs and getting an interview. He reported plan to follow up on ordering his tabs and title for his car this week.     The majority of the session was spent on \"What is Psychosis\" material. For the most part, client presented engaged in discussion and reviewing the material. He at first reported that content may be review from day treatment, but later reported he didn't really know that much about psychosis. Reviewed symptoms, causes, diagnosis, and stress-vulnerability model. Client admitted to symptoms of anxiety and delusional thinking that people were out to get him. He reported he doesn't really like talking about diagnoses and his hospitalization. Discussed how client is in recovery and focused on getting back to work. He said reviewing symptoms was helpful. Client reported how he is decreasing stress and bio vulnerability by exercise, time management, taking medications, attending treatment, working on goals, and talking to supportive people (family and NAVIGATE) and said the medications have helped him handle his delusions.  Writer thanked client for his openness to talk about his experience and pyschosis in general. Elicited client's thoughts on having his treatment plan meeting next week at [facility] with [facility] team and his mom. Client reported he was open to meeting for the treatment plan on 9/13 at 4pm.    14. Plan/Referrals:     NAVIGATE team will meet for client's treatment plan meeting on 9/13 at 4pm. Client will continue preparing for his interview on Monday as home practice.     Billing for \"Interactive Complexity\"?  No    Savannah Patel, [facility]    LIANET Individual Resiliency Trainer    Attestation:    I did not see this patient directly. This patient is " discussed with me in individual clinical social work supervision, and I agree with the plan as documented.     ADELFO Hodges, [facility], September 11, 2017

## 2017-09-08 ASSESSMENT — PATIENT HEALTH QUESTIONNAIRE - PHQ9
SUM OF ALL RESPONSES TO PHQ QUESTIONS 1-9: 0
SUM OF ALL RESPONSES TO PHQ QUESTIONS 1-9: 0

## 2017-09-12 ENCOUNTER — BEH TREATMENT PLAN (OUTPATIENT)
Dept: PSYCHIATRY | Facility: CLINIC | Age: 27
End: 2017-09-12

## 2017-09-12 NOTE — PROGRESS NOTES
[facility] NAVIGATE Program Treatment Plan Summary  A Part of the [facility] First Episode of Psychosis Program     NAVIGATE Enrollee: Guero Carter  /Age:  1990 (27 year old)    Date of Initial Service: 2017  Date of INTIAL Treatment Plan: 2017  Last Review/Update Date:  Today 2017   90-Day Review Date: 2017    The following is a REVIEWED treatment plan?  Yes   The following is an UPDATED treatment plan?  Yes    Participants at Collaborative Treatment Planning Meeting:   Client    Family Member(s) (Specify whom: Mom-Catalina)   NAVIGATE IRT Clinician: ADELFO Terrell, LGSW   NAVIGATE SEE: Felix CATATE Director/Family Clinician: ADELFO Hodges, LICSW    1. DSM-V Diagnosis (include numeric code)  Schizophrenia, unspecified type; F20.9    2. Current symptoms and circumstances that substantiate the diagnosis:  Diagnosis of schizophrenia seems supported by positive symptoms (delusional thoughts, suspiciousness, possible response to internal stimuli) and negative symptoms (avolition, affective flattening, anhedonia, alogia, apathy), as well as past positive (AH, delusional thoughts) and negative symptoms (avolition, affective flattening, anhedonia, alogia, apathy). Guero did not report hx of mood disorder symptoms such as depressive and manic symptoms.   3. How symptoms and/or behaviors are affecting level of function:  Guero has experienced social and academic decline, including conflict with family members and a failed grade and discontinuation of college courses    4. Risk Assessment:  Suicide:  Assessed Level of Immediate Risk: Low- per chart review, Guero presented with SI when hospitalized in   Ideation: No  Plan:  No  Means: No  Intent: No    Homicide/Violence:  Assessed Level of Immediate Risk: Low - History of agressiveness toward mom prior to hospitalization  Ideation: No  Plan: No  Means: No  Intent: No    If on a medication, please include name and dosage:     Current Outpatient Prescriptions   Medication     OLANZapine (ZYPREXA) 15 MG tablet     Multiple Vitamins-Minerals (ADULT GUMMY PO)     IBUPROFEN PO     No current facility-administered medications for this visit.          5. Treatment Goals     Domain: Illness Management & Recovery  Goal: Identify and engage possible areas of improvement related to +/- symptoms, ability to manage illness, medications, and/or substance use/abuse, SI/SIB/HI     Objectives & Target Date        Continue with mediation recommendations; Target date: 12/13/2017       Complete a safety plan with therapist and share with support system; Target date: 12/13/2017    Verbally report and demonstrate an increased sense of hope for self; Target date: 12/13/2017         Identify positive traits and talents about self; Target date: 11/17/2017     Identify negative automatic thoughts and replace them with positive self-talk messages to build self-esteem ; Target date: 11/17/2017    Verbally identify the stressors that contribute to the reactive psychosis ; Target date: 11/17/2017    Report a diminishing or absence of positive symptoms; Target date: 12/13/2017    Develop and implement relapse prevention strategies for managing possible future episodes of psychosis; Target date: 12/13/2017       Increase communication with the parents, resulting in feeling attended to and understood; Target date: 12/13/2017    Family members verbalize increased understanding of an knowledge about Guero s illness and treatment ; Target date: 12/13/2017    Family members increase positive support of Guero to reduce the chances of acute exacerbation of the psychotic episode ; Target date: 12/13/2017     Strengths       Capacity to love and be loved      Hope, Optimism, & future-mindedness      Honest, Authentic, Genuine      Industry, diligence, & perseverance      Medication adherence (P)    Supportive family, recovery environment (P)     Barriers     Drug/Alcohol  use- history    Isolation (S), Male (S), Recent loss of social support (S), SI- history, Single (S)    Symptoms of psychosis, positive (delusions, hallucinations)    Symptoms of psychosis, negative (flat affect, avolition, anhedonia, alogia, and/or apathy)    Symptoms of psychosis, cognitive (memory, attention and concentration, and/or executive functioning difficulties)    Treatment Non-Adherence - history     Provider & Intervention   IRT    Motivational Interviewing (Connect info and skills with personal goals, Promote hope and positive expectations, Explore pros and cons of change, Re-frame experiences in a positive light)    Educational Teaching Strategies (Review written material/education on: Assessment/Initial Goal Setting, Education about Psychosis, Relapse Prevention Planning, Processing the Psychotic Episode, Developing Resiliency -Standard Sessions, Building a Bridging to Your Goals, Dealing with Negative Feelings, Coping with Symptoms, Substance Use, Having Fun and Developing Good Relationships, Making Choices about Smoking, Nutrition and Exercise, Developing Resiliency)    CBT (Reinforcement and shaping, Social skills training, Relapse prevention planning, Coping skills training, Relaxation training, Cognitive restructuring, Behavioral tailoring)  Prescriber    Medication Evaluation     Ongoing Medication Management    Side Effect Monitoring    Medication Education    Adherence Monitoring    Lab work  Family Therapy    Motivational Interviewing (Connect info and skills with personal goals, Promote hope and positive expectations, Explore pros and cons of change, Re-frame experiences in a positive light)    Educational Teaching Strategies (Review written material/education on: Psychosis, Medications for psychosis, Coping with stress, Strategies to build resiliency, Relapse prevention planning, Developing a collaboration with mental health professionals, Effective communication, A relative s guide to  supporting recovery from psychosis, Basic facts about alcohol and drugs)    CBT (Reinforcement and shaping, Social skills training, Relapse prevention planning, Coping skills training, Relaxation training, Cognitive restructuring, Behavioral tailoring)        Domain: Health & Basic Living Needs  Goal: Identify and engage possible areas of improvement related to basic needs being met and maintaining or improving overall health and well-being     Objectives & Target Date        Verbalize an accurate understanding of factors influencing eating, health, overweight, and obesity; Target Date: 2/17/2018    Identify changes in daily lifestyle activity conducive to improved health and good weight management; Target Date: 2/17/2018    Learn and implement healthy nutritional practices; Target Date: 2/17/2018    Establish a plan to increase physical exercise; Target Date: 2/17/2018       Disclose any history of substance use that may contribute to and complicate the treatment of the psychosis; Target Date: 2/17/2018    Verbalize increased knowledge of substance use and the process of recovery  ; Target Date: 2/17/2018    Verbalize a commitment to a harm reduction approach to using substances ; Target Date: 2/17/2018    Implement relapse prevention strategies for managing possible future situations with high risk for relapse ; Target Date: 2/17/2018       Family members verbally reinforce Guero's active attempts to decrease substance by providing praise, encouragement, and affirmations ; Target Date: 2/17/2018     Strengths    Caution, Prudence, & Discretion      Hope, Optimism, & future-mindedness      Industry, diligence, & perseverance      Medication adherence (P)    Supportive friends, family, recovery environment (P)     Barriers     Drug/Alcohol abuse- history    Recent loss of social support (S)    Symptoms of psychosis, positive (delusions, hallucinations)    Symptoms of psychosis, negative (flat affect, avolition,  anhedonia, alogia, and/or apathy)    Symptoms of psychosis, cognitive (memory, attention and concentration, and/or executive functioning difficulties)    Treatment Non-Adherence -history      Provider & Intervention   IRT    Motivational Interviewing     Educational Teaching Strategies (Review written material/education on: Substance Use, Nutrition and Exercise, Developing Resiliency)    CBT  Prescriber    Medication Evaluation     Ongoing Medication Management    Side Effect Monitoring    Medication Education    Adherence Monitoring    Lab work  Family Therapy    Motivational Interviewing    Educational Teaching Strategies ( Basic facts about alcohol and drugs)    CBT   Domain: Family & Other Supports  Goal: Identify and engage possible areas of improvement related to engaging family, friends and other supports     Objectives & Target Date        Identify strengths and interests that can be used to initiate social contacts and develop peer friendships; Target date: 12/13/17     Strengthen the social support network with friends by initiating social contact and participating in social activities with peers: Target date: 12/13/17       Consider participating in family therapy: Target date: 12/13/17       Consider participating in group therapy  : Target date: 12/13/17    Increase participation in interpersonal or peer group activities : Target date: 12/13/17       Family members demonstrate support for Guero as he/she participates in treatment: Target date: 12/13/17     Strengths      Capacity to love and be loved      Hope, Optimism, & future-mindedness      Kind & Generous      Supportive family, recovery environment (P)     Barriers    Isolation     Recent loss of social support (S)    Symptoms of psychosis, positive (delusions, hallucinations)    Symptoms of psychosis, negative (flat affect, avolition, anhedonia, alogia, and/or apathy)    Symptoms of psychosis, cognitive (memory, attention and concentration,  "and/or executive functioning difficulties)     Provider & Intervention   IRT    Motivational Interviewing    Educational Teaching Strategies (Review written material/education on:  Having Fun and Developing Good Relationships)    CBT   Family Therapy    Motivational Interviewing     Educational Teaching Strategies     CBT   SEE    Employment Assistance & Supports (Information gathering/planning re: \"benefits applications\" or \"work incentive planning\", Job searching, Interview preparation/skills training, Complete resume, Complete applications, Follow along supports for requesting accommodations, development and use of natural supports, problem solving difficulties, stress management, develop/use of coping skills for symptoms, develop/use of coping skills for cognitive difficulties, social skills training)     Domain: Social, Academic, & Employment  Goal: Identify and engage possible areas of improvement related to education, employment, and social activities      Objectives & Target Date    Explore areas of interest for continued educational opportunities : Target date: 11/17/17    Explore areas of interest for employment purposes: Target date: 11/17/17    Obtain/maintain gainful employment : Target date: 11/17/17    Increase participation in interpersonal or peer group activities: Target date: 12/13/17      Strengths        Hope, Optimism, & future-mindedness       Industry, diligence, & perseverance      Medication adherence (P)    Supportive family, recovery environment (P)     Work history/experience     motivated to return to employment     Barriers     Symptoms of psychosis, positive (delusions, hallucinations)    Symptoms of psychosis, negative (flat affect, avolition, anhedonia, alogia, and/or apathy)    Symptoms of psychosis, cognitive (memory, attention and concentration, and/or executive functioning difficulties)    Treatment Non-Adherence -hx     Hx of drug and alcohol abuse     Provider & Intervention " "  SEE    Career Inventory    Goal Setting    Identify Level of Disclosure     Education Assistance & Supports (Discuss/plan use of student disability services, School searching, Interview preparation/skills training, Complete forms/applications, Follow along supports for requesting accommodations, development and use of natural supports, problem solving difficulties, stress management, develop/use of coping skills for symptoms, develop/use of coping skills for cognitive difficulties, social skills training)    Employment Assistance & Supports (Information gathering/planning re: \"benefits applications\" or \"work incentive planning\", Job searching, Interview preparation/skills training, Complete resume, Complete applications, Follow along supports for requesting accommodations, development and use of natural supports, problem solving difficulties, stress management, develop/use of coping skills for symptoms, develop/use of coping skills for cognitive difficulties, social skills training)  IRT    Motivational Interviewing (Connect info and skills with personal goals, Promote hope and positive expectations, Explore pros and cons of change, Re-frame experiences in a positive light)    Educational Teaching Strategies (Review written material/education on: Building a Bridging to Your Goals, Dealing with Negative Feelings, Coping with Symptoms)    CBT     6. Frequency of Sessions: Weekly     7. Expected duration of treatment:  2-4x monthly SEE services for 6 months; 1-2x monthly prescriber services for 6 months; 2-4x per month IRT services for 6 months followed by 12-18 months monthly sessions; Consider family education 1-4x/month for 6 months     8. Participants in therapy plan (family, friends, support network): Family members as able        See scanned document for Acknowledgement of Current Treatment Plan     Regulatory Guidelines for Updating Treatment Plan  [place] Medical Assistance: Reviewed & signed at least every " 90 days  Medicare:  Update per policy

## 2017-09-13 ENCOUNTER — OFFICE VISIT (OUTPATIENT)
Dept: PSYCHIATRY | Facility: CLINIC | Age: 27
End: 2017-09-13

## 2017-09-13 DIAGNOSIS — F20.9 SCHIZOPHRENIA, UNSPECIFIED TYPE (H): Primary | ICD-10-CM

## 2017-09-13 NOTE — MR AVS SNAPSHOT
After Visit Summary   2017    Guero Carter    MRN: [id number]           Patient Information     Date Of Birth          1990        Visit Information        Provider Department      2017 4:00 PM Felix Forrester; Savannah Patel [facility]; Denise Rogers, [facility]        Today's Diagnoses     Schizophrenia, unspecified type (H)    -  1       Follow-ups after your visit        Your next 10 appointments already scheduled     Sep 21, 2017 11:00 AM CDT   Navigate Psychotherapy with KHRIS Terrell   [facility] ([facility])    [address]  [facility] [id number] [phone]              Who to contact     Please call your clinic at [phone] to:    Ask questions about your health    Make or cancel appointments    Discuss your medicines    Learn about your test results    Speak to your doctor   If you have compliments or concerns about an experience at your clinic, or if you wish to file a complaint, please contact [facility] Patient Relations at [phone] or email us at [email or web address]         Additional Information About Your Visit        MyChart Information     ABFIT Productst is an electronic gateway that provides easy, online access to your medical records. With Joshfire, you can request a clinic appointment, read your test results, renew a prescription or communicate with your care team.     To sign up for ABFIT Productst visit the website at [email or web address]   You will be asked to enter the access code listed below, as well as some personal information. Please follow the directions to create your username and password.     Your access code is: [email or web address]  Expires: 2017  8:41 AM     Your access code will  in 90 days. If you need help or a new code, please contact your [facility] or call [phone] for assistance.        Care EveryWhere  ID     This is your Care EveryWhere ID. This could be used by other organizations to access your [place] medical records  [id number]         Blood Pressure from Last 3 Encounters:   09/05/17 (!) 135/91   08/04/17 (!) 146/98   06/08/17 135/84    Weight from Last 3 Encounters:   09/05/17 110.4 kg (243 lb 6.4 oz)   08/04/17 109 kg (240 lb 6.4 oz)   06/14/17 102.1 kg (225 lb)              Today, you had the following     No orders found for display       Primary Care Provider    Physician No Ref-Primary       No address on file        Equal Access to Services     [facility]: Hadii ralph Amaya, wamontseda landon, qaybta kaalmada mirza, moe garcia . So [facility] [place] [facility] [phone].    ATENCIÓN: Si habla español, tiene a bruce disposición servicios gratuitos de asistencia lingüística. Llame al [phone].    We comply with applicable federal civil rights laws and [place] laws. We do not discriminate on the basis of race, color, national origin, age, disability sex, sexual orientation or gender identity.            Thank you!     Thank you for choosing [facility]  for your care. Our goal is always to provide you with excellent care. Hearing back from our patients is one way we can continue to improve our services. Please take a few minutes to complete the written survey that you may receive in the mail after your visit with us. Thank you!             Your Updated Medication List - Protect others around you: Learn how to safely use, store and throw away your medicines at www.disposemymeds.org.          This list is accurate as of: 9/13/17 11:59 PM.  Always use your most recent med list.                   Brand Name Dispense Instructions for use Diagnosis    ADULT GUMMY PO      Take 2 tablets by mouth daily        IBUPROFEN PO      Take 200-400 mg by mouth every 4 hours as needed for other (headache)        OLANZapine 15 MG tablet    zyPREXA    30 tablet    Take 1 tablet (15 mg) by  mouth At Bedtime    Undifferentiated schizophrenia (H)
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
## 2017-09-18 NOTE — PROGRESS NOTES
NAVIGNAREN SEE Progress Note   For Supported Employment & Education    NAVIGATE Enrollee: Guero Carter (1990)     MRN: [id number]  Date:  8/7/2017  Any Client Contact?: Yes  Any Status Changes?: N/A  Clinician: LIANET Supported Employment & , Felix Forrester  Client contact in the past 30 days? Yes    Client Status Update     1a. Education - Guero Carter is interested in education: Yes and possibly returning to [employer] (eventually) or [employer] Certification   1A1. Orientation to SEE services completed   1A2. Client working on completing Career Profile     1b. Employment - Guero Carter is interested in employment: Yes   1B1. Orientation to SEE services completed   1B2. Client working on completing Career Profile    2. People present:    Client: Guero Carter  LIANET Supported : Felix Forrester    3. Total number of persons who participated in contact: 1    4. Length of Actual Contact: 60 minutes, Record Minutes Travel Time: 40 minutes    5. Location of contact:  Psychiatry Clinic, [place]     6. Brief description of session, contact, or client status (include: strategies, interventions, client reaction to meeting, next steps, etc.):  Writer met with Guero for the first 1:1 session at [facility]. Originally, meeting was scheduled in the community at a coffee shop neat Guero's house, but he felt more comfortable meeting at the clinic before day treatment. Writer set meeting agenda as explaining expectations surrounding SEE services and answering any questions, then jumping into Career and Education Inventory, as Guero previously requested urgency to find full-time employment. Guero agreed to meeting terms and did not have any additional comments or questions regarding SEE orientation information.     Writer began Career and Education Inventory and gathered important information pertaining to Guero's work and school history: Guero  attended [employer] in [place] when his family lived near [place]. He played basketball, soccer, and tennis in high school. He reports having a strong preference for sports, and will often play tennis or basketball with his brothers. He also has a passion for construction and landscaping, and more specifically, carpentry. He described his hobby of building/putting things together in his garage. Right now, he is working on a dresser. Writer suggested that once Guero feels comfortable, perhaps they can meet at Guero's house to see some of his work. He nodded and said that he would be okay with that. Writer went on to discuss the concept of informational interviews and engaged Guero in a conversation about his perception of interviews and how that compares to meeting employers, small business owners, or staff on the ground level, to learn more about the company; in turn, developing a network or an impressionable relationship for potential future work opportunities. Guero suggested that he might be interested in this kind of opportunity. Both agreed to explore this after a couple of meetings together. 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 Writer validated his work ethic and suggested that maybe he has not found the perfect career job yet. He acknowledged this statement and said that he had been working toward getting his degree/certification in Sleep Polysomnography at [employer], but did not finish due to his mental health at the time.     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 He also suggested that he would like to have his own apartment, as a long term goal. Once he is able to start saving money from a full-time position, he will purchase car insurance and consider moving into a 4-plex apartment that his dad has been helping to keep an eye on in [place].     Guero reports exercising when he can, but also as a stress coping mechanism. He admits to smoking about a pack of cigarettes every day and a half, but that he has tried to cut back by not bringing them, to day treatment. Writer asked how many cigarettes he has avoided during the times he is at day treatment, and he guessed about 5-7 cigarettes. Writer validated his willingness to reduce his intake alone and offered to provide more resources if interested. He also admitted that cigarettes should not effect his mood at work, as he is respectful of the break systems in place.     Overall, writer observed Guero as pleasant and very engaged during the session. They plan to meet again next Monday before day treatment to complete the Career and Education Inventory, as well as to start the job search if time allows. The plan for the next few meetings will be to begin job search as soon as possible. 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      7. Completion of mutually agreed upon client task from previous meeting:  N/A    8. Orientation and Treatment Planning:  SEE orientation meeting    9. Assessment:  Gathering SEE information/inventory regarding work and/or education history, skills, goals, and preferences with client    10. Placement:   N/A    11. Follow Along Supports:    N/A    12. Mutually agreed upon client task for next meeting:    Guero will email writer current resume, [employer] transcripts, and any information he might have regarding the expiration  of his  Central Service certification.       13. Next Meeting Scheduled for: Monday, August 14 at 11:30 a.m.     Felix MARTINI   Supported Employment &      done

## 2017-09-20 ENCOUNTER — ALLIED HEALTH/NURSE VISIT (OUTPATIENT)
Dept: PSYCHIATRY | Facility: CLINIC | Age: 27
End: 2017-09-20

## 2017-09-20 DIAGNOSIS — F20.9 SCHIZOPHRENIA, UNSPECIFIED TYPE (H): Primary | ICD-10-CM

## 2017-09-20 NOTE — MR AVS SNAPSHOT
After Visit Summary   2017    Guero Carter    MRN: [id number]           Patient Information     Date Of Birth          1990        Visit Information        Provider Department      2017 1:30 PM Felix Forrester [facility]        Today's Diagnoses     Schizophrenia, unspecified type (H)    -  1       Follow-ups after your visit        Your next 10 appointments already scheduled     Sep 21, 2017 11:00 AM CDT   Navigate Psychotherapy with KHRIS Terrell   [facility] ([facility])    [address]  [facility] [address] [phone]              Who to contact     Please call your clinic at [phone] to:    Ask questions about your health    Make or cancel appointments    Discuss your medicines    Learn about your test results    Speak to your doctor   If you have compliments or concerns about an experience at your clinic, or if you wish to file a complaint, please contact [facility] Patient Relations at [phone] or email us at [email or web address]         Additional Information About Your Visit        Extreme Plastics Plushart Information     Dana Translation is an electronic gateway that provides easy, online access to your medical records. With Dana Translation, you can request a clinic appointment, read your test results, renew a prescription or communicate with your care team.     To sign up for Dana Translation visit the website at [email or web address]   You will be asked to enter the access code listed below, as well as some personal information. Please follow the directions to create your username and password.     Your access code is: [email or web address]  Expires: 2017  8:41 AM     Your access code will  in 90 days. If you need help or a new code, please contact your [facility] or call [phone] for assistance.        Care EveryWhere ID     This is your Care EveryWhere ID. This could be  used by other organizations to access your [place] medical records  [id number]         Blood Pressure from Last 3 Encounters:   09/05/17 (!) 135/91   08/04/17 (!) 146/98   06/08/17 135/84    Weight from Last 3 Encounters:   09/05/17 110.4 kg (243 lb 6.4 oz)   08/04/17 109 kg (240 lb 6.4 oz)   06/14/17 102.1 kg (225 lb)              Today, you had the following     No orders found for display       Primary Care Provider    Physician No Ref-Primary       No address on file        Equal Access to Services     [facility]: Hadii ralph Amaya, wanoe navarrete, anselmo kaalmanas blue, moe garcia . So [facility] [phone].    ATENCIÓN: Si habla español, tiene a bruce disposición servicios gratuitos de asistencia lingüística. Llame al [phone].    We comply with applicable federal civil rights laws and [place] laws. We do not discriminate on the basis of race, color, national origin, age, disability sex, sexual orientation or gender identity.            Thank you!     Thank you for choosing [facility]  for your care. Our goal is always to provide you with excellent care. Hearing back from our patients is one way we can continue to improve our services. Please take a few minutes to complete the written survey that you may receive in the mail after your visit with us. Thank you!             Your Updated Medication List - Protect others around you: Learn how to safely use, store and throw away your medicines at www.disposemymeds.org.          This list is accurate as of: 9/20/17 11:59 PM.  Always use your most recent med list.                   Brand Name Dispense Instructions for use Diagnosis    ADULT GUMMY PO      Take 2 tablets by mouth daily        IBUPROFEN PO      Take 200-400 mg by mouth every 4 hours as needed for other (headache)        OLANZapine 15 MG tablet    zyPREXA    30 tablet    Take 1 tablet (15 mg) by mouth At Bedtime    Undifferentiated schizophrenia (H)

## 2017-09-21 ENCOUNTER — OFFICE VISIT (OUTPATIENT)
Dept: PSYCHIATRY | Facility: CLINIC | Age: 27
End: 2017-09-21

## 2017-09-21 DIAGNOSIS — F20.9 SCHIZOPHRENIA, UNSPECIFIED TYPE (H): Primary | ICD-10-CM

## 2017-09-21 ASSESSMENT — PATIENT HEALTH QUESTIONNAIRE - PHQ9: SUM OF ALL RESPONSES TO PHQ QUESTIONS 1-9: 0

## 2017-09-21 NOTE — MR AVS SNAPSHOT
After Visit Summary   2017    Guero Carter    MRN: [id number]           Patient Information     Date Of Birth          1990        Visit Information        Provider Department      2017 11:00 AM Savannah Patel LGSW [facility] Psychiat[facility]        Today's Diagnoses     Schizophrenia, unspecified type (H)    -  1       Follow-ups after your visit        Your next 10 appointments already scheduled     Sep 28, 2017 11:00 AM CDT   Navigate Psychotherapy with Savannah KHRIS Patel   [facility] ([facility])    [address] [facility] [address]  [facility] [id number] [phone]              Who to contact     Please call your clinic at [phone] to:    Ask questions about your health    Make or cancel appointments    Discuss your medicines    Learn about your test results    Speak to your doctor   If you have compliments or concerns about an experience at your clinic, or if you wish to file a complaint, please contact [facility] Patient Relations at [phone] or email us at [email or web address]         Additional Information About Your Visit        MyChart Information     XODIS is an electronic gateway that provides easy, online access to your medical records. With XODIS, you can request a clinic appointment, read your test results, renew a prescription or communicate with your care team.     To sign up for XODIS visit the website at [email or web address]   You will be asked to enter the access code listed below, as well as some personal information. Please follow the directions to create your username and password.     Your access code is: [email or web address]  Expires: 2017  8:41 AM     Your access code will  in 90 days. If you need help or a new code, please contact your [facility] or call [phone] for assistance.        Care EveryWhere ID     This is your Care EveryWhere ID. This  could be used by other organizations to access your [place] medical records  [id number]         Blood Pressure from Last 3 Encounters:   09/05/17 (!) 135/91   08/04/17 (!) 146/98   06/08/17 135/84    Weight from Last 3 Encounters:   09/05/17 110.4 kg (243 lb 6.4 oz)   08/04/17 109 kg (240 lb 6.4 oz)   06/14/17 102.1 kg (225 lb)              Today, you had the following     No orders found for display       Primary Care Provider    Physician No Ref-Primary       No address on file        Equal Access to Services     [facility]: Hadii ralph Amaya, wanoe navarrete, anselmo kaaljose f blue, moe garcia . So [facility] [phone].    ATENCIÓN: Si habla español, tiene a bruce disposición servicios gratuitos de asistencia lingüística. Llame al [phone].    We comply with applicable federal civil rights laws and [place] laws. We do not discriminate on the basis of race, color, national origin, age, disability sex, sexual orientation or gender identity.            Thank you!     Thank you for choosing [facility]  for your care. Our goal is always to provide you with excellent care. Hearing back from our patients is one way we can continue to improve our services. Please take a few minutes to complete the written survey that you may receive in the mail after your visit with us. Thank you!             Your Updated Medication List - Protect others around you: Learn how to safely use, store and throw away your medicines at www.disposemymeds.org.          This list is accurate as of: 9/21/17 11:59 PM.  Always use your most recent med list.                   Brand Name Dispense Instructions for use Diagnosis    ADULT GUMMY PO      Take 2 tablets by mouth daily        IBUPROFEN PO      Take 200-400 mg by mouth every 4 hours as needed for other (headache)        OLANZapine 15 MG tablet    zyPREXA    30 tablet    Take 1 tablet (15 mg) by mouth At Bedtime    Undifferentiated  schizophrenia (H)

## 2017-09-21 NOTE — Clinical Note
FYI- recent IRT session with client. [place], I've asked the  staff to call client to schedule him with you in the coming weeks.  Savannah

## 2017-09-21 NOTE — PROGRESS NOTES
LIANET SEE Progress Note   For Supported Employment & Education    NAVIGATE Enrollee: Guero Carter (1990)     MRN: [id number]  Date:  9/20/2017  Any Client Contact?: Yes  Any Status Changes?: No  Clinician: LIANET Supported Employment & , Felix Forrester  Client contact in the past 30 days? Yes    Client Status Update     1a. Education - Guero Carter is interested in education: No   1A1. Orientation to SEE services completed   1A3. Client completed Career Profile    1b. Employment - Guero Carter is interested in employment: Yes   1B1. Orientation to SEE services completed   1B3. Client completed Career Profile   1B4. Client developed employment goals   1B6. Client visited potential place of employment   1B7. Client had in person contact with potential employer   1B8. Client had interview with potential employer    2. People present:    Client: Guero Carter   LIANET Supported : Felix Forrester    3. Total number of persons who participated in contact: 1     4. Length of Actual Contact: 60 minutes   Traveled?  Yes   Start Time (indicate am/pm):  1:00, traveling from [place] (location)   End Time (indicate am/pm):  1:30   Total Travel Time:  30 minutes    5. Location of contact:  Community: [place]    6. Brief description of session, contact, or client status (include: strategies, interventions, client reaction to meeting, next steps, etc.):  Writer met with Guero for weekly meeting at the [employer] [place], near his house. Guero was on time and well-prepared, with his folder, paper, and pen, as well as his laptop. 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 Writer coached Guero through all of the required fields to thoroughly complete an account, and also helped him to apply to one position through [employer]. Guero  presented well throughout the meeting and reciprocated conversation kindly and respectfully.     Writer also coached Guero through follow-up procedures and gave homework assignment to complete at least one more online job account before the next meeting.     7. Completion of mutually agreed upon client task from previous meeting:  Completed    8. Orientation and Treatment Planning:  Pursuing current SEE goals     9. Assessment:    Assisting client to visit work or school setting to develop client preferences and goals regarding work and/or school    10. Placement:   N/A    11. Follow Along Supports:    N/A    12. Mutually agreed upon client task for next meeting:    To set up account for [place] or [facility].     13. Next Meeting Scheduled for: Wednesday, September 27 at 1:30 pm.    Felix MARTINI   Supported Employment &

## 2017-09-22 NOTE — PROGRESS NOTES
NAVIGATE Clinician Contact & Progress Note  For Individual Resiliency Training (IRT)  A Part of the [facility] First Episode of Psychosis Program    NAVIGATE Enrollee: Guero Carter (1990)     MRN: [id number]  Date:  9/21/17  Diagnosis: Schizophrenia, unspecified type  Clinician: LIANET Individual Resiliency Trainer, KHRIS Terrell     1. Type of contact: (majority of time spent)  IRT Session    2. People present:   Client  NAVIGATE IRT/writer    3. Total number of persons who participated in contact: 2, including writer    4. Length of Actual Contact: Start Time: 11:00am ; End Time: 11:55am   Traveled?  No    5. Location of contact:  Psychiatry Clinic, [facility]    6. Did the client complete the home practice option(s) from the previous session: Yes    7. Motivational Teaching Strategies:  Connect info and skills with personal goals  Promote hope and positive expectations  Explore pros and cons of change  Re-frame experiences in positive light    8. Educational Teaching Strategies:  Review of written material/education  Relate information to client's experience  Ask questions to check comprehension  Break down information into small chunks  Adopt client's language    9. CBT Teaching Strategies:  Reinforcement and shaping (positive feedback for steps towards goals, gains in knowledge & skills, follow-through on home assignments)    Relapse prevention planning (review of stressors, early warning signs, written plan to respond to signs, rehearse plan)    Coping skills training (review current coping skills, increase currently used skills, model new skill, role play new skill, feedback, plan home practice)      10. IRT Module(s) Addressed:  Module 3 - Education about Psychosis    11. Techniques utilized:   [place] announced at beginning of session  Review of homework  Review of goal  Review of previous meeting  Present new material  Help client choose a home practice option  Summarize progress made in  "current session    12. Mental Status Exam:    Alertness: alert  and oriented  Appearance: well groomed  Behavior/Demeanor: cooperative and calm, with good  eye contact   Speech: regular rate and rhythm  Language: intact. Preferred language identified as English.  Psychomotor: normal or unremarkable  Mood: description consistent with euthymia  Affect: flat; was not congruent to mood; was congruent to content  Thought Process/Associations: unremarkable  Thought Content:  Reports none;  Denies suicidal and violent ideation  Perception:  Reports none;  Denies auditory hallucinations and visual hallucinations  Insight: fair  Judgment: fair  Cognition: does  appear grossly intact; formal cognitive testing was not done  Suicidal ideation: denies SI, denies intent,  and denies plan  Homicidal Ideation: denies    13. Assessment/Progress Note:     Writer met client for weekly IRT session. During check in, client reported he set up an indeed account and was called today to schedule a phone interview at [employer], [place], which will be on Monday. He reported comfortability with interviewing and reported he is still waiting to hear back from the job at [employer]. 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 He plans to finish painting his grandmother's fence today. Client reported his mood has been good but he has had a little anxiety due to his job search. He reported sleep is good and denied symptoms of psychosis. He denied substance use and reported taking medications daily. Reviewed IRT goals and client reported plan to focus on goal of getting a job and insurance for his car.     The majority of session was spent on Module 3 Education about Psychosis. Reviewed \"what is psychosis\" content from last session and completed topic #2 on \"Medications for Psychosis.\" Provided information about medications for psychosis and their clinical benefits and side effects. Client reported belief that his medication " "helps him sleep, helps with appetite, and has improved his mood. He reported side effects of weight gain and drowsiness in the morning. Client discussed how his quality of life is better with medication because he feels more rested and he is less irritable toward his mom, stating their relationship is better. Client denied having issues remembering to take medication, stating he makes it part of his routine about 8pm and takes them at the same time every night. Client denied concerns about communicating with his doctor about his medications or questions. Writer and client then went on to topic #3 on Coping with Stress. Reviewed stress-vulnerability and discussed information on stress, signs, causes, and consequences. Completed daily hassles and life events check lists. Client reported hospitalization, getting a new job, and starting a new diet were stressful life events in the past year, with hospitalization being the most stressful. The only daily hassle he reported was lack of order/cleanliness in his room. Elicited from client information on the causes of his stress. He reported feeling stressed about finding a job. He reported managing it with positive self talk and motivation. He completed the checklist of strategies for preventing and coping with stress. He indicated using all strategies except journaling and participating in Protestant. He indicated wanting to return to [employer] as he reported enjoying the community and reminders for morality. Writer prepped client for continuing with this topic next session to develop his personal plan for coping with stress.    14. Plan/Referrals:     Writer will meet client in one week 9/28 at 11am for next IRT session. Writer will have scheduling call client to schedule appt with [facility] prescriber, Akosua De Oliveira.    Billing for \"Interactive Complexity\"?  No    [place], [facility]    LIANET Individual Resiliency Trainer    Attestation:    I did not see this patient " directly. This patient is discussed with me in individual clinical social work supervision, and I agree with the plan as documented.     ADELFO Hodges, LICSW, September 25, 2017

## 2017-09-27 ENCOUNTER — ALLIED HEALTH/NURSE VISIT (OUTPATIENT)
Dept: PSYCHIATRY | Facility: CLINIC | Age: 27
End: 2017-09-27

## 2017-09-27 DIAGNOSIS — F20.9 SCHIZOPHRENIA, UNSPECIFIED TYPE (H): Primary | ICD-10-CM

## 2017-09-27 NOTE — MR AVS SNAPSHOT
After Visit Summary   2017    Guero Carter    MRN: [id number]           Patient Information     Date Of Birth          1990        Visit Information        Provider Department      2017 1:30 PM Felix Forrester [facility] Psychiatry        Today's Diagnoses     Schizophrenia, unspecified type (H)    -  1       Follow-ups after your visit        Your next 10 appointments already scheduled     Sep 28, 2017 11:00 AM CDT   Navigate Psychotherapy with KHRIS Terrell   [facility] ([facility])    [address]  [facility] [address] [phone]              Who to contact     Please call your clinic at [phone] to:    Ask questions about your health    Make or cancel appointments    Discuss your medicines    Learn about your test results    Speak to your doctor   If you have compliments or concerns about an experience at your clinic, or if you wish to file a complaint, please contact [facility] Patient Relations at [phone] or email us at [email or web address]         Additional Information About Your Visit        Sophie & Juliethart Information     CouponCabin is an electronic gateway that provides easy, online access to your medical records. With CouponCabin, you can request a clinic appointment, read your test results, renew a prescription or communicate with your care team.     To sign up for [employer]Cabin visit the website at [email or web address]"   You will be asked to enter the access code listed below, as well as some personal information. Please follow the directions to create your username and password.     Your access code is: [email or web address]  Expires: 2017  8:41 AM     Your access code will  in 90 days. If you need help or a new code, please contact your [facility] or call [phone] for assistance.        Care EveryWhere ID     This is your Care EveryWhere ID. This could be  used by other organizations to access your [place] medical records  [id number]         Blood Pressure from Last 3 Encounters:   09/05/17 (!) 135/91   08/04/17 (!) 146/98   06/08/17 135/84    Weight from Last 3 Encounters:   09/05/17 110.4 kg (243 lb 6.4 oz)   08/04/17 109 kg (240 lb 6.4 oz)   06/14/17 102.1 kg (225 lb)              Today, you had the following     No orders found for display       Primary Care Provider    Physician No Ref-Primary       No address on file        Equal Access to Services     [facility]: Hadii ralph Amaya, wanoe navarrete, anselmo kaalmanas blue, moe garcia . So [facility] [phone].    ATENCIÓN: Si habla español, tiene a bruce disposición servicios gratuitos de asistencia lingüística. Llame al [phone].    We comply with applicable federal civil rights laws and [place] laws. We do not discriminate on the basis of race, color, national origin, age, disability sex, sexual orientation or gender identity.            Thank you!     Thank you for choosing [facility] PSYCHIATRY  for your care. Our goal is always to provide you with excellent care. Hearing back from our patients is one way we can continue to improve our services. Please take a few minutes to complete the written survey that you may receive in the mail after your visit with us. Thank you!             Your Updated Medication List - Protect others around you: Learn how to safely use, store and throw away your medicines at www.disposemymeds.org.          This list is accurate as of: 9/27/17  3:09 PM.  Always use your most recent med list.                   Brand Name Dispense Instructions for use Diagnosis    ADULT GUMMY PO      Take 2 tablets by mouth daily        IBUPROFEN PO      Take 200-400 mg by mouth every 4 hours as needed for other (headache)        OLANZapine 15 MG tablet    zyPREXA    30 tablet    Take 1 tablet (15 mg) by mouth At Bedtime    Undifferentiated schizophrenia (H)

## 2017-09-27 NOTE — PROGRESS NOTES
EMORY[facility] SEE Progress Note   For Supported Employment & Education    NAVIGATE Enrollee: Guero Carter (1990)     MRN: [id number]  Date:  9/27/2017  Any Client Contact?: Yes  Any Status Changes?: Yes  Clinician: LIANET Supported Employment & , Felix Forrester  Client contact in the past 30 days? Yes    Client Status Update     1a. Education - Guero Carter is interested in education: No   1A1. Orientation to SEE services completed   1A3. Client completed Career Profile    1b. Employment - Guero Carter is interested in employment: Yes   1B1. Orientation to SEE services completed   1B3. Client completed Career Profile   1B4. Client developed employment goals   1B6. Client visited potential place of employment   1B7. Client had in person contact with potential employer   1B8. Client had interview with potential employer   1B9. 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     2. People present:    Client: Guero Carter  LIANET Supported : Felix Forrester    3. Total number of persons who participated in contact: 1    4. Length of Actual Contact: 60 minutes   Traveled?  Yes   Start Time (indicate am/pm):  1:30, traveling from [facility] (location)   End Time (indicate am/pm):  2:30   Total Travel Time:  35 minutes    5. Location of contact:  Community: [employer] [place]    6. Brief description of session, contact, or client status (include: strategies, interventions, client reaction to meeting, next steps, etc.):   met with Guero for weekly employment check-in at the [employer], in [facility]. Guero reported that he took an Uber to the appointment, which is less than a mile from his house, so bertor joked with Guero about considering a brisk walk the next time, and he laughed and agreed that he probably  should walk. Guero came to the meeting with a huge grin on his face, which  observed as appropriate when he shared the news of his offer of employment from [employer] [facility] [employer] as a full-time Sterile Processing Technician. Guero was very thrilled about the opportunity, so the meeting agenda focused on preparing for his first day, gathering all of the applicable documents to complete the offer, and discussing expectations of SEE moving forward.  first assisted Guero with turning off his email notifications from [employer], as he was receiving a barrage of employer emails interested in his resume.     Details about Guero's position include a $18.82 / hour rate of pay, 40 hours / week, the first three months will be training from 7 am - 3 pm, and he will begin his first day of work on Monday, October 16. Guero reported that he has a phone check-in with his employer on Monday, October 2, to discuss his role and expectations during the orientation period, and to ensure that Guero has all of his immunizations up to date, as well as his eligibility to work forms ready for the first day. Milesr assisted Guero in contacted [facility] [place] medical records to fax to [employer].  discussed disclosure with Guero, to which he admitted that he was unsure if he needed my help moving forward; however, he did admit that he would utilize SEE to identify a budget and to do general check-ins when he starts work.      and Guero spent the last minutes of the meeting discussing his excitement to start work, and the expectations of starting in a professional environment. Guero said that he just got his car back and would be updating his tabs this week, and that he plans to get up at 5:30 am for work.  will continue to check-in with Guero as needed for the next few weeks before starts his first day of work.     7. Completion of mutually agreed upon client task from previous  meeting:  Completed    8. Orientation and Treatment Planning:  Pursuing current SEE goals     9. Assessment:    Assessing client's need for follow-along supports    10. Placement:     10b. Employment:  10B1. Discussion and planning re: disclosure decisions and role of SEE   10B2. Indicate client's current decision regarding disclosure:    2. Client unsure about using disclosure   10B3. Information gathering/planning re: Discussed possibly setting a budget or reviewing benefits together     11. Follow Along Supports:   Writer will continue to check-in with Guero through his start date.     12. Mutually agreed upon client task for next meeting:    To check-in next week via phone or 1:1 meeting.     13. Next Meeting Scheduled for: GIORGIO MARTINI   Supported Employment &

## 2017-09-28 ENCOUNTER — OFFICE VISIT (OUTPATIENT)
Dept: PSYCHIATRY | Facility: CLINIC | Age: 27
End: 2017-09-28

## 2017-09-28 DIAGNOSIS — F20.9 SCHIZOPHRENIA, UNSPECIFIED TYPE (H): Primary | ICD-10-CM

## 2017-09-28 ASSESSMENT — PATIENT HEALTH QUESTIONNAIRE - PHQ9: SUM OF ALL RESPONSES TO PHQ QUESTIONS 1-9: 0

## 2017-09-28 NOTE — MR AVS SNAPSHOT
After Visit Summary   2017    Guero Carter    MRN: [id number]           Patient Information     Date Of Birth          1990        Visit Information        Provider Department      2017 11:00 AM Savannah Patel LGSW [facility] Psychiatry         Follow-ups after your visit        Your next 10 appointments already scheduled     Oct 02, 2017  1:45 PM CDT   Navigate New with LOTTIE Castellanos CNP   [facility] Psychiatry ([facility])    [address]  [facility] [id number] [phone]            Oct 02, 2017  3:00 PM CDT   Navigate Psychotherapy with Savannah KHRIS Patel   [facility]iat[facility] ([facility])    [address]  [facility] [id number] [phone]              Who to contact     Please call your clinic at [phone] to:    Ask questions about your health    Make or cancel appointments    Discuss your medicines    Learn about your test results    Speak to your doctor   If you have compliments or concerns about an experience at your clinic, or if you wish to file a complaint, please contact [facility] Patient Relations at [phone] or email us at [email or web address]         Additional Information About Your Visit        MyChart Information     [employer] is an electronic gateway that provides easy, online access to your medical records. With [employer], you can request a clinic appointment, read your test results, renew a prescription or communicate with your care team.     To sign up for [employer] visit the website at [email or web address]   You will be asked to enter the access code listed below, as well as some personal information. Please follow the directions to create your username and password.     Your access code is: [email or web address]  Expires: 2017  8:41 AM     Your access code will  in 90 days. If you need help or a new code, please contact your [facility]  [place] [facility] or call [phone] for assistance.        Care EveryWhere ID     This is your Care EveryWhere ID. This could be used by other organizations to access your [place] medical records  [id number]         Blood Pressure from Last 3 Encounters:   06/08/17 135/84   05/28/17 138/71   10/11/13 135/89    Weight from Last 3 Encounters:   06/14/17 225 lb (102.1 kg)   06/08/17 226 lb 9.6 oz (102.8 kg)   06/06/17 218 lb (98.9 kg)              Today, you had the following     No orders found for display       Primary Care Provider Fax #    Physician No Ref-Primary [phone]       No address on file        Equal Access to Services     MARI VELASCO : Hadii ralph Amaya, wamontseda luqadaha, qaybta kaalmada domingayanas, moe garcia . So [facility] [phone].    ATENCIÓN: Si habla español, tiene a bruce disposición servicios gratuitos de asistencia lingüística. Llame al [phone].    We comply with applicable federal civil rights laws and [place] laws. We do not discriminate on the basis of race, color, national origin, age, disability sex, sexual orientation or gender identity.            Thank you!     Thank you for choosing [email or web address] PSYCHIATRY  for your care. Our goal is always to provide you with excellent care. Hearing back from our patients is one way we can continue to improve our services. Please take a few minutes to complete the written survey that you may receive in the mail after your visit with us. Thank you!             Your Updated Medication List - Protect others around you: Learn how to safely use, store and throw away your medicines at www.disposemymeds.org.          This list is accurate as of: 9/28/17 11:52 AM.  Always use your most recent med list.                   Brand Name Dispense Instructions for use Diagnosis    ADULT GUMMY PO      Take 2 tablets by mouth daily        IBUPROFEN PO      Take 200-400 mg by mouth every 4 hours as needed for other  (headache)        OLANZapine 15 MG tablet    zyPREXA    30 tablet    Take 1 tablet (15 mg) by mouth At Bedtime    Undifferentiated schizophrenia (H)

## 2017-09-28 NOTE — PROGRESS NOTES
NAVIGNAREN Clinician Contact & Progress Note  For Individual Resiliency Training (IRT)  A Part of the [facility] First Episode of Psychosis Program    NAVIGATE Enrollee: Guero Carter (1990)     MRN: [id number]  Date:  9/28/17  Diagnosis: Schizophrenia, unspecified type; F20.9  Clinician: LIANET Individual Resiliency Trainer, KHRIS Terrell     1. Type of contact: (majority of time spent)  IRT Session    2. People present:   Client  NAVIGATE IRT/writer    3. Total number of persons who participated in contact: 2, including writer    4. Length of Actual Contact: Start Time: 11:00; End Time: 11:50   Traveled?  No     5. Location of contact:  Psychiatry Clinic, [facility]    6. Did the client complete the home practice option(s) from the previous session: Yes    7. Motivational Teaching Strategies:  Connect info and skills with personal goals  Promote hope and positive expectations  Explore pros and cons of change  Re-frame experiences in positive light    8. Educational Teaching Strategies:  Review of written material/education  Relate information to client's experience  Ask questions to check comprehension  Break down information into small chunks  Adopt client's language    9. CBT Teaching Strategies:  Reinforcement and shaping (positive feedback for steps towards goals, gains in knowledge & skills, follow-through on home assignments)    Relapse prevention planning (review of stressors, early warning signs, written plan to respond to signs, rehearse plan)    Coping skills training (review current coping skills, increase currently used skills, model new skill, role play new skill, feedback, plan home practice)    Relaxation training (model relaxation technique, practice technique, feedback, plan home practice)    10. IRT Module(s) Addressed:  Module 3 - Education about Psychosis    11. Techniques utilized:   [place] announced at beginning of session  Review of homework  Review of goal  Review of previous  "meeting  Present new material  Summarize progress made in current session    12. Mental Status Exam:    Alertness: alert  and oriented  Appearance: casually groomed  Behavior/Demeanor: cooperative, pleasant and calm, with good  eye contact   Speech: regular rate and rhythm  Language: intact. Preferred language identified as English.  Psychomotor: normal or unremarkable  Mood: description consistent with euthymia  Affect: blunted; was congruent to mood; was congruent to content  Thought Process/Associations: unremarkable  Thought Content:  Reports none;  Denies suicidal ideation  Perception:  Reports none;  Denies auditory hallucinations and visual hallucinations  Insight: fair  Judgment: fair  Cognition: does  appear grossly intact; formal cognitive testing was not done  Suicidal ideation: denies SI, denies intent,  and denies plan  Homicidal Ideation: none reported    13. Assessment/Progress Note:     Writer met client for weekly IRT session. Client was pleased to update that he was offered a sterile processing job at [employer] and that he accepted. He described details of the position and training schedule, M-F 7-3pm for 3 months. Writer praised client for his hard work in searching for a position and validated his success. Writer and client reviewed his IRT goal plan. Updated goal of \"finding a job\" to preparing for his job by adjusting his sleep schedule and studying for the sterile processing exam. He also updated that he completed steps in his goal of getting his car and reported new goal of fixing his tire. He updated goal of saving money by adding a step to create a budget with [employer]. Client described steps he's taking to adjust to waking up early in preparation for his job, including taking his medication an hour earlier at night time, 7pm. Writer assessed client's current mental health symptoms. He denied symptoms of psychosis, though reported having vivid nightmares occasionally.  He reported " "his mood has been good besides feeling a little anxious about starting his job, which writer normalized. Denied substance use. Reported plan to schedule with [facility]IGATE prescriber for next week as he has 2 weeks of meds left. He also reported hope to talk to prescriber about getting on metformin because he reports gaining 5 lbs in the last week.     The rest of session was spent on topic from Module 3 \"Coping with Stress.\" Reviewed checklists from last week and came up with client's own plan for coping with stress:  Stressful situations to be aware of:  -Being around my brother  -starting a new job/staying organized  -putting off exercise  Signs I am under stress:  -feeling tightness in chest  -decreased energy or sleeping too much  -fidgety  Strategies to avoid stress:  -avoid brother  -practice mindfulness and being present  -relax with music  Strategies to cope with stress:  -talk to therapist in IRT  -watch TV for distraction  -Music, exercise, focus on a project    Writer and client discussed plan for continuing IRT sessions the next two weeks before he starts work on 10/16 and having monthly meetings after that.     14. Plan/Referrals:     Client and writer will meet on 10/2 for next IRT session following his intake with [facility] prescriber, LOTTIE Castellanos, CNP. Will revisit options of frequency of IRT sessions    Billing for \"Interactive Complexity\"?  No    Savannah Patel, [facility]    LIANET Individual Resiliency Trainer    Attestation:    I did not see this patient directly. This patient is discussed with me in individual clinical social work supervision, and I agree with the plan as documented.     ADELFO Hodges, [facility], October 2, 2017    "

## 2017-10-02 ENCOUNTER — OFFICE VISIT (OUTPATIENT)
Dept: PSYCHIATRY | Facility: CLINIC | Age: 27
End: 2017-10-02

## 2017-10-02 VITALS — WEIGHT: 253.4 LBS | BODY MASS INDEX: 32.52 KG/M2 | HEIGHT: 74 IN

## 2017-10-02 DIAGNOSIS — Z72.0 TOBACCO ABUSE: ICD-10-CM

## 2017-10-02 DIAGNOSIS — Z71.6 TOBACCO ABUSE COUNSELING: ICD-10-CM

## 2017-10-02 DIAGNOSIS — F20.9 SCHIZOPHRENIA, UNSPECIFIED TYPE (H): Primary | ICD-10-CM

## 2017-10-02 RX ORDER — OLANZAPINE 15 MG/1
15 TABLET ORAL AT BEDTIME
Qty: 30 TABLET | Refills: 0 | Status: SHIPPED | OUTPATIENT
Start: 2017-10-02 | End: 2017-11-01 | Stop reason: DRUGHIGH

## 2017-10-02 NOTE — MR AVS SNAPSHOT
After Visit Summary   10/2/2017    Guero Carter    MRN: [id number]           Patient Information     Date Of Birth          1990        Visit Information        Provider Department      10/2/2017 1:45 PM Akosua De Oliveira APRN CNP [facility] Psychiatry        Today's Diagnoses     Schizophrenia, unspecified type (H)    -  1    Tobacco abuse        Tobacco abuse counseling          Care Instructions    -Continue Zyprexa 15mg every night  -Add more fruits and vegetables to your diet   -Continue to work on cutting out fast food and soda  -Be active every day    HOW TO QUIT SMOKING  Smoking is one of the hardest habits to break. About half of all those who have ever smoked have been able to quit, and most of those (about 70%) who still smoke want to quit. Here are some of the best ways to stop smoking.     KEEP TRYING:  It takes most smokers about 8 tries before they are finally able to fully quit. So, the more often you try and fail, the better your chance of quitting the next time! So, don't give up!    GO COLD TURKEY:  Most ex-smokers quit cold turkey. Trying to cut back gradually doesn't seem to work as well, perhaps because it continues the smoking habit. Also, it is possible to fool yourself by inhaling more while smoking fewer cigarettes. This results in the same amount of nicotine in your body!    GET SUPPORT:  Support programs can make an important difference, especially for the heavy smoker. These groups offer lectures, methods to change your behavior and peer support. Call the free national Quitline for more information. 800-QUIT-NOW (652-614-7087). Low-cost or free programs are offered by many hospitals, local chapters of the American Lung Association (502-836-9737) and the American Cancer Society (167-269-3829). Support at home is important too. Non-smokers can help by offering praise and encouragement. If the smoker fails to quit, encourage them to try again!    OVER-THE-COUNTER  MEDICINES:  For those who can't quit on their own, Nicotine Replacement Therapy (NRT) may make quitting much easier. Certain aids such as the nicotine patch, gum and lozenge are available without a prescription. However, it is best to use these under the guidance of your doctor. The skin patch provides a steady supply of nicotine to the body. Nicotine gum and lozenge gives temporary bursts of low levels of nicotine. Both methods take the edge off the craving for cigarettes. WARNING: If you feel symptoms of nicotine overdose, such as nausea, vomiting, dizziness, weakness, or fast heartbeat, stop using these and see your doctor.    PRESCRIPTION MEDICINES:  After evaluating your smoking patterns and prior attempts at quitting, your doctor may offer a prescription medicine such as bupropion (Zyban, Wellbutrin), varenicline (Chantix, Champix), a niocotine inhaler or nasal spray. Each has its unique advantage and side effects which your doctor can review with you.    HEALTH BENEFITS OF QUITTING:  The benefits of quitting start right away and keep improving the longer you go without smokin minutes: blood pressure and pulse return to normal  8 hours: oxygen levels return to normal  2 days: ability to smell and taste begins to improve as damaged nerves start to regrow  2-3 weeks: circulation and lung function improves  1-9 months: decreased cough, congestion and shortness of breath; less tired  1 year: risk of heart attack decreases by half  5 years: risk of lung cancer decreases by half; risk of stroke becomes the same as a non-smoker  For information about how to quit smoking, visit the following links:  National Cancer [place] ,   Clearing the Air, Quit Smoking Today   - an online booklet. http://www.smokefree.gov/pubs/clearing_the_air.pdf  Smokefree.gov http://smokefree.gov/  QuitNet http://www.quitnet.com/    4727-8801 [employer], [address] [place] [address], [place] [address]. All rights reserved. This  information is not intended as a substitute for professional medical care. Always follow your healthcare professional's instructions.    The Benefits of Living Smoke Free  What do you want to gain from quitting? Check off some reasons to quit.  Health Benefits  ___ Reduce my risk of lung cancer, heart disease, chronic lung disease  ___ Have fewer wrinkles and softer skin  ___ Improve my sense of taste and smell  ___ For pregnant women--reduce the risk of having a miscarriage, stillbirth, premature birth, or low-birth-weight baby  Personal Benefits  ___ Feel more in control of my life  ___ Have better-smelling hair, breath, clothes, home, and car  ___ Save time by not having to take smoke breaks, buy cigarettes, or hunt for a light  ___ Have whiter teeth  Family Benefits  ___ Reduce my children s respiratory tract infections  ___ Set a good example for my children  ___ Reduce my family s cancer risk  Financial Benefits  ___ Save hundreds of dollars each year that would be spent on cigarettes  ___ Save money on medical bills  ___ Save on life, health, and car insurance premiums    Those Dollars Add Up!  Cigarettes are expensive, and getting more expensive all the time. Do you realize how much money you are spending on cigarettes per year? What is the average amount you spend on a pack of cigarettes? What is the average number of packs that you smoke per day? Using your answers to these questions, fill in this formula to help you find out:  ($ _____ per pack) ×  ( _____ number of packs per day) × (365 days) =  $ _____ yearly cost of smoking  Besides tobacco, there are other costs, including extra cleaning bills and replacement costs for clothing and furniture; medical expenses for smoking-related illnesses; and higher health, life, and car insurance premiums.    Cigars and Pipes Count Too!  Cigars and pipes are also dangerous. So are smokeless (chewing) tobacco and snuff. All of these products contain nicotine, a  highly addictive substance that has harmful effects on your body. Quitting smoking means giving up all tobacco products.      3988-2027 [employer] [facility], [address] [place] [address], [place] [address]. All rights reserved. This information is not intended as a substitute for professional medical care. Always follow your healthcare professional's instructions.          Follow-ups after your visit        Follow-up notes from your care team     Return in about 4 weeks (around 10/30/2017).      Your next 10 appointments already scheduled     Oct 12, 2017 12:00 PM CDT   Navigate Psychotherapy with Savannah Patel [facility] ([facility])    [address]  [facility] [id number] [phone]              Who to contact     Please call your clinic at [phone] to:    Ask questions about your health    Make or cancel appointments    Discuss your medicines    Learn about your test results    Speak to your doctor   If you have compliments or concerns about an experience at your clinic, or if you wish to file a complaint, please contact [facility] Patient Relations at [phone] or email us at [email or web address]         Additional Information About Your Visit        Communicadohart Information     docTrackrt is an electronic gateway that provides easy, online access to your medical records. With [facility], you can request a clinic appointment, read your test results, renew a prescription or communicate with your care team.     To sign up for docTrackrt visit the website at [email or web address]   You will be asked to enter the access code listed below, as well as some personal information. Please follow the directions to create your username and password.     Your access code is: [email or web address]  Expires: 2017  8:41 AM     Your access code will  in 90 days. If you need help or a new code, please contact your [facility]  "Clinic or call [phone] for assistance.        Care EveryWhere ID     This is your Care EveryWhere ID. This could be used by other organizations to access your [place] medical records  [id number]        Your Vitals Were     Height BMI (Body Mass Index)                1.88 m (6' 2\") 32.53 kg/m2           Blood Pressure from Last 3 Encounters:   09/05/17 (!) 135/91   08/04/17 (!) 146/98   06/08/17 135/84    Weight from Last 3 Encounters:   10/02/17 114.9 kg (253 lb 6.4 oz)   09/05/17 110.4 kg (243 lb 6.4 oz)   08/04/17 109 kg (240 lb 6.4 oz)              Today, you had the following     No orders found for display         Where to get your medicines      These medications were sent to [facility] - [address] [address]     Phone:  [phone]     OLANZapine 15 MG tablet          Primary Care Provider    Physician No Ref-Primary       NO REF-PRIMARY PHYSICIAN        Equal Access to Services     MARI VELASCO AH: Hadii aad ku hadasho Soprerna, waaxda luqadaha, qaybta kaalmada mirza, moe farr. So [facility] [phone].    ATENCIÓN: Si habla español, tiene a bruce disposición servicios gratuitos de asistencia lingüística. Elda al [phone].    We comply with applicable federal civil rights laws and [place] laws. We do not discriminate on the basis of race, color, national origin, age, disability, sex, sexual orientation, or gender identity.            Thank you!     Thank you for choosing [facility]  for your care. Our goal is always to provide you with excellent care. Hearing back from our patients is one way we can continue to improve our services. Please take a few minutes to complete the written survey that you may receive in the mail after your visit with us. Thank you!             Your Updated Medication List - Protect others around you: Learn how to safely use, store and throw " away your medicines at www.disposemymeds.org.          This list is accurate as of: 10/2/17 11:59 PM.  Always use your most recent med list.                   Brand Name Dispense Instructions for use Diagnosis    IBUPROFEN PO      Take 200-400 mg by mouth every 4 hours as needed for other (headache)        OLANZapine 15 MG tablet    zyPREXA    30 tablet    Take 1 tablet (15 mg) by mouth At Bedtime
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
## 2017-10-02 NOTE — MR AVS SNAPSHOT
After Visit Summary   10/2/2017    Guero Carter    MRN: [id number]           Patient Information     Date Of Birth          1990        Visit Information        Provider Department      10/2/2017 3:00 PM Savannah Patel LGSW [facility]        Today's Diagnoses     Schizophrenia, unspecified type (H)    -  1       Follow-ups after your visit        Your next 10 appointments already scheduled     Oct 25, 2017  5:00 PM CDT   Navigate Psychotherapy with Savannah KHRIS Patel   [facility] ([facility])    [address]  [facility] [id number] [phone]              Who to contact     Please call your clinic at [phone] to:    Ask questions about your health    Make or cancel appointments    Discuss your medicines    Learn about your test results    Speak to your doctor   If you have compliments or concerns about an experience at your clinic, or if you wish to file a complaint, please contact [facility] Patient Relations at [phone] or email us at [email or web address]         Additional Information About Your Visit        MyChart Information     VetDC is an electronic gateway that provides easy, online access to your medical records. With VetDC, you can request a clinic appointment, read your test results, renew a prescription or communicate with your care team.     To sign up for VetDC visit the website at [email or web address]   You will be asked to enter the access code listed below, as well as some personal information. Please follow the directions to create your username and password.     Your access code is: [email or web address]  Expires: 2017  8:41 AM     Your access code will  in 90 days. If you need help or a new code, please contact your [facility] or call [phone] for assistance.        Care EveryWhere ID     This is your Care EveryWhere ID. This  could be used by other organizations to access your [facility] medical records  [id number]         Blood Pressure from Last 3 Encounters:   09/05/17 (!) 135/91   08/04/17 (!) 146/98   06/08/17 135/84    Weight from Last 3 Encounters:   10/02/17 114.9 kg (253 lb 6.4 oz)   09/05/17 110.4 kg (243 lb 6.4 oz)   08/04/17 109 kg (240 lb 6.4 oz)              Today, you had the following     No orders found for display         Where to get your medicines      These medications were sent to [facility] - [address], [facility], [facility] [address]     Phone:  [phone]     OLANZapine 15 MG tablet          Primary Care Provider    Physician No Ref-Primary       NO REF-PRIMARY PHYSICIAN        Equal Access to Services     MARI VELASCO : Hadii ralph mo Soprerna, waaxnas lusudhakar, qamignonta kaalmanas blue, moe farr. So [facility] [phone].    ATENCIÓN: Si habla español, tiene a bruce disposición servicios gratuitos de asistencia lingüística. Monikaame al [phone].    We comply with applicable federal civil rights laws and [place] laws. We do not discriminate on the basis of race, color, national origin, age, disability, sex, sexual orientation, or gender identity.            Thank you!     Thank you for choosing [facility]  for your care. Our goal is always to provide you with excellent care. Hearing back from our patients is one way we can continue to improve our services. Please take a few minutes to complete the written survey that you may receive in the mail after your visit with us. Thank you!             Your Updated Medication List - Protect others around you: Learn how to safely use, store and throw away your medicines at www.disposemymeds.org.          This list is accurate as of: 10/2/17 11:59 PM.  Always use your most recent med list.                   Brand Name Dispense Instructions for use Diagnosis     IBUPROFEN PO      Take 200-400 mg by mouth every 4 hours as needed for other (headache)        OLANZapine 15 MG tablet    zyPREXA    30 tablet    Take 1 tablet (15 mg) by mouth At Bedtime

## 2017-10-02 NOTE — PATIENT INSTRUCTIONS
-Continue Zyprexa 15mg every night  -Add more fruits and vegetables to your diet   -Continue to work on cutting out fast food and soda  -Be active every day    HOW TO QUIT SMOKING  Smoking is one of the hardest habits to break. About half of all those who have ever smoked have been able to quit, and most of those (about 70%) who still smoke want to quit. Here are some of the best ways to stop smoking.     KEEP TRYING:  It takes most smokers about 8 tries before they are finally able to fully quit. So, the more often you try and fail, the better your chance of quitting the next time! So, don't give up!    GO COLD TURKEY:  Most ex-smokers quit cold turkey. Trying to cut back gradually doesn't seem to work as well, perhaps because it continues the smoking habit. Also, it is possible to fool yourself by inhaling more while smoking fewer cigarettes. This results in the same amount of nicotine in your body!    GET SUPPORT:  Support programs can make an important difference, especially for the heavy smoker. These groups offer lectures, methods to change your behavior and peer support. Call the free national Quitline for more information. 800-QUIT-NOW (868-517-2080). Low-cost or free programs are offered by many hospitals, local chapters of the American Lung Association (214-166-6645) and the American Cancer Society (783-497-5695). Support at home is important too. Non-smokers can help by offering praise and encouragement. If the smoker fails to quit, encourage them to try again!    OVER-THE-COUNTER MEDICINES:  For those who can't quit on their own, Nicotine Replacement Therapy (NRT) may make quitting much easier. Certain aids such as the nicotine patch, gum and lozenge are available without a prescription. However, it is best to use these under the guidance of your doctor. The skin patch provides a steady supply of nicotine to the body. Nicotine gum and lozenge gives temporary bursts of low levels of nicotine. Both methods  take the edge off the craving for cigarettes. WARNING: If you feel symptoms of nicotine overdose, such as nausea, vomiting, dizziness, weakness, or fast heartbeat, stop using these and see your doctor.    PRESCRIPTION MEDICINES:  After evaluating your smoking patterns and prior attempts at quitting, your doctor may offer a prescription medicine such as bupropion (Zyban, Wellbutrin), varenicline (Chantix, Champix), a niocotine inhaler or nasal spray. Each has its unique advantage and side effects which your doctor can review with you.    HEALTH BENEFITS OF QUITTING:  The benefits of quitting start right away and keep improving the longer you go without smokin minutes: blood pressure and pulse return to normal  8 hours: oxygen levels return to normal  2 days: ability to smell and taste begins to improve as damaged nerves start to regrow  2-3 weeks: circulation and lung function improves  1-9 months: decreased cough, congestion and shortness of breath; less tired  1 year: risk of heart attack decreases by half  5 years: risk of lung cancer decreases by half; risk of stroke becomes the same as a non-smoker  For information about how to quit smoking, visit the following links:  National Cancer [place] ,   Clearing the Air, Quit Smoking Today   - an online booklet. http://www.smokefree.gov/pubs/clearing_the_air.pdf  Smokefree.gov http://smokefree.gov/  QuitNet http://www.quitnet.com/    7674-7911 [employer] [facility], [address] [place] [address]. All rights reserved. This information is not intended as a substitute for professional medical care. Always follow your healthcare professional's instructions.    The Benefits of Living Smoke Free  What do you want to gain from quitting? Check off some reasons to quit.  Health Benefits  ___ Reduce my risk of lung cancer, heart disease, chronic lung disease  ___ Have fewer wrinkles and softer skin  ___ Improve my sense of taste and smell  ___ For pregnant  women--reduce the risk of having a miscarriage, stillbirth, premature birth, or low-birth-weight baby  Personal Benefits  ___ Feel more in control of my life  ___ Have better-smelling hair, breath, clothes, home, and car  ___ Save time by not having to take smoke breaks, buy cigarettes, or hunt for a light  ___ Have whiter teeth  Family Benefits  ___ Reduce my children s respiratory tract infections  ___ Set a good example for my children  ___ Reduce my family s cancer risk  Financial Benefits  ___ Save hundreds of dollars each year that would be spent on cigarettes  ___ Save money on medical bills  ___ Save on life, health, and car insurance premiums    Those Dollars Add Up!  Cigarettes are expensive, and getting more expensive all the time. Do you realize how much money you are spending on cigarettes per year? What is the average amount you spend on a pack of cigarettes? What is the average number of packs that you smoke per day? Using your answers to these questions, fill in this formula to help you find out:  ($ _____ per pack) ×  ( _____ number of packs per day) × (365 days) =  $ _____ yearly cost of smoking  Besides tobacco, there are other costs, including extra cleaning bills and replacement costs for clothing and furniture; medical expenses for smoking-related illnesses; and higher health, life, and car insurance premiums.    Cigars and Pipes Count Too!  Cigars and pipes are also dangerous. So are smokeless (chewing) tobacco and snuff. All of these products contain nicotine, a highly addictive substance that has harmful effects on your body. Quitting smoking means giving up all tobacco products.      2308-9207 [employer] [facility], [address] [place] [address], [place] [address]. All rights reserved. This information is not intended as a substitute for professional medical care. Always follow your healthcare professional's instructions.

## 2017-10-03 NOTE — PROGRESS NOTES
NAVIGNAREN Clinician Contact & Progress Note  For Individual Resiliency Training (IRT)  A Part of the [facility] First Episode of Psychosis Program    NAVIGATE Enrollee: Guero Carter (1990)     MRN: [id number]  Date:  10/02/17  Diagnosis: Schizophrenia, unspecified type; F20.9  Clinician: LIANET Individual Resiliency Trainer, KHRIS Terrell     1. Type of contact: (majority of time spent)  IRT Session    2. People present:   Client  NAVIGATE IRT/writer    3. Total number of persons who participated in contact: 2, including writer    4. Length of Actual Contact: Start Time: 2:45pm; End Time: 3:35   Traveled?  No     5. Location of contact:  Psychiatry Clinic, [facility]    6. Did the client complete the home practice option(s) from the previous session: Yes    7. Motivational Teaching Strategies:  Connect info and skills with personal goals  Promote hope and positive expectations  Explore pros and cons of change  Re-frame experiences in positive light    8. Educational Teaching Strategies:  Review of written material/education  Relate information to client's experience  Ask questions to check comprehension  Break down information into small chunks  Adopt client's language    9. CBT Teaching Strategies:  Reinforcement and shaping (positive feedback for steps towards goals, gains in knowledge & skills, follow-through on home assignments)    Relapse prevention planning (review of stressors, early warning signs, written plan to respond to signs, rehearse plan)    Coping skills training (review current coping skills, increase currently used skills, model new skill, role play new skill, feedback, plan home practice)    Relaxation training (model relaxation technique, practice technique, feedback, plan home practice)      10. IRT Module(s) Addressed:    Module 3 - Education about Psychosis  Module 10 - Substance Use    11. Techniques utilized:   [place] announced at beginning of session  Review of homework  Review  of goal  Review of previous meeting  Present new material  Problem-solving practice  Help client choose a home practice option  Summarize progress made in current session    12. Mental Status Exam:    Alertness: alert  and oriented  Appearance: well groomed  Behavior/Demeanor: cooperative and calm, with fair  eye contact   Speech: normal  Language: intact. Preferred language identified as English.  Psychomotor: normal or unremarkable  Mood: anxious  Affect: blunted; was congruent to mood; was congruent to content  Thought Process/Associations: unremarkable  Thought Content:  Reports none;  Denies suicidal and violent ideation  Perception:  Reports none;  Denies auditory hallucinations and visual hallucinations  Insight: fair  Judgment: fair  Cognition: does  appear grossly intact; formal cognitive testing was not done  Suicidal ideation: denies SI, denies intent,  and denies plan  Homicidal Ideation: denies    13. Assessment/Progress Note:     Writer met client for weekly IRT appt. Client reported meeting with NAVIGATE prescriber LOTTIE Castellanos, CNP, prior to the appt and that the appt went well. He reported plan to try to lose weight on his own before trying metformin. Reported plan to increase exercise and improve diet. Client reported he passed his background check for his new job at [employer], which he starts 10/16. He described next steps of getting his blood drawn and getting his SS card for the I9 form. He reported his mood has been happy overall, though described some worry about starting the new job, asking himself if he will be organized enough or late for work. Discussed steps to take to ensure that client is on time and prepared for work on the 16th. Reviewed goals. Client added goal of getting diagnostics completed on his car to ensure it is reliable for him to take to work. 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 He also reported going golfing with his dad and brothers  "yesterday and was pleased to report he won.     The majority of the session was spent on topic areas added to Module 3: \"Basic Facts About Alcohol ad Drugs\" and \"Substance use and Psychosis.\" Material was presented in a conversational manner to engage client in conversation. Discussed commonly used substances, their effects, and common reasons for using. Elicited client's own experiences with substances. Client reported hx of using marijuana and stimulants (adderrall a handful of times) and current occasional use of alcohol and daily use of nicotine and caffeine. He reported smoking half a pack of cigarettes per day and desire to quit. He reported successfully quitting 2x in the past and is using gum. Discussed how his past drinking and marijuana use led to negative experiences of conflict with family, difficulty waking up for work, lack of motivation, losing friends, hanging out with the bad crowd, legal problems and health problems. Discussed the stress vulnerability model and implications for increase sensitivity to effects of substances. Client reported no concerns currently with substance use. He reported he had a couple beers while golfing with his family yesterday. He denied desire to use marjuana again. Reflected that he has more important things to focus on than partying such as getting established at his new job. Overall, client presented engaged in discussion. At one point writer noticed client smiling. When asked about it, client said he was thinking of something funny. Writer prepped client for Relapse Prevention Planning next week.     14. Plan/Referrals:     Writer will meet client on 10/12 at 1pm for next IRT session.    Billing for \"Interactive Complexity\"?  No    Savannah Patel, YOSI CATATE Individual Resiliency Trainer    Attestation:    I did not see this patient directly. This patient is discussed with me in individual clinical social work supervision, and I agree with the plan as " documented.     ADELFO Hodges, [facility], October 14, 2017

## 2017-10-04 ENCOUNTER — ALLIED HEALTH/NURSE VISIT (OUTPATIENT)
Dept: PSYCHIATRY | Facility: CLINIC | Age: 27
End: 2017-10-04

## 2017-10-04 DIAGNOSIS — F20.9 SCHIZOPHRENIA, UNSPECIFIED TYPE (H): Primary | ICD-10-CM

## 2017-10-04 NOTE — MR AVS SNAPSHOT
After Visit Summary   10/4/2017    Guero Carter    MRN: [id number]           Patient Information     Date Of Birth          1990        Visit Information        Provider Department      10/4/2017 9:00 AM Felix Forrester [facility] Psychiatry        Today's Diagnoses     Schizophrenia, unspecified type (H)    -  1       Follow-ups after your visit        Your next 10 appointments already scheduled     Oct 12, 2017 12:00 PM CDT   Navigate Psychotherapy with KHRIS Terrell   [facility] Psychiatry ([facility])    [address]  [facility] [address] [phone]              Who to contact     Please call your clinic at [phone] to:    Ask questions about your health    Make or cancel appointments    Discuss your medicines    Learn about your test results    Speak to your doctor   If you have compliments or concerns about an experience at your clinic, or if you wish to file a complaint, please contact [facility] Patient Relations at [phone] or email us at [email or web address]         Additional Information About Your Visit        MyChart Information     IntY is an electronic gateway that provides easy, online access to your medical records. With IntY, you can request a clinic appointment, read your test results, renew a prescription or communicate with your care team.     To sign up for IntY visit the website at [email or web address]   You will be asked to enter the access code listed below, as well as some personal information. Please follow the directions to create your username and password.     Your access code is: [email or web address]  Expires: 2017  8:41 AM     Your access code will  in 90 days. If you need help or a new code, please contact your [facility] Clinic or call [phone] for assistance.        Care EveryWhere ID     This is your Care EveryWhere ID. This could be  used by other organizations to access your [place] medical records  [id number]         Blood Pressure from Last 3 Encounters:   09/05/17 (!) 135/91   08/04/17 (!) 146/98   06/08/17 135/84    Weight from Last 3 Encounters:   10/02/17 114.9 kg (253 lb 6.4 oz)   09/05/17 110.4 kg (243 lb 6.4 oz)   08/04/17 109 kg (240 lb 6.4 oz)              Today, you had the following     No orders found for display       Primary Care Provider    Physician No Ref-Primary       NO REF-PRIMARY PHYSICIAN        Equal Access to Services     [facility] : Hadii ralph Amaya, wamontseda landon, anselmo kaalmanas blue, moe garcia . So [facility] [phone].    ATENCIÓN: Si habla español, tiene a bruce disposición servicios gratuitos de asistencia lingüística. Llame al [phone].    We comply with applicable federal civil rights laws and [place] laws. We do not discriminate on the basis of race, color, national origin, age, disability, sex, sexual orientation, or gender identity.            Thank you!     Thank you for choosing [facility]  for your care. Our goal is always to provide you with excellent care. Hearing back from our patients is one way we can continue to improve our services. Please take a few minutes to complete the written survey that you may receive in the mail after your visit with us. Thank you!             Your Updated Medication List - Protect others around you: Learn how to safely use, store and throw away your medicines at www.disposemymeds.org.          This list is accurate as of: 10/4/17 11:59 PM.  Always use your most recent med list.                   Brand Name Dispense Instructions for use Diagnosis    IBUPROFEN PO      Take 200-400 mg by mouth every 4 hours as needed for other (headache)        OLANZapine 15 MG tablet    zyPREXA    30 tablet    Take 1 tablet (15 mg) by mouth At Bedtime

## 2017-10-06 NOTE — PROGRESS NOTES
LIANET SEE Progress Note   For Supported Employment & Education    NAVIGATE Enrollee: Guero Carter (1990)     MRN: [id number]  Date:  10/4/2017  Any Client Contact?: Yes  Any Status Changes?: No  Clinician: LIANET Supported Employment & , Felix Forrester  Client contact in the past 30 days? Yes    Client Status Update     1a. Education - Guero Carter is interested in education: No   1A1. Orientation to SEE services completed   1A2. Client working on completing Career Profile   1A3. Client completed Career Profile    1b. Employment - Guero Carter is interested in employment: Yes   1B1. Orientation to SEE services completed   1B3. Client completed Career Profile   1B4. Client developed employment goals   1B6. Client visited potential place of employment   1B7. Client had in person contact with potential employer   1B8. Client had interview with potential employer   1B9. Client enrolled in work related activity - full time at [employer] as [employer]   2. People present:    Client: Guero Carter  LIANET Supported : Felix Forrester    3. Total number of persons who participated in contact: 1    4. Length of Actual Contact: 60 minutes   Traveled?  Yes   Start Time (indicate am/pm):  7:30, traveling from Home (location)   End Time (indicate am/pm):  11:00   Total Travel Time:  30 minutes    5. Location of contact:  Community: [facility] [place] [facility]    6. Brief description of session, contact, or client status (include: strategies, interventions, client reaction to meeting, next steps, etc.):  Writer met with Guero for weekly employment readiness check-in at the [employer] in [facility]. Guero was in good spirits, and as always, came prepared with his laptop and checklist of things needed to accomplish before his October 16 start date.  The meeting agenda included reviewing transportation plan, discussing orientation etiquette (and being a  professional, in general), and confirming work uniforms. Guero first reflected on his vehicle status and how he needs to be keenly aware of the maintenance of his car, as well as the possibility of contingency routes to and from work, especially during rush hour and impending road closures. Writer validated his planning and preparation, as transportation is one of the most important factors to consider when thinking about work. Writer initiated topic of professionalism in the workplace and expectations of a full-time employee, as Guero has not held a position like this in the past. Topics included, workplace etiquette, talking to supervisor/manager, talking with co-workers and importance of appropriate conversations and relationships at work, and hygiene.     The remainder of the session was spent confirming dress code at work, as there was a discrepancy in Guero's offer letter and [facility]'s dress code policy.  Guero was finally able to reach his  over the phone to confirm that he will be receiving the proper information and order forms for wardrobe during first week of orientation.     The plan will be to check-in by phone next week and to meet two weeks from today to begin budget planning.     7. Completion of mutually agreed upon client task from previous meeting:  Completed    8. Orientation and Treatment Planning:  Pursuing current SEE goals     9. Assessment:    Assessing client's need for follow-along supports    10. Placement:    10b. Employment:  10B1. Discussion and planning re: disclosure decisions and role of SEE   10B2. Indicate client's current decision regarding disclosure:     3. Client does not want to use disclosure    11. Follow Along Supports:      Writer will meet with Guero weekly for check-ins, as needed.     12. Mutually agreed upon client task for next meeting:      To start budget discussion.     13. Next Meeting Scheduled for: Wednesday, October 18 at 4:00 pm    Felix  Usama MARTINI   Supported Employment &

## 2017-10-12 ENCOUNTER — OFFICE VISIT (OUTPATIENT)
Dept: PSYCHIATRY | Facility: CLINIC | Age: 27
End: 2017-10-12

## 2017-10-12 DIAGNOSIS — F20.9 SCHIZOPHRENIA, UNSPECIFIED TYPE (H): Primary | ICD-10-CM

## 2017-10-12 NOTE — MR AVS SNAPSHOT
After Visit Summary   10/12/2017    Guero Carter    MRN: [id number]           Patient Information     Date Of Birth          1990        Visit Information        Provider Department      10/12/2017 12:00 PM Savannah Patel LGSW [facility] Psychiatry         Follow-ups after your visit        Your next 10 appointments already scheduled     Oct 25, 2017  5:00 PM CDT   Navigate Psychotherapy with Savannah KHRIS Patel   [facility] Psychiat[facility] ([facility])    [address] [place] [id number] [phone]              Who to contact     Please call your clinic at [phone] to:    Ask questions about your health    Make or cancel appointments    Discuss your medicines    Learn about your test results    Speak to your doctor   If you have compliments or concerns about an experience at your clinic, or if you wish to file a complaint, please contact [facility] Patient Relations at [phone] or email us at [email or web address]         Additional Information About Your Visit        MyChart Information     CloudWork is an electronic gateway that provides easy, online access to your medical records. With CloudWork, you can request a clinic appointment, read your test results, renew a prescription or communicate with your care team.     To sign up for CloudWork visit the website at [email or web address]   You will be asked to enter the access code listed below, as well as some personal information. Please follow the directions to create your username and password.     Your access code is: [email or web address]  Expires: 2017  8:41 AM     Your access code will  in 90 days. If you need help or a new code, please contact your [facility] or call [phone] for assistance.        Care EveryWhere ID     This is your Care EveryWhere ID. This could be used by other organizations to access your [facility]  records  [id number]         Blood Pressure from Last 3 Encounters:   06/08/17 135/84   05/28/17 138/71   10/11/13 135/89    Weight from Last 3 Encounters:   10/02/17 253 lb 6.4 oz (114.9 kg)   06/14/17 225 lb (102.1 kg)   06/08/17 226 lb 9.6 oz (102.8 kg)              Today, you had the following     No orders found for display       Primary Care Provider    Physician No Ref-Primary       NO REF-PRIMARY PHYSICIAN        Equal Access to Services     MARI VELASCO : Hadii aad ku hadasho Soomaali, waaxda luqadaha, qaybta kaalmada adesinaiyanas, moe garcia . So [facility] [phone].    ATENCIÓN: Si habla español, tiene a bruce disposición servicios gratuitos de asistencia lingüística. Llame al [phone].    We comply with applicable federal civil rights laws and [place] laws. We do not discriminate on the basis of race, color, national origin, age, disability, sex, sexual orientation, or gender identity.            Thank you!     Thank you for choosing [facility]  for your care. Our goal is always to provide you with excellent care. Hearing back from our patients is one way we can continue to improve our services. Please take a few minutes to complete the written survey that you may receive in the mail after your visit with us. Thank you!             Your Updated Medication List - Protect others around you: Learn how to safely use, store and throw away your medicines at www.disposemymeds.org.          This list is accurate as of: 10/12/17  3:53 PM.  Always use your most recent med list.                   Brand Name Dispense Instructions for use Diagnosis    IBUPROFEN PO      Take 200-400 mg by mouth every 4 hours as needed for other (headache)        OLANZapine 15 MG tablet    zyPREXA    30 tablet    Take 1 tablet (15 mg) by mouth At Bedtime

## 2017-10-13 NOTE — PROGRESS NOTES
NAVIGNAREN Clinician Contact & Progress Note  For Individual Resiliency Training (IRT)  A Part of the [facility] First Episode of Psychosis Program    NAVIGATE Enrollee: Guero Carter (1990)     MRN: [id number]  Date:  10/12/17  Diagnosis: Schizophrenia, unspecified type; F20.9  Clinician: LIANET Individual Resiliency Trainer, KHRIS Terrell     1. Type of contact: (majority of time spent)  IRT Session    2. People present:   Client  NAVIGATE IRT/writer    3. Total number of persons who participated in contact: 2, including writer    4. Length of Actual Contact: Start Time: 12pm; End Time: 1pm   Traveled?  No    5. Location of contact:  Psychiatry Clinic, [facility]    6. Did the client complete the home practice option(s) from the previous session: Yes    7. Motivational Teaching Strategies:  Connect info and skills with personal goals  Promote hope and positive expectations  Explore pros and cons of change  Re-frame experiences in positive light    8. Educational Teaching Strategies:  Review of written material/education  Relate information to client's experience  Ask questions to check comprehension  Break down information into small chunks  Adopt client's language    9. CBT Teaching Strategies:  Reinforcement and shaping (positive feedback for steps towards goals, gains in knowledge & skills, follow-through on home assignments)    Social skills training (rationale for skill, modeling, role play practice, feedback, plan home practice)    Relapse prevention planning (review of stressors, early warning signs, written plan to respond to signs, rehearse plan)    Coping skills training (review current coping skills, increase currently used skills, model new skill, role play new skill, feedback, plan home practice)    10. IRT Module(s) Addressed:  Module 4 - Relapse Prevention Planning    11. Techniques utilized:   [place] announced at beginning of session  Review of homework  Review of goal  Review of previous  "meeting  Present new material  Summarize progress made in current session    12. Mental Status Exam:    Alertness: alert   Appearance: casually groomed  Behavior/Demeanor: cooperative and calm, with good  eye contact   Speech: normal and regular rate and rhythm  Language: intact. Preferred language identified as English.  Psychomotor: normal or unremarkable  Mood: description consistent with euthymia  Affect: flat; was congruent to mood; was congruent to content  Thought Process/Associations: unremarkable  Thought Content:  Reports none;  Denies suicidal and violent ideation  Perception:  Reports none;  Denies auditory hallucinations and visual hallucinations  Insight: limited  Judgment: fair  Cognition: does  appear grossly intact; formal cognitive testing was not done  Suicidal ideation: denies SI, denies intent,  and denies plan  Homicidal Ideation: denies    13. Assessment/Progress Note:     Writer met client for weekly IRT session. Client reported he is continuing to prepare to start his job at [employer] in [place] on Monday 10/16. He reported waking up earlier and practicing the drive today. He reported some anxiety about starting, but reported he is managing. Writer also normalized starting a new job as a potentially stressful transition. Praised client for all his work on preparing. Client said his mood has been good and he is sleeping okay. Denied AH, substance use and paranoia. He reported plan to quit smoking cigarettes using the gum this month. Reported no concerns about medications and taking them every night.     The majority of the session was spent on Module 4 \"Relapse Prevention Planning.\" Discussed concept of relapse, prevention, and role in recovery. Discussed early warning signs to symptoms, triggers, and possible ways to address these.   Writer reviewed example of Relapse Prevention Plan in preparation for client to develop own plan.    Client was somewhat guarded during conversation " "about early warning signs and triggers to symptoms. He reported not remembering what his early warning signs were or stressful situations prior to his episode. He spoke generally about symptoms, not psychosis specific. Client described early warning signs as getting too much sleep and feeling irritable. He reported a trigger is feeling stressed. Discussed coping skills for stress including exercise, swimming, and talking about it. He reported coming up with a plan to address what he's irritable about is also a way to respond. Writer prepped client for finishing the relapse plan next session and starting processing the episode.     14. Plan/Referrals:     Client and writer made plan to meet in two weeks due to client's work schedule. He reported understanding that he can call the clinic if he'd like to schedule an appt sooner. He also reported plan to continue checking in with SEE, Felix Forrester, weekly.     Billing for \"Interactive Complexity\"?  No    Savannah Patel, [facility]    NAVIGATE Individual Resiliency Trainer    Attestation:    I did not see this patient directly. This patient is discussed with me in individual clinical social work supervision, and I agree with the plan as documented.     ADELFO Hodges, [facility], October 14, 2017    "

## 2017-10-17 ASSESSMENT — PATIENT HEALTH QUESTIONNAIRE - PHQ9: SUM OF ALL RESPONSES TO PHQ QUESTIONS 1-9: 0

## 2017-10-18 ENCOUNTER — ALLIED HEALTH/NURSE VISIT (OUTPATIENT)
Dept: PSYCHIATRY | Facility: CLINIC | Age: 27
End: 2017-10-18

## 2017-10-18 DIAGNOSIS — F20.9 SCHIZOPHRENIA, UNSPECIFIED TYPE (H): Primary | ICD-10-CM

## 2017-10-18 NOTE — MR AVS SNAPSHOT
After Visit Summary   10/18/2017    Guero Carter    MRN: [id number]           Patient Information     Date Of Birth          1990        Visit Information        Provider Department      10/18/2017 4:00 PM Felix Forrester [facility] Psychiatry        Today's Diagnoses     Schizophrenia, unspecified type (H)    -  1       Follow-ups after your visit        Your next 10 appointments already scheduled     2017  4:45 PM CST   Navigate Medication Follow Up with LOTTIE Castellanos CNP   [facility] Psychiatry ([facility])    [address]  [facility] [id number] [phone]              Who to contact     Please call your clinic at [phone] to:    Ask questions about your health    Make or cancel appointments    Discuss your medicines    Learn about your test results    Speak to your doctor   If you have compliments or concerns about an experience at your clinic, or if you wish to file a complaint, please contact [facility] Patient Relations at [phone] or email us at [email or web address]         Additional Information About Your Visit        ConnectEduhart Information     [employer] is an electronic gateway that provides easy, online access to your medical records. With [employer], you can request a clinic appointment, read your test results, renew a prescription or communicate with your care team.     To sign up for [employer] visit the website at [email or web address]   You will be asked to enter the access code listed below, as well as some personal information. Please follow the directions to create your username and password.     Your access code is: [email or web address]  Expires: 2017  7:41 AM     Your access code will  in 90 days. If you need help or a new code, please contact your [facility] or call [phone] for assistance.        Care EveryWhere ID     This is your Care EveryWhere ID.  This could be used by other organizations to access your [place] medical records  [id number]         Blood Pressure from Last 3 Encounters:   11/01/17 (!) 173/109   09/05/17 (!) 135/91   08/04/17 (!) 146/98    Weight from Last 3 Encounters:   11/01/17 114.9 kg (253 lb 3.2 oz)   10/02/17 114.9 kg (253 lb 6.4 oz)   09/05/17 110.4 kg (243 lb 6.4 oz)              Today, you had the following     No orders found for display       Primary Care Provider    Physician No Ref-Primary       NO REF-PRIMARY PHYSICIAN        Equal Access to Services     [facility]: Hadii ralph Amaya, wamontseda harriettadaha, qaybta kaalmada mirza, moe garcia . So [facility] [phone].    ATENCIÓN: Si habla español, tiene a bruce disposición servicios gratuitos de asistencia lingüística. Llame al [phone].    We comply with applicable federal civil rights laws and [place] laws. We do not discriminate on the basis of race, color, national origin, age, disability, sex, sexual orientation, or gender identity.            Thank you!     Thank you for choosing [facility] PSYCHIATRY  for your care. Our goal is always to provide you with excellent care. Hearing back from our patients is one way we can continue to improve our services. Please take a few minutes to complete the written survey that you may receive in the mail after your visit with us. Thank you!             Your Updated Medication List - Protect others around you: Learn how to safely use, store and throw away your medicines at www.disposemymeds.org.          This list is accurate as of: 10/18/17 11:59 PM.  Always use your most recent med list.                   Brand Name Dispense Instructions for use Diagnosis    IBUPROFEN PO      Take 200-400 mg by mouth every 4 hours as needed for other (headache)

## 2017-10-25 ENCOUNTER — TELEPHONE (OUTPATIENT)
Dept: PSYCHIATRY | Facility: CLINIC | Age: 27
End: 2017-10-25

## 2017-10-27 ENCOUNTER — TELEPHONE (OUTPATIENT)
Dept: PSYCHIATRY | Facility: CLINIC | Age: 27
End: 2017-10-27

## 2017-10-30 ENCOUNTER — VIRTUAL VISIT (OUTPATIENT)
Dept: PSYCHIATRY | Facility: CLINIC | Age: 27
End: 2017-10-30

## 2017-10-30 DIAGNOSIS — F20.9 SCHIZOPHRENIA, UNSPECIFIED TYPE (H): Primary | ICD-10-CM

## 2017-10-30 NOTE — TELEPHONE ENCOUNTER
Writer received call from Catalina, client's mother. She reported client would like to schedule a phone check in with writer next week Mon or Tuesday at 4pm. Writer scheduled this for Mon 10/30. Catalina also updated that a long-time friend of client's and the family  due to overdose recently and that the  will be on . She said client and family plan to attend and wanted to make writer aware. Writer thanked Catalina for the update and offered condolences. Catalina reported overall, client seems to be doing well considering.

## 2017-10-30 NOTE — TELEPHONE ENCOUNTER
Writer received call from client's mother, Catalina. She reported she was asked by client to cancel his IRT appt for today at 5pm. She reported client has been very busy with work and finds that he needs to take his bedtime medications around 5pm in order to go to sleep at a decent time. She said he has to wake up at 5 am for work and asked if writer could see him at 4pm instead. Discussed scheduling options. She also asked to schedule client with LOTTIE Castellanos CNP for med follow up, per client's request. Milesr assisted scheduling client with Akosua for 11/1 at 4:15pm. She reported plan to talk to client about his availability to meet for IRT. Writer suggested a phone check in next week when Catalina suggested two appts in one week may be too much for client.

## 2017-10-30 NOTE — MR AVS SNAPSHOT
After Visit Summary   10/30/2017    Guero Carter    MRN: [id number]           Patient Information     Date Of Birth          1990        Visit Information        Provider Department      10/30/2017 4:00 PM Savannah Patel LGSW [facility] Psychiatry        Today's Diagnoses     Schizophrenia, unspecified type (H)    -  1       Follow-ups after your visit        Your next 10 appointments already scheduled     Nov 01, 2017  4:15 PM CDT   Navigate Medication Follow Up with LOTTIE Castellanos CNP   [facility] Psychiatry ([facility])    [address] [place] [id number] [phone]            Nov 10, 2017  4:00 PM CST   Navigate Psychotherapy with KHRIS Terrell   [facility] Psychiatry ([facility])    [address] [place] [id number] [phone]              Who to contact     Please call your clinic at [phone] to:    Ask questions about your health    Make or cancel appointments    Discuss your medicines    Learn about your test results    Speak to your doctor   If you have compliments or concerns about an experience at your clinic, or if you wish to file a complaint, please contact [facility] Patient Relations at [phone] or email us at [email or web address]         Additional Information About Your Visit        MyChart Information     [employer] is an electronic gateway that provides easy, online access to your medical records. With [employer], you can request a clinic appointment, read your test results, renew a prescription or communicate with your care team.     To sign up for [employer] visit the website at [email or web address]   You will be asked to enter the access code listed below, as well as some personal information. Please follow the directions to create your username and password.     Your access code is: [email or web address]  Expires: 12/4/2017  8:41 AM     Your access code will   in 90 days. If you need help or a new code, please contact your [facility] or call [phone] for assistance.        Care EveryWhere ID     This is your Care EveryWhere ID. This could be used by other organizations to access your [place] medical records  [id number]         Blood Pressure from Last 3 Encounters:   17 (!) 135/91   17 (!) 146/98   17 135/84    Weight from Last 3 Encounters:   10/02/17 114.9 kg (253 lb 6.4 oz)   17 110.4 kg (243 lb 6.4 oz)   17 109 kg (240 lb 6.4 oz)              Today, you had the following     No orders found for display       Primary Care Provider    Physician No Ref-Primary       NO REF-PRIMARY PHYSICIAN        Equal Access to Services     [facility]I VELASCO : Hadii ralph mo Soprerna, waaxda luqadaha, qaybta kaalmada mirza, moe garcia . So [facility] [phone].    ATENCIÓN: Si habla español, tiene a bruce disposición servicios gratuitos de asistencia lingüística. Elda al [phone].    We comply with applicable federal civil rights laws and [place] laws. We do not discriminate on the basis of race, color, national origin, age, disability, sex, sexual orientation, or gender identity.            Thank you!     Thank you for choosing [facility] PSYCHIATRY  for your care. Our goal is always to provide you with excellent care. Hearing back from our patients is one way we can continue to improve our services. Please take a few minutes to complete the written survey that you may receive in the mail after your visit with us. Thank you!             Your Updated Medication List - Protect others around you: Learn how to safely use, store and throw away your medicines at www.disposemymeds.org.          This list is accurate as of: 10/30/17 11:59 PM.  Always use your most recent med list.                   Brand Name Dispense Instructions for use Diagnosis    IBUPROFEN PO      Take 200-400 mg by  mouth every 4 hours as needed for other (headache)        OLANZapine 15 MG tablet    zyPREXA    30 tablet    Take 1 tablet (15 mg) by mouth At Bedtime

## 2017-10-31 NOTE — PROGRESS NOTES
NAVIGATE Outreach  A Part of the [facility] First Episode of Psychosis Program     Patient Name: Guero Carter  /Age:  1990 (27 year old)  Diagnosis: Schizophrenia, unspecified type; F20.9  Call duration: 25 minutes    Contacted client per his request for a phone check-in/IRT session. Client requested a virtual visit this week to accommodate his work schedule. He scheduled next face to face IRT session on 11/10. Client reported that work is going very well. He is excited to get his first pay check this week. He reported that this will make him feel more motivated at work, knowing he is making money. Client reported he had to attend a  yesterday for a life-long friend. He reported it was sad and feeling more sad today. Writer provided supportive listening and offered condolences. Client reported the friend is the same age as his younger brother and that most of his family knew him. Reported spending the weekend with his friends who also knew the friend. He reported drinking with them on Saturday. Denied concerns with this, reporting he did not get intoxicated, just tired and went home early. He expressed desire to support his younger brother, who was closest with him, and reported plan to visit him this weekend. Writer and client talked about grief and writer encouraged client to utilize supports to talk about his feelings. Checked in on IRT goals. Client reported he is trying to increase weekly exercising as well as eat a healthy diet. He provided details of his job tasks and reported that overall, it is easier than he was expecting. He also reported the trainers and staff are pretty nice. He confirmed plan to meet with NAVIGATE prescriber LOTTIE Castellanos, CNP on  and plan to ask her about metformin. He denied concerns with symptoms of psychosis.  Writer encouraged client call writer if he'd like to schedule another phone check-in prior to next scheduled IRT session on 11/10.     Savannah  Padroni, ADELFO, LGSW  NAVIGATE Individual Resiliency Trainer    Attestation:    I did not see this patient directly. This patient is discussed with me in individual clinical social work supervision, and I agree with the plan as documented.     ADELFO Hodges, LICSW, November 14, 2017

## 2017-11-01 ENCOUNTER — OFFICE VISIT (OUTPATIENT)
Dept: PSYCHIATRY | Facility: CLINIC | Age: 27
End: 2017-11-01

## 2017-11-01 VITALS
BODY MASS INDEX: 32.49 KG/M2 | HEIGHT: 74 IN | HEART RATE: 88 BPM | WEIGHT: 253.2 LBS | SYSTOLIC BLOOD PRESSURE: 173 MMHG | DIASTOLIC BLOOD PRESSURE: 109 MMHG

## 2017-11-01 DIAGNOSIS — F20.9 SCHIZOPHRENIA, UNSPECIFIED TYPE (H): Primary | ICD-10-CM

## 2017-11-01 RX ORDER — OLANZAPINE 10 MG/1
10 TABLET ORAL AT BEDTIME
Qty: 30 TABLET | Refills: 0 | Status: SHIPPED | OUTPATIENT
Start: 2017-11-01 | End: 2017-11-29

## 2017-11-01 NOTE — MR AVS SNAPSHOT
After Visit Summary   11/1/2017    Guero Carter    MRN: [id number]           Patient Information     Date Of Birth          1990        Visit Information        Provider Department      11/1/2017 4:15 PM Akosua De Oliveira APRN CNP [facility] Psychiatry        Today's Diagnoses     Schizophrenia, unspecified type (H)    -  1      Care Instructions    - Decrease olanzapine to 10 mg at bedtime  - Start metformin 500 mg twice a day. Take with meals- 500mg at breakfast, 500 mg at dinner. If this is not tolerated well, just take the 500mg with dinner for one week and then add the 500mg back with breakfast   - RTC in 3 weeks           Follow-ups after your visit        Follow-up notes from your care team     Return in about 3 weeks (around 11/22/2017).      Your next 10 appointments already scheduled     Nov 10, 2017  4:00 PM CST   Navigate Psychotherapy with KHRIS Terrell   [facility]iatry ([facility])    [address]  [facility] [id number] [phone]            Nov 17, 2017  4:00 PM CST   Navigate Medication Follow Up with LOTTIE Castellanos CNP   [facility]iatry ([facility])    [address]  [facility] [id number] [phone]              Who to contact     Please call your clinic at [phone] to:    Ask questions about your health    Make or cancel appointments    Discuss your medicines    Learn about your test results    Speak to your doctor   If you have compliments or concerns about an experience at your clinic, or if you wish to file a complaint, please contact [facility] Patient Relations at [phone] or email us at [email or web address]         Additional Information About Your Visit        MyChart Information     [employer]flyer is an electronic gateway that provides easy, online access to your medical records. With adflyer, you can request a clinic appointment, read your test  "results, renew a prescription or communicate with your care team.     To sign up for [employer] visit the website at [email or web address]   You will be asked to enter the access code listed below, as well as some personal information. Please follow the directions to create your username and password.     Your access code is: [email or web address]  Expires: 2017  8:41 AM     Your access code will  in 90 days. If you need help or a new code, please contact your [facility] or call [phone] for assistance.        Care EveryWhere ID     This is your Care EveryWhere ID. This could be used by other organizations to access your [place] medical records  [id number]        Your Vitals Were     Pulse Height BMI (Body Mass Index)             88 1.88 m (6' 2\") 32.51 kg/m2          Blood Pressure from Last 3 Encounters:   17 (!) 173/109   17 (!) 135/91   17 (!) 146/98    Weight from Last 3 Encounters:   17 114.9 kg (253 lb 3.2 oz)   10/02/17 114.9 kg (253 lb 6.4 oz)   17 110.4 kg (243 lb 6.4 oz)              Today, you had the following     No orders found for display         Today's Medication Changes          These changes are accurate as of: 17 11:59 PM.  If you have any questions, ask your nurse or doctor.               Start taking these medicines.        Dose/Directions    metFORMIN 500 MG tablet   Commonly known as:  GLUCOPHAGE   Used for:  Schizophrenia, unspecified type (H)   Started by:  Akosua De Oliveira APRN CNP        Dose:  500 mg   Take 1 tablet (500 mg) by mouth 2 times daily (with meals)   Quantity:  60 tablet   Refills:  0         These medicines have changed or have updated prescriptions.        Dose/Directions    OLANZapine 10 MG tablet   Commonly known as:  zyPREXA   This may have changed:    - medication strength  - how much to take   Used for:  Schizophrenia, unspecified type (H)   Changed by:  Akosua De Oliveira APRN CNP        " Dose:  10 mg   Take 1 tablet (10 mg) by mouth At Bedtime   Quantity:  30 tablet   Refills:  0            Where to get your medicines      These medications were sent to [facility] - [address], [facility] [address]     Phone:  [phone]     metFORMIN 500 MG tablet    OLANZapine 10 MG tablet                Primary Care Provider    Physician No Ref-Primary       NO REF-PRIMARY PHYSICIAN        Equal Access to Services     MARI VELASCO AH: Hadvadim sanders hadasho Soomaali, waaxda luqadaha, qaybta kaalmada adeegyada, moe velascoin hayshreya farr. So [facility] [phone].    ATENCIÓN: Si habla español, tiene a bruce disposición servicios gratuitos de asistencia lingüística. Elda al [phone].    We comply with applicable federal civil rights laws and [place] laws. We do not discriminate on the basis of race, color, national origin, age, disability, sex, sexual orientation, or gender identity.            Thank you!     Thank you for choosing [facility] PSYCHIATRY  for your care. Our goal is always to provide you with excellent care. Hearing back from our patients is one way we can continue to improve our services. Please take a few minutes to complete the written survey that you may receive in the mail after your visit with us. Thank you!             Your Updated Medication List - Protect others around you: Learn how to safely use, store and throw away your medicines at www.disposemymeds.org.          This list is accurate as of: 11/1/17 11:59 PM.  Always use your most recent med list.                   Brand Name Dispense Instructions for use Diagnosis    IBUPROFEN PO      Take 200-400 mg by mouth every 4 hours as needed for other (headache)        metFORMIN 500 MG tablet    GLUCOPHAGE    60 tablet    Take 1 tablet (500 mg) by mouth 2 times daily (with meals)    Schizophrenia, unspecified type (H)       OLANZapine 10 MG tablet     zyPREXA    30 tablet    Take 1 tablet (10 mg) by mouth At Bedtime    Schizophrenia, unspecified type (H)

## 2017-11-01 NOTE — PROGRESS NOTES
"NAVIGATE Medication Management Progress Note  A Part of the [employer] First Episode of Psychosis Program    NAVIGATE Enrollee: Guero Carter (1990)     MRN: [id number]  Date:  17         Contributors to the Assessment     Chart Reviewed.   Interview completed with Guero Carter.         Chief Complaint     I am here for a medication check. I would like to discuss reducing my Zyprexa and possibly starting metformin.         Interim History      Guero Carter is a 27 year old male who was last seen in clinic on  at which time 10/2/17. The patient reports good treatment adherence.  History was provided by Guero who was a good historian.    Since the last visit:  - Started his job and it is going well. He is feeling competent and usually enjoys it, although it is not very stimulating or challenging for him. He likes most of the people he works with.   - He is continuing to try and eat nutritious food and exercise, although he is finding it a struggle to fit regular exercise into his schedule. He is continuing to consider how this might work. He thinks he might be able to motivate his mom to go to the [place] with him.   Overall fx in all domains is going \"well\" and he is feeling good.     PSYCH ROS:   DEPRESSION:  reports-none;  DENIES- suicidal ideation , depressed mood, anhedonia, low energy and insomnia   ANDRES/HYPOMANIA:  reports-none;  DENIES- increased energy, decreased sleep need, increased activity and grandiosity  PSYCHOSIS:  reports-none;  DENIES- delusions, auditory hallucinations and visual hallucinations  ANXIETY:  none  SLEEP:  Reports sleep is \"good\", although he is groggy for a short time after awakening. He says after his morning coffee this resolves.      RECENT SUBSTANCE USE:  Reports a couple of beers, socially but denies other substance use       RECENT SOCIAL HISTORY:  Guero reported that one of his younger brother's friends, close to the family, passed away and the  was over the " "previous weekend. He said that the gathering was nice and he enjoyed seeing a lot of people, but it was a really sad situation.   Other than that, he spends most of his social time with sibs and enjoys various social activities.     MEDICAL ROS:  Continued concerns with weight. His wt is stable from last visit because he has been eating less calories.   Constitutional, neuro, endocrine, gastrointestinal, genitourinary and psychiatric systems are otherwise negative.           First Episode of Psychosis History      DUP (duration untreated psychosis):  Unclear- denies prodromal sx  Route to initial care: Hospitalized twice (2013, 2017)  Medication adherence overall:  Has been adherent since May; hx of nonadherence                       General frequency of visits:  2-4 weeks  Participation in groups:  None currently  Cognitive Remediation:  None  Other treatment history: None     Reviewed for completion of First Episode work-up:  Yes  First episode workup:  Done (May 2017 labs reviewed)  MATRICS Consensus Cognitive Battery:  Not Done         Medical/Surgical History     Review of patient's allergies indicates no known allergies.    Patient Active Problem List   Diagnosis     Psychiatric disorder     MENTAL HEALTH     Psychosis            Medications     Current Outpatient Prescriptions   Medication Sig Dispense Refill     OLANZapine (ZYPREXA) 15 MG tablet Take 1 tablet (15 mg) by mouth At Bedtime 30 tablet 0     IBUPROFEN PO Take 200-400 mg by mouth every 4 hours as needed for other (headache)                Vitals     BP (!) 173/109 (BP Location: Right arm, Patient Position: Chair, Cuff Size: Adult Large)  Pulse 88  Ht 1.88 m (6' 2\")  Wt 114.9 kg (253 lb 3.2 oz)  BMI 32.51 kg/m2      Weight prior to medication: 200#         Mental Status Exam     Alertness: alert  and oriented  Appearance: well groomed  Behavior/Demeanor: cooperative and pleasant, with good  eye contact   Speech: regular rate and " rhythm  Language: intact  Psychomotor: normal or unremarkable  Mood: description consistent with euthymia  Affect: full range; was congruent to mood; was congruent to content  Thought Process/Associations: unremarkable  Thought Content:  Reports none;  Denies suicidal and violent ideation, delusions and paranoid ideation  Perception:  Reports none;  Denies auditory hallucinations and visual hallucinations  Insight: excellent  Judgment: excellent  Cognition: does  appear grossly intact; formal cognitive testing was not done         Labs and Data     RATING SCALES:  N/A    PHQ-9 SCORE 9/21/2017 9/28/2017 10/12/2017   Total Score 0 0 0       ANTIPSYCHOTIC LABS ROUTINE    [glu, A1C, lipids (focus LDL), liver enzymes, WBC, ANEU, Hgb, plts]   q12 mo  Recent Labs   Lab Test  05/26/17   0756  09/26/13   0735   GLC  87  78     Recent Labs   Lab Test  05/26/17   0756   CHOL  192   TRIG  149   LDL  113*   HDL  49     Recent Labs   Lab Test  05/26/17   0756  09/26/13   0735   AST  22  21   ALT  37  26   ALKPHOS  102  65     Recent Labs   Lab Test  05/26/17   0756  09/26/13   0735   WBC  6.0  4.7   ANEU  3.2  1.8   HGB  16.6  14.9   PLT  228  191            Psychiatric Diagnoses     Schizophrenia, Unspecified type; Tobacco abuse         Assessment     TODAY Guero reports good treatment adherence and continues to demonstrate progress in all functional domains. He has not endorsed sx f psychosis, depression, or anxiety for some time. Because of this, the possibility of decreasing his Zyprexa to 10mg qd was discussed and the benefits outweigh the risk.   In addition, in order to help target the weight gain that Guero has noticed since starting the Zyprexa, metformin was added to his regimen. He was educated about how this medication works and how to take it to avoid adverse s/e.  He will RTC sooner than he has been d/t these medication changes.               MANAGEMENT:  Monitoring for adverse effects, routine vitals, routine  labs, periodic EKGs, using lowest therapeutic dose of [zyprexa] and patient is aware of risks         Plan     1) PSYCHOTROPIC MEDICATIONS:  - Decrease olanzapine 10mg    - add metformin 500mg bid, take with meals    2) THERAPY:  Continue    3) NEXT DUE:    Labs- November 2017  Rating Scales- AIMS due at RTC    4) REFERRALS:    No Referrals needed    5) RTC: 2- 3 weeks    6) CRISIS NUMBERS:   Provided routinely in AVS.    TREATMENT RISK STATEMENT:  The risks, benefits, alternatives and potential adverse effects have been discussed and are understood by the pt. The pt understands the risks of using street drugs or alcohol. There are no medical contraindications, the pt agrees to treatment with the ability to do so. The pt knows to call the clinic for any problems or to access emergency care if needed.  Medical and substance use concerns are documented above.  Psychotropic drug interaction check was done, including changes made today.      PROVIDER:  LOTTIE Castellanos CNP

## 2017-11-01 NOTE — PATIENT INSTRUCTIONS
- Decrease olanzapine to 10 mg at bedtime  - Start metformin 500 mg twice a day. Take with meals- 500mg at breakfast, 500 mg at dinner. If this is not tolerated well, just take the 500mg with dinner for one week and then add the 500mg back with breakfast   - RTC in 3 weeks

## 2017-11-10 ENCOUNTER — OFFICE VISIT (OUTPATIENT)
Dept: PSYCHIATRY | Facility: CLINIC | Age: 27
End: 2017-11-10

## 2017-11-10 ENCOUNTER — TELEPHONE (OUTPATIENT)
Dept: PSYCHIATRY | Facility: CLINIC | Age: 27
End: 2017-11-10

## 2017-11-10 DIAGNOSIS — F20.9 SCHIZOPHRENIA, UNSPECIFIED TYPE (H): Primary | ICD-10-CM

## 2017-11-10 NOTE — MR AVS SNAPSHOT
After Visit Summary   11/10/2017    Guero Carter    MRN: [id number]           Patient Information     Date Of Birth          1990        Visit Information        Provider Department      11/10/2017 4:00 PM Savannah Patel LGSW [facility]        Today's Diagnoses     Schizophrenia, unspecified type (H)    -  1       Follow-ups after your visit        Your next 10 appointments already scheduled     Nov 16, 2017  5:00 PM CST   Navigate Telephone Call with KHRIS Terrell   [facility] ([facility])    [address]  [facility] [id number] [phone]            Nov 17, 2017  4:00 PM CST   Navigate Medication Follow Up with LOTTIE Castellanos CNP   [facility] ([facility])    [address] [place] [id number] [phone]              Who to contact     Please call your clinic at [phone] to:    Ask questions about your health    Make or cancel appointments    Discuss your medicines    Learn about your test results    Speak to your doctor   If you have compliments or concerns about an experience at your clinic, or if you wish to file a complaint, please contact [facility] Patient Relations at [phone] or email us at [email or web address]         Additional Information About Your Visit        MyChart Information     SkyPhrase is an electronic gateway that provides easy, online access to your medical records. With SkyPhrase, you can request a clinic appointment, read your test results, renew a prescription or communicate with your care team.     To sign up for [facility] visit the website at [email or web address]   You will be asked to enter the access code listed below, as well as some personal information. Please follow the directions to create your username and password.     Your access code is: [email or web address]  Expires: 12/4/2017  7:41 AM     Your access code will   in 90 days. If you need help or a new code, please contact your [facility] or call [phone] for assistance.        Care EveryWhere ID     This is your Care EveryWhere ID. This could be used by other organizations to access your [place] medical records  [id number]         Blood Pressure from Last 3 Encounters:   17 (!) 173/109   17 (!) 135/91   17 (!) 146/98    Weight from Last 3 Encounters:   17 114.9 kg (253 lb 3.2 oz)   10/02/17 114.9 kg (253 lb 6.4 oz)   17 110.4 kg (243 lb 6.4 oz)              Today, you had the following     No orders found for display       Primary Care Provider    Physician No Ref-Primary       NO REF-PRIMARY PHYSICIAN        Equal Access to Services     MARI VELASCO : Hadii ralph Amaya, waaxda diegoqadaha, qaybta kaalmada mirza, moe garcia . So [facility] [phone].    ATENCIÓN: Si habla español, tiene a bruce disposición servicios gratuitos de asistencia lingüística. Elda al [phone].    We comply with applicable federal civil rights laws and [place] laws. We do not discriminate on the basis of race, color, national origin, age, disability, sex, sexual orientation, or gender identity.            Thank you!     Thank you for choosing [facility]  for your care. Our goal is always to provide you with excellent care. Hearing back from our patients is one way we can continue to improve our services. Please take a few minutes to complete the written survey that you may receive in the mail after your visit with us. Thank you!             Your Updated Medication List - Protect others around you: Learn how to safely use, store and throw away your medicines at www.disposemymeds.org.          This list is accurate as of: 11/10/17 11:59 PM.  Always use your most recent med list.                   Brand Name Dispense Instructions for use Diagnosis    IBUPROFEN PO      Take 200-400  mg by mouth every 4 hours as needed for other (headache)        metFORMIN 500 MG tablet    GLUCOPHAGE    60 tablet    Take 1 tablet (500 mg) by mouth 2 times daily (with meals)    Schizophrenia, unspecified type (H)       OLANZapine 10 MG tablet    zyPREXA    30 tablet    Take 1 tablet (10 mg) by mouth At Bedtime    Schizophrenia, unspecified type (H)

## 2017-11-13 ASSESSMENT — PATIENT HEALTH QUESTIONNAIRE - PHQ9: SUM OF ALL RESPONSES TO PHQ QUESTIONS 1-9: 0

## 2017-11-14 NOTE — PROGRESS NOTES
NAVIGNAREN Clinician Contact & Progress Note  For Individual Resiliency Training (IRT)  A Part of the [facility] First Episode of Psychosis Program    NAVIGATE Enrollee: Guero Carter (1990)     MRN: [id number]  Date:  11/10/17  Diagnosis: Schizophrenia, unspecified type; F20.9  Clinician: LIANET Individual Resiliency Trainer, KHRIS Terrell     1. Type of contact: (majority of time spent)  IRT Session    2. People present:   Client  NAVIGATE IRT/writer    3. Total number of persons who participated in contact: 2, including writer    4. Length of Actual Contact: Start Time: 4:00pm; End Time: 5:00pm   Traveled?  No    5. Location of contact:    Psychiatry Clinic, [place]     6. Did the client complete the home practice option(s) from the previous session: Yes    7. Motivational Teaching Strategies:  Connect info and skills with personal goals  Promote hope and positive expectations  Explore pros and cons of change  Re-frame experiences in positive light    8. Educational Teaching Strategies:    Relate information to client's experience  Ask questions to check comprehension  Break down information into small chunks  Adopt client's language    9. CBT Teaching Strategies:  Reinforcement and shaping (positive feedback for steps towards goals, gains in knowledge & skills, follow-through on home assignments)    Relapse prevention planning (review of stressors, early warning signs, written plan to respond to signs, rehearse plan)    Coping skills training (review current coping skills, increase currently used skills, model new skill, role play new skill, feedback, plan home practice)    Behavioral tailoring (fit taking medication into client's daily routine)    10. IRT Module(s) Addressed:  Module 4 - Relapse Prevention Planning  Module 10 - Substance Use  Module 12 - Making Choices about Smoking    11. Techniques utilized:   [place] announced at beginning of session  Review of homework  Review of goal  Review  "of previous meeting  Present new material  Help client choose a home practice option  Summarize progress made in current session    12. Mental Status Exam:    Alertness: alert   Appearance: adequately groomed  Behavior/Demeanor: cooperative and calm, with good  eye contact   Speech: normal  Language: intact. Preferred language identified as English.  Psychomotor: normal or unremarkable  Mood: description consistent with euthymia  Affect: restricted; was congruent to mood; was congruent to content  Thought Process/Associations: response delay  Thought Content:  Reports none;  Denies suicidal and violent ideation, delusions, magical thinking and paranoid ideation  Perception:  Reports none;  Denies auditory hallucinations and visual hallucinations  Insight: fair  Judgment: limited  Cognition: does  appear grossly intact; formal cognitive testing was not done  Suicidal ideation: denies SI, denies intent,  and denies plan  Homicidal Ideation: denies    13. Assessment/Progress Note:     Writer met writer for IRT session. During check in, client updated about his job and reviewed progress on goals. Client reported work is going well and he's been there 4 weeks now. He said he had an interesting day observing surgery and overall he likes the variety of tasks. Client reported he plans to go to his brother's home tonight. He explained that he has been spending his weekends there to be with his brother and friends following the death of their friend. Client reported he still feels a little sad and hanging with his friends helps. He said they typically hang out at the home or go out to bars. He reported most of the time they just drink beer. He reported \"I don't like drinking too much\" and reported it usually makes him tired. Client then reported that he had about 4 shots of amos last weekend. Client reported he is not concerned about this. Writer and client discussed his past hx with substance use. Client reported hx of " "underage drinking tickets and attending AA for that, but that he is not currently concerned of his use.     Discussed client's pros and cons of drinking, discussed harm reduction strategies (i.e. Not driving) and reviewed how substance use can impact psychosis. Client denied any psychotic symptoms. Denied depressed mood or anxiety. Discussed how client would know if his drinking becomes a problem. Client reported if he was drinking until he vomits, or if he is unable to go to work due to drinking. Client reported he only drinks on the weekends, then reported except for taking a couple shots at his house with his sister a \"few times\" before bed. Discussed reasoning for taking the shots before bed. Client reported he does it when his sister asks him to. Again discussed how client would know if this becomes a problem. Client said he is not concerned because he used to drink almost entire bottles in the past. Writer expressed concern and reflected that this drinking pattern is new for client after a period of sobriety. Discussed client's goals of being successful at his job and saving money for independent living and how drinking could be a potential barrier to these goals. Reflected on client's hard work and progress toward goals.     Client reported he is taking medication, 10 mg zyprexa, every night. He reported starting metformin last week and that he is taking it at bedtime. Also discussed client's motivation to quit smoking cigarettes. Reviewed relapse prevention strategies discussed at previous session and reported plan to continue with Relapse Prevention Planning next session, as most of today's session was spent discussing substance use.     14. Plan/Referrals:     Writer will have phone check in with client on 11/16 at 5pm and will meet f2f on 11/28 at 4pm.     Billing for \"Interactive Complexity\"?  No    Savannah Maybee, [facility]    NAVIGATE Individual Resiliency Trainer    Attestation:    I did not see this " patient directly. This patient is discussed with me in individual clinical social work supervision, and I agree with the plan as documented.     ADELFO Hodges, [facility], November 14, 2017

## 2017-11-14 NOTE — TELEPHONE ENCOUNTER
"NAVIGATE Incoming Telephone Call    NAVIGATE Enrollee: Guero Carter (1990)     MRN: [id number]  Incoming Call Received on: 11/10/17  Call Received from: Catalina Carter    Writer received  msg from client's mother, Catalina, requesting call back to discuss some concerns she has regarding client. She also asked about insurance eligibility for NAVIGATE as she is helping client select a health plan.    Writer returned call to Catalina, received VM, reported availability today for a return call. Also reported NAVIGATE accepts most insurance plans, with exception of some services not covered under [facility].    Received return call from Catalina. She reported she got writer's message about insurance. She reported concern that client is going to quit the NAVIGATE program. She explained that he missed his appt with SEE last week and now isn't scheduled for another two weeks. She reported she can tell that client is more motivated on the weeks he meets with SEE. Writer reported plan to see client today for IRT and that client confirmed the appt yesterday. Reported NAVIGATE team has not heard any indication from client about quitting and agreed to follow up with team members. Catalina also said that she found several empty alcohol bottles in client's room. She reported she has not addressed this with him yet and doesn't want client to know she \"told on him.\" Writer thanked Catalina for the update and agreed to continue to assess client's substance use in session, but also suggested Catalina talk to client directly about this. Catalina reported that she isn't noticing any behavior changes, that he's doing well, and is helpful around the house.  She reported that client has been spending a lot of time at his brother's house with friends and is also concerned he is spending his money. Catalina reported she spoke with Denise Rogers, NAVIGATE Family Clinician/Director, regarding similar concerns in the past. Writer agreed to " update Denise and the rest of NAVIGATE team.     Savannah Patel

## 2017-11-17 ENCOUNTER — VIRTUAL VISIT (OUTPATIENT)
Dept: PSYCHIATRY | Facility: CLINIC | Age: 27
End: 2017-11-17

## 2017-11-17 DIAGNOSIS — F29 PSYCHOSIS, UNSPECIFIED PSYCHOSIS TYPE (H): Primary | ICD-10-CM

## 2017-11-17 NOTE — MR AVS SNAPSHOT
After Visit Summary   2017    Guero Carter    MRN: [id number]           Patient Information     Date Of Birth          1990        Visit Information        Provider Department      2017 10:00 AM Savannah Patel LGSW [facility]        Today's Diagnoses     Psychosis, unspecified psychosis type    -  1       Follow-ups after your visit        Your next 10 appointments already scheduled     2017  4:45 PM CST   Navigate Medication Follow Up with LOTTIE Castellanos CNP   [facility] ([facility])    [address]  [facility] [id number] [phone]              Who to contact     Please call your clinic at [phone] to:    Ask questions about your health    Make or cancel appointments    Discuss your medicines    Learn about your test results    Speak to your doctor   If you have compliments or concerns about an experience at your clinic, or if you wish to file a complaint, please contact [facility] Patient Relations at [phone] or email us at [email or web address]         Additional Information About Your Visit        EntraTympanichart Information     Playsino is an electronic gateway that provides easy, online access to your medical records. With Playsino, you can request a clinic appointment, read your test results, renew a prescription or communicate with your care team.     To sign up for Playsino visit the website at [email or web address]   You will be asked to enter the access code listed below, as well as some personal information. Please follow the directions to create your username and password.     Your access code is: [email or web address]  Expires: 2017  7:41 AM     Your access code will  in 90 days. If you need help or a new code, please contact your [facility] or call [phone] for assistance.        Care EveryWhere ID     This is your Care  EveryWhere ID. This could be used by other organizations to access your [place] medical records  [id number]         Blood Pressure from Last 3 Encounters:   11/01/17 (!) 173/109   09/05/17 (!) 135/91   08/04/17 (!) 146/98    Weight from Last 3 Encounters:   11/01/17 114.9 kg (253 lb 3.2 oz)   10/02/17 114.9 kg (253 lb 6.4 oz)   09/05/17 110.4 kg (243 lb 6.4 oz)              Today, you had the following     No orders found for display       Primary Care Provider    Physician No Ref-Primary       NO REF-PRIMARY PHYSICIAN        Equal Access to Services     [facility] ROD : Hadii ralph Amaya, mehnaz navarrete, anselmo del cidmanas blue, moe garcia . So [facility] [phone].    ATENCIÓN: Si habla español, tiene a bruce disposición servicios gratuitos de asistencia lingüística. Llame al [phone].    We comply with applicable federal civil rights laws and [place] laws. We do not discriminate on the basis of race, color, national origin, age, disability, sex, sexual orientation, or gender identity.            Thank you!     Thank you for choosing [facility]  for your care. Our goal is always to provide you with excellent care. Hearing back from our patients is one way we can continue to improve our services. Please take a few minutes to complete the written survey that you may receive in the mail after your visit with us. Thank you!             Your Updated Medication List - Protect others around you: Learn how to safely use, store and throw away your medicines at www.disposemymeds.org.          This list is accurate as of: 11/17/17 11:59 PM.  Always use your most recent med list.                   Brand Name Dispense Instructions for use Diagnosis    IBUPROFEN PO      Take 200-400 mg by mouth every 4 hours as needed for other (headache)        metFORMIN 500 MG tablet    GLUCOPHAGE    60 tablet    Take 1 tablet (500 mg) by mouth 2 times daily (with meals)    Schizophrenia,  unspecified type (H)       OLANZapine 10 MG tablet    zyPREXA    30 tablet    Take 1 tablet (10 mg) by mouth At Bedtime    Schizophrenia, unspecified type (H)

## 2017-11-20 NOTE — PROGRESS NOTES
Writer called client for scheduled phone check in. Reported availability for return phone call and reminded of next IRT appt scheduled 11/28. Asked that client confirm if he can attend the appt in clinic instead of at his home.

## 2017-11-22 ENCOUNTER — ALLIED HEALTH/NURSE VISIT (OUTPATIENT)
Dept: PSYCHIATRY | Facility: CLINIC | Age: 27
End: 2017-11-22

## 2017-11-22 DIAGNOSIS — F29 PSYCHOSIS, UNSPECIFIED PSYCHOSIS TYPE (H): Primary | ICD-10-CM

## 2017-11-22 NOTE — MR AVS SNAPSHOT
After Visit Summary   2017    Guero Carter    MRN: [id number]           Patient Information     Date Of Birth          1990        Visit Information        Provider Department      2017 4:00 PM Felix Forrester [facility]         Follow-ups after your visit        Your next 10 appointments already scheduled     2017  4:45 PM CST   Navigate Medication Follow Up with LOTTIE Castellanos CNP   [facility] ([facility])    [address]  [facility] [address] [phone]              Who to contact     Please call your clinic at [phone] to:    Ask questions about your health    Make or cancel appointments    Discuss your medicines    Learn about your test results    Speak to your doctor   If you have compliments or concerns about an experience at your clinic, or if you wish to file a complaint, please contact [facility] Patient Relations at [phone] or email us at [email or web address]         Additional Information About Your Visit        MyChart Information     Doctor.com is an electronic gateway that provides easy, online access to your medical records. With Doctor.com, you can request a clinic appointment, read your test results, renew a prescription or communicate with your care team.     To sign up for Doctor.com visit the website at [email or web address]   You will be asked to enter the access code listed below, as well as some personal information. Please follow the directions to create your username and password.     Your access code is: [email or web address]  Expires: 2017  7:41 AM     Your access code will  in 90 days. If you need help or a new code, please contact your [facility] or call [phone] for assistance.        Care EveryWhere ID     This is your Care EveryWhere ID. This could be used by other organizations to access your [facility]  records  [id number]         Blood Pressure from Last 3 Encounters:   11/01/17 (!) 173/109   06/08/17 135/84   05/28/17 138/71    Weight from Last 3 Encounters:   11/01/17 253 lb 3.2 oz (114.9 kg)   10/02/17 253 lb 6.4 oz (114.9 kg)   06/14/17 225 lb (102.1 kg)              Today, you had the following     No orders found for display       Primary Care Provider    Physician No Ref-Primary       NO REF-PRIMARY PHYSICIAN        Equal Access to Services     [facility] : Hadii ralph mo Soprerna, waaxda luqadaha, qaybta kaalmada mirza, moe garcia . So [facility] [phone].    ATENCIÓN: Si habla español, tiene a bruce disposición servicios gratuitos de asistencia lingüística. Llame al [phone].    We comply with applicable federal civil rights laws and [place] laws. We do not discriminate on the basis of race, color, national origin, age, disability, sex, sexual orientation, or gender identity.            Thank you!     Thank you for choosing [facility]  for your care. Our goal is always to provide you with excellent care. Hearing back from our patients is one way we can continue to improve our services. Please take a few minutes to complete the written survey that you may receive in the mail after your visit with us. Thank you!             Your Updated Medication List - Protect others around you: Learn how to safely use, store and throw away your medicines at www.disposemymeds.org.          This list is accurate as of: 11/22/17  9:34 AM.  Always use your most recent med list.                   Brand Name Dispense Instructions for use Diagnosis    IBUPROFEN PO      Take 200-400 mg by mouth every 4 hours as needed for other (headache)        metFORMIN 500 MG tablet    GLUCOPHAGE    60 tablet    Take 1 tablet (500 mg) by mouth 2 times daily (with meals)    Schizophrenia, unspecified type (H)       OLANZapine 10 MG tablet    zyPREXA    30 tablet    Take 1 tablet (10 mg) by mouth  At Bedtime    Schizophrenia, unspecified type (H)

## 2017-11-22 NOTE — PROGRESS NOTES
11. Follow Along Supports: (for clients who are working or attending school)     11b. Employment:   11B1. Follow along job supports    11B2. Discussing or revisiting disclosure plan   11B3. Indicate client's current decision regarding disclosure:    3. Client does not want to use disclosure   11B4. Assisting with requesting accommodations   11B5. Assisting with development and use of natural support for work    Writer met with Guero to organize new hire paperwork, and to discuss opportunities for benefits at work. Guero presented very organized and eager to begin his career at his new job. Writer will continue to follow up with Guero every other week to assist with benefits and budget planning.     Felix GERARDO NAVIGATE

## 2017-11-28 ENCOUNTER — OFFICE VISIT (OUTPATIENT)
Dept: PSYCHIATRY | Facility: CLINIC | Age: 27
End: 2017-11-28

## 2017-11-28 DIAGNOSIS — F20.9 SCHIZOPHRENIA, UNSPECIFIED TYPE (H): Primary | ICD-10-CM

## 2017-11-28 NOTE — PROGRESS NOTES
NAVIGATE SEE Progress Note   For Supported Employment & Education     NAVIGATE Enrollee: Guero Carter (1990)     MRN: [id number]  Date:  11/22/2017  Any Client Contact?: Yes  Any Status Changes?: No  Clinician: LIANET Supported Employment & , Felix Forrester  Client contact in the past 30 days? Yes     Client Status Update      1a. Education - Guero Carter is interested in education: No                         1A1. Orientation to SEE services completed                         1A2. Client working on completing Career Profile                         1A3. Client completed Career Profile     1b. Employment - Guero Carter is interested in employment: Yes                         1B1. Orientation to SEE services completed                         1B3. Client completed Career Profile                         1B4. Client developed employment goals                         1B6. Client visited potential place of employment                         1B7. Client had in person contact with potential employer                         1B8. Client had interview with potential employer                         1B9. 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 People present:                          Client: Guero Carter  LIANET Supported : Felix Forrester     3. Total number of persons who participated in contact: 1     4. Length of Actual Contact: 60 minutes                         Traveled?  Yes                         Start Time (indicate am/pm):  3:30, traveling from Home (location)                         End Time (indicate am/pm):  5:00                         Total Travel Time:  30 minutes     5. Location of contact:  [facility] [employer] [place]     6. Brief description of session, contact, or client status (include: strategies, interventions, client reaction to meeting, next steps, etc.):  Writer met with  Guero for monthly check-in to start budget and to discuss updates related to work. 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 Writer helped Guero create a budget based on his current income. He has a plan to save close to $10k in the next year in order to find an apartment and live independently, which according to his budget and lack of current financial responsibilities (living at home), he should be able to save close to his goal, assuming he follows the plan.  and Guero discussed recreational goal to save up to $200/month on cigarettes, and how his mother sometimes pays for cigarettes. Guero admitted that he needs to cut back and eventually quit someday; however, he is not ready at the moment.     Plan will be to check-in on the budget two weeks from today, and with a future plan to meet every other week to check-in regarding work.      7. Completion of mutually agreed upon client task from previous meeting:  Completed     8. Orientation and Treatment Planning:  Pursuing current SEE goals      9. Assessment:     Assessing client's need for follow-along supports     10. Placement:     10b. Employment:  10B1. Discussion and planning re: disclosure decisions and role of SEE                         10B2. Indicate client's current decision regarding disclosure:                                      3. Client does not want to use disclosure     11. Follow Along Supports:        will meet with Guero weekly for check-ins, as needed.      12. Mutually agreed upon client task for next meeting:                 To review budget.      13. Next Meeting Scheduled for: Wednesday, December 5 at 4:00 pm     Felix [facility]   Supported Employment &

## 2017-11-28 NOTE — MR AVS SNAPSHOT
After Visit Summary   11/28/2017    Guero Carter    MRN: [id number]           Patient Information     Date Of Birth          1990        Visit Information        Provider Department      11/28/2017 4:00 PM Savannah Patel LGSW [facility]        Today's Diagnoses     Schizophrenia, unspecified type (H)    -  1       Follow-ups after your visit        Your next 10 appointments already scheduled     Nov 29, 2017  4:45 PM CST   Navigate Medication Follow Up with LOTTIE Castellanos CNP   [facility] ([facility])    [address] [place] [id number] [phone]            Dec 12, 2017  4:00 PM CST   Navigate Psychotherapy with KHRIS Terrell   [facility] ([facility])    [address] [place] [id number] [phone]              Who to contact     Please call your clinic at [phone] to:    Ask questions about your health    Make or cancel appointments    Discuss your medicines    Learn about your test results    Speak to your doctor   If you have compliments or concerns about an experience at your clinic, or if you wish to file a complaint, please contact [facility] Patient Relations at [phone] or email us at [email or web address]         Additional Information About Your Visit        MyChart Information     ipadio is an electronic gateway that provides easy, online access to your medical records. With ipadio, you can request a clinic appointment, read your test results, renew a prescription or communicate with your care team.     To sign up for Synthelist visit the website at [email or web address]   You will be asked to enter the access code listed below, as well as some personal information. Please follow the directions to create your username and password.     Your access code is: [email or web address]  Expires: 12/4/2017  7:41 AM     Your access code will   in 90 days. If you need help or a new code, please contact your [facility] or call [phone] for assistance.        Care EveryWhere ID     This is your Care EveryWhere ID. This could be used by other organizations to access your [place] medical records  [id number]         Blood Pressure from Last 3 Encounters:   17 (!) 173/109   17 (!) 135/91   17 (!) 146/98    Weight from Last 3 Encounters:   17 114.9 kg (253 lb 3.2 oz)   10/02/17 114.9 kg (253 lb 6.4 oz)   17 110.4 kg (243 lb 6.4 oz)              Today, you had the following     No orders found for display       Primary Care Provider Fax #    Physician No Ref-Primary [phone]       No address on file        Equal Access to Services     [facility] : Hadii ralph mo Soprerna, waaxda luqadaha, qaybta kaalmada adesinaiyada, moe garcia . So [facility] [phone].    ATENCIÓN: Si habla español, tiene a bruce disposición servicios gratuitos de asistencia lingüística. Llame al [phone].    We comply with applicable federal civil rights laws and [place] laws. We do not discriminate on the basis of race, color, national origin, age, disability, sex, sexual orientation, or gender identity.            Thank you!     Thank you for choosing [facility]  for your care. Our goal is always to provide you with excellent care. Hearing back from our patients is one way we can continue to improve our services. Please take a few minutes to complete the written survey that you may receive in the mail after your visit with us. Thank you!             Your Updated Medication List - Protect others around you: Learn how to safely use, store and throw away your medicines at www.disposemymeds.org.          This list is accurate as of: 17 11:59 PM.  Always use your most recent med list.                   Brand Name Dispense Instructions for use Diagnosis    IBUPROFEN PO       Take 200-400 mg by mouth every 4 hours as needed for other (headache)        metFORMIN 500 MG tablet    GLUCOPHAGE    60 tablet    Take 1 tablet (500 mg) by mouth 2 times daily (with meals)    Schizophrenia, unspecified type (H)       OLANZapine 10 MG tablet    zyPREXA    30 tablet    Take 1 tablet (10 mg) by mouth At Bedtime    Schizophrenia, unspecified type (H)

## 2017-11-29 ENCOUNTER — OFFICE VISIT (OUTPATIENT)
Dept: PSYCHIATRY | Facility: CLINIC | Age: 27
End: 2017-11-29

## 2017-11-29 VITALS — BODY MASS INDEX: 32.38 KG/M2 | WEIGHT: 252.2 LBS

## 2017-11-29 DIAGNOSIS — F20.9 SCHIZOPHRENIA, UNSPECIFIED TYPE (H): ICD-10-CM

## 2017-11-29 RX ORDER — OLANZAPINE 10 MG/1
10 TABLET ORAL AT BEDTIME
Qty: 30 TABLET | Refills: 0 | Status: SHIPPED | OUTPATIENT
Start: 2017-11-29 | End: 2017-12-27 | Stop reason: SINTOL

## 2017-11-29 NOTE — PROGRESS NOTES
NAVIGATE Clinician Contact & Progress Note  For Individual Resiliency Training (IRT)  A Part of the [facility] First Episode of Psychosis Program    NAVIGATE Enrollee: Guero Carter (1990)     MRN: [id number]  Date:  11/28/17  Diagnosis: Schizophrenia, unspecified type; F20.9  Clinician: LIANET Individual Resiliency Trainer, KHRIS Terrell     1. Type of contact: (majority of time spent)  IRT Session    2. People present:   Client  NAVIGATE IRT/writer    3. Total number of persons who participated in contact: 2, including writer    4. Length of Actual Contact: Start Time: 4pm; End Time: 4:50pm   Traveled?  No     5. Location of contact:  Psychiatry Clinic, [facility]    6. Did the client complete the home practice option(s) from the previous session: NA     7. Motivational Teaching Strategies:  Connect info and skills with personal goals  Promote hope and positive expectations  Explore pros and cons of change    8. Educational Teaching Strategies:  Review of written material/education  Relate information to client's experience  Ask questions to check comprehension  Break down information into small chunks  Adopt client's language    9. CBT Teaching Strategies:  Reinforcement and shaping (positive feedback for steps towards goals, gains in knowledge & skills, follow-through on home assignments)    Relapse prevention planning (review of stressors, early warning signs, written plan to respond to signs, rehearse plan)    10. IRT Module(s) Addressed:  Module 4 - Relapse Prevention Planning    11. Techniques utilized:   [place] announced at beginning of session  Review of homework  Review of goal  Review of previous meeting  Present new material  Help client choose a home practice option  Summarize progress made in current session    12. Mental Status Exam:    Alertness: alert  and oriented  Appearance: well groomed  Behavior/Demeanor: cooperative and calm, with good  eye contact   Speech: normal  Language:  "intact. Preferred language identified as English.  Psychomotor: normal or unremarkable  Mood: description consistent with euthymia  Affect: restricted; was congruent to mood; was congruent to content  Thought Process/Associations: unremarkable  Thought Content:  Reports none;  Denies suicidal and violent ideation, delusions, preoccupations, magical thinking and paranoid ideation  Perception:  Reports none;  Denies auditory hallucinations  Insight: fair  Judgment: fair  Cognition: does  appear grossly intact; formal cognitive testing was not done  Suicidal ideation: denies SI, denies intent,  and denies plan  Homicidal Ideation: denies    13. Assessment/Progress Note:     Writer met client for IRT session. Client reported he was doing well. During check in, he updated that he's been hanging out with his friends a lot more on weekends. He described going to bars and explained he went on a double date this past weekend with his sister and her boyfriend. Client reported work is going well and he described having an easy rotation this week. He reported he continues to like the job overall. Client said when he's not working he is watching tv and eating dinner before going to bed around 9pm. He described taking an hour or two to fall asleep and will sometimes watch movies before bed. When asked if he was using alcohol before bed, he reported \"not really.\" He proceeded to explain that he will have 4 shots of amos \"a couple\" nights per week to help with sleeping. Client denied concerns with this drinking. He reported it is not getting in the way of his job, health, or goals. He said his mom has found some empty bottles in his room and told him that she does not want him drinking at home. He reported he knows he could get kicked out if he continues to drink and she finds out but said he would likely get his own apartment or stay at a hotel until he finds a place. He reported he would rather continue living at home though, " "as to keep saving money. Client denied psychosis symptoms, depression, and safety concerns. Denied other substance use besides alcohol. Reviewed goals and client reported he does need to improve on his goal to lose weight by working out. He said he's been trying to eat healthy but started drinking more soda. Discussed options for building motivation to work out, such as rewarding himself with a video game. Client reported continuing to take medications daily, denied concerns with medication.     Spent the rest of session reviewing and completing Module 4 \" Relapse Prevention Planning.\" Client presented somewhat defensive throughout the process, often stopping to clarify that he does not have symptoms currently. Writer validated this and continued to explain purpose of relapse prevention planning in the event that he would experience increased symptoms or stress.   Early warning signs: feeling irritable, missing work, social withdrawal, sleeping too much  Triggers: Stress at work, conflict with parents  Coping: Find own place, grounding, try to get sleep, make effort to see brothers, take a hot shower, exercise, get space/ask to be left alone  Who can assist: Hangout with brothers and watch sports, have mom help remind me to wake up, have dad talk to me    14. Plan/Referrals:     Meet client for IRT on 12/12 at 4pm. Client will see NAVIGATE prescriber, Akosua De Oliveira, tomorrow.    Billing for \"Interactive Complexity\"?  No    Savannah Patel, [facility]    EMORYATE Individual Resiliency Trainer    Attestation:    I did not see this patient directly. This patient is discussed with me in individual clinical social work supervision, and I agree with the plan as documented.     ADELFO Hodges, [facility], December 1, 2017    "

## 2017-11-29 NOTE — PROGRESS NOTES
"NAVIGATE Medication Management Progress Note  A Part of the [facility] First Episode of Psychosis Program    NAVIGATE Enrollee: Guero Carter (1990)     MRN: [id number]  Date:  11/29/17         Contributors to the Assessment     Chart Reviewed.   Interview completed with Guero Carter.         Chief Complaint     \"Things are good\"          Interim History      Guero Carter is a 27 year old male who was last seen in clinic on 11/1/17 at which time his olanzapine was decreased to 10mg and he started 1000mg of metformin. The patient reports good treatment adherence.  History was provided by Guero who was a good historian.    Since the last visit:  - Work is going well, he is enjoying it and is invested in the work. He received a compliment for how well he is doing this week.  - He is going to bed around 9pm and it sometimes takes him 1-2 hours to fall asleep. He thinks this is because previously, before he started working, he wasn't going to bed until midnight and he often goes to bed much later on the weekend when he hangs out with his brother.  - He is drinking 2-3 cans of soda a day and also usually juice and hot chocolate, but overall he is trying to eat healthier.   - His mom found multiple bottles of liquor under his bed and is upset with him and concerned about his drinking. He doesn't think he has a problem. He will have 2-4 drinks to unwind and help him sleep several nights a week and 4 drinks when he goes out with his brother on the weekend. He has not noticed ny adverse consequences with his drinking-he does not feel sick in the morning, still making it to work every day, no reckless behavior reported.   - Tolerated the addition of metformin quite well. He has noticed a satisfactory decrease in appetite.     PSYCH ROS:   DEPRESSION:  reports-none;  DENIES- suicidal ideation , depressed mood, anhedonia, low energy and insomnia   ANDRES/HYPOMANIA:  reports-none;  DENIES- increased energy, decreased sleep " "need, increased activity and grandiosity  PSYCHOSIS:  reports-none;  DENIES- delusions, auditory hallucinations and visual hallucinations  ANXIETY:  none  SLEEP:  Reports sleep is \"good\", although he is groggy for a short time after awakening. He says after his morning coffee this resolves.      RECENT SUBSTANCE USE: Mostly drinks on the weekend but having a few during the week to unwind.     RECENT SOCIAL HISTORY:  Guero spending most of his social time with sibs and enjoys various social activities.     MEDICAL ROS:  Continued concerns with weight. Constitutional, neuro, endocrine, gastrointestinal, genitourinary and psychiatric systems are otherwise negative.         First Episode of Psychosis History      DUP (duration untreated psychosis):  Unclear- denies prodromal sx  Route to initial care: Hospitalized twice (2013, 2017)  Medication adherence overall:  Has been adherent since May; hx of nonadherence                       General frequency of visits:  2-4 weeks  Participation in groups:  None currently  Cognitive Remediation:  None  Other treatment history: None     Reviewed for completion of First Episode work-up:  Yes  First episode workup:  Done (May 2017 labs reviewed)  MATRICS Consensus Cognitive Battery:  Not Done         Medical/Surgical History     Review of patient's allergies indicates no known allergies.    Patient Active Problem List   Diagnosis     Psychiatric disorder     MENTAL HEALTH     Psychosis            Medications     Current Outpatient Prescriptions   Medication Sig Dispense Refill     OLANZapine (ZYPREXA) 10 MG tablet Take 1 tablet (10 mg) by mouth At Bedtime 30 tablet 0     metFORMIN (GLUCOPHAGE) 500 MG tablet Take 1 tablet (500 mg) by mouth 2 times daily (with meals) 60 tablet 0     IBUPROFEN PO Take 200-400 mg by mouth every 4 hours as needed for other (headache)                Vitals     There were no vitals taken for this visit.      Weight prior to medication: 200#         " "Mental Status Exam     Alertness: alert  and oriented  Appearance: well groomed  Behavior/Demeanor: cooperative and pleasant, with good  eye contact   Speech: regular rate and rhythm  Language: intact  Psychomotor: normal or unremarkable  Mood: description consistent with euthymia  Affect: full range; was congruent to mood; was congruent to content  Thought Process/Associations: unremarkable  Thought Content:  Reports none;  Denies suicidal and violent ideation, delusions and paranoid ideation  Perception:  Reports none;  Denies auditory hallucinations and visual hallucinations  Insight: excellent  Judgment: excellent  Cognition: does  appear grossly intact; formal cognitive testing was not done         Labs and Data     RATING SCALES:  N/A    PHQ-9 SCORE 9/28/2017 10/12/2017 11/13/2017   Total Score 0 0 0       ANTIPSYCHOTIC LABS ROUTINE    [glu, A1C, lipids (focus LDL), liver enzymes, WBC, ANEU, Hgb, plts]   q12 mo  Recent Labs   Lab Test  05/26/17   0756 09/26/13   0735   GLC  87  78     Recent Labs   Lab Test  05/26/17   0756   CHOL  192   TRIG  149   LDL  113*   HDL  49     Recent Labs   Lab Test  05/26/17   0756  09/26/13   0735   AST  22  21   ALT  37  26   ALKPHOS  102  65     Recent Labs   Lab Test  05/26/17   0756  09/26/13   0735   WBC  6.0  4.7   ANEU  3.2  1.8   HGB  16.6  14.9   PLT  228  191            Psychiatric Diagnoses     Schizophrenia, Unspecified type; Tobacco abuse         Assessment     TODAY Guero reports good treatment adherence and continues to demonstrate progress in all functional domains. He has not endorsed sx of psychosis, depression, or anxiety for some time; of note, he does not agree with the dx of schizophrenia and passively suggests that his delusions are still possibly real. \"I am not any more crazy than anyone else\". He does find the medication to helpful for sleep and does not have plans to discontinue it for now. He also felt as if he would like to wait a little longer " before considering another decrease in dosage. Metformin has decreased his appetite satisfactorily, and thus the dosage will remain the same for now. Discussed behavioral changes to manage weight.   Discussed how the use of EtOH can decrease efficacy of his medications, and it's propensity to interrupt sleep cycles, assessed for safe behavior when drinking.               MANAGEMENT:  Monitoring for adverse effects, routine vitals, routine labs, periodic EKGs, using lowest therapeutic dose of [zyprexa] and patient is aware of risks         Plan     1) PSYCHOTROPIC MEDICATIONS:  - Continue olanzapine 10mg    - Continue metformin 500mg bid, take with meals    2) THERAPY:  Continue    3) NEXT DUE:    Labs- November 2017  Rating Scales- AIMS due at RTC    4) REFERRALS:    No Referrals needed    5) RTC: 2- 3 weeks    6) CRISIS NUMBERS:   Provided routinely in AVS.    TREATMENT RISK STATEMENT:  The risks, benefits, alternatives and potential adverse effects have been discussed and are understood by the pt. The pt understands the risks of using street drugs or alcohol. There are no medical contraindications, the pt agrees to treatment with the ability to do so. The pt knows to call the clinic for any problems or to access emergency care if needed.  Medical and substance use concerns are documented above.  Psychotropic drug interaction check was done, including changes made today.      PROVIDER:  LOTTIE Castellanos CNP

## 2017-11-29 NOTE — MR AVS SNAPSHOT
After Visit Summary   11/29/2017    Guero Carter    MRN: [id number]           Patient Information     Date Of Birth          1990        Visit Information        Provider Department      11/29/2017 4:45 PM Akosua De Oliveira APRN CNP [facility]        Today's Diagnoses     Schizophrenia, unspecified type (H)           Follow-ups after your visit        Your next 10 appointments already scheduled     Dec 12, 2017  4:00 PM CST   Navigate Psychotherapy with KHRIS Terrell   [facility] ([facility])    [address] [place] [address]  [facility] [id number] [phone]            Dec 20, 2017  4:15 PM CST   Navigate Medication Follow Up with LOTTIE Castellanos CNP   [facility] ([facility])    [address]  [facility] [id number] [phone]              Who to contact     Please call your clinic at [phone] to:    Ask questions about your health    Make or cancel appointments    Discuss your medicines    Learn about your test results    Speak to your doctor   If you have compliments or concerns about an experience at your clinic, or if you wish to file a complaint, please contact [facility] Patient Relations at [phone] or email us at [email or web address]         Additional Information About Your Visit        MyChart Information     IdentiGEN is an electronic gateway that provides easy, online access to your medical records. With IdentiGEN, you can request a clinic appointment, read your test results, renew a prescription or communicate with your care team.     To sign up for [employer] visit the website at [email or web address]   You will be asked to enter the access code listed below, as well as some personal information. Please follow the directions to create your username and password.     Your access code is: [email or web address]  Expires: 12/4/2017  7:41 AM     Your access code will   in 90 days. If you need help or a new code, please contact your [facility] or call [phone] for assistance.        Care EveryWhere ID     This is your Care EveryWhere ID. This could be used by other organizations to access your [facility] medical records  [id number]        Your Vitals Were     BMI (Body Mass Index)                   32.38 kg/m2            Blood Pressure from Last 3 Encounters:   17 (!) 173/109   17 (!) 135/91   17 (!) 146/98    Weight from Last 3 Encounters:   17 114.4 kg (252 lb 3.2 oz)   17 114.9 kg (253 lb 3.2 oz)   10/02/17 114.9 kg (253 lb 6.4 oz)              Today, you had the following     No orders found for display         Where to get your medicines      These medications were sent to [facility] [id number] [place] [address] [address] AT [facility] [address]  [address] [facility] [address]     Phone:  [phone]     metFORMIN 500 MG tablet    OLANZapine 10 MG tablet          Primary Care Provider Fax #    Physician No Ref-Primary [phone]       No address on file        Equal Access to Services     MARI VELASCO : Hadii ralph sanders hadasho Sonasimali, waaxda luqadaha, qaybta kaalmada adeegyada, waxay rodin zuhair farr. So [facility] [phone].    ATENCIÓN: Si habla español, tiene a bruce disposición servicios gratuitos de asistencia lingüística. Llame al [phone].    We comply with applicable federal civil rights laws and [place] laws. We do not discriminate on the basis of race, color, national origin, age, disability, sex, sexual orientation, or gender identity.            Thank you!     Thank you for choosing [facility]  for your care. Our goal is always to provide you with excellent care. Hearing back from our patients is one way we can continue to improve our services. Please take a few minutes to complete the written survey that you may receive in the mail  after your visit with us. Thank you!             Your Updated Medication List - Protect others around you: Learn how to safely use, store and throw away your medicines at www.disposemymeds.org.          This list is accurate as of: 11/29/17 11:59 PM.  Always use your most recent med list.                   Brand Name Dispense Instructions for use Diagnosis    IBUPROFEN PO      Take 200-400 mg by mouth every 4 hours as needed for other (headache)        metFORMIN 500 MG tablet    GLUCOPHAGE    60 tablet    Take 1 tablet (500 mg) by mouth 2 times daily (with meals)    Schizophrenia, unspecified type (H)       OLANZapine 10 MG tablet    zyPREXA    30 tablet    Take 1 tablet (10 mg) by mouth At Bedtime    Schizophrenia, unspecified type (H)

## 2017-11-30 ASSESSMENT — PATIENT HEALTH QUESTIONNAIRE - PHQ9: SUM OF ALL RESPONSES TO PHQ QUESTIONS 1-9: 0

## 2017-12-12 ENCOUNTER — TELEPHONE (OUTPATIENT)
Dept: PSYCHIATRY | Facility: CLINIC | Age: 27
End: 2017-12-12

## 2017-12-12 ENCOUNTER — BEH TREATMENT PLAN (OUTPATIENT)
Dept: PSYCHIATRY | Facility: CLINIC | Age: 27
End: 2017-12-12

## 2017-12-12 NOTE — PROGRESS NOTES
[facility] NAVIGATE Program Treatment Plan Summary  A Part of the [facility] First Episode of Psychosis Program     NAVIGATE Enrollee: Guero Carter  /Age:  1990 (27 year old)     Date of Initial Service: 2017  Date of INTIAL Treatment Plan: 2017  Last Review/Update Date:   2017                                                                      90-Day Review Date: 2017- today     The following is a REVIEWED treatment plan?  Yes   The following is an UPDATED treatment plan?  Yes     Participants at Collaborative Treatment Planning Meeting:                          Client                           NAVIGATE IRT Clinician: ADELFO Terrell, KHRIS                             1. DSM-V Diagnosis (include numeric code)  Schizophrenia, unspecified type; F20.9     2. Current symptoms and circumstances that substantiate the diagnosis:  Diagnosis of schizophrenia seems supported by positive symptoms (delusional thoughts, suspiciousness, possible response to internal stimuli) and negative symptoms (avolition, affective flattening, anhedonia, alogia, apathy), as well as past positive (AH, delusional thoughts) and negative symptoms (avolition, affective flattening, anhedonia, alogia, apathy). Guero did not report hx of mood disorder symptoms such as depressive and manic symptoms.   3. How symptoms and/or behaviors are affecting level of function:  Guero has experienced social and academic decline, but with treatment and support has returned to work. Guero has experienced decrease in positive symptoms over the past 3 months. In the past, delusions and paranoia impacted ability to go to work. Currently negative symptoms impact motivation to work on health goals.      4. Risk Assessment:  Suicide:  Assessed Level of Immediate Risk: Low- per chart review, Guero presented with SI when hospitalized in   Ideation: No  Plan:  No  Means: No  Intent: No     Homicide/Violence:  Assessed Level of Immediate  "Risk: Low - History of agressiveness toward mom prior to hospitalization  Ideation: No  Plan: No  Means: No  Intent: No     If on a medication, please include name and dosage:    Current Outpatient Prescriptions   Medication     metFORMIN (GLUCOPHAGE) 500 MG tablet     OLANZapine (ZYPREXA) 10 MG tablet     IBUPROFEN PO     No current facility-administered medications for this visit.        5. Treatment Goals     Domain: Illness Management & Recovery  Goal: Identify and engage possible areas of improvement related to +/- symptoms, ability to manage illness, medications, and/or substance use/abuse, SI/SIB/HI     Objectives & Target Date        Continue with mediation recommendations/ find \"right does that minimizes weight gain\"; Target date: 3/12/18         Identify negative automatic thoughts and replace them with positive self-talk messages to build self-esteem ; Target date:  3/12/18    Verbally identify the stressors that contribute to the reactive psychosis ; Target date:  3/12/18    Review, Develop and implement relapse prevention strategies for managing possible future episodes of psychosis; Target date:  3/12/18       Increase communication with the parents and family, resulting in feeling attended to and understood; Target date:  3/12/18    Family members verbalize increased understanding of an knowledge about Guero willoughby illness and treatment ; Target date:  3/12/18    Family members increase positive support of Guero to reduce the chances of acute exacerbation of the psychotic episode ; Target date:  3/12/18     Strengths        Capacity to love and be loved      Hope, Optimism, & future-mindedness      Honest, Authentic, Genuine      Industry, diligence, & perseverance      Medication adherence (P)    Supportive family, recovery environment (P)     Barriers     Alcohol use    Isolation (S), Male (S),SI- history, Single (S)    Symptoms of psychosis, positive (delusions, hallucinations)    Symptoms of " psychosis, negative (flat affect, avolition, anhedonia, alogia, and/or apathy)    Symptoms of psychosis, cognitive (memory, attention and concentration, and/or executive functioning difficulties)    Treatment Non-Adherence - history     Provider & Intervention   IRT    Motivational Interviewing (Connect info and skills with personal goals, Promote hope and positive expectations, Explore pros and cons of change, Re-frame experiences in a positive light)    Educational Teaching Strategies (Review written material/education on: Assessment/Initial Goal Setting, Education about Psychosis, Relapse Prevention Planning, Processing the Psychotic Episode, Developing Resiliency -Standard Sessions, Building a Bridging to Your Goals, Dealing with Negative Feelings, Coping with Symptoms, Substance Use, Having Fun and Developing Good Relationships, Making Choices about Smoking, Nutrition and Exercise, Developing Resiliency)    CBT (Reinforcement and shaping, Social skills training, Relapse prevention planning, Coping skills training, Relaxation training, Cognitive restructuring, Behavioral tailoring)  Prescriber    Medication Evaluation     Ongoing Medication Management    Side Effect Monitoring    Medication Education    Adherence Monitoring    Lab work  Family Therapy    Motivational Interviewing (Connect info and skills with personal goals, Promote hope and positive expectations, Explore pros and cons of change, Re-frame experiences in a positive light)    Educational Teaching Strategies (Review written material/education on: Psychosis, Medications for psychosis, Coping with stress, Strategies to build resiliency, Relapse prevention planning, Developing a collaboration with mental health professionals, Effective communication, A relative s guide to supporting recovery from psychosis, Basic facts about alcohol and drugs)    CBT (Reinforcement and shaping, Social skills training, Relapse prevention planning, Coping skills  training, Relaxation training, Cognitive restructuring, Behavioral tailoring)        Domain: Health & Basic Living Needs  Goal: Identify and engage possible areas of improvement related to basic needs being met and maintaining or improving overall health and well-being     Objectives & Target Date        Verbalize an accurate understanding of factors influencing eating, health, overweight, and obesity; Target Date:  3/12/18    Identify changes in daily lifestyle activity conducive to improved health and good weight management; Target Date: 3/12/18     Learn and implement healthy nutritional practices; Target Date:  3/12/18    Establish a plan to increase physical exercise (Jog 3 miles/exercise 3x per week); Target Date:  3/12/18       Disclose any history of substance use that may contribute to and complicate the treatment of the psychosis; Target Date:  3/12/18    Verbalize increased knowledge of substance use and the process of recovery  ; Target Date:  3/12/18    Verbalize a commitment to a harm reduction approach to using substances ; Target Date:  3/12/18    Implement relapse prevention strategies for managing possible future situations with high risk for relapse ; Target Date:  3/12/18       Family members verbally reinforce Guero's active attempts to decrease substance by providing praise, encouragement, and affirmations ; Target Date:  3/12/18     Strengths    Caution, Prudence, & Discretion      Hope, Optimism, & future-mindedness      Industry, diligence, & perseverance      Medication adherence (P)    Supportive friends, family, recovery environment (P)     Barriers     Drug/Alcohol abuse- history; current alcohol use    Symptoms of psychosis, positive (delusions, hallucinations)    Symptoms of psychosis, negative (flat affect, avolition, anhedonia, alogia, and/or apathy)    Symptoms of psychosis, cognitive (memory, attention and concentration, and/or executive functioning difficulties)    Treatment  Non-Adherence -history      Provider & Intervention   IRT    Motivational Interviewing     Educational Teaching Strategies (Review written material/education on: Substance Use, Nutrition and Exercise, Developing Resiliency)    CBT  Prescriber    Medication Evaluation     Ongoing Medication Management    Side Effect Monitoring    Medication Education    Adherence Monitoring    Lab work  Family Therapy    Motivational Interviewing    Educational Teaching Strategies ( Basic facts about alcohol and drugs)    CBT   Domain: Family & Other Supports  Goal: Identify and engage possible areas of improvement related to engaging family, friends and other supports     Objectives & Target Date        Strengthen the social support network with friends by initiating social contact and participating in social activities with peers: Target date: 3/12/18       See family more often: Target date:  3/12/18       Consider participating in group therapy  : Target date:  3/12/18    Increase participation in interpersonal or peer group activities : Target date:  3/12/18       Family members demonstrate support for Guero as he/she participates in treatment: Target date:  3/12/18     Strengths      Capacity to love and be loved      Hope, Optimism, & future-mindedness      Kind & Generous      Supportive family, recovery environment (P)     Barriers    Symptoms of psychosis, positive (delusions, hallucinations)    Symptoms of psychosis, negative (flat affect, avolition, anhedonia, alogia, and/or apathy)    Symptoms of psychosis, cognitive (memory, attention and concentration, and/or executive functioning difficulties)     Provider & Intervention   IRT    Motivational Interviewing    Educational Teaching Strategies (Review written material/education on:  Having Fun and Developing Good Relationships)    CBT   Family Therapy    Motivational Interviewing     Educational Teaching Strategies     CBT   SEE    Employment Assistance &  "Supports (Information gathering/planning re: \"benefits applications\" or \"work incentive planning\", Job searching, Interview preparation/skills training, Complete resume, Complete applications, Follow along supports for requesting accommodations, development and use of natural supports, problem solving difficulties, stress management, develop/use of coping skills for symptoms, develop/use of coping skills for cognitive difficulties, social skills training)     Domain: Social, Academic, & Employment  Goal: Identify and engage possible areas of improvement related to education, employment, and social activities      Objectives & Target Date    Explore areas of interest for continued educational opportunities/ explore returning to 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 : Target date:  3/12/18    Save money/ stick to budget for loans, housing, car; target date:3/12/18    Increase participation in interpersonal or peer group activities/ reconnect with friends/ date: Target date:  3/12/18     Strengths        Hope, Optimism, & future-mindedness       Industry, diligence, & perseverance      Medication adherence (P)    Supportive family, recovery environment (P)     Work history/experience     motivated to return to employment     Barriers     Symptoms of psychosis, positive (delusions, hallucinations)    Symptoms of psychosis, negative (flat affect, avolition, anhedonia, alogia, and/or apathy)    Symptoms of psychosis, cognitive (memory, attention and concentration, and/or executive functioning difficulties)    Treatment Non-Adherence -hx     Hx of drug and alcohol abuse     Provider & Intervention   SEE    Career Inventory    Goal Setting    Identify Level of Disclosure     Education Assistance & Supports (Discuss/plan use of student disability services, School searching, Interview preparation/skills training, Complete forms/applications, Follow along " "supports for requesting accommodations, development and use of natural supports, problem solving difficulties, stress management, develop/use of coping skills for symptoms, develop/use of coping skills for cognitive difficulties, social skills training)    Employment Assistance & Supports (Information gathering/planning re: \"benefits applications\" or \"work incentive planning\", Job searching, Interview preparation/skills training, Complete resume, Complete applications, Follow along supports for requesting accommodations, development and use of natural supports, problem solving difficulties, stress management, develop/use of coping skills for symptoms, develop/use of coping skills for cognitive difficulties, social skills training)  IRT    Motivational Interviewing (Connect info and skills with personal goals, Promote hope and positive expectations, Explore pros and cons of change, Re-frame experiences in a positive light)    Educational Teaching Strategies (Review written material/education on: Building a Bridging to Your Goals, Dealing with Negative Feelings, Coping with Symptoms)    CBT     6. Frequency of Sessions: Weekly     7. Expected duration of treatment:  2-4x monthly SEE services for 6 months; 1-2x monthly prescriber services for 6 months; 2-4x per month IRT services for 6 months followed by 12-18 months monthly sessions; Consider family education 1-4x/month for 6 months     8. Participants in therapy plan (family, friends, support network): Family members as able        See scanned document for Acknowledgement of Current Treatment Plan     Regulatory Guidelines for Updating Treatment Plan  [place] Medical Assistance: Reviewed & signed at least every 90 days  Medicare:  Update per policy    "

## 2017-12-12 NOTE — TELEPHONE ENCOUNTER
NAVIGATE Outreach  A Part of the [facility] First Episode of Psychosis Program     Patient Name: Guero Carter  /Age:  1990 (27 year old)    Call duration: 5 minutes    Contacted client to follow up on canceled IRT session today. Client reported he was not aware of the appt until he was half way home from work today so called to cancel. Client rescheduled for  at 4pm and confirmed is next appt with Akosua De Oliveira, LIANET prescriber, on .    Savannah Patel, MSW, LGSW  [facility] Individual Resiliency Trainer

## 2017-12-14 ENCOUNTER — TELEPHONE (OUTPATIENT)
Dept: PSYCHIATRY | Facility: CLINIC | Age: 27
End: 2017-12-14

## 2017-12-18 ENCOUNTER — OFFICE VISIT (OUTPATIENT)
Dept: PSYCHIATRY | Facility: CLINIC | Age: 27
End: 2017-12-18
Payer: COMMERCIAL

## 2017-12-18 DIAGNOSIS — F20.9 SCHIZOPHRENIA, UNSPECIFIED TYPE (H): Primary | ICD-10-CM

## 2017-12-18 NOTE — MR AVS SNAPSHOT
After Visit Summary   12/18/2017    Guero Carter    MRN: [id number]           Patient Information     Date Of Birth          1990        Visit Information        Provider Department      12/18/2017 4:00 PM Savannah Patel LGSW [facility]        Today's Diagnoses     Schizophrenia, unspecified type (H)    -  1       Follow-ups after your visit        Your next 10 appointments already scheduled     Dec 27, 2017  4:15 PM CST   Navigate Medication Follow Up with LOTTIE Castellanos CNP   [facility] ([facility])    [address]  [facility] [id number] [phone]            Jan 08, 2018  4:00 PM CST   Navigate Psychotherapy with KHRIS Terrell   [facility] ([facility])    [address] [place] [id number] [phone]              Who to contact     Please call your clinic at [phone] to:    Ask questions about your health    Make or cancel appointments    Discuss your medicines    Learn about your test results    Speak to your doctor   If you have compliments or concerns about an experience at your clinic, or if you wish to file a complaint, please contact [facility] Patient Relations at [phone] or email us at [email or web address]         Additional Information About Your Visit        MyChart Information     [employer] is an electronic gateway that provides easy, online access to your medical records. With [employer], you can request a clinic appointment, read your test results, renew a prescription or communicate with your care team.     To sign up for [employer] visit the website at [email or web address]   You will be asked to enter the access code listed below, as well as some personal information. Please follow the directions to create your username and password.     Your access code is: [email or web address]  Expires: 3/24/2018 12:53 PM     Your access code will   in 90 days. If you need help or a new code, please contact your [facility] or call [phone] for assistance.        Care EveryWhere ID     This is your Care EveryWhere ID. This could be used by other organizations to access your [place] medical records  [id number]         Blood Pressure from Last 3 Encounters:   17 (!) 173/109   17 (!) 135/91   17 (!) 146/98    Weight from Last 3 Encounters:   17 114.4 kg (252 lb 3.2 oz)   17 114.9 kg (253 lb 3.2 oz)   10/02/17 114.9 kg (253 lb 6.4 oz)              Today, you had the following     No orders found for display       Primary Care Provider Fax #    Physician No Ref-Primary [phone]       No address on file        Equal Access to Services     MARI VELASCO : Hadii ralph gallegoo Soprerna, waaxda luqadaha, qaybta kaalmada adesinaiyada, moe garcia . So [facility] [phone].    ATENCIÓN: Si habla español, tiene a bruce disposición servicios gratuitos de asistencia lingüística. Llame al [phone].    We comply with applicable federal civil rights laws and [place] laws. We do not discriminate on the basis of race, color, national origin, age, disability, sex, sexual orientation, or gender identity.            Thank you!     Thank you for choosing [facility]  for your care. Our goal is always to provide you with excellent care. Hearing back from our patients is one way we can continue to improve our services. Please take a few minutes to complete the written survey that you may receive in the mail after your visit with us. Thank you!             Your Updated Medication List - Protect others around you: Learn how to safely use, store and throw away your medicines at www.disposemymeds.org.          This list is accurate as of: 17 11:59 PM.  Always use your most recent med list.                   Brand Name Dispense Instructions for use Diagnosis    IBUPROFEN PO       Take 200-400 mg by mouth every 4 hours as needed for other (headache)        metFORMIN 500 MG tablet    GLUCOPHAGE    60 tablet    Take 1 tablet (500 mg) by mouth 2 times daily (with meals)    Schizophrenia, unspecified type (H)       OLANZapine 10 MG tablet    zyPREXA    30 tablet    Take 1 tablet (10 mg) by mouth At Bedtime    Schizophrenia, unspecified type (H)

## 2017-12-19 ASSESSMENT — PATIENT HEALTH QUESTIONNAIRE - PHQ9: SUM OF ALL RESPONSES TO PHQ QUESTIONS 1-9: 0

## 2017-12-24 NOTE — PROGRESS NOTES
NAVIGNAREN Clinician Contact & Progress Note  For Individual Resiliency Training (IRT)  A Part of the [facility] First Episode of Psychosis Program    NAVIGATE Enrollee: Guero Carter (1990)     MRN: [id number]  Date:  12/18/17  Diagnosis: schizophrenia, unspecified type, F20.9  Clinician: LIANET Individual Resiliency Trainer, KHRIS Terrell     1. Type of contact: (majority of time spent)  IRT Session    2. People present:   Client  NAVIGATE IRT/writer    3. Total number of persons who participated in contact: 2, including writer    4. Length of Actual Contact: Start Time: 4:00pm; End Time: 5pm   Traveled?  No    5. Location of contact:  Psychiatry Clinic, [facility]    6. Did the client complete the home practice option(s) from the previous session: Yes    7. Motivational Teaching Strategies:  Connect info and skills with personal goals  Promote hope and positive expectations  Explore pros and cons of change  Re-frame experiences in positive light    8. Educational Teaching Strategies:  Review of written material/education  Relate information to client's experience  Ask questions to check comprehension  Break down information into small chunks  Adopt client's language    9. CBT Teaching Strategies:  Reinforcement and shaping (positive feedback for steps towards goals, gains in knowledge & skills, follow-through on home assignments)    Social skills training (rationale for skill, modeling, role play practice, feedback, plan home practice)    Relapse prevention planning (review of stressors, early warning signs, written plan to respond to signs, rehearse plan)    10. IRT Module(s) Addressed:  Module 6 - Developing Resiliency -Standard Sessions  Module 7 - Building a Bridging to Your Goals    11. Techniques utilized:   Pringle announced at beginning of session  Review of homework  Review of goal  Review of previous meeting  Present new material  Problem-solving practice  Help client choose a home practice  "option  Summarize progress made in current session    12. Mental Status Exam:    Alertness: alert  and oriented  Appearance: well groomed  Behavior/Demeanor: cooperative, with good  eye contact   Speech: normal  Language: intact. Preferred language identified as English.  Psychomotor: normal or unremarkable  Mood: description consistent with euthymia  Affect: full range; was congruent to mood; was congruent to content  Thought Process/Associations: unremarkable  Thought Content:  Reports none;  Denies suicidal and violent ideation, delusions and paranoid ideation  Perception:  Reports none;  Denies auditory hallucinations and visual hallucinations  Insight: fair  Judgment: fair  Cognition: does  appear grossly intact; formal cognitive testing was not done  Suicidal ideation: denies SI, denies intent,  and denies plan  Homicidal Ideation: denies    13. Progress Note:    Writer met client for IRT session. Today's session was spent reviewing and updating treatment plan goals. 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 He reported next steps of taking the certification test and plan to work full time. Reflected on how the training has gone by quickly and how his hours may change when he finishes training. Client reported no concerns regarding mental health symptoms. He said he has been taking medications daily and that they help with sleep. He reported drinking alcohol, beer or amos, 2-3x per week, and that this sometimes helps him sleep. Denied concerns with drinking.     During goal review client updated goal of losing weight. He reported he is motivated to lose weight so he can begin dating again and feel more confident in himself. He Reported he has started a new jogging exercise routine. He also described changes he's made to his diet. He reported continued motivation to save money to get " "his own apartment. Reviewed and updated client's BEH treatment plan. See BEH treatment plan encounter for 12/18. 14. Plan/Referrals:   Meet January 8th for IRT.     Billing for \"Interactive Complexity\"?  No    Savannah Patel, SW    NAVIGATE Individual Resiliency Trainer    Attestation:    I did not see this patient directly. This patient is discussed with me in individual clinical social work supervision, and I agree with the plan as documented.     ADELFO Hodges, LICSW, December 29, 2017    "

## 2017-12-27 ENCOUNTER — OFFICE VISIT (OUTPATIENT)
Dept: PSYCHIATRY | Facility: CLINIC | Age: 27
End: 2017-12-27
Payer: COMMERCIAL

## 2017-12-27 VITALS
HEIGHT: 74 IN | BODY MASS INDEX: 33.24 KG/M2 | SYSTOLIC BLOOD PRESSURE: 126 MMHG | WEIGHT: 259 LBS | HEART RATE: 101 BPM | DIASTOLIC BLOOD PRESSURE: 82 MMHG

## 2017-12-27 DIAGNOSIS — F20.9 SCHIZOPHRENIA, UNSPECIFIED TYPE (H): ICD-10-CM

## 2017-12-27 DIAGNOSIS — F29 PSYCHOSIS, UNSPECIFIED PSYCHOSIS TYPE (H): Primary | ICD-10-CM

## 2017-12-27 RX ORDER — ZIPRASIDONE HYDROCHLORIDE 20 MG/1
20 CAPSULE ORAL 2 TIMES DAILY WITH MEALS
Qty: 60 CAPSULE | Refills: 0 | Status: SHIPPED | OUTPATIENT
Start: 2017-12-27 | End: 2018-02-12

## 2017-12-27 NOTE — PROGRESS NOTES
"NAVIGATE Medication Management Progress Note  A Part of the [facility] First Episode of Psychosis Program    NAVIGATE Enrollee: Guero Carter (1990)     MRN: [id number]  Date:  12/27/17         Contributors to the Assessment     Chart Reviewed.   Interview completed with Guero Carter.         Chief Complaint     \"Things are good\"          Interim History      Guero Carter is a 27 year old male who was last seen in clinic on 11/29/17 at which time his olanzapine was decreased to 10mg and he started 1000mg of metformin. The patient reports good treatment adherence.  History was provided by Guero who was a good historian.    Since the last visit:  - Continues to use EtOH socially and several times per week to \"unwind\" and help him sleep. He does not find his drinking a concern.  - Ongoing concerns with weight gain. Pt acknowledges that he could \"do better\" with diet and exercise but also recognizes that this is an ASE he has experienced with the use of olanzapine in the past. He values the olanzapine for how it helps him sleep so he doesn't want to stop it. Would like to discuss if there are other options that would be less likely to cause weight gain.   - Denies concern with depression, anxiety or psychotic sx otherwise. Pt states he \"isn't any more crazy than anyone else\".   - Continues to excel in his job. Is considering going back to finish up his degree since he is so close to finishing.     PSYCH ROS:   DEPRESSION:  reports-none;  DENIES- suicidal ideation , depressed mood, anhedonia, low energy and insomnia   ANDRES/HYPOMANIA:  reports-none;  DENIES- increased energy, decreased sleep need, increased activity and grandiosity  PSYCHOSIS:  reports-none;  DENIES- delusions, auditory hallucinations and visual hallucinations  ANXIETY:  none  SLEEP:  Reports sleep is \"good\", although he is groggy for a short time after awakening. He says after his morning coffee this resolves.      RECENT SUBSTANCE USE: Mostly " "drinks on the weekend but having a few during the week to unwind.     RECENT SOCIAL HISTORY:  Guero spending most of his social time with sibs and enjoys various social activities.     MEDICAL ROS:  Continued concerns with weight. Constitutional, neuro, endocrine, gastrointestinal, genitourinary and psychiatric systems are otherwise negative.         First Episode of Psychosis History      DUP (duration untreated psychosis):  Unclear- denies prodromal sx  Route to initial care: Hospitalized twice (2013, 2017)  Medication adherence overall:  Has been adherent since May; hx of nonadherence                       General frequency of visits:  2-4 weeks  Participation in groups:  None currently  Cognitive Remediation:  None  Other treatment history: None     Reviewed for completion of First Episode work-up:  Yes  First episode workup:  Done (May 2017 labs reviewed)  MATRICS Consensus Cognitive Battery:  Not Done         Medical/Surgical History     Review of patient's allergies indicates no known allergies.    Patient Active Problem List   Diagnosis     Psychiatric disorder     MENTAL HEALTH     Psychosis            Medications     Current Outpatient Prescriptions   Medication Sig Dispense Refill     metFORMIN (GLUCOPHAGE) 500 MG tablet Take 1 tablet (500 mg) by mouth 2 times daily (with meals) 60 tablet 0     OLANZapine (ZYPREXA) 10 MG tablet Take 1 tablet (10 mg) by mouth At Bedtime 30 tablet 0     IBUPROFEN PO Take 200-400 mg by mouth every 4 hours as needed for other (headache)                Vitals     /82  Pulse 101  Ht 1.88 m (6' 2.02\")  Wt 117.5 kg (259 lb)  BMI 33.24 kg/m2      Weight prior to medication: 200#         Mental Status Exam     Alertness: alert  and oriented  Appearance: well groomed  Behavior/Demeanor: cooperative and pleasant, with good  eye contact   Speech: regular rate and rhythm  Language: intact  Psychomotor: normal or unremarkable  Mood: description consistent with " euthymia  Affect: full range; was congruent to mood; was congruent to content  Thought Process/Associations: unremarkable  Thought Content:  Reports none;  Denies suicidal and violent ideation, delusions and paranoid ideation  Perception:  Reports none;  Denies auditory hallucinations and visual hallucinations  Insight: excellent  Judgment: excellent  Cognition: does  appear grossly intact; formal cognitive testing was not done         Labs and Data     RATING SCALES:  N/A    PHQ-9 SCORE 11/13/2017 11/30/2017 12/19/2017   Total Score 0 0 0       ANTIPSYCHOTIC LABS ROUTINE    [glu, A1C, lipids (focus LDL), liver enzymes, WBC, ANEU, Hgb, plts]   q12 mo  Recent Labs   Lab Test  05/26/17 0756 09/26/13   0735   GLC  87  78     Recent Labs   Lab Test  05/26/17   0756   CHOL  192   TRIG  149   LDL  113*   HDL  49     Recent Labs   Lab Test  05/26/17 0756 09/26/13   0735   AST  22  21   ALT  37  26   ALKPHOS  102  65     Recent Labs   Lab Test  05/26/17 0756 09/26/13   0735   WBC  6.0  4.7   ANEU  3.2  1.8   HGB  16.6  14.9   PLT  228  191            Psychiatric Diagnoses     Schizophrenia, Unspecified type; Tobacco abuse         Assessment     TODAY Guero reports good treatment adherence and continues to demonstrate progress in all functional domains. He has not endorsed sx of psychosis, depression, or anxiety for some time. Continues to identify olanzapine as being helpful for sleep but not for any sx pf psychosis because he does not agree that he has experienced psychosis. Because he sees value in this medication, he does not have plans to discontinue it for now, however he is distressed that his efforts to create a caloric deficit and exercise more has not been successful in more of a weight loss. Discussed a plan to increase metformin to 2000mg/daily, which has been shown to be more effective in weight management. Also discussed a plan to cross-titrate to ziprasidone. Pt was amenable to both of these changes.    Discussed how the use of EtOH can decrease efficacy of his medications, and it's propensity to interrupt sleep cycles, assessed for safe behavior when drinking.               MANAGEMENT:  Monitoring for adverse effects, routine vitals, routine labs, periodic EKGs, using lowest therapeutic dose of [zyprexa] and patient is aware of risks         Plan     1) PSYCHOTROPIC MEDICATIONS:  - Decrease olanzapine to 5mg for one week and then stop  - Start ziprasidone 40mg at night with food.   - Increase metformin to 1000mg at dinner for one week and then increase to 1000mg twice a day    2) THERAPY:  Continue    3) NEXT DUE:    Labs- November 2017  Rating Scales- AIMS due at RTC    4) REFERRALS:    No Referrals needed    5) RTC: 2- 3 weeks    6) CRISIS NUMBERS:   Provided routinely in AVS.    TREATMENT RISK STATEMENT:  The risks, benefits, alternatives and potential adverse effects have been discussed and are understood by the pt. The pt understands the risks of using street drugs or alcohol. There are no medical contraindications, the pt agrees to treatment with the ability to do so. The pt knows to call the clinic for any problems or to access emergency care if needed.  Medical and substance use concerns are documented above.  Psychotropic drug interaction check was done, including changes made today.      PROVIDER:  [facility]      PSYCHIATRY CLINIC INDIVIDUAL PSYCHOTHERAPY NOTE                                                     [16]   Start time - N/A        End time - N/A  Date reviewed - N/A       Date next due - N/A    Subjective: This supportive psychotherapy session addressed issues related to health managment with the use of antipsychotic medications.  Patient's reaction: Action in the context of mental status appropriate for ambulatory setting.  Progress: good  Psychotherapy services during this visit included  myself and the patient.   TREATMENT  PLAN          SYMPTOMS; PROBLEMS   MEASURABLE  GOALS;    FUNCTIONAL IMPROVEMENT INTERVENTIONS;   GAINS MADE DISCHARGE CRITERIA   Psychosocial: metabolic disorder   improve nutrition and exercise for 20 minutes 3-7 days a week  building on strengths marked symptom improvement   Psychosis: delusions   reduce frequency/ intensity of false beliefs medications  marked symptom improvement

## 2017-12-27 NOTE — MR AVS SNAPSHOT
After Visit Summary   12/27/2017    Guero Carter    MRN: [id number]           Patient Information     Date Of Birth          1990        Visit Information        Provider Department      12/27/2017 4:15 PM Akosua De Oliveira APRN CNP [facility] Psychiatry        Today's Diagnoses     Psychosis, unspecified psychosis type    -  1    Schizophrenia, unspecified type (H)          Care Instructions    - Decrease olanzapine to 5mg for one week and then stop  - Start ziprasidone 40mg (2 caps) at night with food. If side effects are bothersome, split dose between morning and night (20mg with breakfast, 20mg with dinner)  - Increase metformin to 1000mg at dinner for one week and then increase to 1000mg twice a day          Follow-ups after your visit        Follow-up notes from your care team     Return in about 2 weeks (around 1/10/2018).      Your next 10 appointments already scheduled     Jan 08, 2018  4:00 PM CST   [facility] ([facility])    [address]  [place] [address] [phone]              Who to contact     Please call your clinic at [phone] to:    Ask questions about your health    Make or cancel appointments    Discuss your medicines    Learn about your test results    Speak to your doctor   If you have compliments or concerns about an experience at your clinic, or if you wish to file a complaint, please contact [facility] Phys[facility] Patient Relations at [phone] or email us at [email or web address]         Additional Information About Your Visit        [employer]iter[employer] Information     Sticky is an electronic gateway that provides easy, online access to your medical records. With Sticky, you can request a clinic appointment, read your test results, renew a prescription or communicate with your care team.     To sign up for Sticky visit the website at  [email or web address]   You will be asked to enter the access code listed below, as well as some personal information. Please follow the directions to create your username and password.     Your access code is: [email or web address]  Expires: 3/24/2018 12:53 PM     Your access code will  in 90 days. If you need help or a new code, please contact your [facility] or call [phone] for assistance.        Care EveryWhere ID     This is your Care EveryWhere ID. This could be used by other organizations to access your [place] medical records  [id number]        Your Vitals Were     Pulse Height BMI (Body Mass Index)             101 6' 2.02\" (188 cm) 33.24 kg/m2          Blood Pressure from Last 3 Encounters:   17 126/82   17 (!) 173/109   17 135/84    Weight from Last 3 Encounters:   17 259 lb (117.5 kg)   17 252 lb 3.2 oz (114.4 kg)   17 253 lb 3.2 oz (114.9 kg)              Today, you had the following     No orders found for display         Today's Medication Changes          These changes are accurate as of: 17  4:54 PM.  If you have any questions, ask your nurse or doctor.               Start taking these medicines.        Dose/Directions    ziprasidone 20 MG capsule   Commonly known as:  GEODON   Used for:  Psychosis, unspecified psychosis type   Started by:  Akosua De Oliveira APRN CNP        Dose:  20 mg   Take 1 capsule (20 mg) by mouth 2 times daily (with meals)   Quantity:  60 capsule   Refills:  0         These medicines have changed or have updated prescriptions.        Dose/Directions    metFORMIN 500 MG tablet   Commonly known as:  GLUCOPHAGE   This may have changed:    - how much to take  - how to take this  - when to take this  - additional instructions   Used for:  Schizophrenia, unspecified type (H)   Changed by:  Akosua De Oliveira APRN CNP        Take 2 tabs by mouth every day with meals   Quantity:  120 tablet   Refills:  0 "         Stop taking these medicines if you haven't already. Please contact your care team if you have questions.     OLANZapine 10 MG tablet   Commonly known as:  zyPREXA   Stopped by:  Akosua De Oliveira APRN CNP                Where to get your medicines      These medications were sent to [id number] - [place], [place] - [address] AT [facility] [address]  [address], [facility] [address]     Phone:  [phone]     metFORMIN 500 MG tablet    ziprasidone 20 MG capsule                Primary Care Provider Fax #    Physician No Ref-Primary [phone]       No address on file        Equal Access to Services     [facility]: Hadii ralph mo Soprerna, waaxda luisiahadaha, qaybta kaalmada mirza, moe garcia . So [facility] [phone].    ATENCIÓN: Si habla español, tiene a bruce disposición servicios gratuitos de asistencia lingüística. [facility] [phone].    We comply with applicable federal civil rights laws and [place] laws. We do not discriminate on the basis of race, color, national origin, age, disability, sex, sexual orientation, or gender identity.            Thank you!     Thank you for choosing [facility] PSYCHIATRY  for your care. Our goal is always to provide you with excellent care. Hearing back from our patients is one way we can continue to improve our services. Please take a few minutes to complete the written survey that you may receive in the mail after your visit with us. Thank you!             Your Updated Medication List - Protect others around you: Learn how to safely use, store and throw away your medicines at www.disposemymeds.org.          This list is accurate as of: 12/27/17  4:54 PM.  Always use your most recent med list.                   Brand Name Dispense Instructions for use Diagnosis    IBUPROFEN PO      Take 200-400 mg by mouth every 4 hours as needed for other (headache)        metFORMIN 500 MG tablet    GLUCOPHAGE     120 tablet    Take 2 tabs by mouth every day with meals    Schizophrenia, unspecified type (H)       ziprasidone 20 MG capsule    GEODON    60 capsule    Take 1 capsule (20 mg) by mouth 2 times daily (with meals)    Psychosis, unspecified psychosis type

## 2017-12-27 NOTE — PATIENT INSTRUCTIONS
- Decrease olanzapine to 5mg for one week and then stop  - Start ziprasidone 40mg (2 caps) at night with food. If side effects are bothersome, split dose between morning and night (20mg with breakfast, 20mg with dinner)  - Increase metformin to 1000mg at dinner for one week and then increase to 1000mg twice a day

## 2018-01-03 ENCOUNTER — ALLIED HEALTH/NURSE VISIT (OUTPATIENT)
Dept: PSYCHIATRY | Facility: CLINIC | Age: 28
End: 2018-01-03
Payer: COMMERCIAL

## 2018-01-03 DIAGNOSIS — F29 PSYCHOSIS, UNSPECIFIED PSYCHOSIS TYPE (H): Primary | ICD-10-CM

## 2018-01-03 NOTE — MR AVS SNAPSHOT
After Visit Summary   1/3/2018    Guero Carter    MRN: [id number]           Patient Information     Date Of Birth          1990        Visit Information        Provider Department      1/3/2018 4:00 PM Felix Forrester [facility] Psychiatry         Follow-ups after your visit        Your next 10 appointments already scheduled     2018  4:00 PM CST   [facility] ([facility])    [address]  [facility] [id number] [phone]              Who to contact     Please call your clinic at [phone] to:    Ask questions about your health    Make or cancel appointments    Discuss your medicines    Learn about your test results    Speak to your doctor   If you have compliments or concerns about an experience at your clinic, or if you wish to file a complaint, please contact [facility] Phys[facility] Patient Relations at [phone] or email us at [email or web address]         Additional Information About Your Visit        MyChart Information     Acrolinx is an electronic gateway that provides easy, online access to your medical records. With Acrolinx, you can request a clinic appointment, read your test results, renew a prescription or communicate with your care team.     To sign up for Acrolinx visit the website at [email or web address]   You will be asked to enter the access code listed below, as well as some personal information. Please follow the directions to create your username and password.     Your access code is: [email or web address]  Expires: 3/24/2018 12:53 PM     Your access code will  in 90 days. If you need help or a new code, please contact your [facility] or call [phone] for assistance.        Care EveryWhere ID     This is your Care EveryWhere ID. This could be used by other organizations to access your [facility]  records  [id number]         Blood Pressure from Last 3 Encounters:   12/27/17 126/82   11/01/17 (!) 173/109   06/08/17 135/84    Weight from Last 3 Encounters:   12/27/17 259 lb (117.5 kg)   11/29/17 252 lb 3.2 oz (114.4 kg)   11/01/17 253 lb 3.2 oz (114.9 kg)              Today, you had the following     No orders found for display       Primary Care Provider Fax #    Physician No Ref-Primary [phone]       No address on file        Equal Access to Services     [facility]: Hadvadim Amaya, mehnaz navarrete, anselmo blue, moe garcia . So [facility] [phone].    ATENCIÓN: Si habla español, tiene a bruce disposición servicios gratuitos de asistencia lingüística. Llame al [phone].    We comply with applicable federal civil rights laws and [place] laws. We do not discriminate on the basis of race, color, national origin, age, disability, sex, sexual orientation, or gender identity.            Thank you!     Thank you for choosing [facility] PSYCHIATRY  for your care. Our goal is always to provide you with excellent care. Hearing back from our patients is one way we can continue to improve our services. Please take a few minutes to complete the written survey that you may receive in the mail after your visit with us. Thank you!             Your Updated Medication List - Protect others around you: Learn how to safely use, store and throw away your medicines at www.disposemymeds.org.          This list is accurate as of: 1/3/18 11:59 PM.  Always use your most recent med list.                   Brand Name Dispense Instructions for use Diagnosis    IBUPROFEN PO      Take 200-400 mg by mouth every 4 hours as needed for other (headache)        metFORMIN 500 MG tablet    GLUCOPHAGE    120 tablet    Take 2 tabs by mouth every day with meals    Schizophrenia, unspecified type (H)       ziprasidone 20 MG capsule    GEODON    60 capsule    Take 1 capsule (20 mg) by mouth 2  times daily (with meals)    Psychosis, unspecified psychosis type

## 2018-01-04 NOTE — PROGRESS NOTES
NAVIGATE SEE Progress Note   For Supported Employment & Education      NAVIGATE Enrollee: Guero Carter (1990)     MRN: [id number]  Date:  1/3/2018  Any Client Contact?: Yes  Any Status Changes?: No  Clinician: LIANET Supported Employment & , Felix Forrester  Client contact in the past 30 days? Yes      Client Status Update       1a. Education - Guero Carter is interested in education: No                         1A1. Orientation to SEE services completed                         1A2. Client working on completing Career Profile                         1A3. Client completed Career Profile      1b. Employment - Guero Carter is interested in employment: Yes                         1B1. Orientation to SEE services completed                         1B3. Client completed Career Profile                         1B4. Client developed employment goals                         1B6. Client visited potential place of employment                         1B7. Client had in person contact with potential employer                         1B8. Client had interview with potential employer                         1B9. 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 People present:                          Client: Guero Carter  LIANET Supported : Felix Forrester      3. Total number of persons who participated in contact: 1      4. Length of Actual Contact: 60 minutes                         Traveled?  Yes                         Start Time (indicate am/pm):  3:30, traveling from Home (location)                         End Time (indicate am/pm):  5:00                         Total Travel Time:  30 minutes      5. Location of contact:  Community: [employer]      6. Brief description of session, contact, or client status (include: strategies, interventions, client reaction to meeting, next steps, etc.):  Writer met  with Guero for monthly meeting at the [facility] to review budget and to check-in regarding work. Guero arrived to the meeting 15 minutes early, claiming that he got off of work earlier than normal today. He reported that work is going very well and that he only has two weeks of his training left to complete before he receives his certification and a raise. He does not know the raise increase number exactly, but believes he will receive at least a $1/hour raise. He will begin working a day/night shift rotation upon completion of training. Guero presented in a positive mood, reporting that the holidays were fun as he went to three different family parties, including a New Years Alanis party at his brother's place in [place]. Writer jokingly asked Guero if he has been staying out of trouble, and he laughed and reported that he does not have as much time to go out anymore, as work has consumed a lot of his time - which is a good thing, he reports. Writer observed Guero as articulating his job responsibilities professionally and accurately. 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 Writer praised Guero for making hopeful and realistic goals for career advancement and offered to support his endeavors as needed.     According to Guero, his budget has not changed all that much since its creation. The only adjustment that both writer and Guero made to the budget included the removal of his mother's assistance in buying cigarettes (as he solely purchases his own cigs now) and adding more money to the savings account.      The plan will be to physically meet monthly for work check-ins and budget reviews.      7. Completion of mutually agreed upon client task from previous meeting:  Completed      8. Orientation and Treatment Planning:  Pursuing  current SEE goals       9. Assessment:      Assessing client's need for follow-along supports      10. Placement:      10b. Employment:  10B1. Discussion and planning re: disclosure decisions and role of SEE                         10B2. Indicate client's current decision regarding disclosure:                                      3. Client does not want to use disclosure      11. Follow Along Supports:       Writer will meet with Guero monthly for check-ins, as needed.       12. Mutually agreed upon client task for next meeting:              To review budget and to provide follow-along supports.       13. Next Meeting Scheduled for: Wednesday, February 7 at 4:00 pm.      Felix MARTINI   Supported Employment &

## 2018-01-08 ENCOUNTER — OFFICE VISIT (OUTPATIENT)
Dept: PSYCHIATRY | Facility: CLINIC | Age: 28
End: 2018-01-08
Payer: COMMERCIAL

## 2018-01-08 DIAGNOSIS — F20.9 SCHIZOPHRENIA, UNSPECIFIED TYPE (H): Primary | ICD-10-CM

## 2018-01-08 NOTE — MR AVS SNAPSHOT
After Visit Summary   2018    Guero Carter    MRN: [id number]           Patient Information     Date Of Birth          1990        Visit Information        Provider Department      2018 4:00 PM Savannah Patel LGSW [facility] Psychiatry        Today's Diagnoses     Schizophrenia, unspecified type (H)    -  1       Follow-ups after your visit        Your next 10 appointments already scheduled     2018  4:00 PM CST   Navigate Psychotherapy with Savannah KHRIS Patel   [facility] Psychiatry ([facility])    [address]  [facility] [place] [id number] [phone]              Who to contact     Please call your clinic at [phone] to:    Ask questions about your health    Make or cancel appointments    Discuss your medicines    Learn about your test results    Speak to your doctor   If you have compliments or concerns about an experience at your clinic, or if you wish to file a complaint, please contact [facility] Patient Relations at [phone] or email us at [email or web address]         Additional Information About Your Visit        MyChart Information     [email or web address] is an electronic gateway that provides easy, online access to your medical records. With [email or web address], you can request a clinic appointment, read your test results, renew a prescription or communicate with your care team.     To sign up for [email or web address] visit the website at [email or web address]   You will be asked to enter the access code listed below, as well as some personal information. Please follow the directions to create your username and password.     Your access code is: [email or web address]  Expires: 3/24/2018 12:53 PM     Your access code will  in 90 days. If you need help or a new code, please contact your [facility] or call [phone] for assistance.        Care EveryWhere ID     This is your Care EveryWhere ID. This could  be used by other organizations to access your [place] medical records  [id number]         Blood Pressure from Last 3 Encounters:   12/27/17 126/82   11/01/17 (!) 173/109   09/05/17 (!) 135/91    Weight from Last 3 Encounters:   12/27/17 117.5 kg (259 lb)   11/29/17 114.4 kg (252 lb 3.2 oz)   11/01/17 114.9 kg (253 lb 3.2 oz)              Today, you had the following     No orders found for display       Primary Care Provider Fax #    Physician No Ref-Primary [phone]       No address on file        Equal Access to Services     [facility] : Hadii ralph Amaya, wamontseda landon, valentinaybta kaalmada mirza, moe garcia . So [facility] [phone].    ATENCIÓN: Si habla español, tiene a bruce disposición servicios gratuitos de asistencia lingüística. Llame al [phone].    We comply with applicable federal civil rights laws and [place] laws. We do not discriminate on the basis of race, color, national origin, age, disability, sex, sexual orientation, or gender identity.            Thank you!     Thank you for choosing [facility]  for your care. Our goal is always to provide you with excellent care. Hearing back from our patients is one way we can continue to improve our services. Please take a few minutes to complete the written survey that you may receive in the mail after your visit with us. Thank you!             Your Updated Medication List - Protect others around you: Learn how to safely use, store and throw away your medicines at www.disposemymeds.org.          This list is accurate as of: 1/8/18 11:59 PM.  Always use your most recent med list.                   Brand Name Dispense Instructions for use Diagnosis    IBUPROFEN PO      Take 200-400 mg by mouth every 4 hours as needed for other (headache)        metFORMIN 500 MG tablet    GLUCOPHAGE    120 tablet    Take 2 tabs by mouth every day with meals    Schizophrenia, unspecified type (H)       ziprasidone 20 MG  capsule    GEODON    60 capsule    Take 1 capsule (20 mg) by mouth 2 times daily (with meals)    Psychosis, unspecified psychosis type

## 2018-01-09 ASSESSMENT — PATIENT HEALTH QUESTIONNAIRE - PHQ9: SUM OF ALL RESPONSES TO PHQ QUESTIONS 1-9: 0

## 2018-01-09 NOTE — PROGRESS NOTES
NAVIGATE Clinician Contact & Progress Note  For Individual Resiliency Training (IRT)  A Part of the [facility] First Episode of Psychosis Program    NAVIGATE Enrollee: Guero Carter (1990)     MRN: [id number]  Date:  1/08/18  Diagnosis: Schizophrenia, unspecified type; F20.9  Clinician: LIANET Individual Resiliency Trainer, KHRIS Terrell     1. Type of contact: (majority of time spent)  IRT Session    2. People present:   Client  NAVIGATE IRT/writer    3. Total number of persons who participated in contact: 2, including writer    4. Length of Actual Contact: Start Time: 4:05pm; End Time: 4:55pm   Traveled?  No     5. Location of contact:  Psychiatry Clinic, [facility]    6. Did the client complete the home practice option(s) from the previous session: Yes    7. Motivational Teaching Strategies:  Connect info and skills with personal goals  Promote hope and positive expectations  Explore pros and cons of change  Re-frame experiences in positive light    8. Educational Teaching Strategies:  Review of written material/education  Relate information to client's experience  Ask questions to check comprehension  Break down information into small chunks  Adopt client's language    9. CBT Teaching Strategies:  Reinforcement and shaping (positive feedback for steps towards goals, gains in knowledge & skills, follow-through on home assignments)    Social skills training (rationale for skill, modeling, role play practice, feedback, plan home practice)    10. IRT Module(s) Addressed:  Module 6 - Developing Resiliency -Standard Sessions    11. Techniques utilized:   [place] announced at beginning of session  Review of homework  Review of goal  Review of previous meeting  Present new material  Problem-solving practice  Help client choose a home practice option  Summarize progress made in current session    12. Mental Status Exam:    Alertness: alert  and oriented  Appearance: well groomed  Behavior/Demeanor:  "cooperative, with good  eye contact   Speech: normal  Language: intact. Preferred language identified as English.  Psychomotor: normal or unremarkable  Mood: description consistent with euthymia  Affect: restricted; was congruent to mood; was congruent to content  Thought Process/Associations: unremarkable  Thought Content:  Reports none;  Denies suicidal and violent ideation  Perception:  Reports none;  Denies auditory hallucinations and visual hallucinations  Insight: fair  Judgment: fair  Cognition: does  appear grossly intact; formal cognitive testing was not done  Suicidal ideation: denies SI, denies intent,  and denies plan  Homicidal Ideation: denies    13. Assessment/Progress Note:     Writer met client for IRT session. He reported he was doing \"good\" and that he came right from work today, after \"an easy day.\" Client seemed eager to update writer about his job. He described the details of his position and that he has one more week after this week of training. 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 Reviewed client's goals and progress on losing weight, studying for his certification, and budgeting. Client updated that he is on a new medication, Geodon, and that this is supposed to help decrease his weight gain. He said he also continues taking metformin. Client denied concerns with symptoms of psychosis or side effects to medication. Denied SI or HI. He reported his mood has been \"bland\" lately, feeling like he's not as excitable as he used to be, though he does not see this as negative. Denied depression or stress.     The majority of the session was spent starting Module 6 - developing resiliency. Reviewed client's strengths from the strengths assessment (perseverance, kindness, teamwork, forgiveness, and hope/optimism). Discussed resiliency and how these strengths are important to him. Client was engaged and openly reflected on his use " "of strengths. Engaged in discussion on how using strengths in new and challenging ways can strengthen strengths and resiliency. Client came up with ideas on how to use strengths in new ways: learn to play piano, help his nephews, ask his brother to do a project together; stay optimistic about his goals and his future. Discussed home practice of tracking how client utilizes his strengths each day over the next two weeks.     14. Plan/Referrals:     Writer and client will met next for IRT on 1/22/18.    Billing for \"Interactive Complexity\"?  No    Savannah Patel, SW    NAVIGATE Individual Resiliency Trainer    Attestation:    I did not see this patient directly. This patient is discussed with me in individual clinical social work supervision, and I agree with the plan as documented.     ADELFO Hodges, [facility], January 14, 2018    "

## 2018-01-16 ENCOUNTER — TELEPHONE (OUTPATIENT)
Dept: PSYCHIATRY | Facility: CLINIC | Age: 28
End: 2018-01-16

## 2018-01-16 NOTE — TELEPHONE ENCOUNTER
-writer called and spoke with pt's mother   -she stated that she was not aware that he needed an appointment with LOTTIE Castellanos CNP  -She reported that pt is currently at work, and that he will not answer his cell phone while he is there.   -she reported that she will tell him to call clinic to make an appointment

## 2018-01-23 ENCOUNTER — TELEPHONE (OUTPATIENT)
Dept: PSYCHIATRY | Facility: CLINIC | Age: 28
End: 2018-01-23

## 2018-01-23 ASSESSMENT — PATIENT HEALTH QUESTIONNAIRE - PHQ9: SUM OF ALL RESPONSES TO PHQ QUESTIONS 1-9: 0

## 2018-01-23 NOTE — TELEPHONE ENCOUNTER
-pt still has not called in and made a medication follow up appointment   -writer called and left voicemail to return call to clinic

## 2018-02-12 ENCOUNTER — TELEPHONE (OUTPATIENT)
Dept: PSYCHIATRY | Facility: CLINIC | Age: 28
End: 2018-02-12

## 2018-02-12 DIAGNOSIS — F29 PSYCHOSIS, UNSPECIFIED PSYCHOSIS TYPE (H): ICD-10-CM

## 2018-02-12 RX ORDER — ZIPRASIDONE HYDROCHLORIDE 20 MG/1
20 CAPSULE ORAL 2 TIMES DAILY WITH MEALS
Qty: 60 CAPSULE | Refills: 0 | Status: SHIPPED | OUTPATIENT
Start: 2018-02-12 | End: 2018-02-23

## 2018-02-12 NOTE — TELEPHONE ENCOUNTER
Last seen: 12/27/18  RTC: 2 weeks  Cancel: none  No-show: none  Next appt: 3/7/18    Incoming refill from Pt via phone    Medication requested: ziprasidone 20 mg   Directions: Take 1 capsule by mouth two times daily with meals  Qty: 60  Last refilled: 12/27/18    Medication refill approved per refill protocol

## 2018-02-23 ENCOUNTER — TELEPHONE (OUTPATIENT)
Dept: PSYCHIATRY | Facility: CLINIC | Age: 28
End: 2018-02-23

## 2018-02-23 DIAGNOSIS — F20.9 SCHIZOPHRENIA, UNSPECIFIED TYPE (H): Primary | ICD-10-CM

## 2018-02-23 RX ORDER — OLANZAPINE 10 MG/1
10 TABLET ORAL AT BEDTIME
Qty: 30 TABLET | Refills: 1 | Status: SHIPPED | OUTPATIENT
Start: 2018-02-23 | End: 2018-03-08
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
## 2018-02-23 NOTE — TELEPHONE ENCOUNTER
Plan: Stop ziprasidone 20mg bid. Start olanzapine 10mg qHS as of 2/23/18. May increase to 15mg in a week if sx are still not well-managed.

## 2018-02-23 NOTE — TELEPHONE ENCOUNTER
-Writer called and spoke with pt's mother, Catalina  -Informed Catalina that LOTTIE Castellanos CNP wishes pt to stop ziprasidone and started olanzapine 10 mg qhs.  If pt is still floridly psychotic to increase olanzapine dose to 15 mg.    -Catalina informed writer that pt is only taking ziprasidone once a day, as she counted his pills.    -Catalina is going to talk to pt about changing to olanzapine, she is slightly worried that pt will not want to take it and feels that he will want to discuss with Navigate Team.  Writer asked that if this is the case to call clinic back.    -Writer also let Catalina know that writer will be joining 2/27 appointment to review medications with pt.    -Catalina thanked writer, writer again encouraged her to call clinic should there be anymore concerns.

## 2018-02-23 NOTE — TELEPHONE ENCOUNTER
NAVIGATE Outreach  A Part of the [facility] First Episode of Psychosis Program     Patient Name: Guero Carter  /Age:  1990 (27 year old)    Received update from RN, Akosua Huynh re: mom's call from today 18.     LVM for mom indicating writer is in between off site meetings but will try calling her back at 2:30pm.     Consulted with Felix Forrester, LIANET SEE who reported able to reach out to Guero to try and set-up a follow-up SEE appt.     Will message Akosua Huynh with request to follow-up with patient to confirm he is able to make his scheduled NAVIGATE Prescriber appt on 3/7/18 at 12:45pm with Akosua De Oliveira, DNP, APRN, PMHNP-BC, EMORYATE Prescriber as Guero is known to have a day/night rotating work schedule. Will also request he be scheduled with LIANET Terrell IRT, MSW, LGSW who it seems he has canceled with on two occassions since they last met on 18.    Denise Rogers, [facility]  EMORYATE Director & Family Clinician

## 2018-02-23 NOTE — TELEPHONE ENCOUNTER
-Writer called the pt to offer appointment times listed below.   -Received voicemail; writer left message asking pt to return call to clinic

## 2018-02-23 NOTE — TELEPHONE ENCOUNTER
"-Writer spoke with pt's mother Catalina.   -Catalina reported much of the same information that was reported to Denise (see below)  -Catalina also reported that pt has been drinking \"a lot.\"  Reports finding many empty bottle of Destini   -Catalina is very concerned and is wondering if she can have pt take zyprexa until pt can be seen in clinic next week, Catalina reports that he has always done well on Zyprexa and is hoping that pt can take this.      -Writer to contact LOTTIE Castellanos CNP to discuss this.    "

## 2018-02-23 NOTE — TELEPHONE ENCOUNTER
Call patient?  Received: Today       Denise Rogers, [facility] HospitalAkosua Sy, RN       Cc: Felix Forrester; Akosua De Oliveira APRN CNP; Jorge, Savannah, [employer] Akosua BOURGEOIS, thank you for forwarding that call from mom!     Can you call the patient to confirm he can make the next prescriber appt 3/7 at 12:45 and see if he would come in at 12:00 to meet with Savannah? I think she would be available for those 45 minutes. OR I see Akosua is available Monday  3/5 at 3:45pm and Savannah at 3:00pm. Felix says the patient works rotating day/nights so I am worried he will call and cancel last minute. Thanks! Denise

## 2018-02-23 NOTE — TELEPHONE ENCOUNTER
-Writer received a phone call from Catalina pt's mother.   -Per Catalina pt has been getting more symptomatic.  Catalina states that pt has been having more delusions and paranoia, states that he is still going to work and is still taking care of himself but she is worried that he is on a slippery slope in regards to his symptoms.  Per Catalina she does not want him to lose his job.  Catalina states that pt has also been drinking.   -Catalina believes that pt is not taking his medications correctly/does not think they are working well.   -Catalina also reports that pt told her he had appointments on specific days, when he actually does not.   -Catalina requested that either Denise or Felix call or text her as she would like to discuss with them how to handle this.  Writer let Catalina know that she will send messages to them to have them get into contact with her.    -Writer also informed Catalina that she will also send a message to prescriber LOTTIE Castellanos CNP.

## 2018-02-26 ENCOUNTER — TELEPHONE (OUTPATIENT)
Dept: PSYCHIATRY | Facility: CLINIC | Age: 28
End: 2018-02-26

## 2018-02-26 NOTE — TELEPHONE ENCOUNTER
-Catalina, pt's mother again called to give writer more collateral information.    -Catalina reports that pt has been calling her today very agitated and is having issues at work.  She is hoping that HUMAIRA [place] can help with some of the work problems.    -Writer did inform Catalina that Felix would be in meeting tomorrow and can certainly discuss work concerns at that time.    -Catalina is wondering if at this time Navigate needs her to do anything, writer stated not at this time.   -Writer also informed Catalina that she would be in appointment tomorrow to review medications.

## 2018-02-26 NOTE — TELEPHONE ENCOUNTER
-Writer called pt's mother to check in to see if Med change had been made and how the weekend went.   -Received voicemail; left message asking mom to return call to clinic.

## 2018-02-26 NOTE — TELEPHONE ENCOUNTER
-Catalina returned writer's phone call.   -She states that she was not home over the weekend, but she did not receive any concerning texts or phone calls from pt over the weekend.   -Pt has not started to take his zyprexa.  Catalina states that he only took one pill out of the Geodon bottle over the weekend.   -Per Catalina pt states that he is ok, but states that he has been telling her ok to everything.   -She reminded him that he has an appointment tomorrow, and that she is going to take off work and meet him at appointment to make sure that he arrives.  She is hoping if she does this he won't call to cancel appointment.

## 2018-02-27 ENCOUNTER — OFFICE VISIT (OUTPATIENT)
Dept: PSYCHIATRY | Facility: CLINIC | Age: 28
End: 2018-02-27
Payer: COMMERCIAL

## 2018-02-27 ENCOUNTER — CARE COORDINATION (OUTPATIENT)
Dept: PSYCHIATRY | Facility: CLINIC | Age: 28
End: 2018-02-27

## 2018-02-27 DIAGNOSIS — F20.9 SCHIZOPHRENIA (H): Primary | ICD-10-CM

## 2018-02-27 DIAGNOSIS — F20.9 SCHIZOPHRENIA, UNSPECIFIED TYPE (H): Primary | ICD-10-CM

## 2018-02-27 ASSESSMENT — PATIENT HEALTH QUESTIONNAIRE - PHQ9: 5. POOR APPETITE OR OVEREATING: NOT AT ALL

## 2018-02-27 ASSESSMENT — ANXIETY QUESTIONNAIRES
1. FEELING NERVOUS, ANXIOUS, OR ON EDGE: NOT AT ALL
2. NOT BEING ABLE TO STOP OR CONTROL WORRYING: NOT AT ALL
6. BECOMING EASILY ANNOYED OR IRRITABLE: NOT AT ALL
3. WORRYING TOO MUCH ABOUT DIFFERENT THINGS: NOT AT ALL
7. FEELING AFRAID AS IF SOMETHING AWFUL MIGHT HAPPEN: NOT AT ALL
GAD7 TOTAL SCORE: 0
5. BEING SO RESTLESS THAT IT IS HARD TO SIT STILL: NOT AT ALL

## 2018-02-27 NOTE — MR AVS SNAPSHOT
After Visit Summary   2018    Guero Carter    MRN: [id number]           Patient Information     Date Of Birth          1990        Visit Information        Provider Department      2018 12:30 PM Denise Rogers, [facility] Psychiatry         Follow-ups after your visit        Your next 10 appointments already scheduled     Mar 07, 2018 12:00 PM CST   Navigate Psychotherapy with Savannah Jorge [facility] Psychiatry ([facility])    [address] [place] [id number]   [phone]            Mar 07, 2018 12:00 PM CST   Navigate Family Therapy with Denise Gonzalez Ken [facility] Psychiatry ([facility])    [address] [place] [id number] [phone]            Mar 07, 2018 12:45 PM CST   Navigate Medication Follow Up with LOTTIE Castellanos [facility] Psychiatry ([facility])    [address] [place] [id number] [phone]              Who to contact     Please call your clinic at [phone] to:    Ask questions about your health    Make or cancel appointments    Discuss your medicines    Learn about your test results    Speak to your doctor            Additional Information About Your Visit        MyChart Information     Kleermail is an electronic gateway that provides easy, online access to your medical records. With Kleermail, you can request a clinic appointment, read your test results, renew a prescription or communicate with your care team.     To sign up for Ecommot visit the website at [email or web address]   You will be asked to enter the access code listed below, as well as some personal information. Please follow the directions to create your username and password.     Your access code is: [email or web address]  Expires: 3/24/2018 12:53 PM     Your access code will  in 90 days. If you need help or a new code, please contact your [facility]  [place] [facility] or call [phone] for assistance.        Care EveryWhere ID     This is your Care EveryWhere ID. This could be used by other organizations to access your [place] medical records  [id number]         Blood Pressure from Last 3 Encounters:   12/27/17 126/82   11/01/17 (!) 173/109   06/08/17 135/84    Weight from Last 3 Encounters:   12/27/17 259 lb (117.5 kg)   11/29/17 252 lb 3.2 oz (114.4 kg)   11/01/17 253 lb 3.2 oz (114.9 kg)              Today, you had the following     No orders found for display       Primary Care Provider Fax #    Physician No Ref-Primary [phone]       No address on file        Equal Access to Services     MARI VELASCO : Hadii ralph gallegoo Soprerna, waaxda luqadaha, qaybta kaalmada adeegyada, moe garcia . So [facility] [phone].    ATENCIÓN: Si habla español, tiene a bruce disposición servicios gratuitos de asistencia lingüística. Llame al [phone].    We comply with applicable federal civil rights laws and [place] laws. We do not discriminate on the basis of race, color, national origin, age, disability, sex, sexual orientation, or gender identity.            Thank you!     Thank you for choosing [facility]AT[facility]  for your care. Our goal is always to provide you with excellent care. Hearing back from our patients is one way we can continue to improve our services. Please take a few minutes to complete the written survey that you may receive in the mail after your visit with us. Thank you!             Your Updated Medication List - Protect others around you: Learn how to safely use, store and throw away your medicines at www.disposemymeds.org.          This list is accurate as of 2/27/18  2:22 PM.  Always use your most recent med list.                   Brand Name Dispense Instructions for use Diagnosis    IBUPROFEN PO      Take 200-400 mg by mouth every 4 hours as needed for other (headache)        metFORMIN 500 MG tablet     GLUCOPHAGE    120 tablet    Take 2 tabs by mouth every day with meals    Schizophrenia, unspecified type (H)       OLANZapine 10 MG tablet    zyPREXA    30 tablet    Take 1 tablet (10 mg) by mouth At Bedtime    Schizophrenia, unspecified type (H)

## 2018-02-27 NOTE — PROGRESS NOTES
"Writer met with patient today to review medications and check in with symptoms.  Writer also provided some health education.      Guero reported that he has been taking his medications, but has not been taking the full dose of what is prescribed.  Guero states the has been taking 20 mg of Geodon, occasionally reports taking 40 mg but this is very rare and sometimes happens on weekends.  Full prescribed dose of Geodon is 40 mg.  Guero reported that he has not been taking his Metformin consistently, reported that he has lost some weight with taking the Geodon and this is the main reason he has not been taking Metformin.  Writer enquired as to how he was taking his medications.  He reported that he usually just takes them out of the bottles, he was very adamant that he does not miss doses.  Writer encouraged Guero to use a pill box to assist him in remembering to take his medications, he was not receptive to this idea.  Guero told writer that he will remember to take his medications.  Writer and Guero talked about the medication change that LOTTIE Castellanos CNP made.  Guero is to stop Geodon and start Zyprexa 10 mg qhs.  Guero told writer that he is unsure if he has any at home.  Writer informed Guero that a new order was sent to [employer] for him, so if he needs more he can pick it up.  Guero states that he has not had any side effects from his Geodon, but reported that he will have increased appetite if he goes back on Zyprexa.  Guero agreed to start taking Zyprexa and he can further discuss his medications with LOTTIE Castellanos CNP next week at his appointment.  Guero reported to writer that he has not been experiencing any symptoms.  Guero reported that he \"is just crazy.\"  Writer questioned Guero as to what this meant.  Guero reported that he watches horrible things on the news and thinks \"that could be me.\"  Guero denied having any AH.  Guero stated that most of the time his mood is " "\"bland.\"  Reported that he has asked for an antidepressant to \"make him happier\" but has not gotten this prescribed yet.  Writer questioned if Guero was feeling depressed, Guero reported no and that his motivation is good.  Guero also reported that his anxiety is fine.  Guero had a difficult time defining anxiety; after discussing the definition he does report occasional anxiety.  Guero did complete a COMPASS form, but was given the clinician form instead of patient form.  Guero reported using some coping skills such as grounding, deep breathing, and counting.      Writer and Guero discussed what is getting in the way of him making appointments with Navigate Team members.  Guero identified that mostly it is work.  Guero has rotating day and evening shifts.  Guero did discuss a lot about his job today.  Guero appears to enjoy his job and reported evening shifts being his favorite.  Writer stressed the importance of Guero making it to his appointments in order for Navigate to assist him.  Guero reported that a co-worker wants to trade her evening shifts for his day shifts next week.  Guero is currently scheduled to see GER Terrell at 12pm on 3/7/18 and is seeing LOTTIE Castellanos CNP at 12:45pm on 3/7/18.  Writer stressed to Guero that if he does not get his day shift switched, that he needs to call clinic immediatly to reschedule his appointments.  Writer will also follow up with him.  As writer was assisting Guero in making appointment to see GER Gomez Guero's mother Catalina came up to counter and informed Guero that she was going to see MOMO Hodges for family therapy.  Guero became very agitated as his mother announced this stating that he does not like when she talks about him to other people.  Guero's mother asked if writer would connect with MOMO Hodges to make an appointment for the same time Guero would be coming in.  Writer did connect with Denise to " make appointment.  Writer to follow up with Guero's mother to let her know appointment time next week.      Write also discussed Sleep, exercise, and nutrition with Guero.  Guero reported that he is sleeping well at night, about 8 hours.  Guero reported that his sleep schedule depends on what shift he is working that week.  Guero has no complaints with sleep.  Guero reported that he has been getting more exercise and has been able to do push ups.  Writer encouraged Guero to keep getting exercise.  Guero reported that he knows he should be better about his eating habits states, he does eat out at times.  Guero does bring his own lunch to work.

## 2018-02-27 NOTE — MR AVS SNAPSHOT
After Visit Summary   2018    Guero Carter    MRN: [id number]           Patient Information     Date Of Birth          1990        Visit Information        Provider Department      2018 12:30 PM Felix Forrester [facility] Psychiatry         Follow-ups after your visit        Your next 10 appointments already scheduled     Mar 07, 2018 12:00 PM CST   Navigate Psychotherapy with Savannah Jorge, [facility] Psychiatry ([facility])    [address]  [facility] [address]   [phone]            Mar 07, 2018 12:00 PM CST   Navigate Family Therapy with Denise Nunezliang, [facility] Psychiatry ([facility])    [address]  [facility] [address] [phone]            Mar 07, 2018 12:45 PM CST   Navigate Medication Follow Up with LOTTIE Castellanos [facility] Psychiatry ([facility])    [address] [place] [address] [phone]              Who to contact     Please call your clinic at [phone] to:    Ask questions about your health    Make or cancel appointments    Discuss your medicines    Learn about your test results    Speak to your doctor            Additional Information About Your Visit        MyChart Information     [employer] is an electronic gateway that provides easy, online access to your medical records. With [employer], you can request a clinic appointment, read your test results, renew a prescription or communicate with your care team.     To sign up for Wave - Private Location Appt visit the website at [email or web address]   You will be asked to enter the access code listed below, as well as some personal information. Please follow the directions to create your username and password.     Your access code is: [email or web address]  Expires: 3/24/2018 12:53 PM     Your access code will  in 90 days. If you need help or a new code, please contact your [facility]inic or call [phone] for assistance.        Care EveryWhere ID     This is your Care EveryWhere ID. This could be used by other organizations to access your [place] medical records  [id number]         Blood Pressure from Last 3 Encounters:   12/27/17 126/82   11/01/17 (!) 173/109   06/08/17 135/84    Weight from Last 3 Encounters:   12/27/17 259 lb (117.5 kg)   11/29/17 252 lb 3.2 oz (114.4 kg)   11/01/17 253 lb 3.2 oz (114.9 kg)              Today, you had the following     No orders found for display       Primary Care Provider Fax #    Physician No Ref-Primary [phone]       No address on file        Equal Access to Services     MARI VELASCO : Libertad Amaya, waaxda luqadaha, qaybta kaalmada adesinaiyanas, moe garcia . So [facility] [phone].    ATENCIÓN: Si habla español, tiene a bruce disposición servicios gratuitos de asistencia lingüística. [facility] [phone].    We comply with applicable federal civil rights laws and [place] laws. We do not discriminate on the basis of race, color, national origin, age, disability, sex, sexual orientation, or gender identity.            Thank you!     Thank you for choosing [facility] PSYCHIATRY  for your care. Our goal is always to provide you with excellent care. Hearing back from our patients is one way we can continue to improve our services. Please take a few minutes to complete the written survey that you may receive in the mail after your visit with us. Thank you!             Your Updated Medication List - Protect others around you: Learn how to safely use, store and throw away your medicines at www.disposemymeds.org.          This list is accurate as of 2/27/18  2:21 PM.  Always use your most recent med list.                   Brand Name Dispense Instructions for use Diagnosis    IBUPROFEN PO      Take 200-400 mg by mouth every 4 hours as needed for other (headache)        metFORMIN 500 MG tablet    GLUCOPHAGE    120  tablet    Take 2 tabs by mouth every day with meals    Schizophrenia, unspecified type (H)       OLANZapine 10 MG tablet    zyPREXA    30 tablet    Take 1 tablet (10 mg) by mouth At Bedtime    Schizophrenia, unspecified type (H)

## 2018-02-28 ASSESSMENT — ANXIETY QUESTIONNAIRES: GAD7 TOTAL SCORE: 0

## 2018-02-28 ASSESSMENT — PATIENT HEALTH QUESTIONNAIRE - PHQ9: SUM OF ALL RESPONSES TO PHQ QUESTIONS 1-9: 0

## 2018-02-28 NOTE — PROGRESS NOTES
NAVIGATE SEE Progress Note   For Supported Employment & Education      NAVIGATE Enrollee: Guero Carter (1990)     MRN: [id number]  Date:  2/27/2018  Any Client Contact?: Yes  Any Status Changes?: No  Clinician: LIANET Supported Employment & , Felix Forrester  Client contact in the past 30 days? Yes      Client Status Update       1a. Education - Guero Carter is interested in education: No                         1A1. Orientation to SEE services completed                         1A2. Client working on completing Career Profile                         1A3. Client completed Career Profile      1b. Employment - Guero Carter is interested in employment: Yes                         1B1. Orientation to SEE services completed                         1B3. Client completed Career Profile                         1B4. Client developed employment goals                         1B6. Client visited potential place of employment                         1B7. Client had in person contact with potential employer                         1B8. Client had interview with potential employer                         1B9. Client enrolled in work related activity - full time at [email or web address] as [employer]       2. People present:                          Client: Guero Carter   LIANET Supported : Felix Forrester      3. Total number of persons who participated in contact: 1      4. Length of Actual Contact: 30 minutes                         Traveled?  No                           5. Location of contact:  [facility]       6. Brief description of session, contact, or client status (include: strategies, interventions, client reaction to meeting, next steps, etc.):  Writer met with Guero for first time in over a month, as he has new work schedule limits his availability and flexibility to meet in the community during the week. Today's meeting was requested by Guero's  "mom, as she shared concern with the team that Guero might be relapsing by not taking his medications, increasing his drinking, and experiencing an increase in delusional thinking. The meeting block from 12:30 - 1:30 included a visit from both  and Akosua Huynh RN:    -12:30-1:00pm Felix   Catch-up   Work stressors   What gets in the way of scheduling and making appts   Next SEE appt     -1:00-1:30pm Akosua   Identify patient medication beliefs and goals   Medication review   Side effects   What gets in the way of scheduling and making appts   Next appts     Guero reported that work has been going well for the most part, and that he has considered permanently switching shifts with a co-worker, so that he can work straight evening shifts, as he prefers to have the mornings to sleep. He reported that his back has been hurting, and that he has not been lifting, so that's weird. Writer inquired about past weekend activities, and he supposedly shoveled for several hours, so writer suggested and normalized that perhaps his back could be hurting from all of the shoveling, and he responded, \"yea, maybe.\" Writer asked if he has any concern of losing his job (as previously reported by his mother), and he responded be saying that he has recently struggled to, \"have confidence in his self-worth.\" Writer asked him to go into more detail and describe what that means, but he did not want to elaborate any more. However, he admitted that he likes the job, he has no concerns about co-workers or management, but that his thoughts are getting in the way of feeling positive about his situation.  and Guero also reviewed his budget form, and the only significant change to the spreadsheet involves the additional expense of paying his mother $100/month for rent. He reported that he is taking medications to help him sleep, which are the same medications prescribed by LIANET. Milesr explained Akosua Huynh's role, and " that she would be discussing medications with him for the remainder of the meeting, to which he was amenable.     Writer attempted to schedule a follow-up appointment in the community for a Wednesday in March, and Guero said that he would need to double check his work schedule, but reported that 12:30 pm might be a good time.      7. Completion of mutually agreed upon client task from previous meeting:  Completed      8. Orientation and Treatment Planning:  Pursuing current SEE goals       9. Assessment:      Assessing client's need for follow-along supports      10. Placement:      10b. Employment:  10B1. Discussion and planning re: disclosure decisions and role of SEE                         10B2. Indicate client's current decision regarding disclosure:                                      3. Client does not want to use disclosure      11. Follow Along Supports:       Writer will try to meet with Guero every other week, or as needed.       12. Mutually agreed upon client task for next meeting:              To review budget and to provide follow-along supports.       13. Next Meeting Scheduled for: TBD, but a Wednesday at 12:30 pm in March (he's going to check his work schedule).       Felix MARTINI   Supported Employment &

## 2018-03-04 NOTE — PROGRESS NOTES
NAVIGATE Clinician Contact & Progress Note   For Family Education Program    NAVIGATE Enrollee: Guero Carter (1990)     MRN: [id number]  Date:  2/27/18  Diagnosis(es):   Schizophrenia  Clinician: NAVIGATE Director & Family Clinician, Denise Rogers [facility]     1. Type of contact: (majority of time spent)   Family Session    2. People present:   Writer  Client: No  Significant Other/Family/Friend:  Mother    3. Total number of persons who participated in contact: 2, including writer    4. Length of Actual Contact: Start Time: 12:25PM; End Time: 1:00PM   Traveled?    No     5. Location of contact:  [facility], [place]    6. Did the client complete the home practice option(s) from the previous session:   Not Applicable    7. Motivational Teaching Strategies:   Connect info and skills with personal goals  Promote hope and positive expectations  Explore pros and cons of change  Re-frame experiences in positive light    8. Educational Teaching Strategies:   Relate information to client's experience  Ask questions to check comprehension  Break down information into small chunks  Adopt client's language     9. CBT Teaching Strategies:   Reinforcement and shaping (positive feedback for steps towards goals, gains in knowledge & skills and follow-through on home assignments)     Relapse prevention planning (review of stressors, early warning signs and rehearse plan)     Behavioral tailoring and communication    10. Psychoeducational Topic(s) Addressed:  Just the Facts - Psychosis  Just the Facts - Medications for Psychosis  Just the Facts - Relapse Prevention Planning    11. Techniques utilized:   [place] announced at beginning of session  Review of homework  Review of goal  Review of previous meeting  Present new material  Role-play  Problem-solving practice  Help family choose a home practice option    12. Assessment/Progress Note:     Met with Guero's mom to offer emotional support and  "anticipatory guidance. Praised mom for communication with the NAVIGATE team once she noticed warning signs for relapse, such as persecutory delusions and preoccupation with rape, homosexuality, and the anus. Mom reported Guero expressed fear that he will be rehospitalized and was increasingly suspicious with her attending his appointment today. Brainstormed communication strategies to reframe clinic appointments and working with NAVIGATE as a means to stay out of the hospital.     Guero's mom identified concerns re: medications, specifically, that Geodon \"isn't working.\" Suggested Guero's decision to only take his evening dose may be playing a part in the effectiveness of the medication. Mom reported Guero has historically refused to take medications at any other time during the day but at the nighttime because he has a persistent fear that the medication will effect his ability to work. Mom is also of the belief that Guero is not taking the metformin. Reported today's goal for appts was for Guero to connect with Felix re: employment concerns and speak with RN Akosua Huynh re: medication concerns.     Mom was agreeable to coming into clinic again next week for continued support and to help Guero follow-up with med management and IRT appts.     13. Plan/Referrals:     Will meet with family weekly as schedule allows for evidence based family psychoeducation and therapeutic support aimed at maximizing Guero's opportunity for recovery from psychosis.     Denise Rogers, [facility]   NAVIGATE Director & Family Clinician     "

## 2018-03-05 ENCOUNTER — TELEPHONE (OUTPATIENT)
Dept: PSYCHIATRY | Facility: CLINIC | Age: 28
End: 2018-03-05

## 2018-03-05 NOTE — TELEPHONE ENCOUNTER
-Writer attempted to call pt to make sure he can make his appointments on 3/7/18.  Pt did not answer; left message asking pt to return call to clinic.

## 2018-03-06 NOTE — TELEPHONE ENCOUNTER
-Writer called and spoke with pt regarding his appointments tomorrow.   -Pt stated that he did get his works shifts switched and is working evenings this week.   -Confirmed with pt that he will be in clinic at noon tomorrow to meet with IRT and then meet with LOTTIE Castellanos CNP following that.

## 2018-03-07 ENCOUNTER — BEH TREATMENT PLAN (OUTPATIENT)
Dept: PSYCHIATRY | Facility: CLINIC | Age: 28
End: 2018-03-07

## 2018-03-07 ENCOUNTER — OFFICE VISIT (OUTPATIENT)
Dept: PSYCHIATRY | Facility: CLINIC | Age: 28
End: 2018-03-07
Payer: COMMERCIAL

## 2018-03-07 VITALS
TEMPERATURE: 98.1 F | WEIGHT: 257.4 LBS | DIASTOLIC BLOOD PRESSURE: 107 MMHG | BODY MASS INDEX: 32 KG/M2 | HEIGHT: 75 IN | HEART RATE: 73 BPM | SYSTOLIC BLOOD PRESSURE: 170 MMHG | RESPIRATION RATE: 20 BRPM

## 2018-03-07 DIAGNOSIS — T50.905S ADVERSE EFFECT OF DRUG, SEQUELA: Primary | ICD-10-CM

## 2018-03-07 DIAGNOSIS — F20.9 SCHIZOPHRENIA, UNSPECIFIED TYPE (H): Primary | ICD-10-CM

## 2018-03-07 DIAGNOSIS — F20.9 SCHIZOPHRENIA (H): Primary | ICD-10-CM

## 2018-03-07 DIAGNOSIS — F20.9 SCHIZOPHRENIA, UNSPECIFIED TYPE (H): ICD-10-CM

## 2018-03-07 ASSESSMENT — PAIN SCALES - GENERAL: PAINLEVEL: SEVERE PAIN (6)

## 2018-03-07 NOTE — MR AVS SNAPSHOT
After Visit Summary   3/7/2018    Guero Carter    MRN: [id number]           Patient Information     Date Of Birth          1990        Visit Information        Provider Department      3/7/2018 12:00 PM Denise Rogers, [facility] Psychiatry         Follow-ups after your visit        Your next 10 appointments already scheduled     Mar 14, 2018 12:00 PM CDT   Navigate Psychotherapy with Savannah Patel [facility] Psychiatry ([facility])    [address]  [facility] [address] [phone]            Mar 21, 2018 12:00 PM CDT   Navigate See with Felix Forrester   [facility] Psychiatry ([facility])    [address]  [facility] [address] [phone]            Mar 28, 2018 12:45 PM CDT   Navigate Medication Follow Up with LOTTIE Castellanos CNP   [facility] Psychiatry ([facility])    [address]  [facility] [id number] [phone]              Who to contact     Please call your clinic at [phone] to:    Ask questions about your health    Make or cancel appointments    Discuss your medicines    Learn about your test results    Speak to your doctor            Additional Information About Your Visit        MyChart Information     [employer] is an electronic gateway that provides easy, online access to your medical records. With [employer], you can request a clinic appointment, read your test results, renew a prescription or communicate with your care team.     To sign up for [email or web address] visit the website at [email or web address]   You will be asked to enter the access code listed below, as well as some personal information. Please follow the directions to create your username and password.     Your access code is: [email or web address]  Expires: 3/24/2018 12:53 PM     Your access code will  in 90 days. If you need help or a new code, please contact your [facility] or  call [phone] for assistance.        Care EveryWhere ID     This is your Care EveryWhere ID. This could be used by other organizations to access your [place] medical records  [id number]         Blood Pressure from Last 3 Encounters:   03/07/18 (!) 170/107   12/27/17 126/82   11/01/17 (!) 173/109    Weight from Last 3 Encounters:   03/07/18 257 lb 6.4 oz (116.8 kg)   12/27/17 259 lb (117.5 kg)   11/29/17 252 lb 3.2 oz (114.4 kg)              Today, you had the following     No orders found for display         Today's Medication Changes          These changes are accurate as of 3/7/18  3:07 PM.  If you have any questions, ask your nurse or doctor.               Stop taking these medicines if you haven't already. Please contact your care team if you have questions.     metFORMIN 500 MG tablet   Commonly known as:  GLUCOPHAGE   Stopped by:  Akosua De Oliveira APRN CNP                    Primary Care Provider Fax #    Physician No Ref-Primary [phone]       No address on file        Equal Access to Services     [facility]: Hadii ralph Amaya, waaxda luqadaha, qaybta kaaljose f blue, moe garcia . So [facility] [phone].    ATENCIÓN: Si habla español, tiene a bruce disposición servicios gratuitos de asistencia lingüística. Llame al [phone].    We comply with applicable federal civil rights laws and [place] laws. We do not discriminate on the basis of race, color, national origin, age, disability, sex, sexual orientation, or gender identity.            Thank you!     Thank you for choosing [facility] PSYCHIAT[facility]  for your care. Our goal is always to provide you with excellent care. Hearing back from our patients is one way we can continue to improve our services. Please take a few minutes to complete the written survey that you may receive in the mail after your visit with us. Thank you!             Your Updated Medication List - Protect others around you: Learn how to  safely use, store and throw away your medicines at www.disposemymeds.org.          This list is accurate as of 3/7/18  3:07 PM.  Always use your most recent med list.                   Brand Name Dispense Instructions for use Diagnosis    IBUPROFEN PO      Take 200-400 mg by mouth every 4 hours as needed for other (headache)        OLANZapine 10 MG tablet    zyPREXA    30 tablet    Take 1 tablet (10 mg) by mouth At Bedtime    Schizophrenia, unspecified type (H)

## 2018-03-07 NOTE — MR AVS SNAPSHOT
After Visit Summary   3/7/2018    Guero Carter    MRN: [id number]           Patient Information     Date Of Birth          1990        Visit Information        Provider Department      3/7/2018 12:00 PM Savannah Patel LGSW [facility]        Today's Diagnoses     Schizophrenia, unspecified type (H)    -  1       Follow-ups after your visit        Your next 10 appointments already scheduled     Mar 14, 2018 12:00 PM CDT   Navigate Psychotherapy with KHRIS Terrell   [facility] ([facility])    [address]  [facility] [address]   [phone]            Mar 21, 2018 12:00 PM CDT   Navigate See with Felix Forrester   [facility] ([facility])    [address]  [facility] [address] [phone]            Mar 28, 2018 12:45 PM CDT   Navigate Medication Follow Up with LOTTIE Castellanos CNP   [facility] ([facility])    [address]  [facility] [address] [phone]              Who to contact     Please call your clinic at [phone] to:    Ask questions about your health    Make or cancel appointments    Discuss your medicines    Learn about your test results    Speak to your doctor            Additional Information About Your Visit        MyChart Information     Klevosti is an electronic gateway that provides easy, online access to your medical records. With Klevosti, you can request a clinic appointment, read your test results, renew a prescription or communicate with your care team.     To sign up for Devtoot visit the website at [email or web address]   You will be asked to enter the access code listed below, as well as some personal information. Please follow the directions to create your username and password.     Your access code is: [email or web address]  Expires: 3/24/2018 12:53 PM     Your access code will  in 90 days. If you need help or a new code,  please contact your [facility] or call [phone] for assistance.        Care EveryWhere ID     This is your Care EveryWhere ID. This could be used by other organizations to access your [place] medical records  [id number]         Blood Pressure from Last 3 Encounters:   03/07/18 (!) 170/107   12/27/17 126/82   11/01/17 (!) 173/109    Weight from Last 3 Encounters:   03/07/18 257 lb 6.4 oz (116.8 kg)   12/27/17 259 lb (117.5 kg)   11/29/17 252 lb 3.2 oz (114.4 kg)              Today, you had the following     No orders found for display         Today's Medication Changes          These changes are accurate as of 3/7/18  1:30 PM.  If you have any questions, ask your nurse or doctor.               Stop taking these medicines if you haven't already. Please contact your care team if you have questions.     metFORMIN 500 MG tablet   Commonly known as:  GLUCOPHAGE   Stopped by:  Akosua De Oliveira APRN CNP                    Primary Care Provider Fax #    Physician No Ref-Primary [phone]       No address on file        Equal Access to Services     [facility]: Hadvadim Amaya, wanoe navarrete, qagerald blue, moe garcia . So [facility] [phone].    ATENCIÓN: Si habla español, tiene a bruce disposición servicios gratuitos de asistencia lingüística. Llame al [phone].    We comply with applicable federal civil rights laws and [place] laws. We do not discriminate on the basis of race, color, national origin, age, disability, sex, sexual orientation, or gender identity.            Thank you!     Thank you for choosing [facility]  for your care. Our goal is always to provide you with excellent care. Hearing back from our patients is one way we can continue to improve our services. Please take a few minutes to complete the written survey that you may receive in the mail after your visit with us. Thank you!             Your  Updated Medication List - Protect others around you: Learn how to safely use, store and throw away your medicines at www.disposemymeds.org.          This list is accurate as of 3/7/18  1:30 PM.  Always use your most recent med list.                   Brand Name Dispense Instructions for use Diagnosis    IBUPROFEN PO      Take 200-400 mg by mouth every 4 hours as needed for other (headache)        OLANZapine 10 MG tablet    zyPREXA    30 tablet    Take 1 tablet (10 mg) by mouth At Bedtime    Schizophrenia, unspecified type (H)

## 2018-03-07 NOTE — Clinical Note
Could you please f/u with pt to make sure that he has an appt scheduled with primary care to address HTN? Thanks!

## 2018-03-07 NOTE — PROGRESS NOTES
"NAVIGATE Medication Management Progress Note  A Part of the [facility] First Episode of Psychosis Program    NAVIGATE Enrollee: Guero Carter (1990)     MRN: [id number]  Date:  3/07/18         Contributors to the Assessment     Chart Reviewed.   Interview completed with Guero Carter.         Chief Complaint     \"Things are good\"          Interim History      Guero Carter is a 27 year old male who was last seen in clinic on 12/27/17 at which time a plan to cross-titrate from olanzapine to ziprasidone with a starting dose of 40mg was made. Metformin 1000mg was continued. The patient reports poor treatment adherence.  History was provided by Guero who was a good historian.    Since the last visit:  - Collateral information received from pt mother on 2/23/18 reporting that since his previous visit to the clinic in January, the patient's alcohol use had increased, he had been telling his mother he was attending [facility] appts when he hadn't been, mother observed medication nonadherence, family had noticed an increase in paranoid and delusional thoughts about rape and discomfort in his rectal area- consistent with his previous episodes of florid psychosis. The patient has been able to maintain his FT employment, however mother is concerned that he will not be able to if his symptoms continue to become more exacerbated.A consult with patient and mother about treatment options resulted in the patient agreeing to start olanzapine again to avoid further decompensation.   Today, the patient reports that he does see a benefit with the addition of olanzapine. He is feeling as if he is able to challenge or tolerate his delusional thoughts better. He recounts what was happening before restarting olanzapine: He was only taking 20mg of ziprasidone and had an increase delusional thinking, an increase in anxiety about delusions.   -  Continued satisfaction with work. 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   - Reports that he is currently experiencing low motivation and asociality. He will see his brother on the weekend, but is not socializing with people other than his brother and he often spends most of his time sleeping during the week when he is not working.  - He has been spending more time doing weight-bearing exercise every day.   - He is noticing a big increase in appetite again. Sometimes needs to eat a full meal- sandwich and snack before bed so he doesn't feel hungry. He did think the metformin was helpful when he was taking it regularly. He is willing to start it again.   - Continues to use EtOH socially and several times per week to \"unwind\" and help him sleep. He does not find his drinking a concern. Denies other substance use.     PSYCH ROS:   DEPRESSION:  reports-none;  DENIES- suicidal ideation , depressed mood, anhedonia, low energy and insomnia   ANDRES/HYPOMANIA:  reports-none;  DENIES- increased energy, decreased sleep need, increased activity and grandiosity  PSYCHOSIS:  reports-none;  DENIES- delusions, auditory hallucinations and visual hallucinations  ANXIETY:  none  SLEEP:  Reports sleep is \"good\", although he is groggy for a short time after awakening. He says after his morning coffee this resolves.      RECENT SUBSTANCE USE: Mostly drinks on the weekend but having a few during the week to unwind.     RECENT SOCIAL HISTORY:  Guero spends most of his social time with sibs and enjoys various social activities.     MEDICAL ROS:    Weight gain and increased appetite with olanzapine. Weight is 257 (BMI 32.34) on 3/7/18.   BP was 170/107 during rooming and 177/107 when retested after prescriber appt.  Patient reports lower back pain today which he attributes to shoveling his driveway.  Constitutional, neuro, endocrine, gastrointestinal, genitourinary and psychiatric systems are negative other than what is noted in the HPI.         First Episode of Psychosis " "History      DUP (duration untreated psychosis):  Unclear- denies prodromal sx  Route to initial care: Hospitalized twice (2013, 2017)  Medication adherence overall:  Has been adherent since May; hx of nonadherence                       General frequency of visits:  2-4 weeks  Participation in groups:  None currently  Cognitive Remediation:  None  Other treatment history: None     Reviewed for completion of First Episode work-up:  Yes  First episode workup:  Done (May 2017 labs reviewed)  MATRICS Consensus Cognitive Battery:  Not Done         Medical/Surgical History     Review of patient's allergies indicates no known allergies.    Patient Active Problem List   Diagnosis     Psychiatric disorder     MENTAL HEALTH     Psychosis            Medications     Current Outpatient Prescriptions   Medication Sig Dispense Refill     OLANZapine (ZYPREXA) 10 MG tablet Take 1 tablet (10 mg) by mouth At Bedtime 30 tablet 1     metFORMIN (GLUCOPHAGE) 500 MG tablet Take 2 tabs by mouth every day with meals 120 tablet 0     IBUPROFEN PO Take 200-400 mg by mouth every 4 hours as needed for other (headache)                Vitals     BP (!) 170/107 (BP Location: Right arm, Patient Position: Chair, Cuff Size: Adult Large)  Pulse 73  Temp 98.1  F (36.7  C)  Resp 20  Ht 1.9 m (6' 2.8\")  Wt 116.8 kg (257 lb 6.4 oz)  BMI 32.34 kg/m2      Weight prior to medication: 200#         Mental Status Exam     Alertness: alert  and oriented  Appearance: well groomed  Behavior/Demeanor: cooperative and pleasant, with good  eye contact   Speech: regular rate and rhythm  Language: intact  Psychomotor: normal or unremarkable  Mood: description consistent with euthymia  Affect: full range; was congruent to mood; was congruent to content  Thought Process/Associations: unremarkable  Thought Content:  Reports none;  Denies suicidal and violent ideation, delusions and paranoid ideation  Perception:  Reports none;  Denies auditory hallucinations and " "visual hallucinations  Insight: excellent  Judgment: excellent  Cognition: does  appear grossly intact; formal cognitive testing was not done         Labs and Data     RATING SCALES:  N/A    PHQ-9 SCORE 1/9/2018 1/23/2018 2/27/2018   Total Score 0 0 0       ANTIPSYCHOTIC LABS ROUTINE    [glu, A1C, lipids (focus LDL), liver enzymes, WBC, ANEU, Hgb, plts]   q12 mo  Recent Labs   Lab Test  05/26/17   0756  09/26/13   0735   GLC  87  78     Recent Labs   Lab Test  05/26/17   0756   CHOL  192   TRIG  149   LDL  113*   HDL  49     Recent Labs   Lab Test  05/26/17   0756  09/26/13   0735   AST  22  21   ALT  37  26   ALKPHOS  102  65     Recent Labs   Lab Test  05/26/17   0756 09/26/13   0735   WBC  6.0  4.7   ANEU  3.2  1.8   HGB  16.6  14.9   PLT  228  191            Psychiatric Diagnoses     Schizophrenia, Unspecified type; Tobacco abuse         Assessment     TODAY   During the rooming process, the patient had a BP of 170/107. Discussed concerns with elevated BP, pt responded \"I have had BP that were higher than than, I am not worried\". Retested BP before he left the clinic and reading was 177/107. Pt denied overt sx of htn, was educated about what s/sx are. Pt was advised to be seen in urgent care today due to concern for adverse health event. Pt refused because he was concerned about making it to work, did agree to make an appt with primary care to be seen before the end of the week.   The pateint reports good treatment adherence since restarting  olanzapine and demonstrates a new commitment to med adherence to help with distressing delusions. He has noticed a significant increase in appetite again and was interested in trying the metformin again, although \"he does not have diabetes and he just needs to do better with diet and exercise\".   Discussed again how the use of EtOH can decrease efficacy of his medications, and it's propensity to interrupt sleep cycles, assessed for safe behavior when drinking. Patient " denies concerns with his alcohol use.               MANAGEMENT:  Monitoring for adverse effects, routine vitals, routine labs, periodic EKGs, using lowest therapeutic dose of [zyprexa] and patient is aware of risks         Plan     1) PSYCHOTROPIC MEDICATIONS:  - Continue olanzapine 10mg.   - Start metformin ER 1000mg at dinner for one week and then increase to 2000mg     2) THERAPY:  Continue    3) NEXT DUE:    Labs- November 2017  Rating Scales- AIMS due at RTC    4) REFERRALS:    No Referrals needed    5) RTC: 2- 3 weeks    6) CRISIS NUMBERS:   Provided routinely in AVS.    TREATMENT RISK STATEMENT:  The risks, benefits, alternatives and potential adverse effects have been discussed and are understood by the pt. The pt understands the risks of using street drugs or alcohol. There are no medical contraindications, the pt agrees to treatment with the ability to do so. The pt knows to call the clinic for any problems or to access emergency care if needed.  Medical and substance use concerns are documented above.  Psychotropic drug interaction check was done, including changes made today.      PROVIDER:  LOTTIE Castellanos [facility]      PSYCHIATRY CLINIC INDIVIDUAL PSYCHOTHERAPY NOTE                                                     [16]   Start time - 1255        End time - 1323  Date reviewed - 3/7/18       Date next due - 3/7/19    Subjective: This supportive psychotherapy session addressed issues related to health managment with the use of antipsychotic medications.  Patient's reaction: Action in the context of mental status appropriate for ambulatory setting.  Progress: good  Psychotherapy services during this visit included  myself and the patient.   TREATMENT  PLAN          SYMPTOMS; PROBLEMS   MEASURABLE GOALS;    FUNCTIONAL IMPROVEMENT INTERVENTIONS;   GAINS MADE DISCHARGE CRITERIA   Psychosocial: metabolic disorder   improve nutrition and exercise for 20 minutes 3-7 days a week  building on strengths marked  symptom improvement   Psychosis: delusions   reduce frequency/ intensity of false beliefs medications  marked symptom improvement

## 2018-03-07 NOTE — MR AVS SNAPSHOT
After Visit Summary   3/7/2018    Guero Carter    MRN: [id number]           Patient Information     Date Of Birth          1990        Visit Information        Provider Department      3/7/2018 12:45 PM Akosua De Oliveira APRN CNP [facility]         Follow-ups after your visit        Your next 10 appointments already scheduled     Mar 14, 2018 12:00 PM CDT   Navigate Psychotherapy with KHRIS Terrell   [facility] ([facility])    [address]  [facility] [address] [phone]            Mar 21, 2018 12:00 PM CDT   Navigate See with Felix Forrester   [facility] ([facility])    [address]  [facility] [address] [phone]            Mar 28, 2018 12:45 PM CDT   Navigate Medication Follow Up with LOTTIE Castellanos CNP   [facility] ([facility])    [address]  [facility] [address] [phone]              Who to contact     Please call your clinic at [phone] to:    Ask questions about your health    Make or cancel appointments    Discuss your medicines    Learn about your test results    Speak to your doctor            Additional Information About Your Visit        MyChart Information     MaxTraffic is an electronic gateway that provides easy, online access to your medical records. With MaxTraffic, you can request a clinic appointment, read your test results, renew a prescription or communicate with your care team.     To sign up for ShopWikit visit the website at [email or web address]   You will be asked to enter the access code listed below, as well as some personal information. Please follow the directions to create your username and password.     Your access code is: [email or web address]  Expires: 3/24/2018 12:53 PM     Your access code will  in 90 days. If you need help or a new code, please contact your [facility] or call  "[phone] for assistance.        Care EveryWhere ID     This is your Care EveryWhere ID. This could be used by other organizations to access your [place] medical records  [id number]        Your Vitals Were     Pulse Temperature Respirations Height BMI (Body Mass Index)       73 98.1  F (36.7  C) 20 6' 2.8\" (190 cm) 32.34 kg/m2        Blood Pressure from Last 3 Encounters:   03/07/18 (!) 170/107   12/27/17 126/82   11/01/17 (!) 173/109    Weight from Last 3 Encounters:   03/07/18 257 lb 6.4 oz (116.8 kg)   12/27/17 259 lb (117.5 kg)   11/29/17 252 lb 3.2 oz (114.4 kg)              Today, you had the following     No orders found for display         Today's Medication Changes          These changes are accurate as of 3/7/18  1:25 PM.  If you have any questions, ask your nurse or doctor.               Stop taking these medicines if you haven't already. Please contact your care team if you have questions.     metFORMIN 500 MG tablet   Commonly known as:  GLUCOPHAGE   Stopped by:  Akosua De Oliveira APRN CNP                    Primary Care Provider Fax #    Physician No Ref-Primary [phone]       No address on file        Equal Access to Services     MARI VELASCO : [place] gal[place], waaxda luqadaha, qaybta kaalmada adeegyada, moe farr. So [facility] [phone].    ATENCIÓN: Si habla español, tiene a bruce disposición servicios gratuitos de asistencia lingüística. Llame al [phone].    We comply with applicable federal civil rights laws and [place] laws. We do not discriminate on the basis of race, color, national origin, age, disability, sex, sexual orientation, or gender identity.            Thank you!     Thank you for choosing [facility]  for your care. Our goal is always to provide you with excellent care. Hearing back from our patients is one way we can continue to improve our services. Please take a few minutes to complete the written survey that you may " receive in the mail after your visit with us. Thank you!             Your Updated Medication List - Protect others around you: Learn how to safely use, store and throw away your medicines at www.disposemymeds.org.          This list is accurate as of 3/7/18  1:25 PM.  Always use your most recent med list.                   Brand Name Dispense Instructions for use Diagnosis    IBUPROFEN PO      Take 200-400 mg by mouth every 4 hours as needed for other (headache)        OLANZapine 10 MG tablet    zyPREXA    30 tablet    Take 1 tablet (10 mg) by mouth At Bedtime    Schizophrenia, unspecified type (H)

## 2018-03-07 NOTE — PROGRESS NOTES
LIANET Clinician Contact & Progress Note  For Individual Resiliency Training (IRT)  A Part of the [facility] First Episode of Psychosis Program    NAVIGATE Enrollee: Guero Carter (1990)     MRN: [id number]  Date:  3/07/18  Diagnosis: Schizophrenia, unspecified type; F20.9  Clinician: LIANET Individual Resiliency Trainer, KHRIS Terrell     1. Type of contact: (majority of time spent)   IRT Session    2. People present:     Client: Guero Carter  LIANET IRT: ADELFO Terrell LGSW    3. Total number of persons who participated in contact: 2, including writer    4. Length of Actual Contact: Start Time: 12:05pm; End Time: 12:45pm   Traveled?    No     5. Location of contact:  Psychiatry Clinic, [place]    6. Did the client complete the home practice option(s) from the previous session:   Not Applicable    7. Motivational Teaching Strategies:   Connect info and skills with personal goals, Promote hope and positive expectations    8. Educational Teaching Strategies:   Ask questions to check comprehension, Break down information into small chunks, Adopt client's language     9. CBT Teaching Strategies:   Reinforcement and shaping  positive feedback for steps towards goals, Behavioral tailoring  fit taking medication into client's daily routine    10. IRT Module(s) Addressed:   Module 2 - Assessment/Initial Goal Setting, Module 6 - Developing Resiliency -Standard Sessions    11. Techniques utilized:    Hiram announced at beginning of session, Review of goal, Review of previous meeting, Summarize progress made in current session    12. Mental Status Exam:    Alertness: oriented  Appearance: well groomed  Behavior/Demeanor: cooperative, pleasant and calm, with fair  eye contact   Speech: normal and regular rate and rhythm  Language: intact. Preferred language identified as English.  Psychomotor: normal or unremarkable  Mood: description consistent with euthymia  Affect: blunted; was congruent to  "mood; was congruent to content  Thought Process/Associations: unremarkable  Thought Content:  Reports preoccupations;  Denies suicidal and violent ideation, delusions and paranoid ideation  Perception:  Reports none;  Denies auditory hallucinations and visual hallucinations  Insight: fair  Judgment: fair  Cognition: does  appear grossly intact; formal cognitive testing was not done  Suicidal ideation: denies SI, denies intent,  and denies plan  Homicidal Ideation: denies    13. Assessment/Progress Note:     Writer met client for IRT session. Spent majority of the session checking in on client's progress toward goals, new goal development and motivation, symptoms, and other updates since last IRT session in January. Client presented organized and engaged. He reported work has been going well and he updated on his work schedule. He initially reported no concerns with symptoms but as the meeting progressed, indicated having a \"bland\" mood, having some depression, and wanting to be more accomplished. Client updated writer on his medication change last week, returning to zyprexa. He reported that he has been taking zyprexa daily and noticed that he is sleeping more , feeling less active, and has more of an appetite. He reported plan to meet with NAVIGATE prescriber for med follow up today following IRT. Writer asked client about substance use. He reported no concerns with his drinking, and reported drinking in moderation. He described drinking half a pint of amos on the weekends, usually at home by himself, and sometimes drinking during the week. \"I try not to everyday.\" Again denied concerns of impact on functioning and symptoms. He did report sometimes he feels happier when he drinks. Client reported he has not been engaging in as many hobbies as he used to. Explored hobbies of interest including spending time with his brothers, making furniture, and exercising. Client also discussed goals of saving money to go back " "to school to complete program in polysomnography. He also reported desire to take vacation to either [facility] or [place]. Client and writer reviewed BEH treatment plan, see encounter for 3/7, and updated CANSAS and [place] Symptom Index assessments for 6 months.   Client indicated \"no problem\" on all fields of the CANSAS. On the [place] Symptom Index, client indicated forgetting important things once during the month, describing incident today in which he forgot his phone at home. He indicated feeling nervous, tense, worried, frustrated, or afraid and depressed \"several times during the month.\" He reported \"not at all\" for all other fields on the measure.     14. Plan/Referrals:     Writer and client will meet for next IRT session on 7/14.    Billing for \"Interactive Complexity\"?     No      Savannah Patel [facility]    NAVIGATE Individual Resiliency Trainer    Attestation:    I did not see this patient directly. This patient is discussed with me in individual clinical social work supervision, and I agree with the plan as documented.     ADELFO Hernandez, [facility], March 11, 2018      "

## 2018-03-08 RX ORDER — OLANZAPINE 10 MG/1
10 TABLET ORAL AT BEDTIME
Qty: 30 TABLET | Refills: 1 | Status: SHIPPED | OUTPATIENT
Start: 2018-03-08 | End: 2021-06-24

## 2018-03-08 RX ORDER — METFORMIN HCL 500 MG
1000 TABLET, EXTENDED RELEASE 24 HR ORAL
Qty: 60 TABLET | Refills: 1 | Status: SHIPPED | OUTPATIENT
Start: 2018-03-08 | End: 2021-06-24

## 2018-03-08 NOTE — PROGRESS NOTES
[facility] NAVIGATE Program Treatment Plan Summary  A Part of the [facility] First Episode of Psychosis Program      NAVIGATE Enrollee: Guero Carter  /Age:  1990 (27 year old)      Date of Initial Service: 2017  Date of INTIAL Treatment Plan: 2017  Last Review/Update Date:  2017                                                                     90-Day Review Date: 3/7/18- today      The following is a REVIEWED treatment plan?  Yes   The following is an UPDATED treatment plan?  Yes      Participants at Collaborative Treatment Planning Meeting:                          Client                           NAVIGATE IRT Clinician: ADELFO Terrell, KHRIS      1. DSM-V Diagnosis (include numeric code)  Schizophrenia, unspecified type; F20.9      2. Current symptoms and circumstances that substantiate the diagnosis:  Diagnosis of schizophrenia seems supported by positive symptoms (delusional thoughts, suspiciousness, possible response to internal stimuli) and negative symptoms (avolition, affective flattening, anhedonia, alogia, apathy), as well as past positive (AH, delusional thoughts) and negative symptoms (avolition, affective flattening, anhedonia, alogia, apathy). Guero did not report hx of mood disorder symptoms such as depressive and manic symptoms.   3. How symptoms and/or behaviors are affecting level of function:  Guero has experienced social and academic decline, but with treatment and support has returned to work. Currently negative symptoms impact motivation to work on health goals.       4. Risk Assessment:  Suicide:  Assessed Level of Immediate Risk: Low- per chart review, Guero presented with SI when hospitalized in   Ideation: No  Plan:  No  Means: No  Intent: No      Homicide/Violence:  Assessed Level of Immediate Risk: Low - History of agressiveness toward mom prior to hospitalization  Ideation: No  Plan: No  Means: No  Intent: No      If on a medication, please include name and  "dosage:    Current Outpatient Prescriptions   Medication     metFORMIN (GLUCOPHAGE-XR) 500 MG 24 hr tablet     OLANZapine (ZYPREXA) 10 MG tablet     [DISCONTINUED] OLANZapine (ZYPREXA) 10 MG tablet     IBUPROFEN PO     No current facility-administered medications for this visit.           5. Treatment Goals      Domain: Illness Management & Recovery  Goal: Identify and engage possible areas of improvement related to +/- symptoms, ability to manage illness, medications, and/or substance use/abuse, SI/SIB/HI      Objectives & Target Date         Continue with mediation recommendations/ find \"right does that minimizes weight gain\"; Target date: 6/12/18         Identify negative automatic thoughts and replace them with positive self-talk messages to build self-esteem ; Target date:  6/12/18    Verbally identify the stressors that contribute to the reactive psychosis ; Target date:  6/12/18    Review, Develop and implement relapse prevention strategies for managing possible future episodes of psychosis; Target date:  6/12/18        Increase communication with the parents and family, resulting in feeling attended to and understood; Target date:  6/12/18    Family members verbalize increased understanding of an knowledge about Guero willoughby illness and treatment ; Target date:  6/12/18    Family members increase positive support of Guero to reduce the chances of acute exacerbation of the psychotic episode ; Target date:  6/12/18      Strengths         Capacity to love and be loved      Hope, Optimism, & future-mindedness      Honest, Authentic, Genuine      Industry, diligence, & perseverance      Medication adherence (P)    Supportive family, recovery environment (P)      Barriers     Alcohol use    Isolation (S), Male (S),SI- history, Single (S)    Symptoms of psychosis, positive (delusions, hallucinations)    Symptoms of psychosis, negative (flat affect, avolition, anhedonia, alogia, and/or apathy)    Symptoms of psychosis, " cognitive (memory, attention and concentration, and/or executive functioning difficulties)    Treatment Non-Adherence - history      Provider & Intervention   IRT    Motivational Interviewing (Connect info and skills with personal goals, Promote hope and positive expectations, Explore pros and cons of change, Re-frame experiences in a positive light)    Educational Teaching Strategies (Review written material/education on: Assessment/Initial Goal Setting, Education about Psychosis, Relapse Prevention Planning, Processing the Psychotic Episode, Developing Resiliency -Standard Sessions, Building a Bridging to Your Goals, Dealing with Negative Feelings, Coping with Symptoms, Substance Use, Having Fun and Developing Good Relationships, Making Choices about Smoking, Nutrition and Exercise, Developing Resiliency)    CBT (Reinforcement and shaping, Social skills training, Relapse prevention planning, Coping skills training, Relaxation training, Cognitive restructuring, Behavioral tailoring)  Prescriber    Medication Evaluation     Ongoing Medication Management    Side Effect Monitoring    Medication Education    Adherence Monitoring    Lab work  Family Therapy    Motivational Interviewing (Connect info and skills with personal goals, Promote hope and positive expectations, Explore pros and cons of change, Re-frame experiences in a positive light)    Educational Teaching Strategies (Review written material/education on: Psychosis, Medications for psychosis, Coping with stress, Strategies to build resiliency, Relapse prevention planning, Developing a collaboration with mental health professionals, Effective communication, A relative s guide to supporting recovery from psychosis, Basic facts about alcohol and drugs)    CBT (Reinforcement and shaping, Social skills training, Relapse prevention planning, Coping skills training, Relaxation training, Cognitive restructuring, Behavioral tailoring)          Domain: Health & Basic  Living Needs  Goal: Identify and engage possible areas of improvement related to basic needs being met and maintaining or improving overall health and well-being      Objectives & Target Date         Verbalize an accurate understanding of factors influencing eating, health, overweight, and obesity; Target Date:  6/12/18    Identify changes in daily lifestyle activity conducive to improved health and good weight management; Target Date: 6/12/18     Learn and implement healthy nutritional practices; Target Date: 6/12/18    Establish a plan to increase physical exercise (Jog 3 miles/exercise 3x per week); Target Date:  6/12/18        Disclose any history of substance use that may contribute to and complicate the treatment of the psychosis; Target Date:  6/12/18    Verbalize increased knowledge of substance use and the process of recovery  ; Target Date:  6/12/18    Verbalize a commitment to a harm reduction approach to using substances ; Target Date:  6/12/18    Implement relapse prevention strategies for managing possible future situations with high risk for relapse ; Target Date:  6/12/18        Family members verbally reinforce Guero's active attempts to decrease substance by providing praise, encouragement, and affirmations ; Target Date: 6/12/18      Strengths    Caution, Prudence, & Discretion      Hope, Optimism, & future-mindedness      Industry, diligence, & perseverance      Medication adherence (P)    Supportive friends, family, recovery environment (P)      Barriers     Drug/Alcohol abuse- history; current alcohol use    Symptoms of psychosis, positive (delusions, hallucinations)    Symptoms of psychosis, negative (flat affect, avolition, anhedonia, alogia, and/or apathy)    Symptoms of psychosis, cognitive (memory, attention and concentration, and/or executive functioning difficulties)    Treatment Non-Adherence -history       Provider & Intervention   IRT    Motivational Interviewing     Educational  "Teaching Strategies (Review written material/education on: Substance Use, Nutrition and Exercise, Developing Resiliency)    CBT  Prescriber    Medication Evaluation     Ongoing Medication Management    Side Effect Monitoring    Medication Education    Adherence Monitoring    Lab work  Family Therapy    Motivational Interviewing    Educational Teaching Strategies ( Basic facts about alcohol and drugs)    CBT   Domain: Family & Other Supports  Goal: Identify and engage possible areas of improvement related to engaging family, friends and other supports      Objectives & Target Date         Strengthen the social support network with friends by initiating social contact and participating in social activities with peers: Target date: 6/12/18        See family more often: Target date:  6/12/18        Increase participation in interpersonal or peer group activities : Target date: 6/12/18        Family members demonstrate support for Guero as he/she participates in treatment: Target date:  6/12/18      Strengths      Capacity to love and be loved      Hope, Optimism, & future-mindedness      Kind & Generous      Supportive family, recovery environment (P)      Barriers    Symptoms of psychosis, positive (delusions, hallucinations)    Symptoms of psychosis, negative (flat affect, avolition, anhedonia, alogia, and/or apathy)    Symptoms of psychosis, cognitive (memory, attention and concentration, and/or executive functioning difficulties)      Provider & Intervention   IRT    Motivational Interviewing    Educational Teaching Strategies (Review written material/education on:  Having Fun and Developing Good Relationships)    CBT   Family Therapy    Motivational Interviewing     Educational Teaching Strategies     CBT   SEE    Employment Assistance & Supports (Information gathering/planning re: \"benefits applications\" or \"work incentive planning\", Job searching, Interview preparation/skills training, Complete resume, Complete " applications, Follow along supports for requesting accommodations, development and use of natural supports, problem solving difficulties, stress management, develop/use of coping skills for symptoms, develop/use of coping skills for cognitive difficulties, social skills training)      Domain: Social, Academic, & Employment  Goal: Identify and engage possible areas of improvement related to education, employment, and social activities       Objectives & Target Date    Explore areas of interest for continued educational opportunities/ explore returning to [employer] : Target date:  6/12/18    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 : Target date:  6/12/18    Save money/ stick to budget for loans, housing, car, school; target date:36/12/18    Increase participation in interpersonal or peer group activities/ reconnect with friends/ date: Target date:  6/12/18      Strengths         Hope, Optimism, & future-mindedness       Industry, diligence, & perseverance      Medication adherence (P)    Supportive family, recovery environment (P)     Work history/experience     motivated to return to employment      Barriers     Symptoms of psychosis, positive (delusions, hallucinations)    Symptoms of psychosis, negative (flat affect, avolition, anhedonia, alogia, and/or apathy)    Symptoms of psychosis, cognitive (memory, attention and concentration, and/or executive functioning difficulties)    Treatment Non-Adherence -hx     Hx of drug and alcohol abuse      Provider & Intervention   SEE    Career Inventory    Goal Setting    Identify Level of Disclosure     Education Assistance & Supports (Discuss/plan use of student disability services, School searching, Interview preparation/skills training, Complete forms/applications, Follow along supports for requesting accommodations, development and use of natural supports, problem solving difficulties, stress management, develop/use of coping skills for  "symptoms, develop/use of coping skills for cognitive difficulties, social skills training)    Employment Assistance & Supports (Information gathering/planning re: \"benefits applications\" or \"work incentive planning\", Job searching, Interview preparation/skills training, Complete resume, Complete applications, Follow along supports for requesting accommodations, development and use of natural supports, problem solving difficulties, stress management, develop/use of coping skills for symptoms, develop/use of coping skills for cognitive difficulties, social skills training)  IRT    Motivational Interviewing (Connect info and skills with personal goals, Promote hope and positive expectations, Explore pros and cons of change, Re-frame experiences in a positive light)    Educational Teaching Strategies (Review written material/education on: Building a Bridging to Your Goals, Dealing with Negative Feelings, Coping with Symptoms)    CBT      6. Frequency of Sessions: Weekly      7. Expected duration of treatment:  2-4x monthly SEE services for 6 months; 1-2x monthly prescriber services for 6 months; 2-4x per month IRT services for 6 months followed by 12-18 months monthly sessions; Consider family education 1-4x/month for 6 months      8. Participants in therapy plan (family, friends, support network): Family members as able          See scanned document for Acknowledgement of Current Treatment Plan      Regulatory Guidelines for Updating Treatment Plan  [place] Medical Assistance: Reviewed & signed at least every 90 days  Medicare:  Update per policy    "

## 2018-03-09 ENCOUNTER — TELEPHONE (OUTPATIENT)
Dept: PSYCHIATRY | Facility: CLINIC | Age: 28
End: 2018-03-09

## 2018-03-09 ASSESSMENT — PATIENT HEALTH QUESTIONNAIRE - PHQ9: SUM OF ALL RESPONSES TO PHQ QUESTIONS 1-9: 0

## 2018-03-09 NOTE — TELEPHONE ENCOUNTER
Akosua De Oliveira, Akosua Harrison CNP, RN                     Could you please f/u with pt to make sure that he has an appt scheduled with primary care to address HTN? Thanks!

## 2018-03-09 NOTE — TELEPHONE ENCOUNTER
-Writer attempted to call pt to discuss HTN in clinic on 3/7/18  -Pt did not answer; left message asking pt to return call to clinic.    -Writer will also attempt to call pt later this afternoon.

## 2018-03-12 ENCOUNTER — TELEPHONE (OUTPATIENT)
Dept: PSYCHIATRY | Facility: CLINIC | Age: 28
End: 2018-03-12

## 2018-03-12 NOTE — PROGRESS NOTES
NAVIGATE Clinician Contact & Progress Note   For Family Education Program    NAVIGATE Enrollee: Guero Carter (1990)     MRN: [id number]  Date:  3/07/18  Diagnosis(es):   Schizophrenia  Clinician: NAVIGATE Director & Family Clinician, Denise Rogers [facility]     1. Type of contact: (majority of time spent)   Family Session    2. People present:   Writer  Client: No  Significant Other/Family/Friend:  Mother, Nurse: Akosua Huynh RN    3. Total number of persons who participated in contact: 3, including writer    4. Length of Actual Contact: Start Time: 12:10PM; End Time: 1:00PM   Traveled?    No     5. Location of contact:  Psychiatry Clinic, [place]    6. Did the client complete the home practice option(s) from the previous session:   Not Applicable    7. Motivational Teaching Strategies:   Connect info and skills with personal goals  Promote hope and positive expectations  Explore pros and cons of change  Re-frame experiences in positive light    8. Educational Teaching Strategies:   Relate information to client's experience  Ask questions to check comprehension  Break down information into small chunks  Adopt client's language     9. CBT Teaching Strategies:   Reinforcement and shaping (positive feedback for steps towards goals, gains in knowledge & skills and follow-through on home assignments)     Relapse prevention planning (review of stressors, early warning signs and rehearse plan)     Behavioral tailoring and communication    10. Psychoeducational Topic(s) Addressed:  Just the Facts - Psychosis  Just the Facts - Medications for Psychosis  Just the Facts - Relapse Prevention Planning    11. Techniques utilized:   [place] announced at beginning of session  Review of homework  Review of goal  Review of previous meeting  Present new material  Role-play  Problem-solving practice  Help family choose a home practice option    12. Assessment/Progress Note:     Met with Guero's mom and RN  "Akosua Huynh to follow-up re: Guero's re-emergence of symptoms and to offer mom emotional support and anticipatory guidance.     Mom reported under the impression Guero did not take meds one day this week. She is quite certain he is not taking metformin what-so-ever. She reported Guero has a fear of establishing a PCP and finding he has diabetes. She believes he is aware metformin is a medication used to treat diabetes and therefore may be discouraged from taking it. Additionally, mom reiterated (as she explained last week) that Guero is only comfortable taking medication in the evening due to his belief that meds will effect his work performance.     Mom expressed some concerns about insurance. She reported Guero is still carrying MA as well as work coverage. She is concerned he may lose his MA, resulting in higher med copays which could discourage him from taking medications. Reported able to update the team.    Mom reported she has received fewer alarming texts from Guero this past week. He typically texts her when on his break at work. His complaints have primarily been unintelligible persecutory delusional thoughts, feeling lonely, and having back pain. Discussed communication strategies when presented with such statements.     Mom identified concern Guero is not only drinking alcohol but also using cannabis. Mom reported they are a family of 15 and many siblings have made comments that he \"is probably\" or \"must be\" using cannabis. Dialogued with mom about her concern that she may be in denial. She admitted this could be possible as she knows he son/Guero's brother Claude, of whom Guero spends time with, regularly uses cannabis. Additionally, mom reported Claude has what seems to be an untreated bipolar disorder with psychotic features.    Lastly, discussed several potential stressors for her and Guero including Claude's health, mom's grandchild/Guero's sibling's child having recently been diagnosed " with leukemia, and mom leaving town from early Friday 3/15 - afternoon Monday 3/19. Given stressors and caregiver responsibilities, will not meet with mom next week. Encouraged she call or schedule if needs, questions or concerns arise.     13. Plan/Referrals:     Will meet with family weekly as schedule allows for evidence based family psychoeducation and therapeutic support aimed at maximizing Guero's opportunity for recovery from psychosis.     Denise Rogers [facility]   NAVIGATE Director & Family Clinician

## 2018-03-14 ENCOUNTER — VIRTUAL VISIT (OUTPATIENT)
Dept: PSYCHIATRY | Facility: CLINIC | Age: 28
End: 2018-03-14
Payer: COMMERCIAL

## 2018-03-14 ENCOUNTER — TELEPHONE (OUTPATIENT)
Dept: PSYCHIATRY | Facility: CLINIC | Age: 28
End: 2018-03-14

## 2018-03-14 DIAGNOSIS — F20.9 SCHIZOPHRENIA, UNSPECIFIED TYPE (H): Primary | ICD-10-CM

## 2018-03-14 NOTE — MR AVS SNAPSHOT
After Visit Summary   3/14/2018    Guero Carter    MRN: [id number]           Patient Information     Date Of Birth          1990        Visit Information        Provider Department      3/14/2018 12:00 PM Savannah Patel LGSW [facility]        Today's Diagnoses     Schizophrenia, unspecified type (H)    -  1       Follow-ups after your visit        Your next 10 appointments already scheduled     Mar 21, 2018 12:00 PM CDT   Navigate See with Felix Forrester   [facility] ([facility]    [address]  [facility] [id number] [phone]            Mar 28, 2018 12:45 PM CDT   Navigate Medication Follow Up with LOTTIE Castellanos CNP   [facility] ([facility])    [address]  [facility] [id number] [phone]              Who to contact     Please call your clinic at [phone] to:    Ask questions about your health    Make or cancel appointments    Discuss your medicines    Learn about your test results    Speak to your doctor            Additional Information About Your Visit        MeetingSense Software Information     Meeting[employer] Software is an electronic gateway that provides easy, online access to your medical records. With Meeting[employer]ense Software, you can request a clinic appointment, read your test results, renew a prescription or communicate with your care team.     To sign up for MeetingSense Software visit the website at [email or web address]   You will be asked to enter the access code listed below, as well as some personal information. Please follow the directions to create your username and password.     Your access code is: [email or web address]  Expires: 3/24/2018  1:53 PM     Your access code will  in 90 days. If you need help or a new code, please contact your [facility] or call [phone] for assistance.        Care EveryWhere ID     This is your Care EveryWhere ID. This could be used by other organizations to  access your [facility] medical records  [id number]         Blood Pressure from Last 3 Encounters:   03/07/18 (!) 170/107   12/27/17 126/82   11/01/17 (!) 173/109    Weight from Last 3 Encounters:   03/07/18 116.8 kg (257 lb 6.4 oz)   12/27/17 117.5 kg (259 lb)   11/29/17 114.4 kg (252 lb 3.2 oz)              Today, you had the following     No orders found for display       Primary Care Provider Fax #    Physician No Ref-Primary [phone]       No address on file        Equal Access to Services     [facility] : Hadvadim Amaya, mehnaz navarrete, anselmo blue, moe garcia . So [facility] [phone].    ATENCIÓN: Si habla español, tiene a bruce disposición servicios gratuitos de asistencia lingüística. Llame al [phone].    We comply with applicable federal civil rights laws and [place] laws. We do not discriminate on the basis of race, color, national origin, age, disability, sex, sexual orientation, or gender identity.            Thank you!     Thank you for choosing [facility]  for your care. Our goal is always to provide you with excellent care. Hearing back from our patients is one way we can continue to improve our services. Please take a few minutes to complete the written survey that you may receive in the mail after your visit with us. Thank you!             Your Updated Medication List - Protect others around you: Learn how to safely use, store and throw away your medicines at www.disposemymeds.org.          This list is accurate as of 3/14/18  5:27 PM.  Always use your most recent med list.                   Brand Name Dispense Instructions for use Diagnosis    IBUPROFEN PO      Take 200-400 mg by mouth every 4 hours as needed for other (headache)        metFORMIN 500 MG 24 hr tablet    GLUCOPHAGE-XR    60 tablet    Take 2 tablets (1,000 mg) by mouth daily (with dinner)    Adverse effect of drug, sequela       OLANZapine 10 MG tablet    zyPREXA     30 tablet    Take 1 tablet (10 mg) by mouth At Bedtime    Schizophrenia, unspecified type (H)

## 2018-03-14 NOTE — Clinical Note
PC check in with client. Denise, I billed care coordination, so please add your signature once you review. Thanks! Savannah

## 2018-03-14 NOTE — PROGRESS NOTES
"NAVIGATE Outreach  A Part of the [facility] First Episode of Psychosis Program     Patient Name: Guero Carter  /Age:  1990 (27 year old)  Diagnosis: Schizophrenia, unspecified type; F20.9  Call duration: 20 minutes    Writer called client, explaining that his mom, Catalina, had notified writer that client wasn't wanting to come to the clinic for IRT session today and wanted a phone check in instead. He confirmed that this was correct and said that it is too time consuming to come to the clinic. He said he wants to find a different psychiatry provider. Writer attempted to elicit further details from client to understand his reasons for this. Client was quite vague and seemed guarded over the phone. He did say he wants to see a provider only 1x/month and doesn't want them to be associated with the hospital. Writer reflected these reasons and suggested that client has been meeting NAVIGATE prescriber 1x/month. \"I just want to find a different prescriber\" \"I don't like answering all the questions.\" Writer attempted to discuss these reasons further. Writer reflected on work with client in IRT so far, noting his progress, and goal achievement. Explored fear of going to the hospital, clarifying goals of helping client stay out of the hospital. Writer assessed client for any concerns regarding symptoms or mood. He reported he is doing good then said \"I'm not the happiest but everything is going good.\" Described wanting to be at a better paying job. Discussed goals of going back to school to complete his degree. He said work is going fine and that he is comfortable there. Writer questioned why client's mom may be concerned about him ending NAVIGATE services, to which he responded \"she's just opinionated and thinks she knows best.\" Writer reviewed relapse prevention plan, discussing early warning signs and triggers, which client denied. He reported feeling tired, but that this is because of his medication. He reported " taking zyprexa nightly. Writer asked about plans for the week, with his mom leaving town. He reported no plans really, but that his brother will be coming over. Also said he's been working out at home.  Writer asked about his concerns regarding getting his blood pressure checked, as was recommended by NAVIGATE prescriber. Client said he plans to go this weekend to a clinic near his house, though he did not have a specific clinic in mind or an appt. Client eventually agreed to a phone check in with writer again on Friday 3/15 at 12pm.     ADELFO Terrell, SW  NAVIGATE Individual Resiliency Trainer    Attestation:    I did not see this patient directly. This patient is discussed with me in individual clinical social work supervision, and I agree with the plan as documented.     ADELFO Hernandez, [facility], March 20, 2018
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
## 2018-03-20 ENCOUNTER — TELEPHONE (OUTPATIENT)
Dept: PSYCHIATRY | Facility: CLINIC | Age: 28
End: 2018-03-20

## 2018-03-21 ENCOUNTER — TELEPHONE (OUTPATIENT)
Dept: PSYCHIATRY | Facility: CLINIC | Age: 28
End: 2018-03-21

## 2018-03-21 NOTE — TELEPHONE ENCOUNTER
"NAVIGATE Incoming Telephone Call    NAVIGATE Enrollee: Guero Carter (1990)     MRN: [id number]  Incoming Call Received on: 3/12/18  Call Received from: Guero's mom who reported:    -Guero texted mom that Wednesday evening (day he came in for appts) and asked for a new Psychiatrist, mom suggested they discuss later and he said, \"Nothing to discuss.\"   -Thursday mom reminded Guero he likes Savannah and Felix and perhaps should reconsider and he replied, I will find something else on my own   -Guero has not mentioned this since last week   -Discussed with mom the suspicion that he was triggered by the recommendation to see a doctor for high BP   -He informed mom last evening (Sunday) that he needs to get his BP checked   -Last evening he was also talking about work again, he thought he was on work probation (which he was when he initially started his job). Mom reminded him he is not and he was surprised.     -Mom asked if we could review Paras med regimen when he is in next. She does not anticipate he is taking metformin and does anticipate he knows it is typically used to treat DM.   -Informed mom of upcoming appts. She said she will be around so between her and Guero's dad they will remind him of his IRT appt Wed at 12:00pm.   -Mom is out of town for 5 days (early Thursday AM - Monday PM), Guero will be at home alone. She anticipates he will watch movies and drink all weekend.     Updated all team members, Savannah Patel, LIANET IRPENG, MSW, LGSW reported able to reach out to patient by phone.     Denise Rogers MSW, LICSW  NAVIGATE Director and Family Clinician       "

## 2018-03-21 NOTE — TELEPHONE ENCOUNTER
"EMORYATE SEE Incoming Telephone TEXT  For Supported Employment & Education    NAVIGATE Enrollee: Guero Carter (1990)     MRN: [id number]  Incoming Call Received on: 3/20/2018 at 9:00 pm   Call Received from: Guero GERARDO's response to incoming call:   Writer received text message from Guero on the rosa of his community appointment, which read: \"Hey I have to cancel tomorrow. I have to run an errand with my brother. And I'm actually trying to be finished with navigate. I loved your help!! Thank you very much!!!\"    Writer responded and suggested that Guero reschedule if he changes his mind, and offered to help with any future employment-related assistance.     Felix Forrester  "

## 2018-03-21 NOTE — TELEPHONE ENCOUNTER
NAVIGATE Outreach  A Part of the [facility] First Episode of Psychosis Program     Patient Name: Guero Carter  /Age:  1990 (27 year old)    Call duration: 5 minutes    Contacted client to check in. Received VM and left msg reporting writer was checking in since client and writer missed scheduled phone check in Friday 3/16. Requested call back.    Savannah Patel, MSW, LGSW  NAVIGATE Individual Resiliency Trainer

## 2018-03-23 ENCOUNTER — TELEPHONE (OUTPATIENT)
Dept: PSYCHIATRY | Facility: CLINIC | Age: 28
End: 2018-03-23

## 2018-03-23 NOTE — TELEPHONE ENCOUNTER
Ayr, Savannah, Akosua Sofia, RN                     Amandeep South,   Would you be able to call Catalina Carter, Guero's mom, back today?  She left me a message saying she was concerned about Guero and needed some direction on where to go with him. Denise and I were hoping you could remind her of crisis resources if needed. I need to run to community appts this afternoon. I can follow up with her next week if needed. The number we have for her is [phone].     I did try to call Guero a couple times last week and this week but we kept missing each other.     Thank you!   Savannah

## 2018-03-23 NOTE — TELEPHONE ENCOUNTER
-Writer called Catalina pt's mother at the request of GER Nuñez.   -Catalina states that she had wanted to talk to Yue and come up with a plan.    -Writer asked if there was something that she could assist her in, but Catalina remained firm that she wanted to speak with Yue on Monday.   -Writer asked if she had crisis resources in the event that Pt needed some assessment over the weekend.   -Catalina responded that she is not going to call and crisis numbers as she has been dealing with this for years.    -Writer let Catalina know that she will pass on the message to Yue that she wants to speak with one of them.

## 2018-03-26 ENCOUNTER — TELEPHONE (OUTPATIENT)
Dept: PSYCHIATRY | Facility: CLINIC | Age: 28
End: 2018-03-26

## 2018-03-26 NOTE — TELEPHONE ENCOUNTER
NAVIGATE Incoming Telephone Call    NAVIGATE Enrollee: Guero Carter (1990)     MRN: [id number]  Incoming Call Received on: 3/23/18  Call Received from: Catalina Carter, client's mom    Writer received VM msg form Catalina, reporting that client was not doing well and wanting direction. Writer asked NAVIGATE RN care coordinator, Akosua Huynh, to follow up with Catalina, as writer was in the community the rest of the day for appts.    Writer heard from Akosua Huynh, that Catalina had requested to speak directly with writer or SEE, Felix Forrester. Writer left VM msg on Monday 3/26 at 10:30am for Catalina, returning her call. Provided times of availability to talk more and also recommended that client call call writer to talk more about engagement in NAVIGATE.     Savannah Patel, [facility] NAVIGATE

## 2018-03-27 ENCOUNTER — TELEPHONE (OUTPATIENT)
Dept: PSYCHIATRY | Facility: CLINIC | Age: 28
End: 2018-03-27
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
## 2018-03-27 NOTE — TELEPHONE ENCOUNTER
-Spoke with Catalina, pt's mother.    -Catalina reports that pt is still having paranoid thoughts, such as thinking Navigate team just wants to put him in the hospital.  Mom is concerned that his medication is not enough and is requesting that his Zyprexa is increased to 15 mg from 10 mg.    -Catalina reports that he is taking his medications, states he has missed two days of medications and she knows this because she is counting his pills.    -Reports that he is still able to go to work, shower, and be social.  Reported that they had a birthday party for someone in the family, per Catalina pt as very social at this party.   -Her main concern is his paranoia, and he has taken zyprexa 15 mg before.      -Let Catalina know that she will send information to ROCCO Castellanos CNP and then get back to her.  Catalina agreed to this plan.

## 2018-03-28 NOTE — TELEPHONE ENCOUNTER
-Writer called Catalina back and passed along Akosua De Oliveira's message below.    -Writer did let Catalina know that pt does have an appointment today with LOTTIE Castellanos CNP, Catalina was not aware of this and more than likely pt will not be in today.    -Catalina thanked writer for the phone call and understood reasoning for not making any medication changes.    -Encouraged Catalina to continue to communicate with Nikita.

## 2018-03-28 NOTE — TELEPHONE ENCOUNTER
Akosua De Oliveira APRN CNP Swanson, Melanie A, RN        Cc: Denise Rogers, [facility]; Savannah Patel YOSI       Caller: Unspecified (Yesterday, 12:07 PM)                     I am not comfortable making medication adjustments for a patient without his consent and who has been very clear that he does not want to see me. Especially an antipsychotic. This feels like it is bordering on forced neuroleptics which I cannot do without a Barth   If mom would like to accompany Guero to a provider appt with me, I would be happy to make med adjustments. Otherwise I would encourage her to see if she might get Guero to meet with a provider in the Strengths program or another community provider. I would be happy to provide care coordination for outside providers with Guero's consent.

## 2018-04-06 ENCOUNTER — TELEPHONE (OUTPATIENT)
Dept: PSYCHIATRY | Facility: CLINIC | Age: 28
End: 2018-04-06

## 2018-04-06 ENCOUNTER — VIRTUAL VISIT (OUTPATIENT)
Dept: PSYCHIATRY | Facility: CLINIC | Age: 28
End: 2018-04-06
Payer: COMMERCIAL

## 2018-04-06 DIAGNOSIS — F20.9 SCHIZOPHRENIA, UNSPECIFIED TYPE (H): Primary | ICD-10-CM

## 2018-04-06 NOTE — PROGRESS NOTES
"NAVIGATE Telephone Call    NAVIGATE Enrollee: Guero Carter (1990)     MRN: [id number]  Call Duration: 15 minutes  Diagnosis: Schizophrenia, unspecified type; F20.9    Writer called client per his request to check in. Client reported he was doing \"good.\" Writer explored client's thoughts about continuing with NAVIGATE services. He reported hesitation due to wanting to be done with the program and move on. Described wanting to make progress on saving money and continuing to work. Writer reflected on the progress and success client has made in goal achievement since he first started NAVIGATE. Client explained that he doesn't want to go back to the hospital. He explained that the last time he attended appt with NAVIGATE prescriber, he was told to go to the hospital for his high blood pressure and was concerned that NAVIGATE wants him in the hospital. He said he had bad experiences at the hospital in the past. Writer provided supportive listening and assured client NAVIGATE team does not want client in the hospital and focus is on keeping client well and supporting him in his goals. Thanked him for explaining his concerns. Writer asked client for ideas of what might make client more comfortable in continuing work with NAVIGATE. Client said he would have to think about it as he was not sure. He also reported not liking having so many appts. He said he would ideally like to see a prescriber every couple months. Writer described flexibility with frequency of appts depending on client's needs and willingness to work with writer and other services. Client said he is still thinking about going to a different prescriber. Discussed pros and cons of switching services. Writer offered to help with a referral if that is what client decides. He reported plan to think about this over this weekend and agreed to have another phone check in on Monday 4/9 with writer. He was not willing to schedule appt with prescriber at this " time, but said he may need to soon due to needing refills. Client reported he is taking his medication daily, and that it makes him sleepy.  He said he has been in a bad mood lately, though did not provide details. Denied SI. Discussed how talking about it can be helpful as well as other ways to help mood, like exercise or hanging out with his brothers. Client said he plans to see his brother this weekend to go to the [employer] or play xbox.       Savannah Patel, [facility] NAVIGATE     Attestation:    I did not see this patient directly. This patient is discussed with me in individual clinical social work supervision, and I agree with the plan as documented.     ADELFO Hernandez, [facility] Center[facility], April 22, 2018

## 2018-04-06 NOTE — Clinical Note
FYI- I'd like to bring Guero up in or Monday meeting again to discuss next steps or further strategies for engagement or referrals.  Savannah

## 2018-04-06 NOTE — MR AVS SNAPSHOT
After Visit Summary   2018    Guero Carter    MRN: [id number]           Patient Information     Date Of Birth          1990        Visit Information        Provider Department      2018 11:30 AM Savannah Patel LGSW [facility]        Today's Diagnoses     Schizophrenia, unspecified type (H)    -  1       Follow-ups after your visit        Your next 10 appointments already scheduled     2018 12:00 PM CDT   Navigate Telephone Call with KHRIS Terrell   [facility] ([facility])    [address] [place] [address] [id number] [phone]              Who to contact     Please call your clinic at [phone] to:    Ask questions about your health    Make or cancel appointments    Discuss your medicines    Learn about your test results    Speak to your doctor            Additional Information About Your Visit        MyChart Information     NICO is an electronic gateway that provides easy, online access to your medical records. With NICO, you can request a clinic appointment, read your test results, renew a prescription or communicate with your care team.     To sign up for [email or web address] visit the website at [email or web address]   You will be asked to enter the access code listed below, as well as some personal information. Please follow the directions to create your username and password.     Your access code is: [email or web address]  Expires: 2018  9:46 AM     Your access code will  in 90 days. If you need help or a new code, please contact your [facility] or call [phone] for assistance.        Care EveryWhere ID     This is your Care EveryWhere ID. This could be used by other organizations to access your [place] medical records  [id number]         Blood Pressure from Last 3 Encounters:   18 (!) 170/107   17 126/82   17 (!) 173/109    Weight from Last 3 Encounters:   18  116.8 kg (257 lb 6.4 oz)   12/27/17 117.5 kg (259 lb)   11/29/17 114.4 kg (252 lb 3.2 oz)              Today, you had the following     No orders found for display       Primary Care Provider Fax #    Physician No Ref-Primary [phone]       No address on file        Equal Access to Services     MARI PAGEZION : Hadii aad ku hadasho Soomaali, waaxda luqadaha, qaybta kaalmada adeegyada, moe hoff samann adesinai kelinvikas garcia . So [facility] [phone].    ATENCIÓN: Si habla español, tiene a bruce disposición servicios gratuitos de asistencia lingüística. Llame al [phone].    We comply with applicable federal civil rights laws and [place] laws. We do not discriminate on the basis of race, color, national origin, age, disability, sex, sexual orientation, or gender identity.            Thank you!     Thank you for choosing [facility]  for your care. Our goal is always to provide you with excellent care. Hearing back from our patients is one way we can continue to improve our services. Please take a few minutes to complete the written survey that you may receive in the mail after your visit with us. Thank you!             Your Updated Medication List - Protect others around you: Learn how to safely use, store and throw away your medicines at www.disposemymeds.org.          This list is accurate as of 4/6/18 11:59 PM.  Always use your most recent med list.                   Brand Name Dispense Instructions for use Diagnosis    IBUPROFEN PO      Take 200-400 mg by mouth every 4 hours as needed for other (headache)        metFORMIN 500 MG 24 hr tablet    GLUCOPHAGE-XR    60 tablet    Take 2 tablets (1,000 mg) by mouth daily (with dinner)    Adverse effect of drug, sequela       OLANZapine 10 MG tablet    zyPREXA    30 tablet    Take 1 tablet (10 mg) by mouth At Bedtime    Schizophrenia, unspecified type (H)
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
## 2018-04-09 ENCOUNTER — VIRTUAL VISIT (OUTPATIENT)
Dept: PSYCHIATRY | Facility: CLINIC | Age: 28
End: 2018-04-09
Payer: COMMERCIAL

## 2018-04-09 DIAGNOSIS — F20.9 SCHIZOPHRENIA, UNSPECIFIED TYPE (H): Primary | ICD-10-CM

## 2018-04-09 NOTE — MR AVS SNAPSHOT
After Visit Summary   2018    Guero Carter    MRN: [id number]           Patient Information     Date Of Birth          1990        Visit Information        Provider Department      2018 12:00 PM Savannah Patel LGSW [facility]        Today's Diagnoses     Schizophrenia, unspecified type (H)    -  1       Follow-ups after your visit        Your next 10 appointments already scheduled     2018 12:00 PM CDT   Navigate Telephone Call with KHRIS Terrell   [facility] ([facility])    [address] [place] [address] [id number] [phone]              Who to contact     Please call your clinic at [phone] to:    Ask questions about your health    Make or cancel appointments    Discuss your medicines    Learn about your test results    Speak to your doctor            Additional Information About Your Visit        MyChart Information     Locappy is an electronic gateway that provides easy, online access to your medical records. With Locappy, you can request a clinic appointment, read your test results, renew a prescription or communicate with your care team.     To sign up for CallidusCloudt visit the website at [email or web address]   You will be asked to enter the access code listed below, as well as some personal information. Please follow the directions to create your username and password.     Your access code is: [email or web address]  Expires: 2018  9:46 AM     Your access code will  in 90 days. If you need help or a new code, please contact your [facility] or call [phone] for assistance.        Care EveryWhere ID     This is your Care EveryWhere ID. This could be used by other organizations to access your [place] medical records  [id number]         Blood Pressure from Last 3 Encounters:   18 (!) 170/107   17 126/82   17 (!) 173/109    Weight from Last 3 Encounters:   18  116.8 kg (257 lb 6.4 oz)   12/27/17 117.5 kg (259 lb)   11/29/17 114.4 kg (252 lb 3.2 oz)              Today, you had the following     No orders found for display       Primary Care Provider Fax #    Physician No Ref-Primary [phone]       No address on file        Equal Access to Services     MARI PAGEZION : Hadii aad ku hadasho Soomaali, waaxda luqadaha, qaybta kaalmada adeegyada, moe hoff samann adesinai kelinvikas garcia . So [facility] [phone].    ATENCIÓN: Si habla español, tiene a bruce disposición servicios gratuitos de asistencia lingüística. Llame al [phone].    We comply with applicable federal civil rights laws and [place] laws. We do not discriminate on the basis of race, color, national origin, age, disability, sex, sexual orientation, or gender identity.            Thank you!     Thank you for choosing [facility]  for your care. Our goal is always to provide you with excellent care. Hearing back from our patients is one way we can continue to improve our services. Please take a few minutes to complete the written survey that you may receive in the mail after your visit with us. Thank you!             Your Updated Medication List - Protect others around you: Learn how to safely use, store and throw away your medicines at www.disposemymeds.org.          This list is accurate as of 4/9/18 11:59 PM.  Always use your most recent med list.                   Brand Name Dispense Instructions for use Diagnosis    IBUPROFEN PO      Take 200-400 mg by mouth every 4 hours as needed for other (headache)        metFORMIN 500 MG 24 hr tablet    GLUCOPHAGE-XR    60 tablet    Take 2 tablets (1,000 mg) by mouth daily (with dinner)    Adverse effect of drug, sequela       OLANZapine 10 MG tablet    zyPREXA    30 tablet    Take 1 tablet (10 mg) by mouth At Bedtime    Schizophrenia, unspecified type (H)

## 2018-04-10 NOTE — PROGRESS NOTES
NAVIGATE Telephone Call    NAVIGATE Enrollee: Guero Carter (1990)     MRN: [id number]  Diagnosis: Schizophrenia, unspecified type; F20.9  Call Duration: 15 minutes    Writer called client for requested phone follow up and check in to discuss thoughts about client continuing to engage in [facility] services. Client reported that he has not had time to think much about it yet. He reported that he only wants to meet with a prescriber 1x/month and is not wanting other services at this time, though said he's not entirely sure if wants to stop therapy. Writer attempted to explore client's reasoning in more detail, but client reported he wasn't sure. He reported that he is unhappy, because he doesn't know what he want's to do with his life, like where he wants to live, or what he wants to do for work. Writer provided empathetic listening and support and described how talking to someone can be helpful to explore goals and motivations. Writer offered to explore referrals for prescribers at the [facility]. Client said he thinks he can find his own prescriber, as he doesn't like the [facility] because it's attached to the hospital. Said he may look into [facility] services. He reported plan to follow up with writer on Thursday this week with what he finds out about services. Writer will also see about the time frame/wait for transfer of care to [facility], to provide this information to client.     Savannah Patel, [facility] NAVIGATE     Attestation:    I did not see this patient directly. This patient is discussed with me in individual clinical social work supervision, and I agree with the plan as documented.     ADELFO Hernandez, [facility], April 22, 2018

## 2018-04-12 ENCOUNTER — VIRTUAL VISIT (OUTPATIENT)
Dept: PSYCHIATRY | Facility: CLINIC | Age: 28
End: 2018-04-12
Payer: COMMERCIAL

## 2018-04-12 DIAGNOSIS — F20.9 SCHIZOPHRENIA, UNSPECIFIED TYPE (H): Primary | ICD-10-CM

## 2018-04-12 NOTE — MR AVS SNAPSHOT
After Visit Summary   2018    Guero Carter    MRN: [id number]           Patient Information     Date Of Birth          1990        Visit Information        Provider Department      2018 12:00 PM Savannah Patel LGSW [facility] Psychiat[facility]        Today's Diagnoses     Schizophrenia, unspecified type (H)    -  1       Follow-ups after your visit        Who to contact     Please call your clinic at [phone] to:    Ask questions about your health    Make or cancel appointments    Discuss your medicines    Learn about your test results    Speak to your doctor            Additional Information About Your Visit        MyChart Information     VitaSensis is an electronic gateway that provides easy, online access to your medical records. With VitaSensis, you can request a clinic appointment, read your test results, renew a prescription or communicate with your care team.     To sign up for VitaSensis visit the website at [email or web address]   You will be asked to enter the access code listed below, as well as some personal information. Please follow the directions to create your username and password.     Your access code is: [email or web address]  Expires: 2018  9:46 AM     Your access code will  in 90 days. If you need help or a new code, please contact your [facility] or call [phone] for assistance.        Care EveryWhere ID     This is your Care EveryWhere ID. This could be used by other organizations to access your [place] medical records  [id number]         Blood Pressure from Last 3 Encounters:   18 (!) 170/107   17 126/82   17 (!) 173/109    Weight from Last 3 Encounters:   18 116.8 kg (257 lb 6.4 oz)   17 117.5 kg (259 lb)   17 114.4 kg (252 lb 3.2 oz)              Today, you had the following     No orders found for display       Primary Care Provider Fax #    Physician No Ref-Primary [phone]       No address  on file        Equal Access to Services     MARI VELASCO : Hadii aad ku hadchavezdmitry Westleyali, wamontsenas lorenzanarolandoha, varunaaron schneideryvonnenas blue, moe farr. So [facility] [phone].    ATENCIÓN: Si habla español, tiene a bruce disposición servicios gratuitos de asistencia lingüística. Llame al [phone].    We comply with applicable federal civil rights laws and [place] laws. We do not discriminate on the basis of race, color, national origin, age, disability, sex, sexual orientation, or gender identity.            Thank you!     Thank you for choosing [facility]  for your care. Our goal is always to provide you with excellent care. Hearing back from our patients is one way we can continue to improve our services. Please take a few minutes to complete the written survey that you may receive in the mail after your visit with us. Thank you!             Your Updated Medication List - Protect others around you: Learn how to safely use, store and throw away your medicines at www.disposemymeds.org.          This list is accurate as of 4/12/18  2:01 PM.  Always use your most recent med list.                   Brand Name Dispense Instructions for use Diagnosis    IBUPROFEN PO      Take 200-400 mg by mouth every 4 hours as needed for other (headache)        metFORMIN 500 MG 24 hr tablet    GLUCOPHAGE-XR    60 tablet    Take 2 tablets (1,000 mg) by mouth daily (with dinner)    Adverse effect of drug, sequela       OLANZapine 10 MG tablet    zyPREXA    30 tablet    Take 1 tablet (10 mg) by mouth At Bedtime    Schizophrenia, unspecified type (H)

## 2018-04-12 NOTE — PROGRESS NOTES
"NAVIGATE Incoming Telephone Call    NAVIGATE Enrollee: Guero Carter (1990)     MRN: [id number]  Incoming Call Received on: 4/12/18  Call Received from: client    Writer received call back from client. He reported he was doing \"fine\" and that he just woke up. Writer inquired about his thoughts of continuing with [facility] or interest in a referral to [facility] prescriber. Also described HOPE program at [facility] as an option, though writer is unsure if they are accepting new patients. Client was very brief in his responses. He said he is unsure what he wants to do, but feels like taking a break from therapy for a little bit. Asked writer to send him info about [facility] and Hope program via email and said he plans to talk to his mom about it tonight, because he wants to make sure she's okay with his decision. Writer inquired about how writer could be helpful, and he was unsure. Client said he would call writer back tomorrow or Monday at the latest, as he thinks he should be able to talk to his mom tonight.     Writer sent encrypted email to client including the intake # for Hope program, and that [facility] prescribers are scheduling out 2-4 weeks.     Savannah Patel, [facility] NAVIGATE     Attestation:    I did not see this patient directly. This patient is discussed with me in individual clinical social work supervision, and I agree with the plan as documented.     ADELFO Hernandez, [facility], April 22, 2018      "

## 2018-04-20 ENCOUNTER — TELEPHONE (OUTPATIENT)
Dept: PSYCHIATRY | Facility: CLINIC | Age: 28
End: 2018-04-20

## 2018-04-20 NOTE — TELEPHONE ENCOUNTER
NAVIGATE Outreach  A Part of the [facility] First Episode of Psychosis Program     Patient Name: Guero Carter  /Age:  1990 (27 year old)  Diagnosis: Schizophrenia, unspecified type: f29  Call duration: 3 minutes    Contacted client to follow up on phone conversation from last week regarding his thoughts on continuing with [facility] or wanting a referral to a different prescriber. Left VM msg relaying this and requested call back.     Savannah Patel, MSW, LGSW  NA[facility] Individual Resiliency Trainer

## 2018-05-04 ENCOUNTER — TELEPHONE (OUTPATIENT)
Dept: PSYCHIATRY | Facility: CLINIC | Age: 28
End: 2018-05-04

## 2018-05-04 NOTE — TELEPHONE ENCOUNTER
NAVIGATE Outreach  A Part of the [facility] First Episode of Psychosis Program     Patient Name: Guero Carter  /Age:  1990 (28 year old)    Call duration: 3 minutes    Contacted client to follow up on his decision to continue with [facility]IG[facility] or seek other providers. Received his voicemail and left message requesting call back regarding his decision about services.    Savannah Patel, MSW, LGSW  [facility] Individual Resiliency Trainer

## 2018-09-18 NOTE — TELEPHONE ENCOUNTER
NAVIGATE SEE Incoming Telephone Call  For Supported Employment & Education    NAVIGATE Enrollee: Guero Carter (1990)     MRN: [id number]  Incoming Call/TEXT Received on: 12/14/2018  Call Received from: Guero Carter    SEE's response to incoming call:   Text conversation to schedule an appointment after the new year. Guero reports that he has a brother's bday party coming up and cannot meet until after the new year.     Felix Forrester

## 2019-08-21 NOTE — PROGRESS NOTES
Adult Mental Health Outpatient Group Therapy Progress Note     Client Initial Individualized Goals for Treatment: I was recommended to the program by inpatient team.  I would be open to working on the delusional thoughts.  I would like to prepare for a job.    See Initial Treatment suggestions for the client during the time between Diagnostic Assessment and completion of the Master Individualized Treatment Plan.    Treatment Goals:    1. Client will notify staff when needing assistance to develop or implement a coping plan to manage suicidal or self injurious urges.    2. Client will use time in OT to explore employment options and needs.    3. Client will use time in Group Therapy to identify level of symptoms and coping skills to manage them.    4. Delroy will begin to develop an aftercare plan by exploring community resources such as [facility] support group and the  [facility] program for individual therapy and assistance with employment options.      Area of Treatment Focus:  Symptom Management, Develop / Improve Independent Living Skills and Develop Socialization / Interpersonal Relationship Skills    Therapeutic Interventions/Treatment Strategies:  Support, Feedback, Safety Assessments, Structured Activity and Problem Solving    Response to Treatment Strategies:  Accepted Feedback, Gave Feedback, Listened, Focused on Goals, Attentive and Accepted Support    Name of Group:  OT Clinic     Description and Outcome:  Pt attended and participated in a structured occupational therapy group where intervention focused on coping through structured hands-on activities to improve function in valued roles, routines, and independent living skills. Client presented to group with a calm, even affect. He was self directed with a familiar problem solving task. He was stuck on a novel technique in session, problem solved and was able to find a solution with verbal direction for support. Client demonstrated understanding of  Referral entered   session content by improved problem solving in session. Client addressed ITP goal number 2 in this session.     Is this a Weekly Review of the Progress on the Treatment Plan?  No

## 2021-04-12 ENCOUNTER — PATIENT OUTREACH (OUTPATIENT)
Dept: PSYCHIATRY | Facility: CLINIC | Age: 31
End: 2021-04-12

## 2021-04-12 DIAGNOSIS — F20.9 SCHIZOPHRENIA, UNSPECIFIED TYPE (H): Primary | ICD-10-CM

## 2021-04-12 NOTE — TELEPHONE ENCOUNTER
NAVIGATE Outreach  A Part of the [facility] First Episode of Psychosis Program     Patient Name: Guero Carter  /Age:  1990 (30 year old)      Writer received the following email from client. Writer called client to check in. He reported he was planning to schedule an FEP screen to see about re-enrolling in FEP services. Writer will send client info on this in an email (below). Client reported he wants to find a different job and need to figure some things out. He said he's been on a medical leave from work for the pats 6 weeks. Writer explained that [facility] is full right now but that the [facility] program could be an option for him.     From: Delroy Carter <[email or web address]>   Sent: 2021 10:09 AM  To: Savannah Patel <[email or web address]>  Subject: Navigate program    Hello,    This is Guero Carter. I have been in one of your programs a few years ago. I am currently having a lot of psychological stress. You guys helped me so much in the past that you are who I want to help me. If I did not seem grateful for your help. It was because I have a long and unpleasant story to tell and life never gets easier. I feel like I have been torchered and just as I researched the hospital gave me a bad label and pushed me aside. I really could use your help! Even a referral to a  complex  psychiatrist would be a life saver. They want me to go to [facility] but I felt like a prisoner there. Even a phone call to show some one cares would be appreciated.    Thank you,  Delroy Potts,  I m so glad you reached out. You should call [phone] and ask to schedule a  FEP screening appt.  After this, you will be referred to an appropriate program for services, most likely the [facility] program.     Please reach out if you have questions or concerns. Also, please continue seeing your current doctors in the meanwhile, while you transition to new services.    Thanks!  Savannah Patel,  AP EMANUEL  (She/Her/Hers)  [facility] Individual Resiliency [place]  [facility] [place] [facility]  [facility], [address]  [address] [place] [address]  Clinic Phone: [phone]  Fax: [phone]        This message contains information that may be confidential, privileged, and/or protected under the Health Insurance Portability and Accountability Act of 1996 and corresponding regulations. This message is intended solely for the addressee (or persons authorized to receive this message for the addressee). If you are not the addressee (or authorized to receive for the addressee), you may not read, use, copy or disclose to anyone this message or any information contained in the message. If you have received the message in error, please advise the sender by reply e-mail and delete the message from your computer system immediately.    AP Campa

## 2021-05-03 ENCOUNTER — VIRTUAL VISIT (OUTPATIENT)
Dept: PSYCHIATRY | Facility: CLINIC | Age: 31
End: 2021-05-03
Payer: COMMERCIAL

## 2021-05-03 DIAGNOSIS — F20.0 PARANOID SCHIZOPHRENIA (H): Primary | ICD-10-CM
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
## 2021-06-05 ENCOUNTER — HEALTH MAINTENANCE LETTER (OUTPATIENT)
Age: 31
End: 2021-06-05

## 2021-06-15 NOTE — MR AVS SNAPSHOT
After Visit Summary   2017    Guero Carter    MRN: [id number]           Patient Information     Date Of Birth          1990        Visit Information        Provider Department      2017 9:00 AM Felix Forrester [facility]        Today's Diagnoses     Undifferentiated schizophrenia (H)    -  1       Follow-ups after your visit        Your next 10 appointments already scheduled     Sep 07, 2017  1:00 PM CDT   Navigate Psychotherapy with KHRIS Terrell   [facility] ([facility])    [address]  [facility] [id number] [phone]              Who to contact     Please call your clinic at [phone] to:    Ask questions about your health    Make or cancel appointments    Discuss your medicines    Learn about your test results    Speak to your doctor   If you have compliments or concerns about an experience at your clinic, or if you wish to file a complaint, please contact [facility] Patient Relations at [phone] or email us at [email or web address]         Additional Information About Your Visit        Amimonhart Information     MOOVIA is an electronic gateway that provides easy, online access to your medical records. With MOOVIA, you can request a clinic appointment, read your test results, renew a prescription or communicate with your care team.     To sign up for MOOVIA visit the website at [email or web address]   You will be asked to enter the access code listed below, as well as some personal information. Please follow the directions to create your username and password.     Your access code is: [email or web address]  Expires: 2017  8:41 AM     Your access code will  in 90 days. If you need help or a new code, please contact your [facility] or call [phone] for assistance.        Care EveryWhere ID     This is your Care EveryWhere ID. This could be used  by other organizations to access your [place] medical records  [id number]         Blood Pressure from Last 3 Encounters:   06/08/17 135/84   05/28/17 138/71   10/11/13 135/89    Weight from Last 3 Encounters:   06/14/17 225 lb (102.1 kg)   06/08/17 226 lb 9.6 oz (102.8 kg)   06/06/17 218 lb (98.9 kg)              Today, you had the following     No orders found for display       Primary Care Provider    Physician No Ref-Primary       No address on file        Equal Access to Services     [facility] : Hadii ralph Amaya, wamontseda luqadaha, qaybta kaalmada domingayanas, moe garcia . So [facility] [phone].    ATENCIÓN: Si habla español, tiene a bruce disposición servicios gratuitos de asistencia lingüística. Llame al [phone].    We comply with applicable federal civil rights laws and [place] laws. We do not discriminate on the basis of race, color, national origin, age, disability sex, sexual orientation or gender identity.            Thank you!     Thank you for choosing [facility]  for your care. Our goal is always to provide you with excellent care. Hearing back from our patients is one way we can continue to improve our services. Please take a few minutes to complete the written survey that you may receive in the mail after your visit with us. Thank you!             Your Updated Medication List - Protect others around you: Learn how to safely use, store and throw away your medicines at www.disposemymeds.org.          This list is accurate as of: 9/6/17  1:41 PM.  Always use your most recent med list.                   Brand Name Dispense Instructions for use Diagnosis    ADULT GUMMY PO      Take 2 tablets by mouth daily        IBUPROFEN PO      Take 200-400 mg by mouth every 4 hours as needed for other (headache)        OLANZapine 15 MG tablet    zyPREXA    30 tablet    Take 1 tablet (15 mg) by mouth At Bedtime    Undifferentiated schizophrenia (H)          Mart-1 - Positive Histology Text: MART-1 staining demonstrates areas of higher density and clustering of melanocytes with Pagetoid spread upwards within the epidermis. The surgical margins are positive for tumor cells.

## 2021-06-17 ENCOUNTER — VIRTUAL VISIT (OUTPATIENT)
Dept: PSYCHIATRY | Facility: CLINIC | Age: 31
End: 2021-06-17

## 2021-06-17 DIAGNOSIS — F20.9 SCHIZOPHRENIA, UNSPECIFIED TYPE (H): Primary | ICD-10-CM
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
## 2021-06-21 ENCOUNTER — VIRTUAL VISIT (OUTPATIENT)
Dept: PSYCHIATRY | Facility: CLINIC | Age: 31
End: 2021-06-21
Attending: SOCIAL WORKER

## 2021-06-21 DIAGNOSIS — F20.9 SCHIZOPHRENIA, UNSPECIFIED TYPE (H): Primary | ICD-10-CM

## 2021-06-21 PROCEDURE — 99207 PR NO BILLABLE SERVICE THIS VISIT: CPT | Performed by: SOCIAL WORKER

## 2021-06-23 NOTE — PROGRESS NOTES
Medication Therapy Management (MTM) Encounter    ASSESSMENT:                            Medication Adherence/Access: No issues identified    Psychosis: Improving with recent change from olanzapine to Abilify+Wellbutrin 1 month ago. Side effects of olanzapine-associated sedation and weight gain are improved. Pt has no medication questions or concerns today.  Next FEP visit will be 7/15/2021 with Sofia Vanessa for further psychiatric evaluation/assessment.     Hypertension: Unknown if BP is at goal - no recent BP on file and unable to see any recent BPs through outside notes. Encouraged lab/BP follow-up with PCP which pt states he is due for.     Supplements: Stable      PLAN:                            Reviewed and updated medication list today.  No medication changes recommended at this time.     Follow-up: 7/15/21 with LOTTIE De Los Santos; 7/22/21 FEP feedback visit    SUBJECTIVE/OBJECTIVE:                          Guero Carter is a 31 year old male called for an initial visit. He was referred to me from First Episode Psychosis (FEP)- Strengths Program.      Reason for visit: First Episode Psychosis (FEP)- Strengths Program medication review.    Allergies/ADRs: None  Tobacco: He reports that he has been smoking. He has never used smokeless tobacco.Tobacco Cessation Action Plan:   did not discuss today  Alcohol: did not discuss - see 6/17 diagnostic assessment  Past Medical History: Reviewed in chart    Medication Adherence/Access: no current issues reported    Psychosis:   Current medications:   Wellbutrin XL 300mg daily   Abilify 15mg at bedtime    Guero is a 31 year old seen today for MTM as part of First Episode Psychosis (FEP)- Strengths Program interdisciplinary team assessment. He has past psychiatric diagnoses of schizophrenia. He was seen by AP Hodges 6/17/21 for diagnostic assessment (DA). Please see DA for additional details regarding symptoms, history, and diagnostic impressions. In brief, it  is reported that pt's symptoms include delusions and paranoia, he has denied current A/V hallucinations. He is scheduled to see FEP provider, Sofia Vanessa, LOTTIE 7/15/21 for further evaluation and medication management.     In regards to medication, Guero reports he was cross-tapered from olanzapine to Abilify 5 to 6 weeks ago and Wellbutrin was started about 4 weeks ago for psychosis and depression.  He has been seeing a provider by the name of Dr. Chapman through the online [facility] for the last 2 months.  He feels Abilify is a much better fit and reports improvement in energy and depression.  He has recently resumed jogging.  He felt that olanzapine led to oversedation and weight gain.  He denies current medication side effects.      Past medication trials:   - Olanzapine 20-30mg at bedtime took for ~3 years ([id number]), caused weight gain and sedation, switched to Abilify May 2021  - Geodon 12/2017-2/2018 - switch was made from olanzapine to help with weight gain, but it wasn't as helpful - led to increased trouble sleeping and anxiety so switched back to olanzapine  - Metformin for olanzapine-induced weight gain.     Hypertension: Current medications include lisinopril 10mg daily.  PCP is Deepika Hicks at [facility]. Pt reports he is due to labs and BP check.     BP Readings from Last 3 Encounters:   03/07/18 (!) 170/107   12/27/17 126/82   11/01/17 (!) 173/109     Supplements: multivitamin gummy daily  ----------------    I spent 25 minutes with this patient today. A copy of the visit note was provided to the patient's referring provider.    The patient was sent via [employer] a summary of these recommendations.     Annika Prince, PharmD, BCPP  Medication Therapy Management Pharmacist  [facility]    Telemedicine Visit Details  Type of service:  Telephone visit  Start Time: 1:00 PM  End Time: 1:25 PM  Originating Location (patient location): Home  Distant Location (provider  location):  [facility] MENTAL HEALTH & ADDI[facility]      Medication Therapy Recommendations  No medication therapy recommendations to display

## 2021-06-24 ENCOUNTER — VIRTUAL VISIT (OUTPATIENT)
Dept: PHARMACY | Facility: CLINIC | Age: 31
End: 2021-06-24
Payer: COMMERCIAL

## 2021-06-24 ENCOUNTER — OFFICE VISIT (OUTPATIENT)
Dept: PSYCHIATRY | Facility: CLINIC | Age: 31
End: 2021-06-24

## 2021-06-24 DIAGNOSIS — I10 HTN (HYPERTENSION): ICD-10-CM

## 2021-06-24 DIAGNOSIS — Z78.9 TAKES DIETARY SUPPLEMENTS: ICD-10-CM

## 2021-06-24 DIAGNOSIS — F20.0 PARANOID SCHIZOPHRENIA (H): Primary | ICD-10-CM

## 2021-06-24 DIAGNOSIS — F20.9 SCHIZOPHRENIA (H): Primary | ICD-10-CM

## 2021-06-24 PROCEDURE — 99605 MTMS BY PHARM NP 15 MIN: CPT | Performed by: PHARMACIST

## 2021-06-24 PROCEDURE — 99607 MTMS BY PHARM ADDL 15 MIN: CPT | Performed by: PHARMACIST

## 2021-06-24 RX ORDER — ARIPIPRAZOLE 15 MG/1
20 TABLET ORAL DAILY
COMMUNITY
Start: 2021-05-15 | End: 2022-02-03

## 2021-06-24 RX ORDER — LISINOPRIL 10 MG/1
10 TABLET ORAL DAILY
COMMUNITY
Start: 2021-06-21 | End: 2023-04-06

## 2021-06-24 RX ORDER — BUPROPION HYDROCHLORIDE 150 MG/1
150 TABLET ORAL DAILY
COMMUNITY
Start: 2021-06-11 | End: 2021-06-24

## 2021-06-24 RX ORDER — BUPROPION HYDROCHLORIDE 300 MG/1
300 TABLET ORAL DAILY
COMMUNITY
Start: 2021-05-15 | End: 2022-02-03

## 2021-06-24 NOTE — PATIENT INSTRUCTIONS
Recommendations from today's MTM visit:                                                      Reviewed and updated medication list today.  No medication changes recommended at this time.     Follow-up: 7/15/21 with LOTTIE De Los Santos; 7/22/21 FEP feedback visit    It was great to speak with you today.  I value your experience and would be very thankful for your time with providing feedback on our clinic survey. You may receive a survey via email or text message in the next few days.     To schedule another MTM appointment, please call the clinic directly or you may call the MTM scheduling line at [phone] or toll-free at [phone].     My Clinical Pharmacist's contact information:                                                      Please feel free to contact me with any questions or concerns you have.      Annika Prince, PharmD, BCPP  Medication Therapy Management Pharmacist  [facility]

## 2021-06-24 NOTE — PROGRESS NOTES
NAVIGATE/STRENGTHS Virtual Visit Progress Note  For Supported Employment & Education    NAVIGATE Enrollee: Guero Carter (1990)     MRN: [id number]  Date of Visit: 6/24/2021  Contacted: Delroy  Appointment Length: 50 minutes    Discussed:   Writer met with Guero Carter for virtual visit check-in. Meeting agenda included recapping his job experience with [employer] at [employer], discussing current work and school goals, and helping to plan for next steps.       CAROLINA Menjivar  NAVIGATE/STRENGTHS - SEE

## 2021-07-01 ENCOUNTER — OFFICE VISIT (OUTPATIENT)
Dept: PSYCHIATRY | Facility: CLINIC | Age: 31
End: 2021-07-01

## 2021-07-01 DIAGNOSIS — F20.0 PARANOID SCHIZOPHRENIA (H): Primary | ICD-10-CM

## 2021-07-08 ENCOUNTER — OFFICE VISIT (OUTPATIENT)
Dept: PSYCHIATRY | Facility: CLINIC | Age: 31
End: 2021-07-08

## 2021-07-08 DIAGNOSIS — F20.0 PARANOID SCHIZOPHRENIA (H): Primary | ICD-10-CM
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
## 2021-07-15 ENCOUNTER — OFFICE VISIT (OUTPATIENT)
Dept: PSYCHIATRY | Facility: CLINIC | Age: 31
End: 2021-07-15

## 2021-07-15 ENCOUNTER — VIRTUAL VISIT (OUTPATIENT)
Dept: PSYCHIATRY | Facility: CLINIC | Age: 31
End: 2021-07-15
Attending: NURSE PRACTITIONER
Payer: COMMERCIAL

## 2021-07-15 DIAGNOSIS — F20.9 SCHIZOPHRENIA, UNSPECIFIED TYPE (H): Primary | ICD-10-CM

## 2021-07-15 PROCEDURE — 99205 OFFICE O/P NEW HI 60 MIN: CPT | Mod: 95 | Performed by: NURSE PRACTITIONER

## 2021-07-15 NOTE — PATIENT INSTRUCTIONS
Nice to meet you today, Guero.   Please follow up with your PCP about getting your blood pressure checked.  See you next week.     Sofia    Thank you for coming to the [facility] [place] [facility].    Lab Testing:  If you had lab testing today and your results are reassuring or normal they will be mailed to you or sent through [employer] within 7 days.   If the lab tests need quick action we will call you with the results.  The phone number we will call with results is # [phone] (home) . If this is not the best number please call our clinic and change the number.    Medication Refills:  If you need any refills please call your pharmacy and they will contact us. Our fax number for refills is [phone]. Please allow three business for refill processing.   If you need to  your refill at a new pharmacy, please contact the new pharmacy directly. The new pharmacy will help you get your medications transferred.     Scheduling:  If you have any concerns about today's visit or wish to schedule another appointment please call our office during normal business hours [phone] (8-5:00 M-F)    Contact Us:  Please call [phone] during business hours (8-5:00 M-F).  If after clinic hours, or on the weekend, please call [phone].    Financial Assistance: [phone]  [phone]  Central Billing Office, [facility]: [phone]  [facility]errill [phone]  Medical Records: [phone]    MENTAL HEALTH CRISIS NUMBERS:  [facility]:   [facility]: [phone]  Crisis Residence [facility]: [phone]   Walk-In Counseling Center [facility]: [phone]   COPE  [place] Mobile Team: [phone] (adults) [phone] (child)     [facility]:   [facility]: [phone]   Walk-in counseling [facility]: [phone]   Walk-in counseling [facility]: [phone]   Crisis  Residence [facility] Residence: [phone]   Urgent Care Adult Mental Health: [phone] Mobile team AND 24/7 crisis line    Other Crisis Numbers:   National Suicide Prevention Lifeline: 258-708-EZTK (408-448-4229)  CRISIS TEXT LINE: Text to 046664 for any crisis 24/7; OR www.crisistextline.org   Poison Control Center: 1-252.219.3971  CHILD: [facility] needs assessment team: [phone]   Trans Lifeline: 1-622.957.9873  [facility] Lifeline: 1-590.170.4291    For a medical emergency please call 911 or go to the nearest ER.         _____________________________________________    Again thank you for choosing [facility] [place] [facility] and please let us know how we can best partner with you to improve you and your family's health.    You may be receiving a survey regarding this appointment. We would love to have your feedback, both positive and negative. The survey is done by an external company, so your answers are anonymous.

## 2021-07-22 ENCOUNTER — VIRTUAL VISIT (OUTPATIENT)
Dept: PSYCHIATRY | Facility: CLINIC | Age: 31
End: 2021-07-22
Attending: NURSE PRACTITIONER
Payer: COMMERCIAL

## 2021-07-22 ENCOUNTER — OFFICE VISIT (OUTPATIENT)
Dept: PSYCHIATRY | Facility: CLINIC | Age: 31
End: 2021-07-22

## 2021-07-22 DIAGNOSIS — F20.9 SCHIZOPHRENIA, UNSPECIFIED TYPE (H): Primary | ICD-10-CM

## 2021-07-22 PROCEDURE — 99214 OFFICE O/P EST MOD 30 MIN: CPT | Mod: 95 | Performed by: NURSE PRACTITIONER

## 2021-07-22 NOTE — PROGRESS NOTES
NAVIGATE/STRENGTHS Virtual Visit Progress Note  For Supported Employment & Education    NAVIGATE Enrollee: Guero Carter (1990)     MRN: [id number]  Date of Visit: 7/1/2021  Contacted: Delroy  Appointment Length: 45 minutes    Discussed:   Writer met with Guero Carter for virtual visit check-in. Meeting agenda included recapping his job experience with [employer] at [employer], discussing current work and school goals, and helping to plan for next steps.       CAROLINA Menjivar  NAVIGATE/STRENGTHS - SEE

## 2021-07-22 NOTE — PROGRESS NOTES
NAVIGATE/STRENGTHS Virtual Visit Progress Note  For Supported Employment & Education    NAVIGATE Enrollee: Guero Carter (1990)     MRN: [id number]  Date of Visit: 7/15/2021  Contacted: Delroy  Appointment Length: 20 minutes    Discussed:   Writer met with Guero Carter for virtual visit check-in. Meeting agenda included quick update regarding [employer], expectations for school, and writing pros and cons list for providing disclosure to current employer.     Writer helped strategize next steps and will continue to meet with Delroy weekly for following along support, as needed.     CAROLINA Menjivar  NAVIGATE/STRENGTHS - SEE

## 2021-07-22 NOTE — PROGRESS NOTES
NAVIGATE/STRENGTHS Virtual Visit Progress Note  For Supported Employment & Education    NAVIGATE Enrollee: Guero Carter (1990)     MRN: [id number]  Date of Visit: 7/8/2021  Contacted: Delroy  Appointment Length: 60 minutes    Discussed:   Writer met with Guero Carter for virtual visit check-in. Meeting agenda included recapping his job experience with [employer] at [employer], discussing current work and school goals, and helping to plan for next steps.       CAROLINA Menjivar  NAVIGATE/STRENGTHS - SEE

## 2021-07-22 NOTE — PROGRESS NOTES
[facility]  Psychiatry Clinic  First Episode of Psychosis - Strengths Program  Explanation of Findings Visit & Recommendation     Patient Name:  Guero Carter  /Age:  1990 (31 year old)  Initial Diagnosis(es):  {DSM V Diagnosis List:287658}    Patient: Guero Carter (1990)     MRN: [id number]  Diagnosis(es): ***  Clinician: AP Kerr; LOTTIE De Los Santos      Video- Visit Details   Type of service:  video visit for family therapy  Time of service:    Date:  21    Video Start Time:  1:00 PM      Video End Time:  ***    Reason for video visit:  COVID-19 public health recommendations on in-person sessions  Originating Site (patient location):  [facility]   Location- Patient's home  Distant Site (provider location):  HIPAA compliant location- Remote location  Mode of Communication:  Secure real time interactive audio and visual telecommunication system via [employer]  Consent:  Patient has given verbal consent for video visit?: Yes     Diagnostic Assessment Completed: 21; please see in chart review for details    Individuals Present:    {OP MH THERAPY ATTENDEES:518876}  {FAMILY MEMBERS:562097}  Prescriber(s): ***   & Family Clinician: ***   & Individual Resiliency Trainer (IRT): ***  Pharmacist: Annika Prince PharmD  Other: ***    Total number of people who participated in contact: ***, which includes the clinical team    Interventions:  >[place] announced at beginning of session  >Review of each team member's role   >Review of diagnosis, symptoms to substantiate the diagnosis, and affected level of functioning  >Interpretation and explanation of results of psychological testing  >Review of medications  >Review of goals and associated strengths, barriers, objectives, and interventions  >Review of safety risks  (ie SI and HI) and characteristics and interventions to mitigate risk  >Advice given as to how interpersonal  "supports can best assist the patient's recovery  >Explored aspects of family history/dynamics  >Explored pt/family understanding of, and adjustment to psychosis / illness  >Review of frequency of appointments and anticipated length of treatment  >Illicit client/family feedback    Assessment:  The above named individuals met for the purposes of reviewing the findings of the diagnostic assessment, Psychological & Cognitive testing, and Psychiatric consultation recently completed.     Per Psychiatric consultation: Guero Carter is a 31 year old year old male. Today, {PSYCHINTERIM:549048:::0}.  Informed Guero of diagnostic impressions corresponding with diagnoses of ***.   ***    Psychosocial considerations: Psychosocial stressors were identified as access to healthcare, family of origin issues, housing , limited social support, mental health symptoms, occupational / vocational stress and parent-child stress. Explored Guero's goals to include extending medical leave.  Guero is currently participating in PHP services. Protective factors and/or strengths identified as educated, good listener, has a previous history of therapy, intelligent, support of family, friends and providers and work history. Self identified Strengths: \"My mind -- knowing that I'm capable of doing great things. Never giving up. Be resilient.\"  ***    Mental Status Exam:   Alertness: {a:716150}  Attention Span and Concentration:  { :847460}  Attitude:  { :520590}   Appearance: {[facility] APPEARANCE:875727}  Behavior/Demeanor: {b:093604}, with {[facility] EYE:364393} eye contact   Speech: {s:755831}  Language: {l:823218}. Preferred language identified as {LANGUAGES SPOKEN:624207}.  Psychomotor Behavior:  {p:111443}  Mood: {m:153443}  Affect: { :604027}  Associations:  { :754216}  Thought Process:  { :072998}  Thought Content:  {[facility] TC:062756}  Perception:  Reports {p:896537};  Denies {p:868162}  Insight: {i:559052}  Judgment: {j:956951}  Impulse " "Control:  { :130716}  Cognition: {Does:749302} appear grossly intact; formal cognitive testing {was:958773} done    Recommendations:  Informed Guero that he {DOES/DOES NOT:380885::\"does not\"} seem appropriate for First Episode of Psychosis, Strengths Program. Writer {HAS:359880} provided verbal and/or written information about [facility]'s First Episode of Psychosis- Strengths Program, including review of recommended services:    FEP Strengths Program Services:  -Medication Management (Psychiatry/Nurse Practitioner)  -Individual Resiliency Training/IRT or CBT for Psychosis (Counseling)  -Psychosis Young Adult Group  -Family Psychoeducation and Support (Family Therapy + Group)  -Supported Education and Employment (SEE)  -Case Management/CM  -Pharmacological support     For Guero, it is recommended that they begin *** within the Strengths Program to assist in their recovery.  Follow-up appointments outlined below.  Additional community support recommendations include ***.  Considerations to improve cognitive functioning discussed and include ***.  They {WERE:033418} interested in research opportunities available through the [facility].  Guero's response was ***. Support Person/Family's response was ***.    Plan:  Guero was agreeable to beginning the below mentioned services.  Follow-up appointments have been arranged for the appropriate providers to initiate services.    {FOLLOW UP PLANS (Optional):439733}    {PSYTX:773403}        Treatment Risk Statement:  The patient understands the risks, benefits, adverse effects and alternatives. Agrees to treatment with the capacity to do so. No medical contraindications to treatment. Agrees to call clinic for any problems. The patient understands to call 911 or go to the nearest ED if life threatening or urgent symptoms occur.        Please call or EPIC message for questions or concerns related to the above information.     "

## 2021-07-22 NOTE — PROGRESS NOTES
NAVIGATE/STRENGTHS Virtual Visit Progress Note  For Supported Employment & Education    NAVIGATE Enrollee: Guero Carter (1990)     MRN: [id number]  Date of Visit: 7/222021  Contacted: Delroy  Appointment Length: 30 minutes    Discussed:   Writer met with Guero Carter for virtual visit check-in. Meeting agenda included recapping his job experience with [employer] at [employer], discussing current work and school goals, and helping to plan for next steps.       CAROLINA Menjivar  NAVIGATE/STRENGTHS - SEE

## 2021-07-22 NOTE — PROGRESS NOTES
[facility]  Psychiatry Clinic  First Episode of Psychosis - Strengths Program  Explanation of Findings Visit & Recommendation     Patient Name:  Guero Carter  /Age:  1990 (31 year old)  Initial Diagnosis(es):    Schizophrenia  R/o Alcohol use disorder    Patient: Guero Carter (1990)     MRN: [id number]  Clinician: AP Kerr; LOTTIE De Los Santos      Video- Visit Details   Type of service:  video visit for family therapy  Time of service:    Date:  21    Video Start Time:  1:12 PM      Video End Time:  2:00 PM    Reason for video visit:  COVID-19 public health recommendations on in-person sessions  Originating Site (patient location):  [facility]   Location- Patient's home  Distant Site (provider location):  HIPAA compliant location- Remote location  Mode of Communication:  Secure real time interactive audio and visual telecommunication system via "[employer]  Consent:  Patient has given verbal consent for video visit?: Yes     Diagnostic Assessment Completed: 21; please see in chart review for details    Individuals Present:    Prescriber(s): Sofia Vanessa CNP   & Family Clinician: AP Kerr   & Individual Resiliency Trainer (IRT): AP Anglin    Total number of people who participated in contact: 4, which includes the clinical team    Interventions:  >[place] announced at beginning of session  >Review of each team member's role   >Review of diagnosis, symptoms to substantiate the diagnosis, and affected level of functioning  >Interpretation and explanation of results of psychological testing  >Review of medications  >Review of goals and associated strengths, barriers, objectives, and interventions  >Review of safety risks  (ie SI and HI) and characteristics and interventions to mitigate risk  >Advice given as to how interpersonal supports can best assist the patient's recovery  >Explored aspects of  family history/dynamics  >Explored pt/family understanding of, and adjustment to psychosis / illness  >Review of frequency of appointments and anticipated length of treatment  >Illicit client/family feedback    Assessment:  The above named individuals met for the purposes of reviewing the findings of the diagnostic assessment, Psychological & Cognitive testing, and Psychiatric consultation recently completed.     Per Psychiatric consultation: Mr. Guero Carter is a 31 year old male with a history of schizophrenia.  He was previously followed in the Navigate program ([id number]).    Guero has achieved a number of functional goals including employment, active social network/relationships, independent living.  He recently underwent a cross-taper from olanzapine to Abilify + Wellbutrin, which he feels was beneficial to reduce side effects, improve mood, and manage psychosis symptoms.   He declines need for medication changes today.  We reviewed risk of Wellbutrin in the setting of hypertension.  He denies urgent concerns for HTN today but agrees to follow up with PCP for evaluation of blood pressure.  He will be following up with his current psychiatrist, Dr. Chapman in August for a final visit.      He is working with Supported Education and Employment re: return to work or school.  May benefit from IRT and group interventions, further assessment needed re: family needs.     Psychosocial considerations: Psychosocial stressors were identified as access to healthcare, family of origin issues, housing , limited social support, mental health symptoms, occupational / vocational stress and parent-child stress. Explored Guero's goals to include extending medical leave.  Guero is currently participating in PHP services. Protective factors and/or strengths identified as educated, good listener, has a previous history of therapy, intelligent, support of family, friends and providers and work history. Self identified  "Strengths: \"My mind -- knowing that I'm capable of doing great things. Never giving up. Be resilient.\"    Mental Status Exam:   Alertness: alert  and oriented  Attention Span and Concentration:  Normal  Attitude:  cooperative   Appearance: casually groomed  Behavior/Demeanor: cooperative and pleasant  Speech: normal  Language: intact. Preferred language identified as English.  Psychomotor Behavior:  normal or unremarkable  Mood: description consistent with euthymia  Affect: mood congruent  Associations:  no loose associations  Thought Process:  logical and linear  Thought Content:  no evidence of suicidal ideation or homicidal ideation  Perception:  Reports none;  Denies auditory hallucinations and visual hallucinations  Insight: adequate  Judgment: adequate for safety  Impulse Control:  intact  Cognition: does  appear grossly intact    Recommendations:  Informed Guero that he does seem appropriate for First Episode of Psychosis, Strengths Program. Writer has provided verbal and/or written information about [facility]'s First Episode of Psychosis- Strengths Program, including review of recommended services:    FEP Strengths Program Services:  -Medication Management (Psychiatry/Nurse Practitioner)  -Individual Resiliency Training/IRT or CBT for Psychosis (Counseling)  -Psychosis Young Adult Group  -Family Psychoeducation and Support (Family Therapy + Group)  -Supported Education and Employment (SEE)  -Case Management/CM  -Pharmacological support     For Guero, it is recommended that they begin services within the Strengths Program, including medication management, IRT, group therapy, supported education and employment, and family services, to assist in their recovery.  Follow-up appointments outlined below.  Considerations to improve cognitive functioning discussed and include possible research studies with the [facility].  Guero's response was engaged.     Plan:  Guero was agreeable to beginning the " below mentioned services.  Follow-up appointments have been arranged for the appropriate providers to initiate services.    1. Medication management - follow up with Sofia Vanessa CNP in approx 6 weeks.  He will continue with current psychiatrist in the meantime.   2. IRT - referral placed  3. Group therapy - access information provided  4. SEE - continue with Felix Forrester        Treatment Risk Statement:  The patient understands the risks, benefits, adverse effects and alternatives. Agrees to treatment with the capacity to do so. No medical contraindications to treatment. Agrees to call clinic for any problems. The patient understands to call 911 or go to the nearest ED if life threatening or urgent symptoms occur.        Please call or EPIC message for questions or concerns related to the above information.

## 2021-07-23 ENCOUNTER — TELEPHONE (OUTPATIENT)
Dept: PSYCHIATRY | Facility: CLINIC | Age: 31
End: 2021-07-23

## 2021-07-23 LAB
ALBUMIN SERPL-MCNC: 4.5 G/DL
ALBUMIN/GLOBULIN RATIO - QUEST: 1.7
ALP SERPL-CCNC: 117 U/L
ALT SERPL-CCNC: 48 U/L
AST SERPL-CCNC: 31 U/L
BASOPHILS - ABS (DIFF) - HISTORICAL: 48 CELLS/UL
BASOPHILS NFR BLD AUTO: 0.7 %
BILIRUB SERPL-MCNC: 0.6 MG/DL
BUN SERPL-MCNC: 8 MG/DL
BUN/CREAT RATIO - HISTORICAL: ABNORMAL
CALCIUM SERPL-MCNC: 9.2 MG/DL
CHLORIDE SERPLBLD-SCNC: 108 MMOL/L
CHOL/HDL RATIO - HISTORICAL: 4.7
CHOLEST SERPL-MCNC: ABNORMAL MG/DL
CO2 SERPL-SCNC: 24 MMOL/L
CREAT SERPL-MCNC: 0.87 MG/DL
EGFR IF AFRICN AM: 134 ML/MIN/1.73
EGFR IF NONAFRICN AM: 116 ML/MIN/1.73
EOSINOPHIL NFR BLD AUTO: 2.9 %
EOSINOPHILS - ABS (DIFF) - HISTORICAL: 200 CELLS/UL
ERYTHROCYTE [DISTWIDTH] IN BLOOD BY AUTOMATED COUNT: 12.3 %
GLOBULIN: 2.6 G/DL
GLUCOSE SERPL-MCNC: 95 MG/DL (ref 70–99)
HBA1C MFR BLD: 5 % (ref 0–5.7)
HCT VFR BLD AUTO: 48.1 %
HDLC SERPL-MCNC: 47 MG/DL
HEMOGLOBIN: 16.2 G/DL
LDL CHOLESTEROL: 144 MG/DL
LYMPHOCYTES # BLD AUTO: 1960 CELLS/UL
LYMPHOCYTES NFR BLD AUTO: 28.4 %
MCH RBC QN AUTO: 29.6 PG
MCHC RBC AUTO-ENTMCNC: 33.7 G/DL
MCV RBC AUTO: 87.9 FL
MONOCYTES # BLD AUTO: 6.9 %
MONOCYTES - ABS (DIFF) - HISTORICAL: 476 CELLS/UL
NEUTROPHILS # BLD AUTO: 4216 CELLS/UL
NEUTROPHILS NFR BLD AUTO: 61.1 %
NONHDLC SERPL-MCNC: 173 MG/DL
PLATELET # BLD AUTO: 296 10^9/L
PMV BLD: 9.6 FL
POTASSIUM SERPL-SCNC: 4.2 MMOL/L
PROTEIN TOTAL (EXTERNAL): 7.1 G/DL
RBC # BLD AUTO: 5.47 M/UL
SODIUM SERPL-SCNC: 139 MMOL/L
TRIGL SERPL-MCNC: 157 MG/DL
WBC # BLD AUTO: 6.9 10^9/L

## 2021-07-23 NOTE — TELEPHONE ENCOUNTER
Writer is aware that provider reviewed lab results shortly after the pt's visit yesterday. No further action needed by RNCC.

## 2021-07-23 NOTE — TELEPHONE ENCOUNTER
Received results of Lipid Panel, CMP, CBC W/ DIFF from [facility] drawn on 7/6/2021 at 0957  Results entered into EMR by Georgia Fernandez CMA on 7/23/2021  Routed to Sofia Vanessa and NJ Donahue for review  Document placed in scanning and a copy held in Psychiatry until scanning confirmed.  Georgia Fernandez CMA

## 2021-07-28 ENCOUNTER — DOCUMENTATION ONLY (OUTPATIENT)
Dept: PSYCHIATRY | Facility: CLINIC | Age: 31
End: 2021-07-28

## 2021-08-03 ENCOUNTER — DOCUMENTATION ONLY (OUTPATIENT)
Dept: PSYCHIATRY | Facility: CLINIC | Age: 31
End: 2021-08-03

## 2021-08-05 ENCOUNTER — OFFICE VISIT (OUTPATIENT)
Dept: PSYCHIATRY | Facility: CLINIC | Age: 31
End: 2021-08-05

## 2021-08-05 DIAGNOSIS — F20.9 SCHIZOPHRENIA, UNSPECIFIED TYPE (H): Primary | ICD-10-CM

## 2021-08-06 NOTE — PROGRESS NOTES
[facility]  Psychiatry Clinic  First Episode of Psychosis - Strengths Program  Orientation     Guero Carter MRN# [id number]   Age: 31 year old YOB: 1990     Date: 6/21/21    Video- Visit Details   Type of service:  video visit for Orientation purposes only  Time of service:    Date:  6/21/21    Video Start Time:  2:00 PM      Video End Time:  2:36 PM    Reason for video visit:  COVID-19 public health recommendations on in-person sessions  Originating Site (patient location):  [facility]   Location- Patient's home  Distant Site (provider location):  HIPAA compliant location- Remote location  Mode of Communication:  Secure real time interactive audio and visual telecommunication system via [employer]  Consent:  Patient has given verbal consent for video visit?: Yes    Spoke with Guero. Introduced self and reason for visit.     Introduced and described the Strengths Program and services.  Strengths Services:  -Medication Management (Psychiatry)  -Individual Resiliency Training/IRT (Counseling)  -Family Psychoeducation and Support  -Case Management  -Group  -SEE     Guero expressed interest in all the above mentioned services and is motivated to begin support with our team.  Guero did not have any questions related to our services.  We discussed next steps and upcoming appointment to complete assessment and initiate our services.     Provided Guero with written information about the First Episode of Psychosis Strengths Program via access to our website and writer's contact information.      PROVIDER: AP Kerr      This is a non-billable encounter as it was solely for the purposes of outreach and/or care coordination.

## 2021-08-12 ENCOUNTER — TELEPHONE (OUTPATIENT)
Dept: PSYCHIATRY | Facility: CLINIC | Age: 31
End: 2021-08-12

## 2021-08-12 NOTE — TELEPHONE ENCOUNTER
Psychometrist, Luh Easton, conducted a remote assessment ([facility]).  Client was actively engaged.  No significant risk issues were reported.

## 2021-08-18 NOTE — PROGRESS NOTES
[facility]  Psychiatry Clinic  First Episode of Psychosis - Strengths Program  Psychological Evaluation Testing Note     Guero Carter MRN# [id number]   Age: 31 year old YOB: 1990     Date of Evaluation: 8/03/21  60 minute evaluation    Video- Visit Details   Type of service:  video visit for Psychological Testing  Time of service:    Date:  8/03/21    Video Start Time:  1:00 pm      Video End Time:  2:00 pm    Reason for video visit:  COVID-19 public health recommendations on in-person sessions  Originating Site (patient location):  [facility]   Location- Patient's home  Distant Site (provider location):  HIPAA compliant location- Remote location  Mode of Communication:  Secure real time interactive audio and visual telecommunication system via Zoom  Consent:  Patient has given verbal consent for video visit?: Yes    Attendees: Patient attended the session alone  : No, English      Identifying Information/Reason for Referral  Guero Carter is a 31 year old male who prefers the name Guero & pronouns he, him, and his .  Guero has a history of psychosis.  The patient was seen for psychological/neuropsychological testing. The purpose of the current psychological evaluation was to provide information about Guero's social, emotional and cognitive functioning, to assist with diagnostic clarification and to guide his treatment and recovery planning. Results of the following assessments are to be used in conjunction with the standard diagnostic evaluation completed by Denise Rogers.  A total of 1 hours 0 minutes were spent in test administration and scoring by this writer, Danika Clements psychometrisnubia.  See Testing Evaluation Report for a full interpretation of the findings and data.     Summary of Services/Procedures  Guero was administered a psychological test battery tailored to his age and the referral questions.     Tests  Administered    EPINET Core Assessment Battery Intake   [facility] Depression Scale for Schizophrenia- CDSS  [place]-Suicide Severity Rating Scale Interview - Lifetime  Shared Decision Making in Clinical Encounters  Intersectional Discrimination Index  Stress Screener for Recent Events              Behavioral Observations  Overall, Guero demonstrated good effort throughout testing. Therefore, the current evaluation results are thought to provide an accurate representation of his functioning in the areas assessed.     Plan    Testing is NOT complete. Patient is scheduled to return to complete additional testing on 8/12    Will coordinate care with other treatment providers to assist with planning as needed.      Danika Clements  Psychometrist      This is a non-billable encounter as it was solely for the purposes of completing initial assessments. Billing will occur during clinical interpretation and review of findings at a future date.

## 2021-08-18 NOTE — PROGRESS NOTES
[facility]  Psychiatry Clinic  First Episode of Psychosis - Strengths Program  Psychological Evaluation Testing Note     Guero Carter MRN# [id number]   Age: 31 year old YOB: 1990     Date of Evaluation: 7/28/21  60 minute evaluation    Video- Visit Details   Type of service:  video visit for Psychological Testing  Time of service:    Date:  7/28/21    Video Start Time:  1:00 pm      Video End Time:  2:00 pm    Reason for video visit:  COVID-19 public health recommendations on in-person sessions  Originating Site (patient location):  [facility]   Location- Patient's home  Distant Site (provider location):  HIPAA compliant location- Remote location  Mode of Communication:  Secure real time interactive audio and visual telecommunication system via Zoom  Consent:  Patient has given verbal consent for video visit?: Yes    Attendees: Patient attended the session alone  : No, English      Identifying Information/Reason for Referral  Guero Carter is a 31 year old male who prefers the name Guero & pronouns he, him, and his .  Guero has a history of psychosis.  The patient was seen for psychological/neuropsychological testing. The purpose of the current psychological evaluation was to provide information about Guero's social, emotional and cognitive functioning, to assist with diagnostic clarification and to guide his treatment and recovery planning. Results of the following assessments are to be used in conjunction with the standard diagnostic evaluation completed by Denise Rogers.  A total of 1 hours 0 minutes were spent in test administration and scoring by this writer, Danika Clements psychometrisnubia.  See Testing Evaluation Report for a full interpretation of the findings and data.     Summary of Services/Procedures  Guero was administered a psychological test battery tailored to his age and the referral questions.     Tests  Administered  [place] Assessment of Need - Short Appraisal Schedule (CANSAS)  [place] Symptom Index  MIRECC Global Assessment of Functioning  Illness Management and Recovery - Practitioner Rating  Illness Management and Recovery - Self Rating  OTTONIEL Stark Symptom Severity Scale  Post Traumatic Stress Disorder Checklist (PCL-5 8 Item)  Audit-C  DAST-10  PROMIS-Sleep Disturbance  International Physical Activity Questionnaire (IPAQ)  Behavioral Inhibition and Activation Scale- BIS/BAS  Brief [place]-28 Item  Life Event Checklist- LEC Intake / Follow Up      Behavioral Observations    Overall, Guero demonstrated good effort throughout testing. Therefore, the current evaluation results are thought to provide an accurate representation of his functioning in the areas assessed.     Plan    Testing is NOT complete. Patient is scheduled to return to complete additional testing on 8/3    Will coordinate care with other treatment providers to assist with planning as needed.      Danika Clements  Psychometrist      This is a non-billable encounter as it was solely for the purposes of completing initial assessments. Billing will occur during clinical interpretation and review of findings at a future date.

## 2021-08-23 NOTE — PROGRESS NOTES
NAVIGATE/STRENGTHS Virtual Visit Progress Note  For Supported Employment & Education    NAVIGATE Enrollee: Guero Carter (1990)     MRN: [id number]  Date of Visit: 8/5/2021  Contacted: Delroy  Appointment Length: 40 minutes    Discussed:   Writer met with Guero Carter for virtual visit check-in. Meeting agenda included recapping his job experience with [employer] at [employer], discussing current work and school goals, and helping to plan for next steps.       CAROLINA Menjivar  NAVIGATE/STRENGTHS - SEE

## 2021-08-24 ENCOUNTER — TELEPHONE (OUTPATIENT)
Dept: PSYCHIATRY | Facility: CLINIC | Age: 31
End: 2021-08-24

## 2021-08-24 ENCOUNTER — VIRTUAL VISIT (OUTPATIENT)
Dept: PSYCHIATRY | Facility: CLINIC | Age: 31
End: 2021-08-24
Attending: NURSE PRACTITIONER
Payer: COMMERCIAL

## 2021-08-24 DIAGNOSIS — F20.9 SCHIZOPHRENIA, UNSPECIFIED TYPE (H): Primary | ICD-10-CM

## 2021-08-24 PROCEDURE — 99215 OFFICE O/P EST HI 40 MIN: CPT | Mod: 95 | Performed by: NURSE PRACTITIONER

## 2021-08-24 NOTE — PATIENT INSTRUCTIONS
Thank you for coming to the [facility].    Lab Testing:  If you had lab testing today and your results are reassuring or normal they will be mailed to you or sent through [employer] within 7 days.   If the lab tests need quick action we will call you with the results.  The phone number we will call with results is # [phone] (home) . If this is not the best number please call our clinic and change the number.    Medication Refills:  If you need any refills please call your pharmacy and they will contact us. Our fax number for refills is [phone]. Please allow three business for refill processing.   If you need to  your refill at a new pharmacy, please contact the new pharmacy directly. The new pharmacy will help you get your medications transferred.     Scheduling:  If you have any concerns about today's visit or wish to schedule another appointment please call our office during normal business hours [phone] (8-5:00 M-F)    Contact Us:  Please call [phone] during business hours (8-5:00 M-F).  If after clinic hours, or on the weekend, please call [phone].    Financial Assistance: [phone]  [phone]  [place] [facility]: [phone]  [facility] [phone]  Medical Records: [phone]    MENTAL HEALTH CRISIS NUMBERS:  [facility]:   [facility]: [phone]  Crisis Residence [facility]: [phone]   [facility]: [phone]   COPE  [place] Mobile Team: [phone] (adults) [phone] (child)     [facility]:   [facility]: [phone]   Walk-in counseling [facility]: [phone]   Walk-in counseling [facility]: [phone]   Crisis Residence [facility] Residence: [phone]   [facility] Adult Mental Health: [phone] Mobile team AND  crisis  line    Other Crisis Numbers:   National Suicide Prevention Lifeline: 660-483-AXOT (202-202-8077)  CRISIS TEXT LINE: Text to 501098 for any crisis 24/7; OR www.crisistextline.org   Poison Control Center: 4-665-603-4980  CHILD: [facility] needs assessment team: [phone]   Trans Lifeline: 1-311.300.3086  [facility] Lifeline: 1-663.626.5540    For a medical emergency please call 911 or go to the nearest ER.         _____________________________________________    Again thank you for choosing [facility] [place] [facility] and please let us know how we can best partner with you to improve you and your family's health.    You may be receiving a survey regarding this appointment. We would love to have your feedback, both positive and negative. The survey is done by an external company, so your answers are anonymous.
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
## 2021-09-01 ENCOUNTER — OFFICE VISIT (OUTPATIENT)
Dept: PSYCHIATRY | Facility: CLINIC | Age: 31
End: 2021-09-01

## 2021-09-01 DIAGNOSIS — F20.9 SCHIZOPHRENIA, UNSPECIFIED TYPE (H): Primary | ICD-10-CM

## 2021-09-09 ENCOUNTER — VIRTUAL VISIT (OUTPATIENT)
Dept: PSYCHIATRY | Facility: CLINIC | Age: 31
End: 2021-09-09
Attending: SOCIAL WORKER
Payer: COMMERCIAL

## 2021-09-09 DIAGNOSIS — F20.9 SCHIZOPHRENIA, UNSPECIFIED TYPE (H): Primary | ICD-10-CM

## 2021-09-09 PROCEDURE — 90834 PSYTX W PT 45 MINUTES: CPT | Mod: 95 | Performed by: SOCIAL WORKER

## 2021-09-09 NOTE — PROGRESS NOTES
[facility]  Psychiatry Clinic  First Episode of Psychosis - [facility]rengths Program  Clinician Contact & Progress Note   For Individual Resiliency Training (IRT) & Psychotherapy     Patient: Guero Carter (1990)     MRN: [id number]  Diagnosis(es): Schizophrenia (H) [F20.9  Clinician: AP Anglin  Service Type: 69568 psychotherapy (38-52 min. with patient and/or family)  Prolonged Care for this visit is not indicated.  Clinical work consists of family therapy.     Video- Visit Details   Type of service:  video visit for individual therapy  Time of service:    Date:  9/09/21    Video Start Time:  12:09PM      Video End Time:  12:48PM    Reason for video visit:  COVID-19 public health recommendations on in-person sessions  Originating Site (patient location):  [facility]   Location- Patient's home  Distant Site (provider location):  HIPAA compliant location- Remote location  Mode of Communication:  Secure real time interactive audio and visual telecommunication system via [employer]  Consent:  Patient has given verbal consent for video visit?: Yes     People present:   Patient   AP Anglin     Intervention:  Motivational Interviewing   Connect info and skills with personal goals  Promote hope and positive expectations    Educational Teaching Strategies   Relate information to client's experience  Ask questions to check comprehension  Break down information into small chunks  Adopt client's language     CBT   Behavioral Experiments (engage in a  what if  consideration, test existing beliefs  and/or help  test more adaptive beliefs, then modify unhelpful beliefs)  Recognizing the positive (Visualize, write, or discuss the best parts of the day to promote positive thinking patterns)      IRT Module(s) Addressed:  Module 1 - Orientation    Did the client complete the home practice option(s) from the previous session:   Not Applicable    Techniques utilized:   [place] announced  at beginning of session  Present new material  Problem-solving practice  Identified urgent concerns  Review of diagnosis, symptoms to substantiate the diagnosis, and affected level of functioning  Review of strengths, barriers, objectives, and interventions  Illicit client feedback  Review of safety risks  (ie SI and HI) and characteristics and interventions to mitigate risk    Mental Status Exam:  Alertness: alert  and oriented  Appearance: awake, alert, adequately groomed and appeared stated age  Behavior/Demeanor: pleasant, with good eye contact   Speech: normal  Language: intact and no problems  Psychomotor: normal or unremarkable  Mood: anxious and worried  Thought Process/Associations:  tangental perseverative  Thought Content:  no evidence of suicidal ideation or homicidal ideation and possible delusions  Perception:  Reports visual hallucinations, delusions and tactile hallucinations (Guero reports anal leakage, unclear if this is based in reality)  Insight: limited  Judgment: fair  Cognition: attention span: intact  recent memory: intact  remote memory: intact  fund of knowledge: appropriate;     JOSE-7 and PHQ-9:  Last PHQ-9 3/8/2018   1.  Little interest or pleasure in doing things 0   2.  Feeling down, depressed, or hopeless 0   3.  Trouble falling or staying asleep, or sleeping too much 0   4.  Feeling tired or having little energy 0   5.  Poor appetite or overeating 0   6.  Feeling bad about yourself 0   7.  Trouble concentrating 0   8.  Moving slowly or restless 0   Q9: Thoughts of better off dead/self-harm past 2 weeks 0   PHQ-9 Total Score 0   Difficulty at work, home, or with people -       Assessment/Progress Note:   I met with Guero for an IRT session.  The focus of today's session was provide supportive therapy and psychoeducation and orienting Guero to IRT. Guero was pleasant during the IRT session and discussed his interest in being able to enjoy work in the future, which may include a  possible job change. Guero reports that he has terminated services with [facility] and is now engaged in medication management and SEE services with the [facility]. His support systems were discussed and he identifies his sister as his primary support. He declined the need for a referral to family therapy and declined to sign SHANA for his parents at this time. He reports that he has been diagnosed with schizophrenia and PTSD. He reports recent symptoms to include nightmares and increased stress due to anal leakage while at work. This writer utilized therapeutic techniques of giving recognition, supportive listening, seeking clarification, and focusing the conversation. Assessed symptom presence and potential triggers for the patient. Discussed recently utilized coping strategies and techniques to include distraction and keeping busy.       With regards to safety, Guero reported the following:    Suicidal ideation: Denies.     Self-harm: Thoughts - Denies.     Homicidal or violent ideation: Denies.     Discussed overall safety plan should symptoms feel unmanageable or safety concerns become imminent to include: [facility] can help people who are in the midst of a mental health crisis.   [facility] mobile crisis teams for adults, 18 and older can call:  COPE -- [phone]  Guero was able to contract for safety.     Overall Guero was cooperative and attentive throughout the session.  This writer observed some delay in responses but no evidence of self-talk.  Helpful clinical techniques utilized during today's appointment appeared to be giving recognition, supportive listening, seeking clarification, and focusing the conversation.  As of today's appt insight to their mental illness appears limited.  Symptom assessment, safety assessment, discussion and identification of coping strategies, and exploration of material in IRT modules was all in support of Guero's self-identified goal(s)  will be discussed at the next session, as identified in most recent BEH Treatment Plan.    Plan/Referrals:   Guero is participating in coordinated speciality care via the [facility] within our clinic.  Will meet with Guero weekly  for Individual Resiliency training, therapy, and support aimed at maximizing Guero's opportunity for recovery from psychosis.    Next session: 9/16/21 at Noon    Crisis Numbers:   Provided routinely in AVS     After hours:  [phone]    Treatment plan last completed on: NA  Next treatment plan update due by: 9/16/21 at noon   Treatment plan was not initiated and completed at this visit as the purpose of the visit was building rapport and orienting Guero to IRT.      Please EPIC message with any questions or concerns.    PROVIDER: AP Anglin

## 2021-09-14 NOTE — PROGRESS NOTES
[facility]S Virtual Visit Progress Note  For Supported Employment & Education    NAVIGATE Enrollee: Guero Carter (1990)     MRN: [id number]  Date of Visit: 9/1/2021  Contacted: Delroy  Appointment Length: 60 minutes    Discussed:   Writer met with Guero Carter for virtual visit check-in. Meeting agenda included discussing disclosure and submitting medical documentation to request for reduction of hours at work     Writer coached Delroy on pros and cons of disclosure.  Delroy is interested in reducing hours at work, hoping to start part-time hours, instead of jumping in full-time.  Writer called HR with Delroy and received work accommodations document to be submitted by the end of the day.    Writer will continue to provide ongoing supports to Delroy, as needed.     Felix MARTINI/STRENGTHS - SEE

## 2021-09-16 ENCOUNTER — VIRTUAL VISIT (OUTPATIENT)
Dept: PSYCHIATRY | Facility: CLINIC | Age: 31
End: 2021-09-16
Attending: SOCIAL WORKER
Payer: COMMERCIAL

## 2021-09-16 DIAGNOSIS — F20.9 SCHIZOPHRENIA, UNSPECIFIED TYPE (H): Primary | ICD-10-CM

## 2021-09-16 PROCEDURE — 90834 PSYTX W PT 45 MINUTES: CPT | Mod: 95 | Performed by: SOCIAL WORKER

## 2021-09-16 NOTE — PROGRESS NOTES
[facility]  Psychiatry Clinic  First Episode of Psychosis - Strengths Program  Clinician Contact & Progress Note   For Individual Resiliency Training (IRT) & Psychotherapy     Patient: Guero Carter (1990)     MRN: [id number]  Diagnosis(es): Schizophrenia, unspecified type (H) [F20.9]  Clinician: AP Anglin  Service Type: 56973 psychotherapy (38-52 min. with patient and/or family)  Prolonged Care for this visit is not indicated.  Clinical work consists of family therapy.     Time of service:    Date:  9/16/21    Start Time:  12:02PM      End Time:  12:50PM    Video- Visit Details   Type of service:  video visit for individual therapy    Reason for video visit:  COVID-19 public health recommendations on in-person sessions  Originating Site (patient location):  [facility]   Location- Patient's home  Distant Site (provider location):  HIPAA compliant location- Remote location  Mode of Communication:  Secure real time interactive audio and visual telecommunication system via [employer]  Consent:  Patient has given verbal consent for video visit?: Yes     People present:   Patient   AP Anglin     Intervention:  Motivational Interviewing   Connect info and skills with personal goals  Promote hope and positive expectations    Educational Teaching Strategies   Review of written material/education  Relate information to client's experience  Ask questions to check comprehension  Break down information into small chunks  Adopt client's language     CBT   Behavioral Experiments (engage in a  what if  consideration, test existing beliefs  and/or help  test more adaptive beliefs, then modify unhelpful beliefs)  Recognizing the positive (Visualize, write, or discuss the best parts of the day to promote positive thinking patterns)      IRT Module(s) Addressed:  Module 2 - Assessment/Initial Goal Setting    Did the client complete the home practice option(s) from the previous  session:   Not Applicable    Techniques utilized:   [place] announced at beginning of session  Review of previous meeting  Present new material  Problem-solving practice  Help client choose a home practice option  Summarize progress made in current session  Identified urgent concerns  Review of strengths, barriers, objectives, and interventions  Illicit client/family feedback    Mental Status Exam:  Alertness: alert  and oriented  Appearance: awake, alert and adequately groomed  Behavior/Demeanor: cooperative, pleasant and calm, with good eye contact   Speech: normal  Language: intact and no problems  Psychomotor: normal or unremarkable  Mood: description consistent with euthymia  Thought Process/Associations:  logical, linear and goal oriented unremarkable  Thought Content:  no evidence of suicidal ideation or homicidal ideation and Appropriate to Interview  Perception:  Reports none  Insight: fair and limited  Judgment: appropriate  Cognition: attention span: intact  concentration: intact  fund of knowledge: appropriate;       Assessment/Progress Note:   I met with Guero for an IRT session.  The focus of today's session was providing supportive therapy, psychoeducation, discussing a referral to community based supports, completing the preliminary treatment plan, and building rapport. Guero appeared much less anxious and distressed than our previous meeting which he attributes to being done with work for the week. Clinician encouraged him to reach out to his primary care physician regarding the issue with his bowels and neck pain to improve his quality of life. Clinician explored his feeling about applying for long-term disability and he expressed some ambivalence today but was encouraged to think about his options. Clinician also discussed the benefits of a  and offered to make a referral, which he also reports that he will think about. The preliminary treatment plan was completed with Guero in  session which was a helpful tool for Guero to share about his symptoms related to traumatic experiences that he has undergone. The brief strengths test was completed in session as well which identified the following top strengths for Guero: Honesty, social intelligence, fairness, caution, and perseverance. He reports that these traits embody him and he feels ownership over these traits. This writer utilized therapeutic techniques of acceptance, giving recognition, seeking clarification, making observations, and reflective listening.      With regards to safety, Guero reported the following:    Suicidal ideation: Denies.     Self-harm: Thoughts - Denies.     Homicidal or violent ideation: Denies.     Discussed overall safety plan should symptoms feel unmanageable or safety concerns become imminent to include: [facility] can help people who are in the midst of a mental health crisis.   [facility] mobile crisis teams for adults, 18 and older can call:  SUSIE -- [phone]  Guero was able to contract for safety.     Overall Guero was cooperative, attentive and engaged throughout the session.  This writer observed a significant decrease in anxiety within Guero as he appeared calmer and was less perseverative about his bowel issues and was able to engage in therapeutic conversation.  Helpful clinical techniques utilized during today's appointment appeared to be acceptance, giving recognition, seeking clarification, making observations, and reflective listening.  As of today's appt insight to their mental illness appears adequate and fair.  Symptom assessment, safety assessment, discussion and identification of coping strategies, and exploration of material in IRT modules was all in support of Guero's self-identified goal(s) of decreasing angry outbursts and increasing positive self-talk and esteem, as identified in most recent BEH Treatment Plan. Progress toward goal completion will be assessed  throughout the duration of treatment.     Plan/Referrals:   Home practice was identified as utilizing a personal strength in one new way.    Guero is participating in coordinated speciality care via the Strengths Program within our clinic.  Will meet with Guero weekly  for Individual Resiliency training, therapy, and support aimed at maximizing Guero's opportunity for recovery from psychosis.    Next session: 9/23/21 at noon    Crisis Numbers:   Provided routinely in AVS     After hours:  [phone]    Treatment plan last completed on: 9/16/21  Next treatment plan update due by: 12/16/21  Treatment plan was initiated and completed at this visit. (Detail why not if incomplete or not reviewed).   Acknowledged consent of current treatment plan was not signed (d/t patient not being present in this session d/t tele health.     Please EPIC message with any questions or concerns.    PROVIDER: AP Anglin

## 2021-09-23 ENCOUNTER — VIRTUAL VISIT (OUTPATIENT)
Dept: PSYCHIATRY | Facility: CLINIC | Age: 31
End: 2021-09-23
Attending: SOCIAL WORKER
Payer: COMMERCIAL

## 2021-09-23 DIAGNOSIS — F20.9 SCHIZOPHRENIA, UNSPECIFIED TYPE (H): Primary | ICD-10-CM

## 2021-09-23 PROCEDURE — 90834 PSYTX W PT 45 MINUTES: CPT | Mod: 95 | Performed by: SOCIAL WORKER

## 2021-09-23 NOTE — PROGRESS NOTES
[facility]  Psychiatry Clinic  First Episode of Psychosis - Strengths Program  Clinician Contact & Progress Note   For Individual Resiliency Training (IRT) & Psychotherapy     Patient: Guero Carter (1990)     MRN: [id number]  Diagnosis(es): Schizophrenia, unspecified type (H) [F20.9]  Clinician: AP Anglin  Service Type: 67973 psychotherapy (38-52 min. with patient and/or family)  Prolonged Care for this visit is not indicated.  Clinical work consists of family therapy.     Time of service:    Date:  9/23/21    Start Time:  12:05      End Time:  12:52    Video- Visit Details   Type of service:  video visit for individual therapy    Reason for video visit:  COVID-19 public health recommendations on in-person sessions  Originating Site (patient location):  [facility]   Location- Friend or family home  Distant Site (provider location):  HIPAA compliant location- Remote location  Mode of Communication:  Secure real time interactive audio and visual telecommunication system via [employer]  Consent:  Patient has given verbal consent for video visit?: Yes     People present:   Patient   AP Anglin     Intervention:  Motivational Interviewing   Connect info and skills with personal goals  Promote hope and positive expectations  Re-frame experiences in positive light    Educational Teaching Strategies   Review of written material/education  Relate information to client's experience  Ask questions to check comprehension  Break down information into small chunks  Adopt client's language     CBT   Reframe Cognitive Distortions (become aware of which distortions you are most vulnerable to, identifying and challenging our harmful automatic thoughts)  Play the Script Until the End (imagine the outcome of the worst case scenario, let the scenario play out, recognize that even if everything feared happens, it will likely turn out okay)  Behavioral Experiments (engage in a  what  if  consideration, test existing beliefs  and/or help  test more adaptive beliefs, then modify unhelpful beliefs)  Recognizing the positive (Visualize, write, or discuss the best parts of the day to promote positive thinking patterns)      IRT Module(s) Addressed:  Module 2 - Assessment/Initial Goal Setting    Did the client complete the home practice option(s) from the previous session:   Partially Completed    Techniques utilized:   [place] announced at beginning of session  Review of homework  Review of goal  Review of previous meeting  Present new material  Problem-solving practice  Summarize progress made in current session  Identified urgent concerns  Assessed caregiver/family burden  Review of strengths, barriers, objectives, and interventions  Illicit client/family feedback    Mental Status Exam:  Alertness: alert  and oriented  Appearance: awake, alert and adequately groomed  Behavior/Demeanor: cooperative, pleasant and calm, with good eye contact   Speech: normal  Language: intact and no problems  Psychomotor: normal or unremarkable  Mood: description consistent with euthymia  Thought Process/Associations:  logical, linear and goal oriented unremarkable  Thought Content:  no evidence of suicidal ideation or homicidal ideation and Appropriate to Interview  Perception:  Reports none  Insight: fair and limited  Judgment: appropriate  Cognition: attention span: intact  concentration: intact  fund of knowledge: appropriate;         Assessment/Progress Note:   I met with Guero for an IRT session.  The focus of today's session was promoting return to functioning in emotional regulation, thought process and social relationships, provide supportive therapy and psychoeducation and identifying areas of life satisfaction and goals for improvement. Guero reports that he has been working 3 days per week with positive outcomes. He denies any recent experiences with bowel issues and has not yet scheduled an appointment  with a medical professional. He identified symptoms since last visit to include depression and low mood/energy. He reports that he matt with these symptoms by utilizing distraction techniques. He states that he would like to be tested for ADD as he feels that he has difficulty concentrating. Guero's areas of life satisfaction were reviewed and he states that he feels that if he were to make more money, it would improve his quality of life in multiple areas as he feels he would have more money for hobbies and as a result would increase socialization. 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  This writer utilized therapeutic techniques of Supportive, Motivational Interviewing and Insight-oriented therapy.     With regards to safety, Guero reported the following:    Suicidal ideation: Denies.     Self-harm: Thoughts - Denies.     Homicidal or violent ideation: Denies.     Discussed overall safety plan should symptoms feel unmanageable or safety concerns become imminent to include: [facility] can help people who are in the midst of a mental health crisis.   [facility] mobile crisis teams for adults, 18 and older can call:  COPE -- [phone]  Guero was able to contract for safety.     Overall Guero was cooperative, attentive and engaged throughout the session.  This writer observed that Guero was in a pleasant mood, was less perseverative about his physical symptoms, and was somewhat difficult to follow as he would not answer assessment questions with consistent answers.  Helpful clinical techniques utilized during today's appointment appeared to be Supportive, Motivational Interviewing and Insight-oriented therapy.  As of today's appt insight to their mental illness appears fair.  Symptom assessment, safety assessment, discussion and identification of coping strategies, and  exploration of material in IRT modules was all in support of Guero's self-identified goal(s) of decreasing angry outbursts and increasing positive self-talk and esteem, as identified in most recent BEH Treatment Plan. Progress toward goal completion seems fair.    Plan/Referrals:   Guero is participating in coordinated speciality care via the Strengths Program within our clinic.  Will meet with Guero weekly  for Individual Resiliency training, therapy, and support aimed at maximizing Guero's opportunity for recovery from psychosis.    Next session: 9/30 at 12pm    Crisis Numbers:   Provided routinely in AVS     After hours:  [phone]    Treatment plan last completed on: 9/16/21  Next treatment plan update due by: 12/16/21      Please EPIC message with any questions or concerns.    PROVIDER: AP Anglin

## 2021-09-25 ENCOUNTER — HEALTH MAINTENANCE LETTER (OUTPATIENT)
Age: 31
End: 2021-09-25

## 2021-09-30 ENCOUNTER — VIRTUAL VISIT (OUTPATIENT)
Dept: PSYCHIATRY | Facility: CLINIC | Age: 31
End: 2021-09-30
Attending: NURSE PRACTITIONER
Payer: COMMERCIAL

## 2021-09-30 ENCOUNTER — VIRTUAL VISIT (OUTPATIENT)
Dept: PSYCHIATRY | Facility: CLINIC | Age: 31
End: 2021-09-30
Attending: SOCIAL WORKER
Payer: COMMERCIAL

## 2021-09-30 DIAGNOSIS — F20.9 SCHIZOPHRENIA, UNSPECIFIED TYPE (H): Primary | ICD-10-CM

## 2021-09-30 PROCEDURE — 99214 OFFICE O/P EST MOD 30 MIN: CPT | Mod: 95 | Performed by: NURSE PRACTITIONER

## 2021-09-30 PROCEDURE — 90834 PSYTX W PT 45 MINUTES: CPT | Mod: 95 | Performed by: SOCIAL WORKER

## 2021-09-30 ASSESSMENT — PAIN SCALES - GENERAL: PAINLEVEL: NO PAIN (0)

## 2021-09-30 NOTE — PATIENT INSTRUCTIONS
**For crisis resources, please see the information at the end of this document**     Patient Education      Thank you for coming to the [facility] [place] [facility].    Lab Testing:  If you had lab testing today and your results are reassuring or normal they will be mailed to you or sent through [facility] within 7 days. If the lab tests need quick action we will call you with the results. The phone number we will call with results is # [phone] (home) . If this is not the best number please call our clinic and change the number.    Medication Refills:  If you need any refills please call your pharmacy and they will contact us. Our fax number for refills is [phone]. Please allow three business for refill processing. If you need to  your refill at a new pharmacy, please contact the new pharmacy directly. The new pharmacy will help you get your medications transferred.     Scheduling:  If you have any concerns about today's visit or wish to schedule another appointment please call our office during normal business hours [phone] (8-5:00 M-F)    Contact Us:  Please call [phone] during business hours (8-5:00 M-F).  If after clinic hours, or on the weekend, please call  [phone].    Financial Assistance [phone]  [facility] Billing [phone]  Central Billing Office, [facility]: [phone]  [facility] Billing [phone]  Medical Records [phone]  [facility] Patient Bill of Rights [email or web address]       MENTAL HEALTH CRISIS NUMBERS:  For a medical emergency please call  911 or go to the nearest ER.     [facility]:   [facility] -[phone]   Crisis Residence [facility]idence -[phone]   Walk-In Counseling Center [facility] -[phone]   COPE 24/7 [place] Mobile Team -[phone] (adults)/[phone] (child)  CHILD: [facility] needs assessment  team - [phone]      [facility]:   [facility] - [phone]   Walk-in counseling [facility] - [phone]   Walk-in counseling [facility] - [phone]   Crisis Residence [facility] - [phone]  Urgent Care Adult [facility]-[phone] mobile unit/ 24/7 crisis line    National Crisis Numbers:   National Suicide Prevention Lifeline: 5-165-085-TALK (788-409-6152)  Poison Control Center - 6-571-066-5768  [facility]/resources for a list of additional resources (SOS)  Trans Lifeline a hotline for transgender people 1-942.611.1046  The [employer] a hotline for LGBT youth 2-016-137-8634  Crisis Text Line: For any crisis 24/7   To: 517912  see www.crisistextline.org  - IF MAKING A CALL FEELS TOO HARD, send a text!         Again thank you for choosing [facility] MENTAL HEALTH & ADDICTION [facility] and please let us know how we can best partner with you to improve you and your family's health.    You may be receiving a survey regarding this appointment. We would love to have your feedback, both positive and negative. The survey is done by an external company, so your answers are anonymous.
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
## 2021-09-30 NOTE — Clinical Note
Amandeep Morejon Guero is still seeing another psychiatrist, which has been ongoing for awhile.  I cannot start managing his care until he decides whether to stop seeing Dr. Chapman.  Is it possible for him to continue with Dr. Chapman and continue in [facility]?  Or does he need to have med management here?  He states he need to be seen urgently at times and would like a provider who can accommodate more frequent visits.

## 2021-09-30 NOTE — PROGRESS NOTES
[facility]  Psychiatry Clinic  First Episode of Psychosis - [facility]  Clinician Contact & Progress Note   For Individual Resiliency Training (IRT) & Psychotherapy     Patient: Guero Carter (1990)     MRN: [id number]  Diagnosis(es): Schizophrenia, unspecified type (H) [F20.9]  Clinician: AP Anglin  Service Type: 37766 psychotherapy (38-52 min. with patient and/or family)  Prolonged Care for this visit is not indicated.  Clinical work consists of family therapy.     Time of service:    Date:  9/30/21    Start Time:  12:09      End Time:  12:53    Video- Visit Details   Type of service:  video visit for individual therapy    Reason for video visit:  COVID-19 public health recommendations on in-person sessions  Originating Site (patient location):  [facility]   Location- Patient's home  Distant Site (provider location):  HIPAA compliant location- Remote location  Mode of Communication:  Secure real time interactive audio and visual telecommunication system via [employer]  Consent:  Patient has given verbal consent for video visit?: Yes     People present:   Patient   AP Anglin     Intervention:  Motivational Interviewing   Connect info and skills with personal goals  Promote hope and positive expectations    Educational Teaching Strategies   Review of written material/education  Relate information to client's experience  Ask questions to check comprehension  Break down information into small chunks  Adopt client's language     CBT   Coping skills training (review current coping skills, increase currently used skills, feedback and plan home practice)  Relaxation training (feedback and plan home practice)  Cognitive restructuring (identify thoughts related to negative feelings and examine the evidence)  Reframe Cognitive Distortions (become aware of which distortions you are most vulnerable to, identifying and challenging our harmful automatic  thoughts)  Recognizing the positive (Visualize, write, or discuss the best parts of the day to promote positive thinking patterns)      IRT Module(s) Addressed:  Module 8 - Dealing with Negative Feelings  Module 9 - Coping with Symptoms    Did the client complete the home practice option(s) from the previous session:   Not Applicable    Techniques utilized:   [place] announced at beginning of session  Review of goal  Review of previous meeting  Present new material  Problem-solving practice  Help client choose a home practice option  Summarize progress made in current session  Identified urgent concerns  Review of strengths, barriers, objectives, and interventions  Illicit client/family feedback    Mental Status Exam:  Alertness: alert  and oriented  Appearance: awake, alert and adequately groomed  Behavior/Demeanor: cooperative, pleasant and calm, with good eye contact   Speech: normal  Language: intact and no problems  Psychomotor: normal or unremarkable  Mood: description consistent with euthymia  Thought Process/Associations:  logical, linear and goal oriented unremarkable  Thought Content:  no evidence of suicidal ideation or homicidal ideation and Appropriate to Interview  Perception:  Reports none  Insight: fair and limited  Judgment: appropriate  Cognition: attention span: intact  concentration: intact  fund of knowledge: appropriate;       Assessment/Progress Note:   I met with Guero for an IRT session.  The focus of today's session was improve coping skills to deal with stress and interpersonal relationships, provide supportive therapy, psychoeducation and restructure irrational beliefs. Guero presented to the appointment with euthymic affect. He reports that he recently underwent a medication increase and has felt depressed and tired as a result. He also endorsed significant neck pain. He denies SI/HI. He states that he coped with these side effects by sleeping and distracting himself with a television  show. His symptoms of depression were explored and he reports experiencing thoughts of death, worthlessness, low mood, and low energy. He also shared that he has experienced anxiety, paranoia, and symptoms of PTSD. Guero's thinking styles were explored which led to a conversation about his difficulty letting go of the past traumas that he has experienced. He reports that he wants justice for the experiences that he has undergone. Guero's definition of justice was explored and he shared that he feels that receiving SSDI would help him feel that he has received justice for these traumatic experiences. His coping skills related to traumatic symptoms were explored and he reports that they are currently effective.  This writer utilized therapeutic techniques of Supportive therapy, Motivational Interviewing, reflective listening, clarifying, and summarizing.       With regards to safety, Guero reported the following:    Suicidal ideation: Denies.     Self-harm: Thoughts - Denies.     Homicidal or violent ideation: Denies.     Discussed overall safety plan should symptoms feel unmanageable or safety concerns become imminent to include: [facility] can help people who are in the midst of a mental health crisis.   [facility] mobile crisis teams for adults, 18 and older can call:  COPE -- [phone]  Guero was able to contract for safety.     Overall Guero was cooperative, attentive and engaged throughout the session.  This writer observed that Guero is easily able to identify which unhelpful thinking styles he engages in.  Helpful clinical techniques utilized during today's appointment appeared to be Supportive therapy, Motivational Interviewing, reflective listening, clarifying, and summarizing.  As of today's appt insight to their mental illness appears adequate.  Symptom assessment, safety assessment, discussion and identification of coping strategies, and exploration of material in IRT modules was all in  "support of Guero's self-identified goal(s) of decreasing angry outbursts and increasing positive self-talk and esteem, as identified in most recent BEH Treatment Plan. Progress toward goal completion seems adequate.    Plan/Referrals:   Home practice was identified as reviewing the worksheet \"thinking styles\".    Guero is participating in coordinated speciality care via the [facility] within our clinic.  Will meet with Guero weekly  for Individual Resiliency training, therapy, and support aimed at maximizing Guero's opportunity for recovery from psychosis.    Next session: 10/7 at 12pm    Crisis Numbers:   Provided routinely in AVS     After hours:  [phone]    Treatment plan last completed on: 9/16/21  Next treatment plan update due by: 12/16/21      Please EPIC message with any questions or concerns.    PROVIDER: AP Anglin                "

## 2021-10-04 ENCOUNTER — VIRTUAL VISIT (OUTPATIENT)
Dept: PSYCHIATRY | Facility: CLINIC | Age: 31
End: 2021-10-04

## 2021-10-04 DIAGNOSIS — F20.9 SCHIZOPHRENIA, UNSPECIFIED TYPE (H): Primary | ICD-10-CM

## 2021-10-04 NOTE — PROGRESS NOTES
[facility]S Virtual Visit Progress Note  For Supported Employment & Education    NAVIGATE Enrollee: Guero Carter (1990)     MRN: [id number]  Date of Visit: 10/4/2021  Contacted: Delroy  Appointment Length: 75 minutes    Discussed:   Writer met with Guero Carter and his sister for virtual visit check-in. Meeting agenda included discussing current status at work, requesting long term disability, and conversation about Social Security application.         Felix [facility] - SEE

## 2021-10-04 NOTE — Clinical Note
NUVIA Morejon I met with Guero and his sister for about 30 minutes, then talked with his HR for another 45 minutes today.  He's putting in a request for LTD at work, should be getting paperwork by the end of this week and we have 15 days to complete.     He's also going to apply to social security disability during this time.

## 2021-10-07 ENCOUNTER — VIRTUAL VISIT (OUTPATIENT)
Dept: PSYCHIATRY | Facility: CLINIC | Age: 31
End: 2021-10-07
Attending: SOCIAL WORKER
Payer: MEDICAID

## 2021-10-07 DIAGNOSIS — F20.9 SCHIZOPHRENIA, UNSPECIFIED TYPE (H): Primary | ICD-10-CM

## 2021-10-07 PROCEDURE — 90834 PSYTX W PT 45 MINUTES: CPT | Mod: 95 | Performed by: SOCIAL WORKER

## 2021-10-07 NOTE — PROGRESS NOTES
[facility]  Psychiatry Clinic  First Episode of Psychosis - Strengths Program  Clinician Contact & Progress Note   For Individual Resiliency Training (IRT) & Psychotherapy     Patient: Guero Carter (1990)     MRN: [id number]  Diagnosis(es): Schizophrenia, unspecified type (H) [F20.9]  Clinician: AP Anglin  Service Type: 77015 psychotherapy (38-52 min. with patient and/or family)  Prolonged Care for this visit is not indicated.  Clinical work consists of family therapy.     Time of service:    Date:  10/07/21    Start Time:  12:03      End Time:  12:49    Video- Visit Details   Type of service:  video visit for individual therapy    Reason for video visit:  COVID-19 public health recommendations on in-person sessions  Originating Site (patient location):  [facility]   Location- Patient's home  Distant Site (provider location):  HIPAA compliant location- Remote location  Mode of Communication:  Secure real time interactive audio and visual telecommunication system via [employer]  Consent:  Patient has given verbal consent for video visit?: Yes     People present:   Patient    ADELFO Caceres Candidate   AP Anglin     Intervention:  Motivational Interviewing   Connect info and skills with personal goals  Promote hope and positive expectations  Re-frame experiences in positive light    Educational Teaching Strategies   Review of written material/education  Relate information to client's experience  Ask questions to check comprehension  Break down information into small chunks  Adopt client's language     CBT   Coping skills training (review current coping skills, increase currently used skills, feedback and plan home practice)  Cognitive restructuring (identify thoughts related to negative feelings, examine the evidence and change though or form action plan)  Reframe Cognitive Distortions (become aware of which distortions you are most vulnerable to,  identifying and challenging our harmful automatic thoughts)  Recognizing the positive (Visualize, write, or discuss the best parts of the day to promote positive thinking patterns)      IRT Module(s) Addressed:  Module 8 - Dealing with Negative Feelings  Module 9 - Coping with Symptoms    Did the client complete the home practice option(s) from the previous session:   Completed    Techniques utilized:   [place] announced at beginning of session  Review of homework  Review of goal  Review of previous meeting  Present new material  Problem-solving practice  Help client choose a home practice option  Summarize progress made in current session  Identified urgent concerns  Review of strengths, barriers, objectives, and interventions  Illicit client/family feedback    Mental Status Exam:  Alertness: alert  and oriented  Appearance: awake, alert and adequately groomed  Behavior/Demeanor: cooperative, pleasant and calm, with good eye contact   Speech: normal  Language: intact and no problems  Psychomotor: normal or unremarkable  Mood: description consistent with euthymia  Thought Process/Associations:  logical, linear and goal oriented unremarkable  Thought Content:  no evidence of suicidal ideation or homicidal ideation and Appropriate to Interview  Perception:  Reports none  Insight: fair and limited  Judgment: appropriate  Cognition: attention span: intact  concentration: intact  fund of knowledge: appropriate;           Assessment/Progress Note:   I met with Guero for an IRT session.  The focus of today's session was promoting return to functioning in emotional regulation, thought process and social relationships, attaining control over disturbing thoughts, improve coping skills to deal with stress and interpersonal relationships, provide supportive therapy, psychoeducation and restructure irrational beliefs. Guero reports that he is doing well this week as he has worked with [employer] and is now on a leave of absence from  work for two months. He also reports that he saw his PCP regarding his neck pain and received a referral to a spine specialist. Guero discussed his goal of traveling for work and discussed the progress that he has made towards this goal. Guero reports that he has been better able to engage in positive thinking styles over the past week since being on a leave of absence from work. Clinician and Guero reviewed common thinking styles and discussed which ones he utilizes. He endorsed using generalization, using shoulds, and self-blaming. Guero was educated about CBT. Clinician and Guero discussed examples of his thoughts, feelings, and behaviors that may impact his mental health. This writer utilized therapeutic techniques of Supportive therapy, Insight-oriented therapy, clarifying, summarizing, reflective listening, and giving recognition.     With regards to safety, Guero reported the following:    Suicidal ideation: Denies.     Self-harm: Thoughts - Denies.     Homicidal or violent ideation: Denies.     Discussed overall safety plan should symptoms feel unmanageable or safety concerns become imminent to include: [facility] can help people who are in the midst of a mental health crisis.   [facility] mobile crisis teams for adults, 18 and older can call:  COPE -- [phone]  Guero was able to contract for safety.     Overall Guero was cooperative, attentive and engaged throughout the session.  This writer observed that Guero presented with a positive affect. He did not display any evidence of psychosis or delusions.  Helpful clinical techniques utilized during today's appointment appeared to be Supportive therapy, Insight-oriented therapy, clarifying, summarizing, reflective listening, and giving recognition.  As of today's appt insight to their mental illness appears good.  Symptom assessment, safety assessment, discussion and identification of coping strategies, and exploration of material in IRT  modules was all in support of Guero's self-identified goal(s) of decreasing angry outbursts and increasing positive self-talk and esteem, as identified in most recent BEH Treatment Plan. Progress toward goal completion seems good.    Plan/Referrals:   Home practice was identified as practicing thought-feeling triangle.    Guero is participating in coordinated speciality care via the Strengths Program within our clinic.  Will meet with Guero weekly  for Individual Resiliency training, therapy, and support aimed at maximizing Guero's opportunity for recovery from psychosis.    Next session: 10/14 at noon    Crisis Numbers:   Provided routinely in AVS     After hours:  [phone]    Treatment plan last completed on: 9/16/21  Next treatment plan update due by: 12/16/21      Please EPIC message with any questions or concerns.    PROVIDER: AP Anglin

## 2021-10-13 ENCOUNTER — VIRTUAL VISIT (OUTPATIENT)
Dept: PSYCHIATRY | Facility: CLINIC | Age: 31
End: 2021-10-13

## 2021-10-13 DIAGNOSIS — F20.9 SCHIZOPHRENIA, UNSPECIFIED TYPE (H): Primary | ICD-10-CM

## 2021-10-14 ENCOUNTER — VIRTUAL VISIT (OUTPATIENT)
Dept: PSYCHIATRY | Facility: CLINIC | Age: 31
End: 2021-10-14
Attending: SOCIAL WORKER
Payer: MEDICAID

## 2021-10-14 DIAGNOSIS — F20.9 SCHIZOPHRENIA, UNSPECIFIED TYPE (H): Primary | ICD-10-CM

## 2021-10-14 PROCEDURE — 90834 PSYTX W PT 45 MINUTES: CPT | Mod: 95 | Performed by: SOCIAL WORKER

## 2021-10-14 NOTE — PROGRESS NOTES
[facility]  Psychiatry Clinic  First Episode of Psychosis - Strengths Program  Clinician Contact & Progress Note   For Individual Resiliency Training (IRT) & Psychotherapy     Patient: Guero Carter (1990)     MRN: [id number]  Diagnosis(es): Schizophrenia, unspecified type (H) [F20.9]  Clinician: AP Anglin  Service Type: 79503 psychotherapy (38-52 min. with patient and/or family)  Prolonged Care for this visit is not indicated.  Clinical work consists of family therapy.     Time of service:    Date:  10/14/21    Start Time:  12:06      End Time:  12:44    Video- Visit Details   Type of service:  video visit for individual therapy    Reason for video visit:  COVID-19 public health recommendations on in-person sessions  Originating Site (patient location):  [facility]   Location- Friend or family home  Distant Site (provider location):  HIPAA compliant location- Remote location  Mode of Communication:  Secure real time interactive audio and visual telecommunication system via [employer]  Consent:  Patient has given verbal consent for video visit?: Yes     People present:   Patient   AP Anglin     Intervention:  Motivational Interviewing   Connect info and skills with personal goals  Promote hope and positive expectations  Re-frame experiences in positive light    Educational Teaching Strategies   Review of written material/education  Relate information to client's experience  Ask questions to check comprehension  Break down information into small chunks  Adopt client's language     CBT   Social skills training (feedback and plan home practice)  Relapse prevention planning (review of stressors, written plan to respond to signs and rehearse plan)  Coping skills training (review current coping skills, increase currently used skills, model new skill, feedback and plan home practice)  Recognizing the positive (Visualize, write, or discuss the best parts of the day to  promote positive thinking patterns)      IRT Module(s) Addressed:  Module 9 - Coping with Symptoms    Did the client complete the home practice option(s) from the previous session:   Completed    Techniques utilized:   [place] announced at beginning of session  Review of homework  Review of goal  Review of previous meeting  Present new material  Problem-solving practice  Help client choose a home practice option  Summarize progress made in current session  Identified urgent concerns  Review of strengths, barriers, objectives, and interventions  Illicit client/family feedback    Mental Status Exam:  Alertness: alert  and oriented  Appearance: awake, alert and adequately groomed  Behavior/Demeanor: cooperative, pleasant and calm, with good eye contact   Speech: normal  Language: intact and no problems  Psychomotor: normal or unremarkable  Mood: description consistent with euthymia  Thought Process/Associations:  logical, linear and goal oriented unremarkable  Thought Content:  no evidence of suicidal ideation or homicidal ideation and Appropriate to Interview  Perception:  Reports none  Insight: fair and limited  Judgment: appropriate  Cognition: attention span: intact  concentration: intact  fund of knowledge: appropriate;         Assessment/Progress Note:   I met with Guero for an IRT session.  The focus of today's session was improve coping skills to deal with stress and interpersonal relationships, provide supportive therapy, psychoeducation and understanding stressors that trigger psychotic episodes and discussing a plan to cope with future anticipated stressors related to his physical health symptoms. Guero reports that he is doing well. He denies SI/HI, denies hallucinations. He reports some anxiety regarding finances. He reports some oversleeping due to lack of routine as he is on leave from work. Clinician and Guero explored his anxiety symptoms. He reports experiencing worry, fear, panic attacks at work,  avoidance, and difficulty concentrating. He shared that he is aware that he bottles his emotions up, which results in unfounded irritability and anger. Clinician and Guero discussed creating a plan of how to cope with physical symptoms and anxiety over the next few weeks to prepare him for returning to work. Guero reports that he will also try suggestions from his PCP regarding the leakage to see if it is helpful and if not, seeking additional medical assistance. He reports that he last experienced leakage one week ago and it was distressing but not as much as it would have been if he were in a public space. This writer utilized therapeutic techniques of Supportive therapy, Insight-oriented therapy, giving recognition, clarifying, reflective listening, and summarizing.     With regards to safety, Guero reported the following:    Suicidal ideation: Denies.     Self-harm: Thoughts - Denies.     Homicidal or violent ideation: Denies.     Discussed overall safety plan should symptoms feel unmanageable or safety concerns become imminent to include: [facility] can help people who are in the midst of a mental health crisis.   [facility] mobile crisis teams for adults, 18 and older can call:  COPE -- [phone]  Guero was able to contract for safety.     Overall Guero was cooperative, attentive and engaged throughout the session.  This writer observed that Guero can easily identify his symptoms and triggers but may hold unrealistic beliefs about how to cope with them.  Helpful clinical techniques utilized during today's appointment appeared to be Supportive therapy, Insight-oriented therapy, giving recognition, clarifying, reflective listening, and summarizing.  As of today's appt insight to their mental illness appears good.  Symptom assessment, safety assessment, discussion and identification of coping strategies, and exploration of material in IRT modules was all in support of Guero's self-identified  goal(s) of decreasing angry outbursts and increasing positive self-talk and esteem, as identified in most recent BEH Treatment Plan. Progress toward goal completion seems good.    Plan/Referrals:   Home practice was identified as trying different tactic to prevent/treat physical symptoms and creating a plan of how to cope with symptoms upon his return to work.    Guero is participating in coordinated speciality care via the Strengths Program within our clinic.  Will meet with Guero weekly  for Individual Resiliency training, therapy, and support aimed at maximizing Guero's opportunity for recovery from psychosis.    Next session: 10/21 at noon    Crisis Numbers:   Provided routinely in AVS     After hours:  [phone]    Treatment plan last completed on: 9/16/21  Next treatment plan update due by: 12/16/21      Please EPIC message with any questions or concerns.    PROVIDER: AP Anglin

## 2021-10-28 ENCOUNTER — VIRTUAL VISIT (OUTPATIENT)
Dept: PSYCHIATRY | Facility: CLINIC | Age: 31
End: 2021-10-28
Attending: SOCIAL WORKER
Payer: MEDICAID

## 2021-10-28 DIAGNOSIS — F20.9 SCHIZOPHRENIA, UNSPECIFIED TYPE (H): Primary | ICD-10-CM

## 2021-10-28 PROCEDURE — 90834 PSYTX W PT 45 MINUTES: CPT | Mod: 95 | Performed by: SOCIAL WORKER

## 2021-10-28 NOTE — PROGRESS NOTES
[facility]  Psychiatry Clinic  First Episode of Psychosis - Strengths Program  Clinician Contact & Progress Note   For Individual Resiliency Training (IRT) & Psychotherapy     Patient: Guero Carter (1990)     MRN: [id number]  Diagnosis(es): Schizophrenia, unspecified type (H) [F20.9]  Clinician: AP Anglin  Service Type: 25610 psychotherapy (38-52 min. with patient and/or family)  Prolonged Care for this visit is not indicated.  Clinical work consists of family therapy.     Time of service:    Date:  10/28/21    Start Time:  1:02      End Time:  1:52    Video- Visit Details   Type of service:  video visit for individual therapy    Reason for video visit:  COVID-19 public health recommendations on in-person sessions  Originating Site (patient location):  [facility]   Location- Friend or family home  Distant Site (provider location):  HIPAA compliant location- Remote location  Mode of Communication:  Secure real time interactive audio and visual telecommunication system via [employer]  Consent:  Patient has given verbal consent for video visit?: Yes     People present:   Patient   AP Anglin     Intervention:  Motivational Interviewing   Connect info and skills with personal goals  Promote hope and positive expectations  Explore pros and cons of change    Educational Teaching Strategies   Review of written material/education  Relate information to client's experience  Ask questions to check comprehension  Break down information into small chunks  Adopt client's language     CBT   Reinforcement and shaping (positive feedback for steps towards goals and gains in knowledge & skills)  Behavioral Experiments (engage in a  what if  consideration, test existing beliefs  and/or help  test more adaptive beliefs, then modify unhelpful beliefs)  Recognizing the positive (Visualize, write, or discuss the best parts of the day to promote positive thinking patterns)      IRT  Module(s) Addressed:  Module 7 - Building a Bridging to Your Goals  Module 8 - Dealing with Negative Feelings  Module 9 - Coping with Symptoms    Did the client complete the home practice option(s) from the previous session:   Not Applicable    Techniques utilized:   [place] announced at beginning of session  Review of previous meeting  Present new material  Problem-solving practice  Summarize progress made in current session  Identified urgent concerns  Review of strengths, barriers, objectives, and interventions  Illicit client/family feedback    Mental Status Exam:  Alertness: alert  and oriented  Appearance: awake, alert and adequately groomed  Behavior/Demeanor: cooperative, pleasant and calm, with good eye contact   Speech: normal  Language: intact and no problems  Psychomotor: normal or unremarkable  Mood: description consistent with euthymia  Thought Process/Associations:  logical, linear and goal oriented unremarkable  Thought Content:  no evidence of suicidal ideation or homicidal ideation and Appropriate to Interview  Perception:  Reports none  Insight: fair and limited  Judgment: appropriate  Cognition: attention span: intact  concentration: intact  fund of knowledge: appropriate;       Assessment/Progress Note:   I met with Guero for an IRT session.  The focus of today's session was promoting return to functioning in emotional regulation, thought process and social relationships, provide supportive therapy, psychoeducation and understanding stressors that trigger psychotic episodes and discussing grief. Guero reports that he is doing well and has been taking active steps towards preparing for Spring semester of school so that he may attain his degree. Clinician and Guero discussed his goals related to work and school and how he plans to manage stress during that time. Guero discussed a struggle related to returning back to work versus applying for disability and the pros and cons of this decision  were explored. Guero also shared that grief has been playing a role in his mood recently and he was educated about types of grief and coping mechanisms for grieving. Guero identified that he does not feel in touch with his emotions and the cultural stigma related to male emotions were explored.  This writer utilized therapeutic techniques of Cognitive-Behavioral, Supportive, Insight-oriented, clarifying, reflective listening, and summarizing.     With regards to safety, Guero reported the following:    Suicidal ideation: Denies.     Self-harm: Thoughts - Denies.     Homicidal or violent ideation: Denies.     Discussed overall safety plan should symptoms feel unmanageable or safety concerns become imminent to include: [facility] can help people who are in the midst of a mental health crisis.   [facility] mobile crisis teams for adults, 18 and older can call:  COPE -- [phone]  Guero was able to contract for safety.      Overall Guero was cooperative, attentive and engaged throughout the session.  This writer observed that Guero appears to have a clearer thought process and is able to complete tasks in an organized and linear fashion.  Helpful clinical techniques utilized during today's appointment appeared to be Cognitive-Behavioral, Supportive, Insight-oriented, clarifying, reflective listening, and summarizing.  As of today's appt insight to their mental illness appears good and adequate.  Symptom assessment, safety assessment, discussion and identification of coping strategies, and exploration of material in IRT modules was all in support of Guero's self-identified goal(s) of decreasing angry outbursts and increasing positive self-talk and esteem, as identified in most recent BEH Treatment Plan. Progress toward goal completion seems good.    Plan/Referrals:   Guero is participating in coordinated speciality care via the Strengths Program within our clinic.  Will meet with Guero weekly  for  Individual Resiliency training, therapy, and support aimed at maximizing Guero's opportunity for recovery from psychosis.    Next session: 11/4 at noon    Crisis Numbers:   Provided routinely in AVS     After hours:  [phone]    Treatment plan last completed on: 9/16/21  Next treatment plan update due by: 12/16/21    Please EPIC message with any questions or concerns.    PROVIDER: AP Anglin

## 2021-10-29 NOTE — PROGRESS NOTES
NAVIGATE/STRENGTHS Virtual Visit Progress Note  For Supported Employment & Education    NAVIGATE Enrollee: Guero Carter (1990)     MRN: [id number]  Date of Visit: 10/13/2021  Contacted: Delroy  Appointment Length: 30 minutes    Discussed:   Writer met with Guero Carter and his sister for virtual visit check-in. Meeting agenda included discussing current status at work, requesting long term disability, and conversation about Social Security application.     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 Guero expressed interest in traveling for his job and writer asked how realistic that might be if he's on LTD and applying to SSI.. Guero understood and felt more clear headed about this short term plans and hopes to continue seeking therapy and focusing on his recovery.     Writer will continue to check-in with Guero as needed to support his work goals.       Felix MARTINI/STRENGTHS - SEE

## 2021-11-04 ENCOUNTER — VIRTUAL VISIT (OUTPATIENT)
Dept: PSYCHIATRY | Facility: CLINIC | Age: 31
End: 2021-11-04
Attending: SOCIAL WORKER
Payer: COMMERCIAL

## 2021-11-04 ENCOUNTER — VIRTUAL VISIT (OUTPATIENT)
Dept: PSYCHIATRY | Facility: CLINIC | Age: 31
End: 2021-11-04
Attending: NURSE PRACTITIONER
Payer: MEDICAID

## 2021-11-04 ENCOUNTER — TELEPHONE (OUTPATIENT)
Dept: PSYCHIATRY | Facility: CLINIC | Age: 31
End: 2021-11-04

## 2021-11-04 DIAGNOSIS — F20.9 SCHIZOPHRENIA, UNSPECIFIED TYPE (H): Primary | ICD-10-CM

## 2021-11-04 PROCEDURE — 90832 PSYTX W PT 30 MINUTES: CPT | Mod: 95 | Performed by: SOCIAL WORKER

## 2021-11-04 ASSESSMENT — PATIENT HEALTH QUESTIONNAIRE - PHQ9
10. IF YOU CHECKED OFF ANY PROBLEMS, HOW DIFFICULT HAVE THESE PROBLEMS MADE IT FOR YOU TO DO YOUR WORK, TAKE CARE OF THINGS AT HOME, OR GET ALONG WITH OTHER PEOPLE: SOMEWHAT DIFFICULT
SUM OF ALL RESPONSES TO PHQ QUESTIONS 1-9: 7
SUM OF ALL RESPONSES TO PHQ QUESTIONS 1-9: 7

## 2021-11-04 NOTE — PROGRESS NOTES
[facility]  Psychiatry Clinic  First Episode of Psychosis - Strengths Program  Clinician Contact & Progress Note   For Individual Resiliency Training (IRT) & Psychotherapy     Patient: Guero Carter (1990)     MRN: [id number]  Diagnosis(es): Schizophrenia, unspecified type (H) [F20.9]  Clinician: AP Anglin  Service Type: 75466 psychotherapy (23-37 min. with patient and/or family)  Prolonged Care for this visit is not indicated.  Clinical work consists of family therapy.     Time of service:    Date:  11/04/21    Start Time:  12:06      End Time:  12:40    Video- Visit Details  Type of service:  video visit for individual therapy    Reason for video visit:  COVID-19 public health recommendations on in-person sessions  Originating Site (patient location):  [facility]   Location- Friend or family home  Distant Site (provider location):  HIPAA compliant location- Remote location  Mode of Communication:  Secure real time interactive audio and visual telecommunication system via [employer]  Consent:  Patient has given verbal consent for video visit?: Yes     People present:   Patient   AP Anglin     Intervention:  Motivational Interviewing   Connect info and skills with personal goals  Promote hope and positive expectations  Re-frame experiences in positive light    Educational Teaching Strategies   Review of written material/education  Relate information to client's experience  Ask questions to check comprehension  Break down information into small chunks  Adopt client's language     CBT   Social skills training (rationale for skill and feedback)  Reframe Cognitive Distortions (become aware of which distortions you are most vulnerable to, identifying and challenging our harmful automatic thoughts)  Play the Script Until the End (imagine the outcome of the worst case scenario, let the scenario play out, recognize that even if everything feared happens, it will likely  turn out okay)  Recognizing the positive (Visualize, write, or discuss the best parts of the day to promote positive thinking patterns)      IRT Module(s) Addressed:  Module 11 - Having Fun and Developing Good Relationships    Did the client complete the home practice option(s) from the previous session:   Not Applicable    Techniques utilized:   [place] announced at beginning of session  Review of previous meeting  Present new material  Problem-solving practice  Summarize progress made in current session  Identified urgent concerns  Review of strengths, barriers, objectives, and interventions  Illicit client/family feedback    Mental Status Exam:  Alertness: alert  and oriented  Appearance: awake, alert and adequately groomed  Behavior/Demeanor: cooperative, pleasant and calm, with good eye contact   Speech: normal  Language: intact and no problems  Psychomotor: normal or unremarkable  Mood: description consistent with euthymia  Thought Process/Associations:  logical, linear and goal oriented unremarkable  Thought Content:  no evidence of suicidal ideation or homicidal ideation and Appropriate to Interview  Perception:  Reports none  Insight: fair and limited  Judgment: appropriate  Cognition: attention span: intact  concentration: intact  fund of knowledge: appropriate;     JOSE-7 and PHQ-9:  Last PHQ-9 11/4/2021   1.  Little interest or pleasure in doing things 1   2.  Feeling down, depressed, or hopeless 1   3.  Trouble falling or staying asleep, or sleeping too much 2   4.  Feeling tired or having little energy 1   5.  Poor appetite or overeating 0   6.  Feeling bad about yourself 1   7.  Trouble concentrating 1   8.  Moving slowly or restless 0   Q9: Thoughts of better off dead/self-harm past 2 weeks 0   PHQ-9 Total Score 7   Difficulty at work, home, or with people -       [place] Protocol Risk Identification:  1) Have you wished you were dead or wished you could go to sleep and not wake up? No  2) Have you  actually had any thoughts about killing yourself? No  If YES to 2, answer questions 3, 4, 5, 6  If NO to 2, go directly to question 6  3) Have you thought about how you might do this? N/A  4) Have you had any intension of acting on these thoughts of killing yourself, as opposed to you have the thoughts but you definitely would not act on them? N/A  5) Have you started to work out or worked out the details of how to kill yourself? Do you intend to carry out this plan? N/A  Always Ask Question 6  6) Have you done anything, started to do anything, or prepared to do anything to end your life? No      Assessment/Progress Note:   I met with Guero for an IRT session.  The focus of today's session was promoting return to functioning in emotional regulation, thought process and social relationships, provide supportive therapy and psychoeducation and discussing clinic policy and options for ongoing treatment. Guero reports that he is doing well. 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 Guero denies SI/HI, denies AH/VH. He is continuing to work towards obtaining FAFSA for the Spring semester. Clinician and Guero discussed communication skills as he identified some arguments and miscommunication events that have occurred over the past week. Guero provided examples and was receptive to feedback. Clinician informed Guero about the team approach with the Adult Strengths model and informed him of his treatment options if he is to continue with another outpatient psychiatrist. He reports that he will discuss these options with his family and report back at his next session. This writer utilized therapeutic techniques of Supportive, Motivational Interviewing, Insight-oriented, summarizing, giving recognition, and reflective listening.    With regards to safety, Guero reported the following:    Suicidal ideation: Denies.     Self-harm:  Thoughts - Denies.     Homicidal or violent ideation: Denies.     Discussed overall safety plan should symptoms feel unmanageable or safety concerns become imminent to include: [facility] can help people who are in the midst of a mental health crisis.   [facility] mobile crisis teams for adults, 18 and older can call:  SUSIE -- [phone]  Guero was able to contract for safety.      Overall Guero was cooperative, attentive and engaged throughout the session.  This writer observed that Guero presented with a bright affect and identified areas of improvement related to his communication skills.  Helpful clinical techniques utilized during today's appointment appeared to be Supportive, Motivational Interviewing, Insight-oriented, summarizing, giving recognition, and reflective listening.  As of today's appt insight to their mental illness appears good and adequate.  Symptom assessment, safety assessment, discussion and identification of coping strategies, and exploration of material in IRT modules was all in support of Guero's self-identified goal(s) of decreasing angry outbursts and increasing positive self-talk and esteem, as identified in most recent BEH Treatment Plan. Progress toward goal completion seems good.    Plan/Referrals:   Guero is participating in coordinated speciality care via the Strengths Program within our clinic.  Will meet with Guero weekly  for Individual Resiliency training, therapy, and support aimed at maximizing Guero's opportunity for recovery from psychosis.    Next session: 11/11 at noon    Crisis Numbers:   Provided routinely in AVS     After hours:  [phone]    Treatment plan last completed on: 9/16/21  Next treatment plan update due by: 12/16/21      Please EPIC message with any questions or concerns.    PROVIDER: AP Anglin

## 2021-11-04 NOTE — PROGRESS NOTES
Guero was last seen by me on  9/30/21, at which time he was still following with community psychiatrist.  He has followed up Dr. Chapman and has decided to continue care with him due to his greater availability.  Reports he needs to have urgent visits frequently and is able to do this easier with Dr. Chapman.  Next appointment is next week. He denies any safety concerns today.  Reviewed that all future work/disability related paperwork should be directed to Dr. Chapman or other Strengths team members.      Tatiana Vanessa CNP, 11/4/2021 10:11 AM

## 2021-11-04 NOTE — TELEPHONE ENCOUNTER
On 11/4/21, at 936, writer called patient at [phone] to confirm Virtual Visit. Writer unable to make contact with patient. Writer left detailed voice message for call back. [phone] left as call back number. Also an email for khanh was sent to [email or web address].  Georgia Fernandez CMA

## 2021-11-04 NOTE — Clinical Note
Hi all - FYTRUDI Gueor is going to stay with his current psychiatrist.  He frequently would like urgent appointments and this is more available with Dr. Chapman than my schedule currently allows.  I let him know that Dr. Chapman should be the one to complete further paperwork re: disability, work leaves, if a medical provider needs to sign off.

## 2021-11-05 ASSESSMENT — PATIENT HEALTH QUESTIONNAIRE - PHQ9: SUM OF ALL RESPONSES TO PHQ QUESTIONS 1-9: 7

## 2021-11-11 ENCOUNTER — VIRTUAL VISIT (OUTPATIENT)
Dept: PSYCHIATRY | Facility: CLINIC | Age: 31
End: 2021-11-11
Attending: SOCIAL WORKER
Payer: MEDICAID

## 2021-11-11 DIAGNOSIS — F20.9 SCHIZOPHRENIA, UNSPECIFIED TYPE (H): Primary | ICD-10-CM

## 2021-11-11 PROCEDURE — 90834 PSYTX W PT 45 MINUTES: CPT | Mod: 95 | Performed by: SOCIAL WORKER

## 2021-11-11 NOTE — PROGRESS NOTES
[facility]  Psychiatry Clinic  First Episode of Psychosis - Strengths Program  Clinician Contact & Progress Note   For Individual Resiliency Training (IRT) & Psychotherapy     Patient: Guero Carter (1990)     MRN: [id number]  Diagnosis(es): Schizophrenia, unspecified type (H) [F20.9]  Clinician: AP Anglin  Service Type: 86856 psychotherapy (38-52 min. with patient and/or family)  Prolonged Care for this visit is not indicated.  Clinical work consists of family therapy.     Time of service:    Date:  11/11/21    Start Time:  12:05      End Time:  12:55    Video- Visit Details   Type of service:  video visit for individual therapy    Reason for video visit:  COVID-19 public health recommendations on in-person sessions  Originating Site (patient location):  [facility]   Location- Friend or family home  Distant Site (provider location):  HIPAA compliant location- Remote location  Mode of Communication:  Secure real time interactive audio and visual telecommunication system via [employer]  Consent:  Patient has given verbal consent for video visit?: Yes     People present:   Patient   AP Anglin     Intervention:  Motivational Interviewing   Connect info and skills with personal goals  Promote hope and positive expectations  Re-frame experiences in positive light    Educational Teaching Strategies   Review of written material/education  Relate information to client's experience  Ask questions to check comprehension  Break down information into small chunks  Adopt client's language     CBT   Behavioral Experiments (engage in a  what if  consideration, test existing beliefs  and/or help  test more adaptive beliefs, then modify unhelpful beliefs)  Recognizing the positive (Visualize, write, or discuss the best parts of the day to promote positive thinking patterns)      IRT Module(s) Addressed:  Module 2 - Assessment/Initial Goal Setting    Did the client complete the  home practice option(s) from the previous session:   Not Applicable    Techniques utilized:   [place] announced at beginning of session  Review of previous meeting  Present new material  Problem-solving practice  Summarize progress made in current session  Identified urgent concerns  Review of strengths, barriers, objectives, and interventions  Illicit client/family feedback    Mental Status Exam:  Alertness: alert  and oriented  Appearance: awake, alert and adequately groomed  Behavior/Demeanor: cooperative, pleasant and calm, with good eye contact   Speech: normal  Language: intact and no problems  Psychomotor: normal or unremarkable  Mood: description consistent with euthymia  Thought Process/Associations:  logical, linear and goal oriented unremarkable  Thought Content:  no evidence of suicidal ideation or homicidal ideation and Appropriate to Interview  Perception:  Reports none  Insight: fair and limited  Judgment: appropriate  Cognition: attention span: intact  concentration: intact  fund of knowledge: appropriate;     JOSE-7 and PHQ-9:  Last PHQ-9 11/4/2021   1.  Little interest or pleasure in doing things 1   2.  Feeling down, depressed, or hopeless 1   3.  Trouble falling or staying asleep, or sleeping too much 2   4.  Feeling tired or having little energy 1   5.  Poor appetite or overeating 0   6.  Feeling bad about yourself 1   7.  Trouble concentrating 1   8.  Moving slowly or restless 0   Q9: Thoughts of better off dead/self-harm past 2 weeks 0   PHQ-9 Total Score 7   Difficulty at work, home, or with people [facility] Protocol Risk Identification:  1) Have you wished you were dead or wished you could go to sleep and not wake up? No  2) Have you actually had any thoughts about killing yourself? No  If YES to 2, answer questions 3, 4, 5, 6  If NO to 2, go directly to question 6  3) Have you thought about how you might do this? N/A  4) Have you had any intension of acting on these thoughts of killing  yourself, as opposed to you have the thoughts but you definitely would not act on them? N/A  5) Have you started to work out or worked out the details of how to kill yourself? Do you intend to carry out this plan? N/A  Always Ask Question 6  6) Have you done anything, started to do anything, or prepared to do anything to end your life? No      Assessment/Progress Note:  I met with Guero for an IRT session.  The focus of today's session was promoting return to functioning in emotional regulation, thought process and social relationships, provide supportive therapy and psychoeducation and exploring areas of life satisfaction. Guero reports that he is doing well. He reports a stable mood over the past week with good sleep. He shared that some financial stress was alleviated this past week, which was helpful at decreasing overall stress. Guero shared that he spoke with his family about his treatment options and has decided to remain involved with the Adult Strengths clinic for his care. Guero and clinician explored areas of life satisfaction and Guero rated work, intimate relationships, and changing for the better as the areas of top satisfaction. He identified work, finances, and substance use as areas that he would like to change/improve. Clinician and Guero reviewed other areas of life that can be stressful and he also identified quitting drinking as a goal for himself as he identified that he struggles to maintain a healthy relationship with ETOH as his tolerance is high naturally. Guero also identified traumatic events that have impacted his mental health and functioning. This writer utilized therapeutic techniques of Supportive, Motivational Interviewing, Insight-oriented therapy, clarifying, modeling empathy, and giving recognition.     With regards to safety, Guero reported the following:    Suicidal ideation: Denies.     Self-harm: Thoughts - Denies.     Homicidal or violent ideation:  Denies.    Discussed overall safety plan should symptoms feel unmanageable or safety concerns become imminent to include: Get Help in a Crisis in [facility]   Call [phone] if you or someone close to you is having a mental health crisis. The [facility] Mobile Crisis Response team will help you. The line is open all day, every day, and the call is free.   Guero was able to contract for safety.     Overall Guero was cooperative, attentive and engaged throughout the session.  This writer observed that Guero utilizes optimism and positive thinking as a coping skill but also utilizes it to avoid negative feelings without addressing them appropriately.  Helpful clinical techniques utilized during today's appointment appeared to be Supportive, Motivational Interviewing, Insight-oriented therapy, clarifying, modeling empathy, and giving recognition.  As of today's appt insight to their mental illness appears good.  Symptom assessment, safety assessment, discussion and identification of coping strategies, and exploration of material in IRT modules was all in support of Guero's self-identified goal(s) of decreasing angry outbursts and increasing positive self-talk and esteem, as identified in most recent BEH Treatment Plan. Progress toward goal completion seems good.    Plan/Referrals:   Guero is participating in coordinated speciality care via the Strengths Program within our clinic.  Will meet with Guero weekly  for Individual Resiliency training, therapy, and support aimed at maximizing Guero's opportunity for recovery from psychosis.    Next session: 11/18 at noon    Crisis Numbers:   Provided routinely in AVS     After hours:  [phone]    Treatment plan last completed on: 9/16/21  Next treatment plan update due by: 12/16/21      Please EPIC message with any questions or concerns.    PROVIDER: AP Anglin

## 2021-12-13 ENCOUNTER — TELEPHONE (OUTPATIENT)
Dept: PSYCHIATRY | Facility: CLINIC | Age: 31
End: 2021-12-13
Payer: MEDICAID

## 2021-12-17 NOTE — TELEPHONE ENCOUNTER
Clinician received communication from client indicating that his health insurance had lapsed and he is working to get it reactivated.   He states that he has enough medication for the time being and will reach out of he needs any assistance with resources.

## 2021-12-17 NOTE — TELEPHONE ENCOUNTER
Per Sofia Vanessa - Forms are to be sent back to [facility]. Pt has chosen to seek care from an outside provider. 4 faxed pages was received from [facility] requesting completion of forms from [employer] - STD - Attending Physician Statement. The forms are being held in Psych.Anastasiya Espinoza LPN

## 2021-12-20 ENCOUNTER — VIRTUAL VISIT (OUTPATIENT)
Dept: PSYCHIATRY | Facility: CLINIC | Age: 31
End: 2021-12-20

## 2021-12-20 DIAGNOSIS — F20.9 SCHIZOPHRENIA, UNSPECIFIED TYPE (H): Primary | ICD-10-CM

## 2021-12-23 ENCOUNTER — VIRTUAL VISIT (OUTPATIENT)
Dept: PSYCHIATRY | Facility: CLINIC | Age: 31
End: 2021-12-23
Attending: SOCIAL WORKER
Payer: MEDICAID

## 2021-12-23 ENCOUNTER — BEH TREATMENT PLAN (OUTPATIENT)
Dept: PSYCHIATRY | Facility: CLINIC | Age: 31
End: 2021-12-23
Payer: MEDICAID

## 2021-12-23 DIAGNOSIS — F20.9 SCHIZOPHRENIA, UNSPECIFIED TYPE (H): Primary | ICD-10-CM

## 2021-12-23 PROCEDURE — 90832 PSYTX W PT 30 MINUTES: CPT | Mod: 95 | Performed by: SOCIAL WORKER

## 2021-12-23 ASSESSMENT — PATIENT HEALTH QUESTIONNAIRE - PHQ9
10. IF YOU CHECKED OFF ANY PROBLEMS, HOW DIFFICULT HAVE THESE PROBLEMS MADE IT FOR YOU TO DO YOUR WORK, TAKE CARE OF THINGS AT HOME, OR GET ALONG WITH OTHER PEOPLE: SOMEWHAT DIFFICULT
SUM OF ALL RESPONSES TO PHQ QUESTIONS 1-9: 12
SUM OF ALL RESPONSES TO PHQ QUESTIONS 1-9: 12

## 2021-12-23 NOTE — PROGRESS NOTES
[facility]  Psychiatry Clinic  First Episode of Psychosis - Strengths Program  Clinician Contact & Progress Note   For Individual Resiliency Training (IRT) & Psychotherapy     Patient: Guero Carter (1990)     MRN: [id number]  Diagnosis(es): Schizophrenia, unspecified type (H) [F20.9]  Clinician: AP Anglin  Service Type: 93281 psychotherapy (23-37 min. with patient and/or family)  Prolonged Care for this visit is not indicated.  Clinical work consists of family therapy.     Time of service:    Date:  12/23/21    Start Time:  12:10      End Time:  12:47    Video- Visit Details   Type of service:  video visit for individual therapy    Reason for video visit:  COVID-19 public health recommendations on in-person sessions  Originating Site (patient location):  [facility]   Location- Friend or family home  Distant Site (provider location):  HIPAA compliant location- Remote location  Mode of Communication:  Secure real time interactive audio and visual telecommunication system via [employer]  Consent:  Patient has given verbal consent for video visit?: Yes     People present:   Patient   AP Anglin     Intervention:  Motivational Interviewing   Connect info and skills with personal goals  Promote hope and positive expectations  Re-frame experiences in positive light    Educational Teaching Strategies   Review of written material/education  Ask questions to check comprehension  Adopt client's language     CBT   Pleasant Activity Scheduling (scheduling activities in the near future that you can look forward to, introduced more positivity and reduce negative thinking)  Recognizing the positive (Visualize, write, or discuss the best parts of the day to promote positive thinking patterns)      IRT Module(s) Addressed:  Module 2 - Assessment/Initial Goal Setting  Treatment plan update                Did the client complete the home practice option(s) from the previous  session:   Not Applicable    Techniques utilized:   [place] announced at beginning of session  Review of previous meeting  Problem-solving practice  Summarize progress made in current session  Identified urgent concerns  Review of strengths, barriers, objectives, and interventions  Illicit client/family feedback    Mental Status Exam:  Alertness: alert  and oriented  Appearance: awake, alert and adequately groomed  Behavior/Demeanor: cooperative, pleasant and calm, with good eye contact   Speech: normal  Language: intact and no problems  Psychomotor: normal or unremarkable  Mood: description consistent with euthymia  Thought Process/Associations:  logical, linear and goal oriented unremarkable  Thought Content:  no evidence of suicidal ideation or homicidal ideation and Appropriate to Interview  Perception:  Reports none  Insight: fair and limited  Judgment: appropriate  Cognition: attention span: intact  concentration: intact  fund of knowledge: appropriate;     JOSE-7 and PHQ-9:  Last PHQ-9 12/23/2021   1.  Little interest or pleasure in doing things 2   2.  Feeling down, depressed, or hopeless 2   3.  Trouble falling or staying asleep, or sleeping too much 1   4.  Feeling tired or having little energy 2   5.  Poor appetite or overeating 1   6.  Feeling bad about yourself 2   7.  Trouble concentrating 2   8.  Moving slowly or restless 0   Q9: Thoughts of better off dead/self-harm past 2 weeks 0   PHQ-9 Total Score 12   Difficulty at work, home, or with people -         [place] Protocol Risk Identification:  1) Have you wished you were dead or wished you could go to sleep and not wake up? No  2) Have you actually had any thoughts about killing yourself? No  If YES to 2, answer questions 3, 4, 5, 6  If NO to 2, go directly to question 6  3) Have you thought about how you might do this? N/A  4) Have you had any intension of acting on these thoughts of killing yourself, as opposed to you have the thoughts but you  definitely would not act on them? N/A  5) Have you started to work out or worked out the details of how to kill yourself? Do you intend to carry out this plan? N/A  Always Ask Question 6  6) Have you done anything, started to do anything, or prepared to do anything to end your life? No      Assessment/Progress Note:  I met with Guero for an IRT session.  The focus of today's session was provide supportive therapy and psychoeducation and completing a treatment plan update. Guero reports that he is doing well and shared updates from the past month regarding his employment and education goals. Guero also shared that he has been working on a goal of abstinence from ETOH without 100% success but has cut down use significantly and plants to continue decreasing use over time. He discussed concerns about financial insecurity while enrolled in school. Clinician validated his concerns and discussed problem-solving techniques related to this concern. Guero's treatment plan update was completed with him and overall he reports that he is doing better in relation to his symptoms and overall wellbeing. He identified a new goal of working on communication skills citing difficulty conveying his thoughts during disagreements and overall difficulty identifying/facing emotions. This writer utilized therapeutic techniques of Supportive, Insight-oriented, clarifying, reflective listening, and giving recognition.     With regards to safety, Guero reported the following:    Suicidal ideation: Denies.     Self-harm: Thoughts - Denies.     Homicidal or violent ideation: Denies.    Discussed overall safety plan should symptoms feel unmanageable or safety concerns become imminent to include: [facility] can help people who are in the midst of a mental health crisis.   [facility] mobile crisis teams for adults, 18 and older can call:  SUSIE -- [phone]  Guero was able to contract for safety.     Overall Guero was cooperative,  attentive and engaged throughout the session.  This writer observed that Guero has excellent organization skills and has a firm grasp of his financial and educational goals currently.  Helpful clinical techniques utilized during today's appointment appeared to be Supportive, Insight-oriented, clarifying, reflective listening, and giving recognition.  As of today's appt insight to their mental illness appears adequate.  Symptom assessment, safety assessment, discussion and identification of coping strategies, and exploration of material in IRT modules was all in support of Guero's self-identified goal(s) of learning communication skills for building healthier relationships,as identified in most recent BEH Treatment Plan. Progress toward goal completion seems adequate.    Plan/Referrals:   Guero is participating in coordinated speciality care via the Strengths Program within our clinic.  Will meet with Guero weekly  for Individual Resiliency training, therapy, and support aimed at maximizing Guero's opportunity for recovery from psychosis.    Next session: 12/30 at noon    Crisis Numbers:   Provided routinely in AVS     After hours:  [phone]    Treatment plan last completed on: 12/23/2021  Next treatment plan update due by: 03/23/2022  Treatment plan was updated at this visit.   Acknowledged consent of current treatment plan was not signed (d/t telemedicine)     Please EPIC message with any questions or concerns.    PROVIDER: AP Anglin        Answers for HPI/ROS submitted by the patient on 12/23/2021  If you checked off any problems, how difficult have these problems made it for you to do your work, take care of things at home, or get along with other people?: Somewhat difficult  PHQ9 TOTAL SCORE: 12

## 2021-12-23 NOTE — PROGRESS NOTES
NAVIGATE/STRENGTHS Virtual Visit Progress Note  For Supported Employment & Education    NAVIGATE Enrollee: Guero Carter (1990)     MRN: [id number]  Date of Visit: 12/20/2021  Contacted: Delroy  Appointment Length: 45 minutes    Discussed:   Writer met with Guero Carter and his sister for virtual visit check-in. Meeting agenda included discussing current status at work, requesting long term disability, and conversation about returning to school.         Felix CATATE/STRENGTHS - SEE
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
## 2021-12-24 ASSESSMENT — PATIENT HEALTH QUESTIONNAIRE - PHQ9: SUM OF ALL RESPONSES TO PHQ QUESTIONS 1-9: 12

## 2021-12-30 ENCOUNTER — VIRTUAL VISIT (OUTPATIENT)
Dept: PSYCHIATRY | Facility: CLINIC | Age: 31
End: 2021-12-30
Attending: SOCIAL WORKER
Payer: MEDICAID

## 2021-12-30 DIAGNOSIS — F20.9 SCHIZOPHRENIA, UNSPECIFIED TYPE (H): Primary | ICD-10-CM

## 2021-12-30 PROCEDURE — 90834 PSYTX W PT 45 MINUTES: CPT | Mod: 95 | Performed by: SOCIAL WORKER

## 2021-12-30 NOTE — PROGRESS NOTES
[facility]  Psychiatry Clinic  First Episode of Psychosis - Strengths Program  Clinician Contact & Progress Note   For Individual Resiliency Training (IRT) & Psychotherapy     Patient: Guero Carter (1990)     MRN: [id number]  Diagnosis(es): Schizophrenia, unspecified type (H) [F20.9]  Clinician: AP Anglin  Service Type: 88878 psychotherapy (38-52 min. with patient and/or family)  Prolonged Care for this visit is not indicated.  Clinical work consists of family therapy.     Time of service:    Date:  12/30/21    Start Time:  12:10      End Time:  12:56    Video- Visit Details   Type of service:  video visit for individual therapy    Reason for video visit:  COVID-19 public health recommendations on in-person sessions  Originating Site (patient location):  [facility]   Location- Friend or family home  Distant Site (provider location):  HIPAA compliant location- Remote location  Mode of Communication:  Secure real time interactive audio and visual telecommunication system via [employer]  Consent:  Patient has given verbal consent for video visit?: Yes     People present:   Patient   AP Anglin     Intervention:  Motivational Interviewing   Connect info and skills with personal goals  Promote hope and positive expectations  Re-frame experiences in positive light    Educational Teaching Strategies   Review of written material/education  Relate information to client's experience  Ask questions to check comprehension  Break down information into small chunks  Adopt client's language     CBT   Reinforcement and shaping (positive feedback for steps towards goals, gains in knowledge & skills and follow-through on home assignments)  Pleasant Activity Scheduling (scheduling activities in the near future that you can look forward to, introduced more positivity and reduce negative thinking)  Recognizing the positive (Visualize, write, or discuss the best parts of the day to  promote positive thinking patterns)      IRT Module(s) Addressed:  Module 5 - Processing the Psychotic Episode    Did the client complete the home practice option(s) from the previous session:   Not Applicable    Techniques utilized:   [place] announced at beginning of session  Review of goal  Review of previous meeting  Present new material  Problem-solving practice  Summarize progress made in current session  Identified urgent concerns  Review of strengths, barriers, objectives, and interventions  Illicit client/family feedback    Mental Status Exam:  Alertness: alert  and oriented  Appearance: awake, alert and adequately groomed  Behavior/Demeanor: cooperative, pleasant and calm, with good eye contact   Speech: normal  Language: intact and no problems  Psychomotor: normal or unremarkable  Mood: description consistent with euthymia  Thought Process/Associations:  logical, linear and goal oriented unremarkable  Thought Content:  no evidence of suicidal ideation or homicidal ideation and Appropriate to Interview  Perception:  Reports none  Insight: fair and limited  Judgment: appropriate  Cognition: attention span: intact  concentration: intact  fund of knowledge: appropriate;     JOSE-7 and PHQ-9:  Last PHQ-9 12/23/2021   1.  Little interest or pleasure in doing things 2   2.  Feeling down, depressed, or hopeless 2   3.  Trouble falling or staying asleep, or sleeping too much 1   4.  Feeling tired or having little energy 2   5.  Poor appetite or overeating 1   6.  Feeling bad about yourself 2   7.  Trouble concentrating 2   8.  Moving slowly or restless 0   Q9: Thoughts of better off dead/self-harm past 2 weeks 0   PHQ-9 Total Score 12   Difficulty at work, home, or with people -       [place] Protocol Risk Identification:  1) Have you wished you were dead or wished you could go to sleep and not wake up? No  2) Have you actually had any thoughts about killing yourself? No  If YES to 2, answer questions 3, 4, 5,  "6  If NO to 2, go directly to question 6  3) Have you thought about how you might do this? N/A  4) Have you had any intension of acting on these thoughts of killing yourself, as opposed to you have the thoughts but you definitely would not act on them? N/A  5) Have you started to work out or worked out the details of how to kill yourself? Do you intend to carry out this plan? N/A  Always Ask Question 6  6) Have you done anything, started to do anything, or prepared to do anything to end your life? No      Assessment/Progress Note:  I met with Guero for an IRT session.  The focus of today's session was promoting return to functioning in emotional regulation, thought process and social relationships, provide supportive therapy and psychoeducation. Guero provided an update regarding his school loan application, health insurance, and status of his leave of absence from work. Guero appears to have a good understanding of all of these processes and is not stressed by any of them at the moment. Clinician and Guero discussed his goals related to mental health as he identified a belief that he will \"fall apart\" before achieving his goals of obtaining a college degree. Clinician and Guero began Module 5- Processing the Episode and completed the upsetting symptoms and treatment experiences checklist. Guero was easily able to identiy and elaborate on his experience and how they impacted him at the time. He states that over the past 10 years, he has been able to work through these symptoms and feels that he no longer has strong reactions to these memories. Overall, Guero did identify significant symptoms on the post-psychotic episode checklist but indicated that these are only present when he experiences the worst symptoms, which does not happen too often. Guero also identified with few of the self-stigmatizing beliefs as he feels that he has a positive outlook and good self-esteem. This writer utilized therapeutic " techniques of Supportive, Insight-oriented, reflective listening, and giving recognition.     With regards to safety, Guero reported the following:    Suicidal ideation: Denies.     Self-harm: Thoughts - Denies.     Homicidal or violent ideation: Denies.    Discussed overall safety plan should symptoms feel unmanageable or safety concerns become imminent to include: Get Help in a Crisis in [facility]   Call [phone] if you or someone close to you is having a mental health crisis. The [facility] Mobile Crisis Response team will help you. The line is open all day, every day, and the call is free.       Overall Guero was cooperative, attentive and engaged throughout the session.  This writer observed that Guero appears to minimize the impact that negative events have had on his mental health and wellbeing.  Helpful clinical techniques utilized during today's appointment appeared to be Supportive, Insight-oriented, reflective listening, and giving recognition.  As of today's appt insight to their mental illness appears good.  Symptom assessment, safety assessment, discussion and identification of coping strategies, and exploration of material in IRT modules was all in support of Guero's self-identified goal(s) of learning communication skills for building healthier relationships, as identified in most recent BEH Treatment Plan. Progress toward goal completion seems adequate.    Plan/Referrals:   Guero is participating in coordinated speciality care via the Strengths Program within our clinic.  Will meet with Guero weekly  for Individual Resiliency training, therapy, and support aimed at maximizing Guero's opportunity for recovery from psychosis.    Next session: 12/6 at noon    Crisis Numbers:   Provided routinely in AVS     After hours:  [phone]    Treatment plan last completed on: 12/23/2021  Next treatment plan update due by: 03/23/2022      Please EPIC message with any questions or  concerns.    PROVIDER: AP Anglin            Answers for HPI/ROS submitted by the patient on 12/23/2021  If you checked off any problems, how difficult have these problems made it for you to do your work, take care of things at home, or get along with other people?: Somewhat difficult  PHQ9 TOTAL SCORE: 12

## 2022-01-06 ENCOUNTER — VIRTUAL VISIT (OUTPATIENT)
Dept: PSYCHIATRY | Facility: CLINIC | Age: 32
End: 2022-01-06
Attending: SOCIAL WORKER
Payer: MEDICAID

## 2022-01-06 ENCOUNTER — VIRTUAL VISIT (OUTPATIENT)
Dept: PSYCHIATRY | Facility: CLINIC | Age: 32
End: 2022-01-06

## 2022-01-06 DIAGNOSIS — F20.9 SCHIZOPHRENIA, UNSPECIFIED TYPE (H): Primary | ICD-10-CM

## 2022-01-06 PROCEDURE — 90834 PSYTX W PT 45 MINUTES: CPT | Mod: 95 | Performed by: SOCIAL WORKER

## 2022-01-06 NOTE — PROGRESS NOTES
[facility]  Psychiatry Clinic  First Episode of Psychosis - Strengths Program  Clinician Contact & Progress Note   For Individual Resiliency Training (IRT) & Psychotherapy     Patient: Guero Carter (1990)     MRN: [id number]  Diagnosis(es): Schizophrenia, unspecified type (H) [F20.9]  Clinician: AP Anglin  Service Type: 09635 psychotherapy (38-52 min. with patient and/or family)  Prolonged Care for this visit is not indicated.  Clinical work consists of family therapy.     Time of service:    Date:  1/06/22    Start Time:  12:09      End Time:  12:56    Video- Visit Details   Type of service:  video visit for individual therapy    Reason for video visit:  COVID-19 public health recommendations on in-person sessions  Originating Site (patient location):  [facility]   Location- Patient's home  Distant Site (provider location):  HIPAA compliant location- Remote location  Mode of Communication:  Secure real time interactive audio and visual telecommunication system via [employer]  Consent:  Patient has given verbal consent for video visit?: Yes     People present:   Patient   AP Anglin     Intervention:  Motivational Interviewing   Connect info and skills with personal goals  Promote hope and positive expectations  Re-frame experiences in positive light    Educational Teaching Strategies   Review of written material/education  Relate information to client's experience  Ask questions to check comprehension  Break down information into small chunks  Adopt client's language     CBT   Reinforcement and shaping (positive feedback for steps towards goals and gains in knowledge & skills)  Recognizing the positive (Visualize, write, or discuss the best parts of the day to promote positive thinking patterns)      IRT Module(s) Addressed:  Module 5 - Processing the Psychotic Episode    Did the client complete the home practice option(s) from the previous session:   Not  Applicable    Techniques utilized:   [place] announced at beginning of session  Review of goal  Review of previous meeting  Present new material  Problem-solving practice  Help client choose a home practice option  Summarize progress made in current session  Identified urgent concerns  Review of strengths, barriers, objectives, and interventions  Illicit client/family feedback    Mental Status Exam:  Alertness: alert  and oriented  Appearance: awake, alert and adequately groomed  Behavior/Demeanor: cooperative, pleasant and calm, with good eye contact   Speech: normal  Language: intact and no problems  Psychomotor: normal or unremarkable  Mood: description consistent with euthymia  Thought Process/Associations:  logical, linear and goal oriented unremarkable  Thought Content:  no evidence of suicidal ideation or homicidal ideation and Appropriate to Interview  Perception:  Reports none  Insight: fair and limited  Judgment: appropriate  Cognition: attention span: intact  concentration: intact  fund of knowledge: appropriate;    JOSE-7 and PHQ-9:  Last PHQ-9 12/23/2021   1.  Little interest or pleasure in doing things 2   2.  Feeling down, depressed, or hopeless 2   3.  Trouble falling or staying asleep, or sleeping too much 1   4.  Feeling tired or having little energy 2   5.  Poor appetite or overeating 1   6.  Feeling bad about yourself 2   7.  Trouble concentrating 2   8.  Moving slowly or restless 0   Q9: Thoughts of better off dead/self-harm past 2 weeks 0   PHQ-9 Total Score 12   Difficulty at work, home, or with people -     JOSE-7  2/27/2018   1. Feeling nervous, anxious, or on edge 0   2. Not being able to stop or control worrying 0   3. Worrying too much about different things 0   4. Trouble relaxing 0   5. Being so restless that it is hard to sit still 0   6. Becoming easily annoyed or irritable 0   7. Feeling afraid, as if something awful might happen 0   JOSE-7 Total Score 0   If you checked any problems,  "how difficult have they made it for you to do your work, take care of things at home, or get along with other people? -       [place] Protocol Risk Identification:  1) Have you wished you were dead or wished you could go to sleep and not wake up? No  2) Have you actually had any thoughts about killing yourself? No  If YES to 2, answer questions 3, 4, 5, 6  If NO to 2, go directly to question 6  3) Have you thought about how you might do this? N/A  4) Have you had any intension of acting on these thoughts of killing yourself, as opposed to you have the thoughts but you definitely would not act on them? N/A  5) Have you started to work out or worked out the details of how to kill yourself? Do you intend to carry out this plan? N/A  Always Ask Question 6  6) Have you done anything, started to do anything, or prepared to do anything to end your life? No      Assessment/Progress Note:  I met with Guero for an IRT session.  The focus of today's session was promoting return to functioning in emotional regulation, thought process and social relationships, attaining control over disturbing thoughts, provide supportive therapy and psychoeducation and continuing Module 5- Processing the Episode. Guero shared that he and his girlfriend broke up this week. His feelings surrounding the incident were explored and he reports that it did trigger a \"nervous breakdown\" in which he felt overwhelmed and had a negative outlook about his future and felt as if he should give up. He reports that his sister, Ly, was helpful throughout this process and offered to take him to the hospital but did not go as he coped with his feelings effectively. Clinician and Guero continued Module 5- Processing the episode and read \"Rik's story\" together. The story was compared and contrasted to Guero's own experience with psychosis and the trauma that followed. Guero and Clinician then began the process of helping Guero to write his own story " "by answering questions about his life \"before the episode\". Guero did admit to feeling somewhat anxious and sad while discussing this topic and the session was ended after assisting Guero to process his negative emotions. This writer utilized therapeutic techniques of Supportive, Insight-oriented, reflective listening, modeling empathy, and giving recognition.     With regards to safety, Guero reported the following:    Suicidal ideation: Denies.     Self-harm: Thoughts - Denies.     Homicidal or violent ideation: Denies.    Discussed overall safety plan should symptoms feel unmanageable or safety concerns become imminent to include: Get Help in a Crisis in [facility]   Call [phone] if you or someone close to you is having a mental health crisis. The [facility] Mobile Crisis Response team will help you. The line is open all day, every day, and the call is free.     Overall Guero was cooperative, attentive and engaged throughout the session.  This writer observed that Guero has a tendency to minimize the impact that his symptoms may have on his wellbeing.  Helpful clinical techniques utilized during today's appointment appeared to be Supportive, Insight-oriented, reflective listening, modeling empathy, and giving recognition.  As of today's appt insight to their mental illness appears adequate.  Symptom assessment, safety assessment, discussion and identification of coping strategies, and exploration of material in IRT modules was all in support of Guero's self-identified goal(s) of learning communication skills for building healthier relationships, as identified in most recent BEH Treatment Plan. Progress toward goal completion seems fair.    Plan/Referrals:   Home practice was identified as writing the first paragraph of \"Telling your story\".    Guero is participating in coordinated speciality care via the Strengths Program within our clinic.  Will meet with Guero weekly  for Individual Resiliency " training, therapy, and support aimed at maximizing Guero's opportunity for recovery from psychosis.    Next session: 1/13 at Noon    Crisis Numbers:   Provided routinely in AVS     After hours:  [phone]    Treatment plan last completed on: 12/23/2021  Next treatment plan update due by: 03/23/2022      Please EPIC message with any questions or concerns.    PROVIDER: AP Anglin    Answers for HPI/ROS submitted by the patient on 12/23/2021  If you checked off any problems, how difficult have these problems made it for you to do your work, take care of things at home, or get along with other people?: Somewhat difficult  PHQ9 TOTAL SCORE: 12

## 2022-01-12 ENCOUNTER — TELEPHONE (OUTPATIENT)
Dept: PSYCHIATRY | Facility: CLINIC | Age: 32
End: 2022-01-12

## 2022-01-13 ENCOUNTER — VIRTUAL VISIT (OUTPATIENT)
Dept: PSYCHIATRY | Facility: CLINIC | Age: 32
End: 2022-01-13
Attending: SOCIAL WORKER
Payer: MEDICAID

## 2022-01-13 DIAGNOSIS — F20.9 SCHIZOPHRENIA, UNSPECIFIED TYPE (H): Primary | ICD-10-CM

## 2022-01-13 PROCEDURE — 90834 PSYTX W PT 45 MINUTES: CPT | Mod: 95 | Performed by: SOCIAL WORKER

## 2022-01-13 NOTE — PROGRESS NOTES
[facility]  Psychiatry Clinic  First Episode of Psychosis - Strengths Program  Clinician Contact & Progress Note   For Individual Resiliency Training (IRT) & Psychotherapy     Patient: Guero Carter (1990)     MRN: [id number]  Diagnosis(es): Schizophrenia, unspecified type (H) [F20.9]  Clinician: AP Anglin  Service Type: 41300 psychotherapy (38-52 min. with patient and/or family)  Prolonged Care for this visit is not indicated.  Clinical work consists of family therapy.     Time of service:    Date:  1/13/22    Start Time:  12:08      End Time:  12:50    Video- Visit Details   Type of service:  video visit for individual therapy    Reason for video visit:  COVID-19 public health recommendations on in-person sessions  Originating Site (patient location):  [facility]   Location- Patient's home  Distant Site (provider location):  HIPAA compliant location- Remote location  Mode of Communication:  Secure real time interactive audio and visual telecommunication system via ClearFlow  Consent:  Patient has given verbal consent for video visit?: Yes     People present:   Patient   AP Anglin     Intervention:  Motivational Interviewing   Connect info and skills with personal goals  Promote hope and positive expectations  Re-frame experiences in positive light    Educational Teaching Strategies   Review of written material/education  Relate information to client's experience  Ask questions to check comprehension  Break down information into small chunks  Adopt client's language     CBT   Reinforcement and shaping (positive feedback for steps towards goals, gains in knowledge & skills and follow-through on home assignments)  Cognitive restructuring (identify thoughts related to negative feelings, examine the evidence and change though or form action plan)  Recognizing the positive (Visualize, write, or discuss the best parts of the day to promote positive thinking  patterns)      IRT Module(s) Addressed:  Module 5 - Processing the Psychotic Episode    Did the client complete the home practice option(s) from the previous session:   Completed    Techniques utilized:   [place] announced at beginning of session  Review of homework  Review of goal  Review of previous meeting  Present new material  Problem-solving practice  Help client choose a home practice option  Summarize progress made in current session  Identified urgent concerns  Review of strengths, barriers, objectives, and interventions  Illicit client/family feedback    Mental Status Exam:  Alertness: alert  and oriented  Appearance: awake, alert and adequately groomed  Behavior/Demeanor: cooperative, pleasant and calm, with good eye contact   Speech: normal  Language: intact and no problems  Psychomotor: normal or unremarkable  Mood: description consistent with euthymia  Thought Process/Associations:  logical, linear and goal oriented unremarkable  Thought Content:  no evidence of suicidal ideation or homicidal ideation and Appropriate to Interview  Perception:  Reports none  Insight: fair and limited  Judgment: appropriate  Cognition: attention span: intact  concentration: intact  fund of knowledge: appropriate;    JOSE-7 and PHQ-9:  Last PHQ-9 12/23/2021   1.  Little interest or pleasure in doing things 2   2.  Feeling down, depressed, or hopeless 2   3.  Trouble falling or staying asleep, or sleeping too much 1   4.  Feeling tired or having little energy 2   5.  Poor appetite or overeating 1   6.  Feeling bad about yourself 2   7.  Trouble concentrating 2   8.  Moving slowly or restless 0   Q9: Thoughts of better off dead/self-harm past 2 weeks 0   PHQ-9 Total Score 12   Difficulty at work, home, or with people [facility] Protocol Risk Identification:  1) Have you wished you were dead or wished you could go to sleep and not wake up? No  2) Have you actually had any thoughts about killing yourself? No  If YES to  "2, answer questions 3, 4, 5, 6  If NO to 2, go directly to question 6  3) Have you thought about how you might do this? N/A  4) Have you had any intension of acting on these thoughts of killing yourself, as opposed to you have the thoughts but you definitely would not act on them? N/A  5) Have you started to work out or worked out the details of how to kill yourself? Do you intend to carry out this plan? N/A  Always Ask Question 6  6) Have you done anything, started to do anything, or prepared to do anything to end your life? No        Assessment/Progress Note:  I met with Guero for an IRT session.  The focus of today's session was promoting return to functioning in emotional regulation, thought process and social relationships, provide supportive therapy and psychoeducation. Guero shared that he was Covid positive earlier this week and will be completing school virtually this week. He states that his FAFSA loan did go through but he continues to work on his health insurance as his issue is unresolved. Guero shared that he has lost 15 pounds due to dietary changes which he is happy about. He continues to abstain from ETOH, nicotine, and marijuana at this time. Guero did complete paragraph 1 of \"Telling Your Story\" which was reviewed and his answers were explored. Guero stated that it was somewhat difficult for him and the topics covered in paragraph 1 were difficult for him to review. Guero's anger towards his mother was explored in relation to her perceived inaction after Guero disclosed something to her. Clinician and Guero completed paragraph 2 of \"Telling your Story\" together and Guero shared about his experience \"during the episode\". Guero's relationship with the supporters in his life, medication, diagnoses, and suicidal ideation were explored. Overall, Guero identifies himself as a resilient person as he feels he overcame the odds in respect to his diagnosis.  This writer utilized therapeutic " "techniques of Motivational Interviewing, Insight-oriented, reflective and supportive listening, modeling empathy, and giving recognition.       With regards to safety, Guero reported the following:    Suicidal ideation: Denies.     Self-harm: Thoughts - Denies.     Homicidal or violent ideation: Denies.    Discussed overall safety plan should symptoms feel unmanageable or safety concerns become imminent to include: [facility] can help people who are in the midst of a mental health crisis.   [facility] mobile crisis teams for adults, 18 and older can call:  COPE -- [phone]      Overall Guero was cooperative, attentive and engaged throughout the session.  This writer observed that Guero has difficulty connecting to his emotions and tends to minimize the impact they have on his wellness.  Helpful clinical techniques utilized during today's appointment appeared to be Motivational Interviewing, Insight-oriented, reflective and supportive listening, modeling empathy, and giving recognition.  As of today's appt insight to their mental illness appears adequate.  Symptom assessment, safety assessment, discussion and identification of coping strategies, and exploration of material in IRT modules was all in support of Guero's self-identified goal(s) of learning communication skills for building healthier relationships, as identified in most recent BEH Treatment Plan. Progress toward goal completion seems adequate.    Plan/Referrals:   Home practice was identified as completing paragraphs 3 & 4 of \"Telling Your Story\".    Guero is participating in coordinated speciality care via the Strengths Program within our clinic.  Will meet with Guero weekly  for Individual Resiliency training, therapy, and support aimed at maximizing Guero's opportunity for recovery from psychosis.    Next session: 1/20 at 1pm    Crisis Numbers:   Provided routinely in AVS     After hours:  [phone]    Treatment plan last " completed on: 12/23/2021  Next treatment plan update due by: 03/23/2022        Please EPIC message with any questions or concerns.    PROVIDER: AP Anglin      Answers for HPI/ROS submitted by the patient on 12/23/2021  If you checked off any problems, how difficult have these problems made it for you to do your work, take care of things at home, or get along with other people?: Somewhat difficult  PHQ9 TOTAL SCORE: 12

## 2022-01-20 ENCOUNTER — TELEPHONE (OUTPATIENT)
Dept: PSYCHIATRY | Facility: CLINIC | Age: 32
End: 2022-01-20
Payer: MEDICAID

## 2022-01-20 ENCOUNTER — VIRTUAL VISIT (OUTPATIENT)
Dept: PSYCHIATRY | Facility: CLINIC | Age: 32
End: 2022-01-20
Attending: SOCIAL WORKER
Payer: MEDICAID

## 2022-01-20 DIAGNOSIS — F20.9 SCHIZOPHRENIA, UNSPECIFIED TYPE (H): Primary | ICD-10-CM

## 2022-01-20 PROCEDURE — 90834 PSYTX W PT 45 MINUTES: CPT | Mod: 95 | Performed by: SOCIAL WORKER

## 2022-01-20 NOTE — PROGRESS NOTES
[facility]  Psychiatry Clinic  First Episode of Psychosis - Strengths Program  Clinician Contact & Progress Note   For Individual Resiliency Training (IRT) & Psychotherapy     Patient: Guero Carter (1990)     MRN: [id number]  Diagnosis(es): Schizophrenia, unspecified type (H) [F20.9]  Clinician: AP Anglin  Service Type: 00026 psychotherapy (38-52 min. with patient and/or family)  Prolonged Care for this visit is not indicated.  Clinical work consists of family therapy.     Time of service:    Date:  1/20/22    Start Time:  12:06      End Time:  12:53    Video- Visit Details   Type of service:  video visit for individual therapy    Reason for video visit:  COVID-19 public health recommendations on in-person sessions  Originating Site (patient location):  [facility]   Location- Patient's home  Distant Site (provider location):  HIPAA compliant location- Remote location  Mode of Communication:  Secure real time interactive audio and visual telecommunication system via [employer]  Consent:  Patient has given verbal consent for video visit?: Yes     People present:   Patient   AP Anglin     Intervention:  Motivational Interviewing   Connect info and skills with personal goals  Promote hope and positive expectations  Explore pros and cons of change  Re-frame experiences in positive light    Educational Teaching Strategies   Review of written material/education  Relate information to client's experience  Ask questions to check comprehension  Break down information into small chunks  Adopt client's language     CBT   Cognitive restructuring (identify thoughts related to negative feelings and examine the evidence)  Recognizing the positive (Visualize, write, or discuss the best parts of the day to promote positive thinking patterns)      IRT Module(s) Addressed:  Module 5 - Processing the Psychotic Episode    Did the client complete the home practice option(s) from the  previous session:   Not Applicable    Techniques utilized:   [place] announced at beginning of session  Review of goal  Review of previous meeting  Present new material  Problem-solving practice  Help client choose a home practice option  Summarize progress made in current session  Identified urgent concerns  Review of strengths, barriers, objectives, and interventions  Illicit client/family feedback    Mental Status Exam:  Alertness: alert  and oriented  Appearance: awake, alert and adequately groomed  Behavior/Demeanor: cooperative, pleasant and calm, with good eye contact   Speech: normal  Language: intact and no problems  Psychomotor: normal or unremarkable  Mood: description consistent with euthymia  Thought Process/Associations:  logical, linear and goal oriented unremarkable  Thought Content:  endorses suicidal ideation without intent or means. Denies homicidal ideation and Appropriate to Interview  Perception:  Reports none  Insight: fair and limited  Judgment: appropriate  Cognition: attention span: intact  concentration: intact  fund of knowledge: appropriate;      JOSE-7 and PHQ-9:  Last PHQ-9 12/23/2021   1.  Little interest or pleasure in doing things 2   2.  Feeling down, depressed, or hopeless 2   3.  Trouble falling or staying asleep, or sleeping too much 1   4.  Feeling tired or having little energy 2   5.  Poor appetite or overeating 1   6.  Feeling bad about yourself 2   7.  Trouble concentrating 2   8.  Moving slowly or restless 0   Q9: Thoughts of better off dead/self-harm past 2 weeks 0   PHQ-9 Total Score 12   Difficulty at work, home, or with people [facility] Protocol Risk Identification:  1) Have you wished you were dead or wished you could go to sleep and not wake up? Yes  2) Have you actually had any thoughts about killing yourself? Yes  If YES to 2, answer questions 3, 4, 5, 6  If NO to 2, go directly to question 6  3) Have you thought about how you might do this? Yes  4) Have you  had any intension of acting on these thoughts of killing yourself, as opposed to you have the thoughts but you definitely would not act on them? No  5) Have you started to work out or worked out the details of how to kill yourself? Do you intend to carry out this plan? No  Always Ask Question 6  6) Have you done anything, started to do anything, or prepared to do anything to end your life? No      Assessment/Progress Note:  I met with Guero for an IRT session.  The focus of today's session was promoting return to functioning in emotional regulation, thought process and social relationships, improve coping skills to deal with stress and interpersonal relationships, provide supportive therapy, psychoeducation and understanding stressors that trigger psychotic episodes. Guero reports that he is doing well and shared that school is going well and the structure is helpful for him. Clinician and Guero continued working on Module 5- Processing the episode and working through Guero's own story. Clinician and Guero discussed the general after-effects of the episode as well as how it impacted his social and family relationships. Guero shared that he feels that going through an episode of psychosis negatively impacted his self-esteem and relationship with family the most. The ways in which his self-esteem were impacted were explored and Guero was able to share about how low self-esteem impacts his friendships and romantic relationships. Guero endorsed a lot of anger about the past and the ways in which his family treated him. He expressed a significant amount of hopelessness and suicidal ideation. He reports the last time he had suicidal ideation was last week in which he thought that he was better off dead and should hang himself. Clinician assessed for safety and Guero denies SI currently, denies intent, and denies working out the details of a suicide plan. He identified his family and future career goals as  protective factors. This writer utilized therapeutic techniques of Supportive, Motivational Interviewing, Insight-oriented, reflective listening, modeling empathy, and giving recognition.     With regards to safety, Guero reported the following:    Suicidal ideation: Denies.     Self-harm: Thoughts - Denies.     Homicidal or violent ideation: Denies.    Discussed overall safety plan should symptoms feel unmanageable or safety concerns become imminent to include: [facility] can help people who are in the midst of a mental health crisis.   [facility] mobile crisis teams for adults, 18 and older can call:  COPE -- [phone]    Overall Guero was cooperative, attentive and engaged throughout the session.  This writer observed that Guero appears to minimize his emotions in order to cope with stress.  Helpful clinical techniques utilized during today's appointment appeared to be Supportive, Motivational Interviewing, Insight-oriented, reflective listening, modeling empathy, and giving recognition.  As of today's appt insight to their mental illness appears good.  Symptom assessment, safety assessment, discussion and identification of coping strategies, and exploration of material in IRT modules was all in support of Guero's self-identified goal(s) of learning communication skills for building healthier relationships, as identified in most recent BEH Treatment Plan. Progress toward goal completion seems adequate.    Plan/Referrals:   Home practice was identified as completing paragraphs 5 & 6 of Telling Your Story.    Guero is participating in coordinated speciality care via the Strengths Program within our clinic.  Will meet with Guero weekly  for Individual Resiliency training, therapy, and support aimed at maximizing Guero's opportunity for recovery from psychosis.    Next session: 1/27 at 12pm    Crisis Numbers:   Provided routinely in AVS     After hours:  [phone]    Treatment plan last  completed on: 12/23/2021  Next treatment plan update due by: 03/23/2022      Please EPIC message with any questions or concerns.    PROVIDER: AP Anglin          Answers for HPI/ROS submitted by the patient on 12/23/2021  If you checked off any problems, how difficult have these problems made it for you to do your work, take care of things at home, or get along with other people?: Somewhat difficult  PHQ9 TOTAL SCORE: 12

## 2022-01-26 ENCOUNTER — MYC MEDICAL ADVICE (OUTPATIENT)
Dept: PSYCHIATRY | Facility: CLINIC | Age: 32
End: 2022-01-26
Payer: MEDICAID

## 2022-01-26 NOTE — TELEPHONE ENCOUNTER
Writer spoke to Sofia Vanessa who has agreed to talk with the patient at 2/3/22 appt regarding forms and future needs.Anastasiya Espinoza LPN

## 2022-01-26 NOTE — TELEPHONE ENCOUNTER
3 faxed pages was received from [facility] from [employer] requesting completion of  Attending Physician Statement. Pt has an appt set for 2/3/22 with Sofia Vanessa and the form will be reviewed at this time.Anastasiya Espinoza LPN

## 2022-02-02 ENCOUNTER — VIRTUAL VISIT (OUTPATIENT)
Dept: PSYCHIATRY | Facility: CLINIC | Age: 32
End: 2022-02-02
Attending: SOCIAL WORKER
Payer: MEDICAID

## 2022-02-02 DIAGNOSIS — F20.9 SCHIZOPHRENIA, UNSPECIFIED TYPE (H): Primary | ICD-10-CM

## 2022-02-02 PROCEDURE — 90834 PSYTX W PT 45 MINUTES: CPT | Mod: 95 | Performed by: SOCIAL WORKER

## 2022-02-02 NOTE — PROGRESS NOTES
[facility]  Psychiatry Clinic  First Episode of Psychosis - Strengths Program  Clinician Contact & Progress Note   For Individual Resiliency Training (IRT) & Psychotherapy     Patient: Guero Carter (1990)     MRN: [id number]  Diagnosis(es): Schizophrenia, unspecified type (H) [F20.9]  Clinician: AP Anglin  Service Type: 59716 psychotherapy (38-52 min. with patient and/or family)  Prolonged Care for this visit is not indicated.  Clinical work consists of family therapy.     Time of service:    Date:  2/02/22    Start Time:  1:05      End Time:  1:50    Video- Visit Details   Type of service:  video visit for individual therapy    Reason for video visit:  COVID-19 public health recommendations on in-person sessions  Originating Site (patient location):  [facility]   Location- Patient's home  Distant Site (provider location):  HIPAA compliant location- Remote location  Mode of Communication:  Secure real time interactive audio and visual telecommunication system via CoolClouds  Consent:  Patient has given verbal consent for video visit?: Yes     People present:   Patient   AP Anglin     Intervention:  Motivational Interviewing   Connect info and skills with personal goals  Promote hope and positive expectations  Explore pros and cons of change  Re-frame experiences in positive light    Educational Teaching Strategies   Review of written material/education  Relate information to client's experience  Ask questions to check comprehension  Break down information into small chunks  Adopt client's language     CBT   Reinforcement and shaping (positive feedback for steps towards goals, gains in knowledge & skills and follow-through on home assignments)  Cognitive restructuring (identify thoughts related to negative feelings, examine the evidence and change though or form action plan)  Reframe Cognitive Distortions (become aware of which distortions you are most  vulnerable to, identifying and challenging our harmful automatic thoughts  Recognizing the positive (Visualize, write, or discuss the best parts of the day to promote positive thinking patterns)      IRT Module(s) Addressed:  Module 5 - Processing the Psychotic Episode    Did the client complete the home practice option(s) from the previous session:   Completed    Techniques utilized:   Quimby announced at beginning of session  Review of homework  Review of goal  Review of previous meeting  Present new material  Problem-solving practice  Summarize progress made in current session  Identified urgent concerns  Review of strengths, barriers, objectives, and interventions  Illicit client/family feedback    Mental Status Exam:  Alertness: alert  and oriented  Appearance: awake, alert and adequately groomed  Behavior/Demeanor: cooperative, pleasant and calm, with good eye contact   Speech: normal  Language: intact and no problems  Psychomotor: normal or unremarkable  Mood: description consistent with euthymia  Thought Process/Associations:  logical, linear and goal oriented unremarkable  Thought Content:  endorses suicidal ideation without intent or means. Denies homicidal ideation and Appropriate to Interview  Perception:  Reports none  Insight: fair and limited  Judgment: appropriate  Cognition: attention span: intact  concentration: intact  fund of knowledge: appropriate;    JOSE-7 and PHQ-9:  Last PHQ-9 12/23/2021   1.  Little interest or pleasure in doing things 2   2.  Feeling down, depressed, or hopeless 2   3.  Trouble falling or staying asleep, or sleeping too much 1   4.  Feeling tired or having little energy 2   5.  Poor appetite or overeating 1   6.  Feeling bad about yourself 2   7.  Trouble concentrating 2   8.  Moving slowly or restless 0   Q9: Thoughts of better off dead/self-harm past 2 weeks 0   PHQ-9 Total Score 12   Difficulty at work, home, or with people [facility] Protocol Risk  "Identification:  1) Have you wished you were dead or wished you could go to sleep and not wake up? Yes  2) Have you actually had any thoughts about killing yourself? Yes  If YES to 2, answer questions 3, 4, 5, 6  If NO to 2, go directly to question 6  3) Have you thought about how you might do this? No  4) Have you had any intension of acting on these thoughts of killing yourself, as opposed to you have the thoughts but you definitely would not act on them? No  5) Have you started to work out or worked out the details of how to kill yourself? Do you intend to carry out this plan? No  Always Ask Question 6  6) Have you done anything, started to do anything, or prepared to do anything to end your life? No      Assessment/Progress Note:  I met with Guero for an IRT session.  The focus of today's session was promoting return to functioning in emotional regulation, thought process and social relationships, attaining control over disturbing thoughts, improve coping skills to deal with stress and interpersonal relationships, provide supportive therapy and psychoeducation. Guero shared many positive updates including completing a rental assistance application which he was tentatively approved for, continued weight loss, and being caught up with his homework. Guero endorses increased anxiety, agitation, and stress recently due to financial stressors and states that this has been impacting his ability to function as he has been feeling depressed and oversleeping which had left him with minimal time to complete assignments. Guero and Clinician reviewed his written paragraphs 5 & 6 of \"Telling Your Story\". Guero demonstrated good insight into how his mental health symptoms and outlook about his future impacted his past choices in regards to schooling and socialization. He reports many positive changes over the past few years that he was able to make with the help of psychoeducation, support from family, and learning from " his past mistakes such as going off his medication when he was nearly done with school which resulted in hospitalization and loss of his independence at that time. Guero identifies his resilience, mindfulness skills, acceptance, and good judgement/logic as positive traits that have helped him to make these changes over time. This writer utilized therapeutic techniques of Supportive, Motivational Interviewing, Insight-oriented, reflective questioning, and giving recognition.     With regards to safety, Guero reported the following:    Suicidal ideation: Denies.     Self-harm: Thoughts - Denies.     Homicidal or violent ideation: Denies.    Discussed overall safety plan should symptoms feel unmanageable or safety concerns become imminent to include: [facility] can help people who are in the midst of a mental health crisis.   [facility] mobile crisis teams for adults, 18 and older can call:  COPE -- [phone]    Overall Guero was cooperative, attentive and engaged throughout the session.  This writer observed that Guero appears to struggle with negative emotions and may minimize them or engage in toxic positivity to cope with distressing events of the past.  Helpful clinical techniques utilized during today's appointment appeared to be Supportive, Motivational Interviewing, Insight-oriented, reflective questioning, and giving recognition.  As of today's appt insight to their mental illness appears good.  Symptom assessment, safety assessment, discussion and identification of coping strategies, and exploration of material in IRT modules was all in support of Guero's self-identified goal(s) of learning communication skills for building healthier relationships, as identified in most recent BEH Treatment Plan. Progress toward goal completion seems adequate.    Plan/Referrals:   Guero is participating in coordinated speciality care via the [facility]reng[facility] Program within our clinic.  Will meet with Guero weekly   for Individual Resiliency training, therapy, and support aimed at maximizing Guero's opportunity for recovery from psychosis.    Next session: 2/9 at 1pm    Crisis Numbers:   Provided routinely in AVS     After hours:  [phone]    Treatment plan last completed on: 12/23/2021  Next treatment plan update due by: 03/23/2022      Please EPIC message with any questions or concerns.    PROVIDER: AP Anglin    Answers for HPI/ROS submitted by the patient on 12/23/2021  If you checked off any problems, how difficult have these problems made it for you to do your work, take care of things at home, or get along with other people?: Somewhat difficult  PHQ9 TOTAL SCORE: 12

## 2022-02-03 ENCOUNTER — VIRTUAL VISIT (OUTPATIENT)
Dept: PSYCHIATRY | Facility: CLINIC | Age: 32
End: 2022-02-03
Attending: NURSE PRACTITIONER
Payer: MEDICAID

## 2022-02-03 DIAGNOSIS — F32.A DEPRESSION, UNSPECIFIED DEPRESSION TYPE: ICD-10-CM

## 2022-02-03 DIAGNOSIS — F20.9 SCHIZOPHRENIA, UNSPECIFIED TYPE (H): Primary | ICD-10-CM

## 2022-02-03 PROCEDURE — 99214 OFFICE O/P EST MOD 30 MIN: CPT | Mod: 95 | Performed by: NURSE PRACTITIONER

## 2022-02-03 RX ORDER — ESCITALOPRAM OXALATE 10 MG/1
10 TABLET ORAL DAILY
Qty: 30 TABLET | Refills: 2 | Status: SHIPPED | OUTPATIENT
Start: 2022-02-03 | End: 2022-03-15

## 2022-02-03 RX ORDER — ARIPIPRAZOLE 20 MG/1
20 TABLET ORAL DAILY
Qty: 30 TABLET | Refills: 2 | Status: SHIPPED | OUTPATIENT
Start: 2022-02-03 | End: 2022-03-15

## 2022-02-03 RX ORDER — BUPROPION HYDROCHLORIDE 450 MG/1
450 TABLET, FILM COATED, EXTENDED RELEASE ORAL DAILY
Qty: 30 TABLET | Refills: 2 | Status: SHIPPED | OUTPATIENT
Start: 2022-02-03 | End: 2022-03-15

## 2022-02-03 ASSESSMENT — PAIN SCALES - GENERAL: PAINLEVEL: MILD PAIN (2)

## 2022-02-03 NOTE — Clinical Note
Amandeep Haile - I saw Anderw today to re-establish care and I will fill out his LTD forms.  I don't believe these have been sent to me, and he is hoping to get them in next week.  I should be able to complete them for him next week - can some one email them to  me at [email or web address]?  Thanks!
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
## 2022-02-03 NOTE — PATIENT INSTRUCTIONS
**For crisis resources, please see the information at the end of this document**   Patient Education    Thank you for coming to the [facility] MENTAL HEALTH & ADDI[facility] [place] [facility].    Lab Testing:  If you had lab testing today and your results are reassuring or normal they will be mailed to you or sent through Userscout within 7 days. If the lab tests need quick action we will call you with the results. The phone number we will call with results is # [phone] (home) . If this is not the best number please call our clinic and change the number.    Medication Refills:  If you need any refills please call your pharmacy and they will contact us. Our fax number for refills is [phone]. Please allow three business for refill processing. If you need to  your refill at a new pharmacy, please contact the new pharmacy directly. The new pharmacy will help you get your medications transferred.     Scheduling:  If you have any concerns about today's visit or wish to schedule another appointment please call our office during normal business hours [phone] (8-5:00 M-F)    Contact Us:  Please call [phone] during business hours (8-5:00 M-F).  If after clinic hours, or on the weekend, please call  [phone].    Financial Assistance [phone]  [facility] Billing [phone]  Central Billing Office, [facility]: [phone]  [facility] Billing [phone]  Medical Records [phone]  [facility] Patient Bill of Rights [email or web address]       MENTAL HEALTH CRISIS NUMBERS:  For a medical emergency please call  911 or go to the nearest ER.     [facility]:   [facility] -[phone]   Crisis Residence [facility] -[phone]   Walk-In Counseling Center [facility] -[phone]   COPE 24/7 [facility] Mobile Team -[phone] (adults)/[phone] (child)  CHILD: [facility] needs assessment team -  [phone]      [facility]:   [facility] - [phone]   Walk-in counseling [facility] - [phone]   Walk-in counseling [facility] - [phone]   Crisis Residence [facility] - [phone]  Urgent Care Adult Mental [facility]-[phone] mobile unit/ 24/7 crisis line    National Crisis Numbers:   National Suicide Prevention Lifeline: 6-683-587-TALK (525-703-9585)  Poison Control Center - 7-927-934-9982  Adcrowd retargeting/resources for a list of additional resources (SOS)  Trans Lifeline a hotline for transgender people 7-145-294-4833  The Vinnie Project a hotline for LGBT youth 2-382-501-7424  Crisis Text Line: For any crisis 24/7   To: 956920  see www.crisistextline.org  - IF MAKING A CALL FEELS TOO HARD, send a text!         Again thank you for choosing [facility] MENTAL HEALTH & ADDICTION [facility] and please let us know how we can best partner with you to improve you and your family's health.    You may be receiving a survey regarding this appointment. We would love to have your feedback, both positive and negative. The survey is done by an external company, so your answers are anonymous.

## 2022-02-09 ENCOUNTER — VIRTUAL VISIT (OUTPATIENT)
Dept: PSYCHIATRY | Facility: CLINIC | Age: 32
End: 2022-02-09
Attending: SOCIAL WORKER
Payer: MEDICAID

## 2022-02-09 DIAGNOSIS — F20.9 SCHIZOPHRENIA, UNSPECIFIED TYPE (H): Primary | ICD-10-CM

## 2022-02-09 PROCEDURE — 90834 PSYTX W PT 45 MINUTES: CPT | Mod: 95 | Performed by: SOCIAL WORKER

## 2022-02-09 NOTE — PROGRESS NOTES
[facility]  Psychiatry Clinic  First Episode of Psychosis - Strengths Program  Clinician Contact & Progress Note   For Individual Resiliency Training (IRT) & Psychotherapy     Patient: Guero Carter (1990)     MRN: [id number]  Diagnosis(es): Schizophrenia, unspecified type (H) [F20.9]  Clinician: AP Anglin  Service Type: 84507 psychotherapy (38-52 min. with patient and/or family)  Prolonged Care for this visit is not indicated.  Clinical work consists of family therapy.     Time of service:    Date:  2/09/22    Start Time:  1:07      End Time:  1:55    Video- Visit Details   Type of service:  video visit for individual therapy    Reason for video visit:  COVID-19 public health recommendations on in-person sessions  Originating Site (patient location):  [facility]   Location- Patient's home  Distant Site (provider location):  HIPAA compliant location- Remote location  Mode of Communication:  Secure real time interactive audio and visual telecommunication system via [employer]  Consent:  Patient has given verbal consent for video visit?: Yes     People present:   Patient   AP Anglin     Intervention:  Motivational Interviewing   Connect info and skills with personal goals  Promote hope and positive expectations  Explore pros and cons of change  Re-frame experiences in positive light    Educational Teaching Strategies   Review of written material/education  Relate information to client's experience  Ask questions to check comprehension  Break down information into small chunks  Adopt client's language     CBT   Cognitive restructuring (identify thoughts related to negative feelings, examine the evidence and change though or form action plan)  Reframe Cognitive Distortions (become aware of which distortions you are most vulnerable to, identifying and challenging our harmful automatic thoughts)  Recognizing the positive (Visualize, write, or discuss the best parts of  the day to promote positive thinking patterns)      IRT Module(s) Addressed:  Module 5 - Processing the Psychotic Episode    Did the client complete the home practice option(s) from the previous session:   Not Applicable    Techniques utilized:   [place] announced at beginning of session  Review of goal  Review of previous meeting  Present new material  Problem-solving practice  Summarize progress made in current session  Identified urgent concerns  Review of strengths, barriers, objectives, and interventions  Illicit client/family feedback    Mental Status Exam:  Alertness: alert  and oriented  Appearance: awake, alert and adequately groomed  Behavior/Demeanor: cooperative, pleasant and calm, with good eye contact   Speech: normal  Language: intact and no problems  Psychomotor: normal or unremarkable  Mood: description consistent with euthymia  Thought Process/Associations:  logical, linear and goal oriented unremarkable  Thought Content:  endorses suicidal ideation without intent or means. Denies homicidal ideation and Appropriate to Interview  Perception:  Reports none  Insight: fair and limited  Judgment: appropriate  Cognition: attention span: intact  concentration: intact  fund of knowledge: appropriate;    JOSE-7 and PHQ-9:  Last PHQ-9 12/23/2021   1.  Little interest or pleasure in doing things 2   2.  Feeling down, depressed, or hopeless 2   3.  Trouble falling or staying asleep, or sleeping too much 1   4.  Feeling tired or having little energy 2   5.  Poor appetite or overeating 1   6.  Feeling bad about yourself 2   7.  Trouble concentrating 2   8.  Moving slowly or restless 0   Q9: Thoughts of better off dead/self-harm past 2 weeks 0   PHQ-9 Total Score 12   Difficulty at work, home, or with people [facility] Protocol Risk Identification:  1) Have you wished you were dead or wished you could go to sleep and not wake up? Yes  2) Have you actually had any thoughts about killing yourself? Yes  If YES  to 2, answer questions 3, 4, 5, 6  If NO to 2, go directly to question 6  3) Have you thought about how you might do this? No  4) Have you had any intension of acting on these thoughts of killing yourself, as opposed to you have the thoughts but you definitely would not act on them? No  5) Have you started to work out or worked out the details of how to kill yourself? Do you intend to carry out this plan? No  Always Ask Question 6  6) Have you done anything, started to do anything, or prepared to do anything to end your life? No      Assessment/Progress Note:  I met with Guero for an IRT session.  The focus of today's session was promoting return to functioning in emotional regulation, thought process and social relationships, improve coping skills to deal with stress and interpersonal relationships, provide supportive therapy and psychoeducation. Guero shared that he is feeling anxious due to financial stress and breaking up with his girlfriend again. Guero's communication patterns within his relationship were explored as well as any impact the stress of their negative interactions have on his mental health symptoms. The argument they had was reviewed and Guero was provided with information about communication skills. Clinician and Guero reviewed his self-stigmatizing beliefs that were discussed at the last session which include: I am crazy and always will be, I am to blame for what happened to me, and fears about not having a family in the future. Clinician introduced the concept of cognitive restructuring as a way of coping with these thoughts. Guero verbalized an understanding of the relationship between thoughts, feelings, and behaviors. Guero verbalized a belief that someone is to blame for his condition. Clinician provided psychoeducation about psychosis and explained the biological factors that may impact a persons susceptibility for developing psychosis. Guero reports that he initially disagrees  with the sentiment but will think about it over the course of the week. This writer utilized therapeutic techniques of Cognitive-Behavioral, Motivational Interviewing, Insight-oriented, reflective listening, and giving recognition.     With regards to safety, Guero reported the following:    Suicidal ideation: Denies.     Self-harm: Thoughts - Denies.     Homicidal or violent ideation: Denies.    Discussed overall safety plan should symptoms feel unmanageable or safety concerns become imminent to include: [facility] can help people who are in the midst of a mental health crisis.   [facility] mobile crisis teams for adults, 18 and older can call:  COPE -- [phone]    Overall Guero was cooperative, attentive and engaged throughout the session.  This writer observed that Guero has good insight into his own thought processes but may hold rigid beliefs that are self-limiting.  Helpful clinical techniques utilized during today's appointment appeared to be Cognitive-Behavioral, Motivational Interviewing, Insight-oriented, reflective listening, and giving recognition.  As of today's appt insight to their mental illness appears fair.  Symptom assessment, safety assessment, discussion and identification of coping strategies, and exploration of material in IRT modules was all in support of Guero's self-identified goal(s) of  learning communication skills for building healthier relationships, as identified in most recent BEH Treatment Plan. Progress toward goal completion seems fair.    Plan/Referrals:   Guero is participating in coordinated speciality care via the Strengths Program within our clinic.  Will meet with Guero weekly  for Individual Resiliency training, therapy, and support aimed at maximizing Guero's opportunity for recovery from psychosis.    Next session: 2/16 at 1:30pm    Crisis Numbers:   Provided routinely in AVS     After hours:  [phone]    Treatment plan last completed on:  12/23/2021  Next treatment plan update due by: 03/23/2022    Please EPIC message with any questions or concerns.    PROVIDER: AP Anglin      Answers for HPI/ROS submitted by the patient on 12/23/2021  If you checked off any problems, how difficult have these problems made it for you to do your work, take care of things at home, or get along with other people?: Somewhat difficult  PHQ9 TOTAL SCORE: 12

## 2022-02-09 NOTE — TELEPHONE ENCOUNTER
Writer downloaded readable forms from [employer] website. These were emailed to Sofia Vanessa for completion.Anastasiya Espinoza LPN

## 2022-02-11 ENCOUNTER — VIRTUAL VISIT (OUTPATIENT)
Dept: PSYCHIATRY | Facility: CLINIC | Age: 32
End: 2022-02-11
Attending: PSYCHOLOGIST
Payer: MEDICAID

## 2022-02-11 DIAGNOSIS — F20.9 SCHIZOPHRENIA, UNSPECIFIED TYPE (H): Primary | ICD-10-CM

## 2022-02-11 PROCEDURE — 99207 PR NON-BILLABLE SERV PER CHARTING: CPT

## 2022-02-11 NOTE — TELEPHONE ENCOUNTER
Completed, signed forms were faxed to [employer] at [phone]. The original copies were faxed to scanning and is being held in Psych until scanning is complete.Anastasiya Espinoza LPN

## 2022-02-12 NOTE — PROGRESS NOTES
[facility]  Psychiatry Clinic  Psychological Evaluation Testing Note     Guero Carter MRN# [id number]   Age: 31 year old YOB: 1990     Video- Visit Details   Type of service:  video visit for Psychological testing  Time of service:    Date:  2/11/22    Video Start Time:  3:30 Pm      Video End Time:  4:30 Pm    Reason for video visit:  COVID-19 public health recommendations on in-person sessions  Originating Site (patient location):  [facility]   Location- Friend or family home  Distant Site (provider location):  HIPAA compliant [facility]  Mode of Communication:  Secure real time interactive audio and visual telecommunication system via [employer]  Consent:  Patient has given verbal consent for video visit?: Yes    Attendees: Patient attended the session alone  : No, English    Identifying Information/Reason for Referral  Geuro Carter is a 31 year old male who prefers the name Guero & pronouns he.  Guero has a history of psychosis.  The patient was seen for psychological testing. The purpose of the current psychological evaluation was to provide information about Guero's social, emotional and cognitive functioning, to assist with diagnostic clarification and to guide his treatment and recovery planning. Results of the following assessments are to be used in conjunction with the standard diagnostic evaluation.    A total of 60 minutes were spent in test administration and scoring by this writer, ZAIN BAKER psychometrist.  See Testing Evaluation Report for a full interpretation of the findings and data.     Summary of Services/Procedures  Guero was administered a psychological test battery tailored to his age and the referral questions.     Tests Administered  [place] Assessment of Need - Short Appraisal Schedule (CANSAS)  [place] Symptom Index  MIREC Global Assessment of Functioning  Illness  Management and Recovery - Practitioner Rating  Illness Management and Recovery - Self Rating  Post Traumatic Stress Disorder Checklist (PCL-5 8 Item)  Audit-C  DAST-10  PROMIS-Sleep Disturbance  International Physical Activity Questionnaire (IPAQ)  Behavioral Inhibition and Activation Scale- BIS/BAS  Brief [place]ara-28 Item  Life Event Checklist- LEC Follow Up      Behavioral Observations  (NOTE: Additional behavioral observations and summary statement regarding validity of testing completed. Delete out grey italics before closing note).  Overall, Guero demonstrated good effort throughout testing. Therefore, the current evaluation results are thought to provide a accurate representation of his functioning in the areas assessed.     Plan  (NOTE: Delete out grey italics before closing note. If more testing is scheduled, use this:)  Testing is NOT complete. Patient is scheduled to return to complete additional testing on February / 17 / 2022.    [If completed] The patient and their invited support system will participate in a feedback appointment on a future date with their clinician.     Will coordinate care with other treatment providers to assist with planning as needed.      ZAIN BAKER  Psychometrist      This is a non-billable encounter as it was solely for the purposes of completing initial assessments. Billing will occur during clinical interpretation and review of findings at a future date.

## 2022-02-15 ENCOUNTER — VIRTUAL VISIT (OUTPATIENT)
Dept: PSYCHIATRY | Facility: CLINIC | Age: 32
End: 2022-02-15
Attending: SOCIAL WORKER
Payer: MEDICAID

## 2022-02-15 DIAGNOSIS — F20.9 SCHIZOPHRENIA, UNSPECIFIED TYPE (H): Primary | ICD-10-CM

## 2022-02-15 PROCEDURE — 90834 PSYTX W PT 45 MINUTES: CPT | Mod: 95 | Performed by: SOCIAL WORKER

## 2022-02-15 NOTE — PROGRESS NOTES
[facility]  Psychiatry Clinic  First Episode of Psychosis - Strengths Program  Clinician Contact & Progress Note   For Individual Resiliency Training (IRT) & Psychotherapy     Patient: Guero Carter (1990)     MRN: [id number]  Diagnosis(es): Schizophrenia, unspecified type (H) [F20.9]  Clinician: AP Anglin  Service Type: 51384 psychotherapy (38-52 min. with patient and/or family)  Prolonged Care for this visit is not indicated.  Clinical work consists of family therapy.     Time of service:    Date:  2/15/22    Start Time:  1:00      End Time:  1:44    Video- Visit Details   Type of service:  video visit for individual therapy    Reason for video visit:  COVID-19 public health recommendations on in-person sessions  Originating Site (patient location):  [facility]   Location- Patient's home  Distant Site (provider location):  HIPAA compliant location- Remote location  Mode of Communication:  Secure real time interactive audio and visual telecommunication system via [employer]  Consent:  Patient has given verbal consent for video visit?: Yes     People present:   Patient   AP Anglin     Intervention:  Motivational Interviewing   Connect info and skills with personal goals  Promote hope and positive expectations  Explore pros and cons of change  Re-frame experiences in positive light    Educational Teaching Strategies   Review of written material/education  Relate information to client's experience  Ask questions to check comprehension  Break down information into small chunks  Adopt client's language     CBT   Reinforcement and shaping (positive feedback for steps towards goals and gains in knowledge & skills)  Cognitive restructuring (identify thoughts related to negative feelings, examine the evidence and change though or form action plan)  Reframe Cognitive Distortions (become aware of which distortions you are most vulnerable to, identifying and challenging  our harmful automatic thoughts)  Recognizing the positive (Visualize, write, or discuss the best parts of the day to promote positive thinking patterns)      IRT Module(s) Addressed:  Module 5 - Processing the Psychotic Episode    Did the client complete the home practice option(s) from the previous session:   Not Applicable    Techniques utilized:   [place] announced at beginning of session  Review of goal  Review of previous meeting  Present new material  Problem-solving practice  Summarize progress made in current session  Identified urgent concerns  Review of strengths, barriers, objectives, and interventions  Illicit client/family feedback    Mental Status Exam:  Alertness: alert  and oriented  Appearance: awake, alert and adequately groomed  Behavior/Demeanor: cooperative, pleasant and calm, with good eye contact   Speech: normal  Language: intact and no problems  Psychomotor: normal or unremarkable  Mood: description consistent with euthymia  Thought Process/Associations:  logical, linear and goal oriented unremarkable  Thought Content:  endorses suicidal ideation without intent or means. Denies homicidal ideation and Appropriate to Interview  Perception:  Reports none  Insight: fair and limited  Judgment: appropriate  Cognition: attention span: intact  concentration: intact  fund of knowledge: appropriate;    JOSE-7 and PHQ-9:  Last PHQ-9 12/23/2021   1.  Little interest or pleasure in doing things 2   2.  Feeling down, depressed, or hopeless 2   3.  Trouble falling or staying asleep, or sleeping too much 1   4.  Feeling tired or having little energy 2   5.  Poor appetite or overeating 1   6.  Feeling bad about yourself 2   7.  Trouble concentrating 2   8.  Moving slowly or restless 0   Q9: Thoughts of better off dead/self-harm past 2 weeks 0   PHQ-9 Total Score 12   Difficulty at work, home, or with people -     JOSE-7  2/27/2018   1. Feeling nervous, anxious, or on edge 0   2. Not being able to stop or  control worrying 0   3. Worrying too much about different things 0   4. Trouble relaxing 0   5. Being so restless that it is hard to sit still 0   6. Becoming easily annoyed or irritable 0   7. Feeling afraid, as if something awful might happen 0   JOSE-7 Total Score 0   If you checked any problems, how difficult have they made it for you to do your work, take care of things at home, or get along with other people? -       [place] Protocol Risk Identification:  1) Have you wished you were dead or wished you could go to sleep and not wake up? Yes  2) Have you actually had any thoughts about killing yourself? Yes  If YES to 2, answer questions 3, 4, 5, 6  If NO to 2, go directly to question 6  3) Have you thought about how you might do this? No  4) Have you had any intension of acting on these thoughts of killing yourself, as opposed to you have the thoughts but you definitely would not act on them? No  5) Have you started to work out or worked out the details of how to kill yourself? Do you intend to carry out this plan? No  Always Ask Question 6  6) Have you done anything, started to do anything, or prepared to do anything to end your life? No      Assessment/Progress Note:  I met with Guero for an IRT session.  The focus of today's session was improve coping skills to deal with stress and interpersonal relationships, provide supportive therapy, psychoeducation, refocus disordered thinking and restructure irrational beliefs. Guero shared that he has had difficulty falling asleep over the past few days which he attributes to increased stress related to an upcoming test and fighting with his partner. Diya and Guero reviewed the concept of cognitive restructuring which was introduced at the last session. Diya and Guero completed the 5 steps of cognitive restructuring related to self-stigmatizing beliefs that were identified previously in the module. Quinten completed the 5 steps of CR related  to his anxiety around the upcoming chemistry test as well with positive results. Guero shared about a belief that causes him distress related to his past trauma and his family's actions following the event. Clinician validated his feelings of distress and clarified that these feelings may not benefit from CR as they are based in facts and encouraged Guero that family therapy may be beneficial for repairing past damages. This writer utilized therapeutic techniques of Cognitive-Behavioral, Supportive, Insight-oriented, clarifying, reflective questioning, and giving recognition.     With regards to safety, Guero reported the following:    Suicidal ideation: Denies.     Self-harm: Thoughts - Denies.     Homicidal or violent ideation: Denies.    Discussed overall safety plan should symptoms feel unmanageable or safety concerns become imminent to include: [facility] can help people who are in the midst of a mental health crisis.   [facility] mobile crisis teams for adults, 18 and older can call:  SUSIE -- [phone]      Overall Guero was cooperative, attentive and engaged throughout the session.  Helpful clinical techniques utilized during today's appointment appeared to be Cognitive-Behavioral, Supportive, Insight-oriented, clarifying, reflective questioning, and giving recognition.  As of today's appt insight to their mental illness appears good.  Symptom assessment, safety assessment, discussion and identification of coping strategies, and exploration of material in IRT modules was all in support of Guero's self-identified goal(s) of learning communication skills for building healthier relationships, as identified in most recent BEH Treatment Plan. Progress toward goal completion seems adequate.    Plan/Referrals:   Guero is participating in coordinated speciality care via the Strengths Program within our clinic.  Will meet with Guero weekly  for Individual Resiliency training, therapy, and support  aimed at maximizing Guero's opportunity for recovery from psychosis.    Next session: 2/22 at 1pm    Crisis Numbers:   Provided routinely in AVS     After hours:  [phone]    Treatment plan last completed on: 12/23/2021  Next treatment plan update due by: 03/23/2022      Please EPIC message with any questions or concerns.    PROVIDER: AP Anglin      Answers for HPI/ROS submitted by the patient on 12/23/2021  If you checked off any problems, how difficult have these problems made it for you to do your work, take care of things at home, or get along with other people?: Somewhat difficult  PHQ9 TOTAL SCORE: 12

## 2022-02-17 ENCOUNTER — VIRTUAL VISIT (OUTPATIENT)
Dept: PSYCHIATRY | Facility: CLINIC | Age: 32
End: 2022-02-17
Attending: PSYCHOLOGIST
Payer: MEDICAID

## 2022-02-17 DIAGNOSIS — F20.9 SCHIZOPHRENIA, UNSPECIFIED TYPE (H): Primary | ICD-10-CM

## 2022-02-17 PROCEDURE — 99207 PR INCOMPL DIAG INTERV-PSYCH TEST: CPT

## 2022-02-17 NOTE — PROGRESS NOTES
[facility]  Psychiatry Clinic  Psychological Evaluation Testing Note     Guero Carter MRN# [id number]   Age: 31 year old YOB: 1990     Video- Visit Details   Type of service:  video visit for Psychological testing  Time of service:    Date:  2/17/22    Video Start Time:  1:00 Pm      Video End Time:  2:00Pm    Reason for video visit:  COVID-19 public health recommendations on in-person sessions  Originating Site (patient location):  [facility]   Location- Patient's home  Distant Site (provider location):  HIPAA compliant [facility]  Mode of Communication:  Secure real time interactive audio and visual telecommunication system via [employer]  Consent:  Patient has given verbal consent for video visit?: Yes    Attendees: Patient attended the session alone  : No, English, Bangladeshi    Identifying Information/Reason for Referral  Guero Carter is a 31 year old male who prefers the name Guero & pronouns he.  Guero has a history of psychosis.  The patient was seen for psychological testing. The purpose of the current psychological evaluation was to provide information about Guero's social, emotional and cognitive functioning, to assist with diagnostic clarification and to guide his treatment and recovery planning. Results of the following assessments are to be used in conjunction with the standard diagnostic evaluation.    A total of 60 minutes were spent in test administration and scoring by this writer, ZAIN BAKER psychometrist.  See Testing Evaluation Report for a full interpretation of the findings and data.     Summary of Services/Procedures  Guero was administered a psychological test battery tailored to his age and the referral questions.     Tests Administered  OTTONIEL CNP  Vin[place]radov Symptom Severity Scale  EPINET Core Assessment Battery  Ongoing  [facility] Depression Scale for Schizophrenia-  CDSS  Dickinson-Suicide Severity Rating Scale Interview - Lifetime  Shared Decision Making in Clinical Encounters  Intersectional Discrimination Index  Stress Screener for Recent Events      Behavioral Observations  (NOTE: Additional behavioral observations and summary statement regarding validity of testing completed. Delete out grey italics before closing note).  Overall, Guero demonstrated good effort throughout testing. Therefore, the current evaluation results are thought to provide a accurate representation of his functioning in the areas assessed.     Plan  (NOTE: Delete out grey italics before closing note. If more testing is scheduled, use this:)  Testing is NOT complete. Patient is scheduled to return to complete additional testing on February / 22 / 2022 with remote  .    [If completed] The patient and their invited support system will participate in a feedback appointment on a future date with their clinician.     Will coordinate care with other treatment providers to assist with planning as needed.      ZAIN BAKER  Psychometrist      This is a non-billable encounter as it was solely for the purposes of completing initial assessments. Billing will occur during clinical interpretation and review of findings at a future date.

## 2022-02-22 ENCOUNTER — VIRTUAL VISIT (OUTPATIENT)
Dept: PSYCHIATRY | Facility: CLINIC | Age: 32
End: 2022-02-22
Attending: SOCIAL WORKER
Payer: MEDICAID

## 2022-02-22 ENCOUNTER — VIRTUAL VISIT (OUTPATIENT)
Dept: PSYCHIATRY | Facility: CLINIC | Age: 32
End: 2022-02-22
Attending: PSYCHOLOGIST
Payer: MEDICAID

## 2022-02-22 DIAGNOSIS — F20.9 SCHIZOPHRENIA, UNSPECIFIED TYPE (H): Primary | ICD-10-CM

## 2022-02-22 PROCEDURE — 90834 PSYTX W PT 45 MINUTES: CPT | Mod: 95 | Performed by: SOCIAL WORKER

## 2022-02-22 NOTE — PROGRESS NOTES
[facility]    NAVIGATE Enrollee: Guero Carter (1990)     MRN: [id number]  Date:  2/22/22    Start Time: 3pm  Stop Time: 324pm    Psychological testing by remote :   COMPASS  QLS    Measures are completed as standard of care at baseline and every 6 months. This is a non-billable encounter as an licensed psychologist will not be interpreting.     Penelope Nieves, KHRIS, [facility]  Psychometrist
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
## 2022-03-01 NOTE — Clinical Note
Chandana Medleyw is hesitant to continue services at [facility], due to it being connected to the hospital, and said he's thinking of finding his own prescriber through [facility] (which is also connected to the hospital I think?). I told him I would find out about wait times/referral process to the [facility] just for more information for him to consider. At this point he said he does not think he is interested in returning to [facility], though said he hadn't had much time to think about it.  Denise, what would be the best way for me to inquire about prescriber services at [facility]?  I can also offer to help with referrals to other clinics too. Thoughts? I have a follow up call with him Thursday to discuss next steps. [place]
negative - no bleeding

## 2022-03-10 ENCOUNTER — MYC MEDICAL ADVICE (OUTPATIENT)
Dept: PSYCHIATRY | Facility: CLINIC | Age: 32
End: 2022-03-10
Payer: MEDICAID

## 2022-03-10 NOTE — TELEPHONE ENCOUNTER
4 pages of forms were received from the patient via [employer] requesting completion of [facility] Request for Medical Opinion. The forms are due on Monday 3/14/22. These are being worked on in Nurse Triage.Anastasiya Espinoza LPN

## 2022-03-15 ENCOUNTER — VIRTUAL VISIT (OUTPATIENT)
Dept: PSYCHIATRY | Facility: CLINIC | Age: 32
End: 2022-03-15
Attending: NURSE PRACTITIONER
Payer: MEDICAID

## 2022-03-15 DIAGNOSIS — F32.A DEPRESSION, UNSPECIFIED DEPRESSION TYPE: ICD-10-CM

## 2022-03-15 DIAGNOSIS — F20.9 SCHIZOPHRENIA, UNSPECIFIED TYPE (H): ICD-10-CM

## 2022-03-15 PROCEDURE — 99214 OFFICE O/P EST MOD 30 MIN: CPT | Mod: 95 | Performed by: NURSE PRACTITIONER

## 2022-03-15 PROCEDURE — 90833 PSYTX W PT W E/M 30 MIN: CPT | Mod: 95 | Performed by: NURSE PRACTITIONER

## 2022-03-15 RX ORDER — ESCITALOPRAM OXALATE 10 MG/1
10 TABLET ORAL DAILY
Qty: 30 TABLET | Refills: 2 | Status: SHIPPED | OUTPATIENT
Start: 2022-03-15 | End: 2022-05-05

## 2022-03-15 RX ORDER — BUPROPION HYDROCHLORIDE 450 MG/1
450 TABLET, FILM COATED, EXTENDED RELEASE ORAL DAILY
Qty: 30 TABLET | Refills: 2 | Status: SHIPPED | OUTPATIENT
Start: 2022-03-15 | End: 2022-05-05

## 2022-03-15 RX ORDER — ARIPIPRAZOLE 20 MG/1
20 TABLET ORAL DAILY
Qty: 30 TABLET | Refills: 2 | Status: SHIPPED | OUTPATIENT
Start: 2022-03-15 | End: 2022-05-05

## 2022-03-15 NOTE — PATIENT INSTRUCTIONS
**For crisis resources, please see the information at the end of this document**   Patient Education    Thank you for coming to the [facility] [place] [facility].    Lab Testing:  If you had lab testing today and your results are reassuring or normal they will be mailed to you or sent through [employer] within 7 days. If the lab tests need quick action we will call you with the results. The phone number we will call with results is # [phone]. If this is not the best number please call our clinic and change the number.     Medication Refills:  If you need any refills please call your pharmacy and they will contact us. Our fax number for refills is [phone]. Please allow three business days for refill processing.   If you need to change to a different pharmacy, please contact the new pharmacy directly. The new pharmacy will help you get your medications transferred.     Contact Us:  Please call [phone] during business hours (8-5:00 M-F).  If you have medication related questions after clinic hours, or on the weekend, please call [phone].    Financial Assistance [phone]  Medical Records [phone]       MENTAL HEALTH CRISIS RESOURCES:  For a emergency help, please call 911 or go to the nearest Emergency Department.     Emergency Walk-In Options:   EmPATH Unit @ [facility] ([place]): [phone] - Specialized mental health emergency area designed to be calming  [facility] ([place]): [phone]  [facility] ([place]): [phone]  [facility]): [phone]    County Crisis Information:   [place]: [phone]  [place]: [phone]  [place] ([place]) - Adult: [phone]     Child: [phone]  [place] - Adult: [phone]     Child: [phone]  [place]: [phone]  List of all [facility] resources:    https://[place].gov/dhs/people-we-serve/adults/health-care/mental-health/resources/crisis-contacts.jsp    National Crisis Information:   Crisis Text Line: Text  [place]  to 347925  National Suicide Prevention Lifeline: 3-781-116-TALK (1-909.242.1682)       For online chat options, visit https://suicidepreventionlifeline.org/chat/  Poison Control Center: 4-052-100-0882  Trans Lifeline: 6-088-320-7917 - Hotline for transgender people of all ages  [employer]: 5-443-106-6348 - Hotline for LGBT youth     For Non-Emergency Support:   Fast Tracker: Mental Health & Substance Use Disorder Resources -   https://www.[employer].org/
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
## 2022-03-16 ENCOUNTER — VIRTUAL VISIT (OUTPATIENT)
Dept: PSYCHIATRY | Facility: CLINIC | Age: 32
End: 2022-03-16
Attending: SOCIAL WORKER
Payer: MEDICAID

## 2022-03-16 DIAGNOSIS — F20.9 SCHIZOPHRENIA, UNSPECIFIED TYPE (H): Primary | ICD-10-CM

## 2022-03-16 PROCEDURE — 90834 PSYTX W PT 45 MINUTES: CPT | Mod: 95 | Performed by: SOCIAL WORKER

## 2022-03-16 NOTE — TELEPHONE ENCOUNTER
Completed, signed forms were faxed to [facility] at [phone] on 3/15/2022. The original copies were faxed to scanning and are being held in Psych until scanning is completed.Anastasiya Espinoza LPN

## 2022-03-16 NOTE — PROGRESS NOTES
[facility]  Psychiatry Clinic  First Episode of Psychosis - Strengths Program  Clinician Contact & Progress Note   For Individual Resiliency Training (IRT) & Psychotherapy     Patient: Guero Carter (1990)     MRN: [id number]  Diagnosis(es): Schizophrenia, unspecified type (H) [F20.9]  Clinician: AP Anglin  Service Type: 16265 psychotherapy (38-52 min. with patient and/or family)  Prolonged Care for this visit is not indicated.  Clinical work consists of family therapy.     Time of service:    Date:  3/16/22    Start Time:  1:06      End Time:  1:53    Video- Visit Details   Type of service:  video visit for individual therapy    Reason for video visit:  COVID-19 public health recommendations on in-person sessions  Originating Site (patient location):  [facility]   Location- Patient's home  Distant Site (provider location):  HIPAA compliant location- Remote location  Mode of Communication:  Secure real time interactive audio and visual telecommunication system via [employer]  Consent:  Patient has given verbal consent for video visit?: Yes     People present:   Patient   AP Anglin     Intervention:  Motivational Interviewing   Connect info and skills with personal goals  Promote hope and positive expectations  Explore pros and cons of change  Re-frame experiences in positive light    Educational Teaching Strategies   Review of written material/education  Relate information to client's experience  Ask questions to check comprehension  Break down information into small chunks  Adopt client's language     CBT   Reinforcement and shaping (positive feedback for steps towards goals and gains in knowledge & skills)  Coping skills training (review current coping skills, increase currently used skills and feedback)  Pleasant Activity Scheduling (scheduling activities in the near future that you can look forward to, introduced more positivity and reduce negative  thinking)  Recognizing the positive (Visualize, write, or discuss the best parts of the day to promote positive thinking patterns)      IRT Module(s) Addressed:  Module 9 - Coping with Symptoms    Did the client complete the home practice option(s) from the previous session:   Not Applicable    Techniques utilized:   [place] announced at beginning of session  Review of goal  Review of previous meeting  Present new material  Help client choose a home practice option  Summarize progress made in current session  Identified urgent concerns  Review of strengths, barriers, objectives, and interventions  Illicit client/family feedback    Mental Status Exam:  Alertness: alert  and oriented  Appearance: awake, alert and adequately groomed  Behavior/Demeanor: cooperative, pleasant and calm, with good eye contact   Speech: normal  Language: intact and no problems  Psychomotor: normal or unremarkable  Mood: description consistent with euthymia  Thought Process/Associations:  logical, linear and goal oriented unremarkable  Thought Content:  endorses suicidal ideation without intent or means. Denies homicidal ideation and Appropriate to Interview  Perception:  Reports none  Insight: fair and limited  Judgment: appropriate  Cognition: attention span: intact  concentration: intact  fund of knowledge: appropriate;    JOSE-7 and PHQ-9:  Last PHQ-9 12/23/2021   1.  Little interest or pleasure in doing things 2   2.  Feeling down, depressed, or hopeless 2   3.  Trouble falling or staying asleep, or sleeping too much 1   4.  Feeling tired or having little energy 2   5.  Poor appetite or overeating 1   6.  Feeling bad about yourself 2   7.  Trouble concentrating 2   8.  Moving slowly or restless 0   Q9: Thoughts of better off dead/self-harm past 2 weeks 0   PHQ-9 Total Score 12   Difficulty at work, home, or with people -     JOSE-7  2/27/2018   1. Feeling nervous, anxious, or on edge 0   2. Not being able to stop or control worrying 0   3.  Worrying too much about different things 0   4. Trouble relaxing 0   5. Being so restless that it is hard to sit still 0   6. Becoming easily annoyed or irritable 0   7. Feeling afraid, as if something awful might happen 0   JOSE-7 Total Score 0   If you checked any problems, how difficult have they made it for you to do your work, take care of things at home, or get along with other people? -       [place] Protocol Risk Identification:  1) Have you wished you were dead or wished you could go to sleep and not wake up? Yes  2) Have you actually had any thoughts about killing yourself? Yes  If YES to 2, answer questions 3, 4, 5, 6  If NO to 2, go directly to question 6  3) Have you thought about how you might do this? No  4) Have you had any intension of acting on these thoughts of killing yourself, as opposed to you have the thoughts but you definitely would not act on them? No  5) Have you started to work out or worked out the details of how to kill yourself? Do you intend to carry out this plan? No  Always Ask Question 6  6) Have you done anything, started to do anything, or prepared to do anything to end your life? No      Assessment/Progress Note:  I met with Guero for an IRT session.  The focus of today's session was promoting return to functioning in emotional regulation, thought process and social relationships, improve coping skills to deal with stress and interpersonal relationships, provide supportive therapy and psychoeducation. Delroy reports that he has been struggling with low energy/mood 1-2 days per week over the last 2 weeks. Delroy's sources of stress were reviewed to include school, a new foot injury, and anxiety about the war in [place] and how it will affect the [place]. Delroy reports that procrastination related to schoolwork has been a primary issue for him. Clinician and Delroy reviewed Module 9 - Coping with Symptoms (low energy/stamina) and reviewed the symptoms of low energy, in which he felt he  experienced 7/9 of the symptoms. Clinician and Delroy reviewed coping strategies for low energy to include building structure into his day, taking care of himself in the face of many stressors (self-care), breaking goals into smaller steps, and using enjoyable activities as motivation to complete tasks throughout the day. This writer utilized therapeutic techniques of Supportive, Motivational Interviewing, Insight-oriented, open-ended questions, reflective questioning, clarifying, and giving recognition.     With regards to safety, Guero reported the following:    Suicidal ideation: Denies.     Self-harm: Thoughts - Denies.     Homicidal or violent ideation: Denies.    Discussed overall safety plan should symptoms feel unmanageable or safety concerns become imminent to include: Get Help in a Crisis in [facility]   Call [phone] if you or someone close to you is having a mental health crisis. The [facility] Mobile Crisis Response team will help you. The line is open all day, every day, and the call is free.     Overall Guero was cooperative, attentive and engaged throughout the session.  This writer observed that Delroy places high amounts of pressure on himself to complete tasks in a certain manner.  Helpful clinical techniques utilized during today's appointment appeared to be Supportive, Motivational Interviewing, Insight-oriented, open-ended questions, reflective questioning, clarifying, and giving recognition.  As of today's appt insight to their mental illness appears good.  Symptom assessment, safety assessment, discussion and identification of coping strategies, and exploration of material in IRT modules was all in support of Guero's self-identified goal(s) of  learning communication skills for building healthier relationships, as identified in most recent BEH Treatment Plan. Progress toward goal completion seems fair.    Plan/Referrals:   Guero is participating in coordinated speciality care via the  [facility]rengths Program within our clinic.  Will meet with Guero monthly  for Individual Resiliency training, therapy, and support aimed at maximizing Guero's opportunity for recovery from psychosis.    Next session: 4/6 at 1pm    Crisis Numbers:   Provided routinely in AVS     After hours:  [phone]    Treatment plan last completed on: 12/23/2021  Next treatment plan update due by: 03/23/2022      Please EPIC message with any questions or concerns.    PROVIDER: AP Anglin        Answers for HPI/ROS submitted by the patient on 12/23/2021  If you checked off any problems, how difficult have these problems made it for you to do your work, take care of things at home, or get along with other people?: Somewhat difficult  PHQ9 TOTAL SCORE: 12

## 2022-03-24 NOTE — PROGRESS NOTES
NAVIGATE/STRENGTHS Virtual Visit Progress Note  For Supported Employment & Education    NAVIGATE Enrollee: Guero Carter (1990)     MRN: [id number]  Date of Visit: 1/6/2022  Contacted: Delroy  Appointment Length: 30 minutes    Discussed:   Writer met with Guero Carter and his sister for virtual visit check-in. Meeting agenda included discussing current status at work, requesting long term disability, and conversation about returning to school.         Felix CATATE/STRENGTHS - SEE

## 2022-03-31 ENCOUNTER — MYC MEDICAL ADVICE (OUTPATIENT)
Dept: PSYCHIATRY | Facility: CLINIC | Age: 32
End: 2022-03-31
Payer: MEDICAID

## 2022-03-31 ENCOUNTER — TELEPHONE (OUTPATIENT)
Dept: PSYCHIATRY | Facility: CLINIC | Age: 32
End: 2022-03-31
Payer: MEDICAID

## 2022-03-31 NOTE — TELEPHONE ENCOUNTER
[facility] Call Center    Phone Message    May a detailed message be left on voicemail: yes     Reason for Call: Form or Letter   Type or form/letter needing completion: Disability Forms  Provider: Sofia Vanessa  Date form needed: Asap, pt stated that he is 2 months late      * Patient stated that he had disability forms faxed over about 2 days ago. Caller asked patient to verify clinic fax number that he had credentials fax forms to. Patient listed [phone] for Felix Forrester. Caller confirmed with patient that Felix Forrester is from [facility] location. Patient stated he absolutely needs the forms to be signed and filled out asap.     Caller provided clinic fax number [phone]. Patient confirmed faxes will come through today and asked for a call back once the forms have been received by RNs.    Action Taken: Other: P PSYCHIATRY NURSE-[facility]    Travel Screening: Not Applicable

## 2022-03-31 NOTE — TELEPHONE ENCOUNTER
"Writer received incoming fax from the patient. However, only 1 page of the 2 page form was received. Writer called [phone] and spoke to  Kevin to request new forms be faxed (Nurse Triage fax [phone] given) and to inquire about what is required to process this claim for the patient as quickly as possible.     Kevin explained they are needing \"Attending Physician Statement\" forms and all records/progress notes for the month of March 2022. Progress Notes from 3/15/22 & 3/16/22 fall within this parameter. Writer agreed to print out these progress notes and attach to forms upon completion of forms. Kevin agreed to this plan. Writer received faxed forms while talking to Kevin and confirmed receipt. These forms are being worked on in Nurse Triage. A message was sent to Sofia Vanessa for review of forms.    Writer called patient at [phone] and left a DVM with above information..Anastasiya Espinoza LPN    "

## 2022-04-04 NOTE — TELEPHONE ENCOUNTER
4 faxed pages was received from [employer]. These are currently being worked on in Nurse Triage. A message was sent to Sofia Vanessa.Anastasiya Espinoza LPN

## 2022-04-05 NOTE — TELEPHONE ENCOUNTER
[facility] Call Center    Phone Message    May a detailed message be left on voicemail: yes     Reason for Call: Other: Pt is calling back to check on the status of his forms. Pt states its urgent to complete.  The writer informed the pt that paperwork is in process and we're waiting on the provider to complete it. Pt stated he will call back later today if he hasn't received it yet.    Action Taken: Other: Anastasiya Espinoza    Travel Screening: Not Applicable

## 2022-04-06 ENCOUNTER — VIRTUAL VISIT (OUTPATIENT)
Dept: PSYCHIATRY | Facility: CLINIC | Age: 32
End: 2022-04-06
Attending: SOCIAL WORKER
Payer: COMMERCIAL

## 2022-04-06 DIAGNOSIS — F20.9 SCHIZOPHRENIA, UNSPECIFIED TYPE (H): Primary | ICD-10-CM

## 2022-04-06 PROCEDURE — 90834 PSYTX W PT 45 MINUTES: CPT | Mod: GT | Performed by: SOCIAL WORKER

## 2022-04-06 NOTE — PROGRESS NOTES
[facility]  Psychiatry Clinic  First Episode of Psychosis - Strengths Program  Clinician Contact & Progress Note   For Individual Resiliency Training (IRT) & Psychotherapy     Patient: Guero Carter (1990)     MRN: [id number]  Diagnosis(es): Schizophrenia, unspecified type (H) [F20.9]  Clinician: AP Anglin  Service Type: 59380 psychotherapy (38-52 min. with patient and/or family)  Prolonged Care for this visit is not indicated.  Clinical work consists of family therapy.     Time of service:    Date:  4/06/22    Start Time:  1:05      End Time:  1:46    Video- Visit Details   Type of service:  video visit for individual therapy    Reason for video visit:  COVID-19 public health recommendations on in-person sessions  Originating Site (patient location):  [facility]   Location- Patient's home  Distant Site (provider location):  HIPAA compliant location- Remote location  Mode of Communication:  Secure real time interactive audio and visual telecommunication system via [employer]  Consent:  Patient has given verbal consent for video visit?: Yes     People present:   Patient   AP Anglin     Intervention:  Motivational Interviewing   Connect info and skills with personal goals  Promote hope and positive expectations  Re-frame experiences in positive light    Educational Teaching Strategies   Relate information to client's experience  Ask questions to check comprehension  Break down information into small chunks  Adopt client's language     CBT   Behavioral tailoring (Explore the individual s daily routine and environment, including routines, meals, activities, responsibilities, and hygiene practices)  Pleasant Activity Scheduling (scheduling activities in the near future that you can look forward to, introduced more positivity and reduce negative thinking)  Recognizing the positive (Visualize, write, or discuss the best parts of the day to promote positive thinking  patterns)      IRT Module(s) Addressed:  Module 9 - Coping with Symptoms    Did the client complete the home practice option(s) from the previous session:   Not Applicable    Techniques utilized:   [place] announced at beginning of session  Review of previous meeting  Present new material  Problem-solving practice  Summarize progress made in current session  Identified urgent concerns  Review of strengths, barriers, objectives, and interventions  Illicit client/family feedback    Mental Status Exam:  Alertness: alert  and oriented  Appearance: awake, alert and adequately groomed  Behavior/Demeanor: cooperative, pleasant and calm, with good eye contact   Speech: normal  Language: intact and no problems  Psychomotor: normal or unremarkable  Mood: description consistent with euthymia  Thought Process/Associations:  logical, linear and goal oriented unremarkable  Thought Content:  endorses suicidal ideation without intent or means. Denies homicidal ideation and Appropriate to Interview  Perception:  Reports none  Insight: fair and limited  Judgment: appropriate  Cognition: attention span: intact  concentration: intact  fund of knowledge: appropriate;    JOSE-7 and PHQ-9:  Last PHQ-9 12/23/2021   1.  Little interest or pleasure in doing things 2   2.  Feeling down, depressed, or hopeless 2   3.  Trouble falling or staying asleep, or sleeping too much 1   4.  Feeling tired or having little energy 2   5.  Poor appetite or overeating 1   6.  Feeling bad about yourself 2   7.  Trouble concentrating 2   8.  Moving slowly or restless 0   Q9: Thoughts of better off dead/self-harm past 2 weeks 0   PHQ-9 Total Score 12   Difficulty at work, home, or with people -     JOSE-7  2/27/2018   1. Feeling nervous, anxious, or on edge 0   2. Not being able to stop or control worrying 0   3. Worrying too much about different things 0   4. Trouble relaxing 0   5. Being so restless that it is hard to sit still 0   6. Becoming easily annoyed or  irritable 0   7. Feeling afraid, as if something awful might happen 0   JOSE-7 Total Score 0   If you checked any problems, how difficult have they made it for you to do your work, take care of things at home, or get along with other people? -       [place] Protocol Risk Identification:  1) Have you wished you were dead or wished you could go to sleep and not wake up? Yes  2) Have you actually had any thoughts about killing yourself? Yes  If YES to 2, answer questions 3, 4, 5, 6  If NO to 2, go directly to question 6  3) Have you thought about how you might do this? No  4) Have you had any intension of acting on these thoughts of killing yourself, as opposed to you have the thoughts but you definitely would not act on them? No  5) Have you started to work out or worked out the details of how to kill yourself? Do you intend to carry out this plan? No  Always Ask Question 6  6) Have you done anything, started to do anything, or prepared to do anything to end your life? No      Assessment/Progress Note:  I met with Guero for an IRT session.  The focus of today's session was promoting return to functioning in emotional regulation, thought process and social relationships, improve coping skills to deal with stress and interpersonal relationships, provide supportive therapy and psychoeducation. Guero reports that he is doing well overall but endorses test anxiety surrounding an upcoming chemistry test and additional stress related to the last month of school. He rates his average anxiety levels around 5 or 6/10 and reports that he is currently at 4/10. He reports that he has been oversleeping due to the stress but identifies it as a coping strategy for stress. He reports that his sister discussed the negative aspects of this coping strategy, which he agreed with and plans to make changes once school is complete. Clinician and Guero reviewed ways that he is currently engaging in self-care to include stretching and  taking baths. Guero shared that he used to meditate for 30 minutes at a time and Clinician discussed paring down the amount of time in meditation (3-5 mins) to make it a more efficient and usable coping strategy. This writer utilized therapeutic techniques of Cognitive-Behavioral, Supportive, Insight-oriented and giving recogntion.     With regards to safety, Guero reported the following:    Suicidal ideation: Denies.     Self-harm: Thoughts - Denies.     Homicidal or violent ideation: Denies.    Discussed overall safety plan should symptoms feel unmanageable or safety concerns become imminent to include: Get Help in a Crisis in [facility] [place]   Call [phone] if you or someone close to you is having a mental health crisis. The [facility] [place] Mobile Crisis Response team will help you. The line is open all day, every day, and the call is free.     Overall Guero was cooperative, attentive and engaged throughout the session.  Helpful clinical techniques utilized during today's appointment appeared to be Cognitive-Behavioral, Supportive, Insight-oriented and giving recogntion.  As of today's appt insight to their mental illness appears good.  Symptom assessment, safety assessment, discussion and identification of coping strategies, and exploration of material in IRT modules was all in support of Guero's self-identified goal(s) of of  learning communication skills for building healthier relationships, as identified in most recent BEH Treatment Plan. Progress toward goal completion seems adequate and fair.    Plan/Referrals:   Guero is participating in coordinated speciality care via the Strengths Program within our clinic.  Will meet with Guero monthly  for Individual Resiliency training, therapy, and support aimed at maximizing Guero's opportunity for recovery from psychosis.    Next session: 4/27 at 1pm    Crisis Numbers:   Provided routinely in AVS     After hours:  [phone]    Treatment plan last completed  on: 12/23/2021  Next treatment plan update due by: 03/23/2022      Please EPIC message with any questions or concerns.    PROVIDER: NAT AnglinSW

## 2022-04-07 ENCOUNTER — VIRTUAL VISIT (OUTPATIENT)
Dept: PSYCHIATRY | Facility: CLINIC | Age: 32
End: 2022-04-07
Attending: NURSE PRACTITIONER
Payer: COMMERCIAL

## 2022-04-07 DIAGNOSIS — F20.9 SCHIZOPHRENIA, UNSPECIFIED TYPE (H): Primary | ICD-10-CM

## 2022-04-07 NOTE — PROGRESS NOTES
Briefly met with Guero butts and discovered that encounter was scheduled in error.  He has a scheduled follow up in May.  He does not have concerns he would like to address today.      Tatiana Vanessa CNP, 4/7/2022 1:08 PM

## 2022-04-07 NOTE — PATIENT INSTRUCTIONS
**For crisis resources, please see the information at the end of this document**   Patient Education    Thank you for coming to the [facility] [place] [facility].    Lab Testing:  If you had lab testing today and your results are reassuring or normal they will be mailed to you or sent through aSmallWorld within 7 days. If the lab tests need quick action we will call you with the results. The phone number we will call with results is # [phone]. If this is not the best number please call our clinic and change the number.     Medication Refills:  If you need any refills please call your pharmacy and they will contact us. Our fax number for refills is [phone]. Please allow three business days for refill processing.   If you need to change to a different pharmacy, please contact the new pharmacy directly. The new pharmacy will help you get your medications transferred.     Contact Us:  Please call [phone] during business hours (8-5:00 M-F).  If you have medication related questions after clinic hours, or on the weekend, please call [phone].    Financial Assistance [phone]  Medical Records [phone]       MENTAL HEALTH CRISIS RESOURCES:  For a emergency help, please call 911 or go to the nearest Emergency Department.     Emergency Walk-In Options:   EmPATH Unit @ [facility] ([place]): [phone] - Specialized mental health emergency area designed to be calming  [facility] ([place]): [phone]  [facility] ([place]): [phone]  [facility]): [phone]    County Crisis Information:   [place]: [phone]  [place]: [phone]  [place] ([place] - Adult: [phone]     Child: [phone]  [place] - Adult: [phone]     Child: [phone]  [place]: [phone]  List of all [facility] resources:    https://[place].gov/dhs/people-we-serve/adults/health-care/mental-health/resources/crisis-contacts.jsp    National Crisis Information:   Crisis Text Line: Text  [place]  to 192347  National Suicide Prevention Lifeline: 1-643-580-TALK (1-212.951.6538)       For online chat options, visit https://suicidepreventionlifeline.org/chat/  Poison Control Center: 4-122-844-1434  Trans Lifeline: 3-339-912-9767 - Hotline for transgender people of all ages  The [employer] Project: 2-686-663-7618 - Hotline for LGBT youth     For Non-Emergency Support:   Fast Tracker: Mental Health & Substance Use Disorder Resources -   https://www.homedeco2un.org/

## 2022-04-15 ENCOUNTER — TELEPHONE (OUTPATIENT)
Dept: PSYCHIATRY | Facility: CLINIC | Age: 32
End: 2022-04-15
Payer: COMMERCIAL

## 2022-04-15 NOTE — TELEPHONE ENCOUNTER
"IRT & SEE    [facility] Call Center    Phone Message    May a detailed message be left on voicemail: yes     Reason for Call: Other: Ins called to clarify the acrynoms noted on pt's progress notes from Sofia Vanessa: \"IRT\" and \"SEE\". Please call rep back to confirm. Ok to lv w/ this information.      Action Taken: Other: [facility] psych pool    Travel Screening: Not Applicable                                                                      "

## 2022-04-15 NOTE — TELEPHONE ENCOUNTER
"Tatiana Vanessa, LOTTIE CNP  You 46 minutes ago (1:19 PM)     NEY Dukes,   I wonder if they are referring to \"IRT\" which is individual resiliency training and is the the therapy we do in [facility]osis [facility].  \"SEE\" is supported education and employment.       Please let me know if any further questions come up.  Thanks!   Sofia    Message text       Writer returned a call to Olinda at [employer] as requested. No answer at number provided. LVM, requesting a call back. Clinic number provided.   "

## 2022-04-18 NOTE — TELEPHONE ENCOUNTER
Second attempt made to reach out to Olinda. No answer at number provided. LVM, requesting a call back. Clinic number provided.

## 2022-04-19 NOTE — TELEPHONE ENCOUNTER
"Writer received incoming call from Olinda at [employer]. Updated her acrynoms of SEE \"supported education and employment\" and IRT \"individual resiliency training\" as below. She verbalized understanding.   "

## 2022-04-19 NOTE — TELEPHONE ENCOUNTER
Third attempt made to reach out to Olinda. No answer at number provided. LVM, requesting a call back. Clinic number provided.

## 2022-04-27 ENCOUNTER — VIRTUAL VISIT (OUTPATIENT)
Dept: PSYCHIATRY | Facility: CLINIC | Age: 32
End: 2022-04-27
Attending: SOCIAL WORKER
Payer: COMMERCIAL

## 2022-04-27 ENCOUNTER — BEH TREATMENT PLAN (OUTPATIENT)
Dept: PSYCHIATRY | Facility: CLINIC | Age: 32
End: 2022-04-27
Payer: COMMERCIAL

## 2022-04-27 DIAGNOSIS — F20.9 SCHIZOPHRENIA, UNSPECIFIED TYPE (H): Primary | ICD-10-CM

## 2022-04-27 PROCEDURE — 90834 PSYTX W PT 45 MINUTES: CPT | Mod: 95 | Performed by: SOCIAL WORKER

## 2022-04-27 NOTE — PROGRESS NOTES
[facility]  Psychiatry Clinic  First Episode of Psychosis - Strengths Program  Clinician Contact & Progress Note   For Individual Resiliency Training (IRT) & Psychotherapy     Patient: Guero Carter (1990)     MRN: [id number]  Diagnosis(es): Schizophrenia   Clinician: AP Anglin  Service Type: 98868 psychotherapy (38-52 min. with patient and/or family)  Prolonged Care for this visit is not indicated.  Clinical work consists of family therapy.     Time of service:    Date:  4/27/22    Start Time:  1:05      End Time:  1:55    Video- Visit Details   Type of service:  video visit for individual therapy    Reason for video visit:  COVID-19 public health recommendations on in-person sessions  Originating Site (patient location):  [facility]   Location- Patient's home  Distant Site (provider location):  HIPAA compliant location- Remote location  Mode of Communication:  Secure real time interactive audio and visual telecommunication system via [employer]  Consent:  Patient has given verbal consent for video visit?: Yes     People present:   Patient   AP Anglin     Intervention:  Motivational Interviewing   Connect info and skills with personal goals  Promote hope and positive expectations  Explore pros and cons of change  Re-frame experiences in positive light    Educational Teaching Strategies   Review of written material/education  Relate information to client's experience  Ask questions to check comprehension  Break down information into small chunks  Adopt client's language     CBT   Cognitive restructuring (identify thoughts related to negative feelings, examine the evidence and change though or form action plan)  Reframe Cognitive Distortions (become aware of which distortions you are most vulnerable to, identifying and challenging our harmful automatic thoughts)  Play the Script Until the End (imagine the outcome of the worst case scenario, let the scenario play  out, recognize that even if everything feared happens, it will likely turn out okay)  Behavioral Experiments (engage in a  what if  consideration, test existing beliefs  and/or help  test more adaptive beliefs, then modify unhelpful beliefs)  Pleasant Activity Scheduling (scheduling activities in the near future that you can look forward to, introduced more positivity and reduce negative thinking)  Recognizing the positive (Visualize, write, or discuss the best parts of the day to promote positive thinking patterns)      IRT Module(s) Addressed:  Module 8 - Dealing with Negative Feelings    Did the client complete the home practice option(s) from the previous session:   Not Applicable    Techniques utilized:   [place] announced at beginning of session  Review of goal  Review of previous meeting  Present new material  Problem-solving practice  Summarize progress made in current session  Identified urgent concerns  Review of strengths, barriers, objectives, and interventions  Illicit client/family feedback    Mental Status Exam:  Alertness: alert  and oriented  Appearance: awake, alert and adequately groomed  Behavior/Demeanor: cooperative, pleasant and calm, with good eye contact   Speech: normal  Language: intact and no problems  Psychomotor: normal or unremarkable  Mood: irritable   Thought Process/Associations:  logical, linear and goal oriented unremarkable  Thought Content:  endorses suicidal ideation without intent or means. Denies homicidal ideation and Appropriate to Interview  Perception:  Reports none  Insight: fair and limited  Judgment: appropriate  Cognition: attention span: intact  concentration: intact  fund of knowledge: appropriate;    JOSE-7 and PHQ-9:  Last PHQ-9 12/23/2021   1.  Little interest or pleasure in doing things 2   2.  Feeling down, depressed, or hopeless 2   3.  Trouble falling or staying asleep, or sleeping too much 1   4.  Feeling tired or having little energy 2   5.  Poor appetite or  overeating 1   6.  Feeling bad about yourself 2   7.  Trouble concentrating 2   8.  Moving slowly or restless 0   Q9: Thoughts of better off dead/self-harm past 2 weeks 0   PHQ-9 Total Score 12   Difficulty at work, home, or with people -       [place] Protocol Risk Identification:  1) Have you wished you were dead or wished you could go to sleep and not wake up? Yes  2) Have you actually had any thoughts about killing yourself? Yes  If YES to 2, answer questions 3, 4, 5, 6  If NO to 2, go directly to question 6  3) Have you thought about how you might do this? No  4) Have you had any intension of acting on these thoughts of killing yourself, as opposed to you have the thoughts but you definitely would not act on them? No  5) Have you started to work out or worked out the details of how to kill yourself? Do you intend to carry out this plan? No  Always Ask Question 6  6) Have you done anything, started to do anything, or prepared to do anything to end your life? No      Assessment/Progress Note:  I met with Guero for an IRT session.  The focus of today's session was attaining control over disturbing thoughts, interact appropriately in social situations, improve coping skills to deal with stress and interpersonal relationships, provide supportive therapy and psychoeducation. Delroy reports that he is not doing well today and endorsed anger about multiple stressful events that have occurred over the past 2 weeks. He shared about an incident at school which resulted in him receiving a zero on a paper and being pulled over for a speeding ticket. Clinician and Delroy completed problem solving regarding the paper and he reports that he does have an opportunity to re-do that paper and improve his grade. Clinician encouraged Delroy to utilize CBT techniques and identify unhelpful thinking patterns he may engage in. Delroy also discussed his anger about the speeding ticket and reports that he feels targeted. Clinician explored his  emotions surrounding this thinking pattern. Delroy reports that it is likely due to a history of poor experiences with police and disdain for authority. Clinician encouraged Delroy to utilize social supports and recognizing the small, positive aspects of life when feeling overwhelmed by social, political, and economical factors of life we do not have control over. Delroy's treatment plan was reviewed and updated. This writer utilized therapeutic techniques of Cognitive-Behavioral, Supportive, Motivational Interviewing, Insight-oriented and giving recognition.     With regards to safety, Guero reported the following:    Suicidal ideation: Denies.     Self-harm: Thoughts - Denies.     Homicidal or violent ideation: Denies.    Discussed overall safety plan should symptoms feel unmanageable or safety concerns become imminent to include: Get Help in a Crisis in [facility] [place]   Call [phone] if you or someone close to you is having a mental health crisis. The [facility] [place] Mobile Crisis Response team will help you. The line is open all day, every day, and the call is free.     Overall Guero was cooperative, attentive and engaged throughout the session.  Helpful clinical techniques utilized during today's appointment appeared to be Cognitive-Behavioral, Supportive, Motivational Interviewing, Insight-oriented and giving recognition.  As of today's appt insight to their mental illness appears fair.  Symptom assessment, safety assessment, discussion and identification of coping strategies, and exploration of material in IRT modules was all in support of Guero's self-identified goal(s) of of  learning communication skills for building healthier relationships and being less harsh on himself, as identified in most recent BEH Treatment Plan. Progress toward goal completion seems adequate and fair.    Plan/Referrals:   Guero is participating in coordinated speciality care via the Strengths Program within our clinic.  Will meet  with Guero weekly  for Individual Resiliency training, therapy, and support aimed at maximizing Guero's opportunity for recovery from psychosis.    Next session: 5/4 at 2:30PM    Crisis Numbers:   Provided routinely in AVS     After hours:  [phone]    Treatment plan last completed on: 4/27/22  Next treatment plan update due by: 7/27/22  Treatment plan was updated at this visit.   Acknowledged consent of current treatment plan was not signed d/t telemedicine      Please EPIC message with any questions or concerns.    PROVIDER: AP Anglin
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
## 2022-05-04 ENCOUNTER — VIRTUAL VISIT (OUTPATIENT)
Dept: PSYCHIATRY | Facility: CLINIC | Age: 32
End: 2022-05-04
Attending: SOCIAL WORKER
Payer: COMMERCIAL

## 2022-05-04 DIAGNOSIS — F20.9 SCHIZOPHRENIA, UNSPECIFIED TYPE (H): Primary | ICD-10-CM

## 2022-05-04 PROCEDURE — 90834 PSYTX W PT 45 MINUTES: CPT | Mod: 95 | Performed by: SOCIAL WORKER

## 2022-05-04 NOTE — PROGRESS NOTES
[facility]  Psychiatry Clinic  First Episode of Psychosis - Strengths Program  Clinician Contact & Progress Note   For Individual Resiliency Training (IRT) & Psychotherapy     Patient: Guero Carter (1990)     MRN: [id number]  Diagnosis(es): Schizophrenia, unspecified type (H) [F20.9]  Clinician: AP Anglin  Service Type: 45176 psychotherapy (38-52 min. with patient and/or family)  Prolonged Care for this visit is not indicated.  Clinical work consists of family therapy.     Time of service:    Date:  5/04/22    Start Time:  1:04      End Time:  1:44    Video- Visit Details   Type of service:  video visit for individual therapy    Reason for video visit:  COVID-19 public health recommendations on in-person sessions  Originating Site (patient location):  [facility]   Location- Patient's home  Distant Site (provider location):  HIPAA compliant location- Remote location  Mode of Communication:  Secure real time interactive audio and visual telecommunication system via AppCard  Consent:  Patient has given verbal consent for video visit?: Yes     People present:   Patient   AP Anglin     Intervention:  Motivational Interviewing   Connect info and skills with personal goals  Promote hope and positive expectations  Explore pros and cons of change  Re-frame experiences in positive light    Educational Teaching Strategies   Relate information to client's experience  Ask questions to check comprehension  Break down information into small chunks  Adopt client's language     CBT   Reinforcement and shaping (positive feedback for steps towards goals and gains in knowledge & skills)  Cognitive restructuring (identify thoughts related to negative feelings, examine the evidence and change though or form action plan)  Reframe Cognitive Distortions (become aware of which distortions you are most vulnerable to, identifying and challenging our harmful automatic  thoughts)  Recognizing the positive (Visualize, write, or discuss the best parts of the day to promote positive thinking patterns)      IRT Module(s) Addressed:  Module 8 - Dealing with Negative Feelings  Module 9 - Coping with Symptoms  Module 11 - Having Fun and Developing Good Relationships    Did the client complete the home practice option(s) from the previous session:   Not Applicable    Techniques utilized:   [place] announced at beginning of session  Review of goal  Review of previous meeting  Present new material  Problem-solving practice  Summarize progress made in current session  Identified urgent concerns  Review of strengths, barriers, objectives, and interventions  Illicit client/family feedback    Mental Status Exam:  Alertness: alert  and oriented  Appearance: awake, alert and adequately groomed  Behavior/Demeanor: cooperative, pleasant and calm, with good eye contact   Speech: normal  Language: intact and no problems  Psychomotor: normal or unremarkable  Mood: euthymic   Thought Process/Associations:  logical, linear and goal oriented unremarkable  Thought Content:  endorses suicidal ideation without intent or means. Denies homicidal ideation and Appropriate to Interview  Perception:  Reports none  Insight: fair and limited  Judgment: appropriate  Cognition: attention span: intact  concentration: intact  fund of knowledge: appropriate;      [place] Protocol Risk Identification:  1) Have you wished you were dead or wished you could go to sleep and not wake up? Yes  2) Have you actually had any thoughts about killing yourself? Yes  If YES to 2, answer questions 3, 4, 5, 6  If NO to 2, go directly to question 6  3) Have you thought about how you might do this? No  4) Have you had any intension of acting on these thoughts of killing yourself, as opposed to you have the thoughts but you definitely would not act on them? No  5) Have you started to work out or worked out the details of how to kill  yourself? Do you intend to carry out this plan? No  Always Ask Question 6  6) Have you done anything, started to do anything, or prepared to do anything to end your life? No      Assessment/Progress Note:  I met with Guero for an IRT session.  The focus of today's session was promoting return to functioning in emotional regulation, thought process and social relationships, improve coping skills to deal with stress and interpersonal relationships, provide supportive therapy, psychoeducation and encourage client to focus on reality of external world. Guero reports that he is doing a lot better compared to last week as he feels a significant amount of his stress has been resolved. He shared many positives that have occurred over the past week and also shared about exciting events in the next week such as graduation and a wedding. Karol also shared that he will be going on vacation to [place] to visit a friend. Guero reports ongoing sleep disturbance such as sleeping too much and missing meetings but equates this to his currently study schedule that involves 8-9 hours of studying per day. He feels that his sleep schedule will return to normal once he has completed this semester. Guero inquired about extending his long-term disability from work and was encouraged to discuss this with his provider at their appointment tomorrow. 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 He was able to provide many pros to completing the program and few cons, aside from familial guilt and worry about his wellbeing in that specific career field (long, overnight hours + stress). He reports that he will continue to ponder this choice while he is out of town. This writer utilized therapeutic techniques of Cognitive-Behavioral, Supportive, Motivational Interviewing, Insight-oriented and giving recognition.     With regards to safety, Guero reported the  following:    Suicidal ideation: Denies.     Self-harm: Thoughts - Denies.     Homicidal or violent ideation: Denies.    Discussed overall safety plan should symptoms feel unmanageable or safety concerns become imminent to include: Get Help in a Crisis in [facility]   Call [phone] if you or someone close to you is having a mental health crisis. The [facility] Mobile Crisis Response team will help you. The line is open all day, every day, and the call is free.     Overall Guero was cooperative, attentive and engaged throughout the session.  Helpful clinical techniques utilized during today's appointment appeared to be Cognitive-Behavioral, Supportive, Motivational Interviewing, Insight-oriented and giving recognition.  As of today's appt insight to their mental illness appears good.  Symptom assessment, safety assessment, discussion and identification of coping strategies, and exploration of material in IRT modules was all in support of Guero's self-identified goal(s) of  learning communication skills for building healthier relationships and being less harsh on himself, as identified in most recent BEH Treatment Plan. Progress toward goal completion seems adequate.    Plan/Referrals:   Guero is participating in coordinated speciality care via the Strengths Program within our clinic.  Will meet with Guero as needed  for Individual Resiliency training, therapy, and support aimed at maximizing Guero's opportunity for recovery from psychosis.    Next session: 5/25 at 1PM    Crisis Numbers:   Provided routinely in AVS     After hours:  [phone]    Treatment plan last completed on: 4/27/22  Next treatment plan update due by: 7/27/22      Please EPIC message with any questions or concerns.    PROVIDER: AP Anglin

## 2022-05-05 ENCOUNTER — VIRTUAL VISIT (OUTPATIENT)
Dept: PSYCHIATRY | Facility: CLINIC | Age: 32
End: 2022-05-05
Attending: NURSE PRACTITIONER
Payer: COMMERCIAL

## 2022-05-05 DIAGNOSIS — F20.9 SCHIZOPHRENIA, UNSPECIFIED TYPE (H): ICD-10-CM

## 2022-05-05 DIAGNOSIS — F32.A DEPRESSION, UNSPECIFIED DEPRESSION TYPE: ICD-10-CM

## 2022-05-05 PROCEDURE — 99214 OFFICE O/P EST MOD 30 MIN: CPT | Mod: 95 | Performed by: NURSE PRACTITIONER

## 2022-05-05 PROCEDURE — 90833 PSYTX W PT W E/M 30 MIN: CPT | Mod: 95 | Performed by: NURSE PRACTITIONER

## 2022-05-05 RX ORDER — ARIPIPRAZOLE 20 MG/1
20 TABLET ORAL DAILY
Qty: 30 TABLET | Refills: 2 | Status: SHIPPED | OUTPATIENT
Start: 2022-05-05 | End: 2022-07-07

## 2022-05-05 RX ORDER — ESCITALOPRAM OXALATE 10 MG/1
10 TABLET ORAL DAILY
Qty: 30 TABLET | Refills: 2 | Status: SHIPPED | OUTPATIENT
Start: 2022-05-05 | End: 2022-07-07

## 2022-05-05 RX ORDER — BUPROPION HYDROCHLORIDE 450 MG/1
450 TABLET, FILM COATED, EXTENDED RELEASE ORAL DAILY
Qty: 30 TABLET | Refills: 2 | Status: SHIPPED | OUTPATIENT
Start: 2022-05-05 | End: 2022-07-07
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
## 2022-05-05 NOTE — PATIENT INSTRUCTIONS
**For crisis resources, please see the information at the end of this document**   Patient Education    Thank you for coming to the [facility] MENT[facility] HEALTH & [facility] [place] [facility].    Lab Testing:  If you had lab testing today and your results are reassuring or normal they will be mailed to you or sent through [employer] within 7 days. If the lab tests need quick action we will call you with the results. The phone number we will call with results is # [phone]. If this is not the best number please call our clinic and change the number.     Medication Refills:  If you need any refills please call your pharmacy and they will contact us. Our fax number for refills is [phone]. Please allow three business days for refill processing.   If you need to change to a different pharmacy, please contact the new pharmacy directly. The new pharmacy will help you get your medications transferred.     Contact Us:  Please call [phone] during business hours (8-5:00 M-F).  If you have medication related questions after clinic hours, or on the weekend, please call [phone].    Financial Assistance [phone]  Medical Records [phone]       MENTAL HEALTH CRISIS RESOURCES:  For a emergency help, please call 911 or go to the nearest Emergency Department.     Emergency Walk-In Options:   EmPATH Unit @ [facility] ([place]): [phone] - Specialized mental health emergency area designed to be calming  [facility] ([place]): [phone]  [facility] ([place]): [phone]  [facility]): [phone]    County Crisis Information:   [place]: [phone]  [place]: [phone]  [place] - Adult: [phone]     Child: [phone]  [place] - Adult: [phone]     Child: [phone]  [place]: [phone]  List of all [facility] resources:    https://[place].gov/dhs/people-we-serve/adults/health-care/mental-health/resources/crisis-contacts.jsp    National Crisis Information:   Crisis Text Line: Text  [place]  to 020254  National Suicide Prevention Lifeline: 5-183-827-TALK (1-116.490.6785)       For online chat options, visit https://suicidepreventionlifeline.org/chat/  Poison Control Center: 6-613-208-4940  Trans Lifeline: 1-259-281-4192 - Hotline for transgender people of all ages  The [employer] Project: 2-282-390-1435 - Hotline for LGBT youth     For Non-Emergency Support:   Fast Tracker: Mental Health & Substance Use Disorder Resources -   https://www.[place].org/

## 2022-06-15 ENCOUNTER — VIRTUAL VISIT (OUTPATIENT)
Dept: PSYCHIATRY | Facility: CLINIC | Age: 32
End: 2022-06-15
Attending: SOCIAL WORKER
Payer: COMMERCIAL

## 2022-06-15 DIAGNOSIS — F32.A DEPRESSION, UNSPECIFIED DEPRESSION TYPE: Primary | ICD-10-CM

## 2022-06-15 PROCEDURE — 90837 PSYTX W PT 60 MINUTES: CPT | Mod: 95 | Performed by: SOCIAL WORKER

## 2022-06-15 NOTE — PROGRESS NOTES
[facility]  Psychiatry Clinic  First Episode of Psychosis - Strengths Program  Clinician Contact & Progress Note   For Individual Resiliency Training (IRT) & Psychotherapy     Patient: Guero Carter (1990)     MRN: [id number]  Diagnosis(es): Depression, unspecified depression type [F32.A]  Clinician: AP Anglin  Service Type: 31466 psychotherapy (53-60 min. with patient and/or family)  Prolonged Care for this visit is not indicated.  Clinical work consists of family therapy.     Time of service:    Date:  6/15/22    Start Time:  1:05      End Time:  1:58    Video- Visit Details   Type of service:  video visit for individual therapy    Reason for video visit:  COVID-19 public health recommendations on in-person sessions  Originating Site (patient location):  [facility]   Location- Patient's home  Distant Site (provider location):  HIPAA compliant location- Remote location  Mode of Communication:  Secure real time interactive audio and visual telecommunication system via [employer]  Consent:  Patient has given verbal consent for video visit?: Yes     People present:   Patient   AP Anglin     Intervention:  Motivational Interviewing   Connect info and skills with personal goals  Promote hope and positive expectations  Explore pros and cons of change  Re-frame experiences in positive light    Educational Teaching Strategies   Review of written material/education  Relate information to client's experience  Ask questions to check comprehension  Break down information into small chunks  Adopt client's language     CBT   Reframe Cognitive Distortions (become aware of which distortions you are most vulnerable to, identifying and challenging our harmful automatic thoughts)  Recognizing the positive (Visualize, write, or discuss the best parts of the day to promote positive thinking patterns)      IRT Module(s) Addressed:  Module 3 - Education about Psychosis  Module 8 -  Dealing with Negative Feelings    Did the client complete the home practice option(s) from the previous session:   Not Applicable    Techniques utilized:   Kerby announced at beginning of session  Review of goal  Review of previous meeting  Present new material  Summarize progress made in current session  Identified urgent concerns  Review of strengths, barriers, objectives, and interventions  Illicit client/family feedback    Mental Status Exam:  Alertness: alert  and oriented  Appearance: awake, alert and adequately groomed  Behavior/Demeanor: cooperative, pleasant and calm, with good eye contact   Speech: normal  Language: intact and no problems  Psychomotor: normal or unremarkable  Mood: euthymic   Thought Process/Associations:  logical, linear and goal oriented unremarkable  Thought Content:  endorses suicidal ideation without intent or means. Denies homicidal ideation and Appropriate to Interview  Perception:  Reports none  Insight: fair and limited  Judgment: appropriate  Cognition: attention span: intact  concentration: intact  fund of knowledge: appropriate;      Fitzhugh Protocol Risk Identification:  1) Have you wished you were dead or wished you could go to sleep and not wake up? Yes  2) Have you actually had any thoughts about killing yourself? Yes  If YES to 2, answer questions 3, 4, 5, 6  If NO to 2, go directly to question 6  3) Have you thought about how you might do this? No  4) Have you had any intension of acting on these thoughts of killing yourself, as opposed to you have the thoughts but you definitely would not act on them? No  5) Have you started to work out or worked out the details of how to kill yourself? Do you intend to carry out this plan? No  Always Ask Question 6  6) Have you done anything, started to do anything, or prepared to do anything to end your life? No      Assessment/Progress Note:  I met with Guero for an IRT session.  The focus of today's session was promoting return to  "functioning in emotional regulation, thought process and social relationships, attaining control over disturbing thoughts, interact appropriately in social situations, improve coping skills to deal with stress and interpersonal relationships, provide supportive therapy and psychoeducation. Patient did not report any changes to medications. Guero reports that he just returned home from a trip to [place] to see a friend and states that he enjoyed himself while in vacation. He shared that he was sad to see him family upon returning from his trip but did not wish to discuss his relationship with his family any further. Guero did discuss symptoms related to trauma. Guero and Clinician reviewed symptoms of PTSD, in which Guero endorsed all of them to some extent. Clinician assessed for symptoms of psychosis, which Guero reports experiencing in the form of paranoia and provided several examples of creating explanations based on the behaviors of others when things don't go well. Clinician spent time attempting to clarify his symptoms of paranoia and assessed for ideas of reference, which he denies. Clinician discussed the benefits of trauma therapy with Guero as much of his thoughts/behaviors appear to be protective or a response to previous trauma. He reports he will discuss this with his sister as he needs to \"seek help\" in order to continue to be eligible for disability.    This writer utilized therapeutic techniques of Cognitive-Behavioral, Supportive, Motivational Interviewing, Insight-oriented and giving recognition.     With regards to safety, Guero reported the following:    Suicidal ideation: Denies.     Self-harm: Thoughts - Denies.     Homicidal or violent ideation: Denies.    Discussed overall safety plan should symptoms feel unmanageable or safety concerns become imminent to include: Get Help in a Crisis in [facility]   Call [phone] if you or someone close to you is having a mental health crisis. " The [facility] Crisis Response team will help you. The line is open all day, every day, and the call is free.     Overall Guero was cooperative, attentive and engaged throughout the session.  This writer observed that Guero appears to minimize the impact his symptoms have on his emotions.  Helpful clinical techniques utilized during today's appointment appeared to be Cognitive-Behavioral, Supportive, Motivational Interviewing, Insight-oriented and giving recognition.  As of today's appt insight to their mental illness appears fair.  Symptom assessment, safety assessment, discussion and identification of coping strategies, and exploration of material in IRT modules was all in support of Guero's self-identified goal(s) of  learning communication skills for building healthier relationships and being less harsh on himself, as identified in most recent BEH Treatment Plan. Progress toward goal completion seems fair.    Plan/Referrals:   Guero is participating in coordinated speciality care via the Strengths Program within our clinic.  Will meet with Guero bi-weekly  for Individual Resiliency training, therapy, and support aimed at maximizing Guero's opportunity for recovery from psychosis.    Next session: 6/29 at 1pm    Crisis Numbers:   Provided routinely in AVS     After hours:  [phone]    Treatment plan last completed on: 4/27/22  Next treatment plan update due by: 7/27/22      Please EPIC message with any questions or concerns.    PROVIDER: AP Anglin

## 2022-06-29 ENCOUNTER — VIRTUAL VISIT (OUTPATIENT)
Dept: PSYCHIATRY | Facility: CLINIC | Age: 32
End: 2022-06-29
Attending: SOCIAL WORKER
Payer: COMMERCIAL

## 2022-06-29 DIAGNOSIS — F32.A DEPRESSION, UNSPECIFIED DEPRESSION TYPE: Primary | ICD-10-CM

## 2022-06-29 DIAGNOSIS — F20.9 SCHIZOPHRENIA, UNSPECIFIED TYPE (H): ICD-10-CM

## 2022-06-29 PROCEDURE — 90834 PSYTX W PT 45 MINUTES: CPT | Mod: 95 | Performed by: SOCIAL WORKER

## 2022-06-29 NOTE — PROGRESS NOTES
[facility]  Psychiatry Clinic  First Episode of Psychosis - Strengths Program  Clinician Contact & Progress Note   For Individual Resiliency Training (IRT) & Psychotherapy     Patient: Guero Carter (1990)     MRN: [id number]  Diagnosis(es): Depression, unspecified depression type [F32.A]  Clinician: AP Anglin  Service Type: 65129 psychotherapy (23-37 min. with patient and/or family)  Prolonged Care for this visit is not indicated.  Clinical work consists of family therapy.     Time of service:    Date:  6/29/22    Start Time:  1:06      End Time:  1:45    Video- Visit Details   Type of service:  video visit for individual therapy    Reason for video visit:  COVID-19 public health recommendations on in-person sessions  Originating Site (patient location):  [facility]   Location- Patient's home  Distant Site (provider location):  HIPAA compliant location- Remote location  Mode of Communication:  Secure real time interactive audio and visual telecommunication system via Jumia  Consent:  Patient has given verbal consent for video visit?: Yes     People present:   Patient   AP Anglin     Intervention:  Motivational Interviewing   Connect info and skills with personal goals  Promote hope and positive expectations  Re-frame experiences in positive light    Educational Teaching Strategies   Ask questions to check comprehension  Break down information into small chunks  Adopt client's language     CBT   Coping skills training (review current coping skills, increase currently used skills and feedback)  Play the Script Until the End (imagine the outcome of the worst case scenario, let the scenario play out, recognize that even if everything feared happens, it will likely turn out okay)  Pleasant Activity Scheduling (scheduling activities in the near future that you can look forward to, introduced more positivity and reduce negative thinking)  Recognizing the positive  (Visualize, write, or discuss the best parts of the day to promote positive thinking patterns)      IRT Module(s) Addressed:  Module 8 - Dealing with Negative Feelings  Module 9 - Coping with Symptoms    Did the client complete the home practice option(s) from the previous session:   Not Applicable    Techniques utilized:   [place] announced at beginning of session  Review of previous meeting  Present new material  Problem-solving practice  Summarize progress made in current session  Identified urgent concerns  Review of strengths, barriers, objectives, and interventions  Illicit client/family feedback    Mental Status Exam:  Alertness: alert  and oriented  Appearance: awake, alert and adequately groomed  Behavior/Demeanor: cooperative, pleasant and calm, with good eye contact   Speech: normal  Language: intact and no problems  Psychomotor: normal or unremarkable  Mood: euthymic   Thought Process/Associations:  logical, linear and goal oriented unremarkable  Thought Content:  endorses suicidal ideation without intent or means. Denies homicidal ideation and Appropriate to Interview, grandiose   Perception:  Reports none  Insight: fair and limited  Judgment: appropriate  Cognition: attention span: intact  concentration: intact  fund of knowledge: appropriate;      [place] Protocol Risk Identification:  1) Have you wished you were dead or wished you could go to sleep and not wake up? Yes  2) Have you actually had any thoughts about killing yourself? No  If YES to 2, answer questions 3, 4, 5, 6  If NO to 2, go directly to question 6  3) Have you thought about how you might do this? No  4) Have you had any intension of acting on these thoughts of killing yourself, as opposed to you have the thoughts but you definitely would not act on them? No  5) Have you started to work out or worked out the details of how to kill yourself? Do you intend to carry out this plan? No  Always Ask Question 6  6) Have you done anything,  started to do anything, or prepared to do anything to end your life? No      Assessment/Progress Note:  I met with Guero for an IRT session.  The focus of today's session was improve coping skills to deal with stress and interpersonal relationships, provide supportive therapy, psychoeducation and understanding stressors that trigger psychotic episodes. Patient did not report any changes to medications. Delroy reports that he has been somewhat depressed over the past couple of days due to experiencing a trigger for his trauma symptoms and feeling that his family overreacted. He denies SI/HI at this time. He reports average level of depression over the past week at 7/10, anxiety 5/10. Delroy's trigger for trauma was explored and how it impacted him. Delroy somewhat minimized any negative impact that the event had on his overall well being. Delroy shared about anger he felt towards his family as he felt their reaction to his mood was overzealous. Clinician educated him about his rights and he was educated about the process and purpose of completing a crisis assessment with a county crisis team or ED crisis SW. Clinician encouraged Delroy to attempt to understand his family's anxiety about his symptoms and their worry for his wellbeing.    This writer utilized therapeutic techniques of Supportive, Motivational Interviewing, Insight-oriented and open-ended questions.     With regards to safety, Guero reported the following:    Suicidal ideation: Denies.     Self-harm: Thoughts - Denies.     Homicidal or violent ideation: Denies.    Discussed overall safety plan should symptoms feel unmanageable or safety concerns become imminent to include: Get Help in a Crisis in [facility]   Call [phone] if you or someone close to you is having a mental health crisis. The [facility] Mobile Crisis Response team will help you. The line is open all day, every day, and the call is free.     Overall Guero was cooperative, attentive and  engaged throughout the session.  Helpful clinical techniques utilized during today's appointment appeared to be Supportive, Motivational Interviewing, Insight-oriented and open-ended questions.  As of today's appt insight to their mental illness appears fair and limited.  Symptom assessment, safety assessment, discussion and identification of coping strategies, and exploration of material in IRT modules was all in support of Guero's self-identified goal(s) of learning communication skills for building healthier relationships and being less harsh on himself, as identified in most recent BEH Treatment Plan. Progress toward goal completion seems fair and limited.    Plan/Referrals:   Guero is participating in coordinated speciality care via the Strengths Program within our clinic.  Will meet with Guero bi-weekly  for Individual Resiliency training, therapy, and support aimed at maximizing Guero's opportunity for recovery from psychosis.    Next session: 7/13 at 1pm    Crisis Numbers:   Provided routinely in AVS     After hours:  [phone]    Treatment plan last completed on: 4/27/22  Next treatment plan update due by: 7/27/22      Please EPIC message with any questions or concerns.    PROVIDER: AP Anglin

## 2022-07-02 ENCOUNTER — HEALTH MAINTENANCE LETTER (OUTPATIENT)
Age: 32
End: 2022-07-02

## 2022-07-07 ENCOUNTER — VIRTUAL VISIT (OUTPATIENT)
Dept: PSYCHIATRY | Facility: CLINIC | Age: 32
End: 2022-07-07
Attending: NURSE PRACTITIONER
Payer: COMMERCIAL

## 2022-07-07 DIAGNOSIS — F32.A DEPRESSION, UNSPECIFIED DEPRESSION TYPE: ICD-10-CM

## 2022-07-07 DIAGNOSIS — Z79.899 HIGH RISK MEDICATIONS (NOT ANTICOAGULANTS) LONG-TERM USE: Primary | ICD-10-CM

## 2022-07-07 DIAGNOSIS — F20.9 SCHIZOPHRENIA, UNSPECIFIED TYPE (H): ICD-10-CM

## 2022-07-07 PROCEDURE — 90833 PSYTX W PT W E/M 30 MIN: CPT | Mod: 95 | Performed by: NURSE PRACTITIONER

## 2022-07-07 PROCEDURE — 99214 OFFICE O/P EST MOD 30 MIN: CPT | Mod: 95 | Performed by: NURSE PRACTITIONER

## 2022-07-07 RX ORDER — BUPROPION HYDROCHLORIDE 450 MG/1
450 TABLET, FILM COATED, EXTENDED RELEASE ORAL DAILY
Qty: 30 TABLET | Refills: 2 | Status: SHIPPED | OUTPATIENT
Start: 2022-07-07 | End: 2022-08-25

## 2022-07-07 RX ORDER — ESCITALOPRAM OXALATE 10 MG/1
10 TABLET ORAL DAILY
Qty: 30 TABLET | Refills: 2 | Status: SHIPPED | OUTPATIENT
Start: 2022-07-07 | End: 2022-08-25

## 2022-07-07 RX ORDER — ARIPIPRAZOLE 20 MG/1
20 TABLET ORAL DAILY
Qty: 30 TABLET | Refills: 2 | Status: SHIPPED | OUTPATIENT
Start: 2022-07-07 | End: 2022-08-25
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
## 2022-07-07 NOTE — PATIENT INSTRUCTIONS
**For crisis resources, please see the information at the end of this document**   Patient Education    Thank you for coming to the [facility] MENTAL HEALTH & ADDICTION [place] CLINIC.     Lab Testing:  If you had lab testing today and your results are reassuring or normal they will be mailed to you or sent through Storefront within 7 days. If the lab tests need quick action we will call you with the results. The phone number we will call with results is # [phone]. If this is not the best number please call our clinic and change the number.     Medication Refills:  If you need any refills please call your pharmacy and they will contact us. Our fax number for refills is [phone].   Three business days of notice are needed for general medication refill requests.   Five business days of notice are needed for controlled substance refill requests.   If you need to change to a different pharmacy, please contact the new pharmacy directly. The new pharmacy will help you get your medications transferred.     Contact Us:  Please call [phone] during business hours (8-5:00 M-F).   If you have medication related questions after clinic hours, or on the weekend, please call [phone].     Financial Assistance [phone]   Medical Records [phone]       MENTAL HEALTH CRISIS RESOURCES:  For a emergency help, please call 911 or go to the nearest Emergency Department.     Emergency Walk-In Options:   EmPATH Unit @ [facility] ([place]): [phone] - Specialized mental health emergency area designed to be calming  [facility] ([place]): [phone]  [facility] ([place]): [phone]  [facility]): [phone]    [facility] Crisis Information:   [place]: [phone]  [place]: [phone]  [place] ([place]) - Adult: [phone]     Child: [phone]  [place] - Adult: [phone]     Child: [phone]  [place]:  [phone]  List of all [facility] resources:   https://[place].gov/dhs/people-we-serve/adults/health-care/mental-health/resources/crisis-contacts.jsp    National Crisis Information:   Crisis Text Line: Text  [place]  to 240937  National Suicide Prevention Lifeline: 5-146-823-TALK (1-658.346.9545)       For online chat options, visit https://suicidepreventionlifeline.org/chat/  Poison Control Center: 0-387-425-2528  Trans Lifeline: 5-152-084-6079 - Hotline for transgender people of all ages  The Vinnie Project: 3-072-577-8343 - Hotline for LGBT youth     For Non-Emergency Support:   Fast Tracker: Mental Health & Substance Use Disorder Resources -   https://www.[employer].org/

## 2022-07-13 ENCOUNTER — VIRTUAL VISIT (OUTPATIENT)
Dept: PSYCHIATRY | Facility: CLINIC | Age: 32
End: 2022-07-13
Attending: SOCIAL WORKER
Payer: COMMERCIAL

## 2022-07-13 DIAGNOSIS — F20.9 SCHIZOPHRENIA, UNSPECIFIED TYPE (H): Primary | ICD-10-CM

## 2022-07-13 PROCEDURE — 90832 PSYTX W PT 30 MINUTES: CPT | Mod: 95 | Performed by: SOCIAL WORKER

## 2022-07-13 ASSESSMENT — PATIENT HEALTH QUESTIONNAIRE - PHQ9
SUM OF ALL RESPONSES TO PHQ QUESTIONS 1-9: 16
10. IF YOU CHECKED OFF ANY PROBLEMS, HOW DIFFICULT HAVE THESE PROBLEMS MADE IT FOR YOU TO DO YOUR WORK, TAKE CARE OF THINGS AT HOME, OR GET ALONG WITH OTHER PEOPLE: VERY DIFFICULT
SUM OF ALL RESPONSES TO PHQ QUESTIONS 1-9: 16

## 2022-07-13 NOTE — PROGRESS NOTES
[facility]  Psychiatry Clinic  First Episode of Psychosis - Strengths Program  Clinician Contact & Progress Note   For Individual Resiliency Training (IRT) & Psychotherapy     Patient: Guero Carter (1990)     MRN: [id number]  Diagnosis(es): Schizophrenia, unspecified type (H) [F20.9]  Clinician: AP Anglin  Service Type: 08373 psychotherapy (23-37 min. with patient and/or family)  Prolonged Care for this visit is not indicated.  Clinical work consists of family therapy.     Time of service:    Date:  7/13/22    Start Time:  12:09      End Time:  12:29    Video- Visit Details   Type of service:  video visit for individual therapy    Reason for video visit:  COVID-19 public health recommendations on in-person sessions  Originating Site (patient location):  [facility]   Location- Patient's home  Distant Site (provider location):  HIPAA compliant location- Remote location  Mode of Communication:  Secure real time interactive audio and visual telecommunication system via [employer]  Consent:  Patient has given verbal consent for video visit?: Yes     People present:   Patient   AP Anglin     IRT Module(s) Addressed:  Unable to utilize IRT material as Delroy was under the influence of ETOH during the session    Did the client complete the home practice option(s) from the previous session:   Not Applicable    Techniques utilized:   Putnam announced at beginning of session  Review of previous meeting  Identified urgent concerns  Review of strengths, barriers, objectives, and interventions  Illicit client/family feedback    Mental Status Exam:  Alertness: oriented  Appearance: awake, alert  Behavior/Demeanor: calm, with good eye contact   Speech: normal  Language: intact  Psychomotor: normal or unremarkable  Mood: description consistent with euthymia  Thought Process/Associations:  linear unremarkable  Thought Content:  no evidence of suicidal ideation or homicidal  ideation  Perception:  Reports paranoia;  Denies visual hallucinations  Insight: limited and gaining/ maintaining insight is challenging  Judgment: fair and adequate for safety  Cognition: fund of knowledge: appropriate;     JOSE-7 and PHQ-9:  Last PHQ-9 7/13/2022   1.  Little interest or pleasure in doing things 3   2.  Feeling down, depressed, or hopeless 2   3.  Trouble falling or staying asleep, or sleeping too much 2   4.  Feeling tired or having little energy 2   5.  Poor appetite or overeating 1   6.  Feeling bad about yourself 2   7.  Trouble concentrating 2   8.  Moving slowly or restless 2   Q9: Thoughts of better off dead/self-harm past 2 weeks 0   PHQ-9 Total Score 16   Difficulty at work, home, or with people -         [place] Protocol Risk Identification:  1) Have you wished you were dead or wished you could go to sleep and not wake up? Yes  2) Have you actually had any thoughts about killing yourself? No  If YES to 2, answer questions 3, 4, 5, 6  If NO to 2, go directly to question 6  3) Have you thought about how you might do this? No  4) Have you had any intension of acting on these thoughts of killing yourself, as opposed to you have the thoughts but you definitely would not act on them? No  5) Have you started to work out or worked out the details of how to kill yourself? Do you intend to carry out this plan? No  Always Ask Question 6  6) Have you done anything, started to do anything, or prepared to do anything to end your life? No      Assessment/Progress Note:  I met with Guero for an IRT session.  The focus of today's session was provide supportive therapy and psychoeducation. Patient did not report any changes to medications. Delroy presented to the appointment and stated that he's had a stressful couple of weeks due to financial stress, school stress, and interpersonal stressors. He shared that he did get approved for EBT which helped alleviate some financial stress. Delroy shared that he was  able to talk with a friend named Malachi about stressors he's encountered recently. Throughout the appointment, Delroy was somewhat circumstantial in his speech patterns and had some difficulty answering assessment questions. He admitted to drinking a shot and part of an alcoholic seltzer prior to the appointment to attempt to relax. Clinician informed him that per policy, therapy cannot be conducted while he is under the influence of any mood-altering substance and terminated the session.    This writer utilized therapeutic techniques of Insight-oriented and open-ended questions.     With regards to safety, Guero reported the following:    Suicidal ideation: Denies.     Self-harm: Thoughts - Denies.     Homicidal or violent ideation: Denies.    Discussed overall safety plan should symptoms feel unmanageable or safety concerns become imminent to include: Get Help in a Crisis in [facility]   Call [phone] if you or someone close to you is having a mental health crisis. The [facility] Mobile Crisis Response team will help you. The line is open all day, every day, and the call is free.     Overall Guero was engaged throughout the session.  This writer observed that Delroy continues to minimize any negative symptom he feels and appears to lack motivation to address his mental health. In addition, Delroy struggles to gain insight into his symptoms.  Helpful clinical techniques utilized during today's appointment appeared to be Insight-oriented and open-ended questions.  As of today's appt insight to their mental illness appears limited and gaining/ maintaining insight is challenging.  Symptom assessment, safety assessment, discussion and identification of coping strategies, and exploration of material in IRT modules was all in support of Guero's self-identified goal(s) of learning communication skills for building healthier relationships and being less harsh on himself, as identified in most recent BEH Treatment Plan.  Progress toward goal completion seems limited.    Plan/Referrals:   Guero is participating in coordinated speciality care via the Strengths Program within our clinic.  Will meet with Guero bi-weekly  for Individual Resiliency training, therapy, and support aimed at maximizing Guero's opportunity for recovery from psychosis.    Next session: 7/27 at 1pm    Crisis Numbers:   Provided routinely in AVS     After hours:  [phone]    Treatment plan last completed on: 4/27/22  Next treatment plan update due by: 7/27/22      Please EPIC message with any questions or concerns.    PROVIDER: AP Anglin        Answers for HPI/ROS submitted by the patient on 7/13/2022  If you checked off any problems, how difficult have these problems made it for you to do your work, take care of things at home, or get along with other people?: Very difficult  PHQ9 TOTAL SCORE: 16

## 2022-07-27 ENCOUNTER — BEH TREATMENT PLAN (OUTPATIENT)
Dept: PSYCHIATRY | Facility: CLINIC | Age: 32
End: 2022-07-27

## 2022-07-27 ENCOUNTER — VIRTUAL VISIT (OUTPATIENT)
Dept: PSYCHIATRY | Facility: CLINIC | Age: 32
End: 2022-07-27
Attending: SOCIAL WORKER
Payer: COMMERCIAL

## 2022-07-27 DIAGNOSIS — F20.9 SCHIZOPHRENIA, UNSPECIFIED TYPE (H): Primary | ICD-10-CM

## 2022-07-27 PROCEDURE — 90834 PSYTX W PT 45 MINUTES: CPT | Mod: 95 | Performed by: SOCIAL WORKER
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
## 2022-07-27 NOTE — PROGRESS NOTES
[facility]  Psychiatry Clinic  First Episode of Psychosis - Strengths Program  Clinician Contact & Progress Note   For Individual Resiliency Training (IRT) & Psychotherapy     Patient: Guero Carter (1990)     MRN: [id number]  Diagnosis(es): Schizophrenia, unspecified type (H) [F20.9]  Clinician: AP Anglin  Service Type: 00137 psychotherapy (38-52 min. with patient and/or family)  Prolonged Care for this visit is not indicated.  Clinical work consists of family therapy.     Time of service:    Date:  7/27/22    Start Time:  1:02      End Time:  1:51    Video- Visit Details   Type of service:  video visit for individual therapy    Reason for video visit:  COVID-19 public health recommendations on in-person sessions  Originating Site (patient location):  [facility]   Location- Friend or family home  Distant Site (provider location):  HIPAA compliant location- Remote location  Mode of Communication:  Secure real time interactive audio and visual telecommunication system via MySQL  Consent:  Patient has given verbal consent for video visit?: Yes     People present:   Patient   AP Anglin     Intervention:  Motivational Interviewing   Connect info and skills with personal goals  Promote hope and positive expectations  Explore pros and cons of change  Re-frame experiences in positive light    Educational Teaching Strategies   Review of written material/education  Relate information to client's experience  Ask questions to check comprehension  Break down information into small chunks  Adopt client's language     CBT   Reframe Cognitive Distortions (become aware of which distortions you are most vulnerable to, identifying and challenging our harmful automatic thoughts)  Recognizing the positive (Visualize, write, or discuss the best parts of the day to promote positive thinking patterns)      IRT Module(s) Addressed:  Module 7 - Building a Bridging to Your  Goals  Module 10 - Substance Use    Did the client complete the home practice option(s) from the previous session:   Not Applicable    Techniques utilized:   Cumming announced at beginning of session  Review of goal  Review of previous meeting  Identified urgent concerns  Review of strengths, barriers, objectives, and interventions  Illicit client/family feedback    Mental Status Exam:  Alertness: oriented  Appearance: awake, alert  Behavior/Demeanor: calm, with good eye contact   Speech: normal  Language: intact  Psychomotor: normal or unremarkable  Mood: description consistent with euthymia  Thought Process/Associations:  linear unremarkable  Thought Content:  no evidence of suicidal ideation or homicidal ideation  Perception:  Reports paranoia;  Denies visual hallucinations  Insight: limited and gaining/ maintaining insight is challenging  Judgment: fair and adequate for safety  Cognition: fund of knowledge: appropriate;     JOSE-7 and PHQ-9:  Last PHQ-9 7/13/2022   1.  Little interest or pleasure in doing things 3   2.  Feeling down, depressed, or hopeless 2   3.  Trouble falling or staying asleep, or sleeping too much 2   4.  Feeling tired or having little energy 2   5.  Poor appetite or overeating 1   6.  Feeling bad about yourself 2   7.  Trouble concentrating 2   8.  Moving slowly or restless 2   Q9: Thoughts of better off dead/self-harm past 2 weeks 0   PHQ-9 Total Score 16   Difficulty at work, home, or with people -         [place] Protocol Risk Identification:  1) Have you wished you were dead or wished you could go to sleep and not wake up? No  2) Have you actually had any thoughts about killing yourself? No  If YES to 2, answer questions 3, 4, 5, 6  If NO to 2, go directly to question 6  3) Have you thought about how you might do this? No  4) Have you had any intension of acting on these thoughts of killing yourself, as opposed to you have the thoughts but you definitely would not act on them? No  5)  Have you started to work out or worked out the details of how to kill yourself? Do you intend to carry out this plan? No  Always Ask Question 6  6) Have you done anything, started to do anything, or prepared to do anything to end your life? No      Assessment/Progress Note:  I met with Guero for an IRT session.  The focus of today's session was provide supportive therapy and psychoeducation and completing his treatment plan update. Patient did not report any changes to medications. Delroy shared that he received approval to begin a PSOM program in the fall and will meet with his advisor next week to discuss his schedule. He rates average level of anxiety over the past week as 4/10, depression 2/10, and distress from psychosis 2/10. His definition of psychosis was explored as well as his experiences with the symptom over the past 3 months while completing the treatment plan update. Delroy reports some experiences of feeling mistrustful of others and feeling as if he has anal leakage but states that he is less distressed by this symptom at this time. He shared his feelings related to hopeless and helplessness in regards to his symptoms of psychosis. Clinician addressed Delroy's relationship with ETOH as he was intoxicated at the previous session. Delroy had previously been 100% abstinent from ETOH over the winter and spring but states that he now drinks 1-4 drinks per day and tries to avoid hard liquor. He admits to utilizing ETOH as a coping mechanism for dealing with stress, depression, and anxiety and does not feel that his current use is a problem. He admits to a decrease in self-esteem after use but denies any increase in depression, anxiety, or psychosis. Clinician provided education about the potential negative impact of ETOH use on symptoms and encouraged him to be mindful and attempt to notice any change in symptoms while using.    This writer utilized therapeutic techniques of Cognitive-Behavioral, Supportive,  Motivational Interviewing and Insight-oriented.     With regards to safety, Guero reported the following:    Suicidal ideation: Denies.     Self-harm: Thoughts - Denies.     Homicidal or violent ideation: Denies.    Discussed overall safety plan should symptoms feel unmanageable or safety concerns become imminent to include: Get Help in a Crisis in [facility]   Call [phone] if you or someone close to you is having a mental health crisis. The [facility] Mobile Crisis Response team will help you. The line is open all day, every day, and the call is free.       Overall Guero was attentive throughout the session.  This writer observed that Delroy appears to minimize any negative mental health symptom and appears precontemplative in his readiness to address his emotions.  Helpful clinical techniques utilized during today's appointment appeared to be Cognitive-Behavioral, Supportive, Motivational Interviewing and Insight-oriented.  As of today's appt insight to their mental illness appears fair and gaining/ maintaining insight is challenging.  Symptom assessment, safety assessment, discussion and identification of coping strategies, and exploration of material in IRT modules was all in support of Guero's self-identified goal(s) of building self-worth, increasing self-esteem, and improving communication skills, as identified in most recent BEH Treatment Plan. Progress toward goal completion seems fair.    Plan/Referrals:   Guero is participating in coordinated speciality care via the Strengths Program within our clinic.  Will meet with Guero bi-weekly  for Individual Resiliency training, therapy, and support aimed at maximizing Guero's opportunity for recovery from psychosis.    Next session: 8/10 at 1pm    Crisis Numbers:   Provided routinely in AVS     After hours:  [phone]    Treatment plan last completed on: 7/27/22  Next treatment plan update due by: 10/27/22  Treatment plan was updated at this  visit.   Acknowledged consent of current treatment plan was not signed d/t telemedicine       Please EPIC message with any questions or concerns.    PROVIDER: AP Anglin    Answers for HPI/ROS submitted by the patient on 7/13/2022  If you checked off any problems, how difficult have these problems made it for you to do your work, take care of things at home, or get along with other people?: Very difficult  PHQ9 TOTAL SCORE: 16

## 2022-08-24 ENCOUNTER — TELEPHONE (OUTPATIENT)
Dept: PSYCHIATRY | Facility: CLINIC | Age: 32
End: 2022-08-24

## 2022-08-24 NOTE — TELEPHONE ENCOUNTER
"[facility] Call Center    Phone Message    May a detailed message be left on voicemail: yes     Reason for Call:   Sister, Ly, is reaching out for help and advice. She stated that pt has been verbally aggressive everyday for the past 2 weeks both on social media and through family texts and have blocked her so she is unable to communicate with him. She stated he has schizophrenia and is supposedly on his medications but that she doesn't know what else to do and does not want to take pt into ED. She also relayed that she is the go-to person to fix things and help pt with everything when he is going through situations like this.    Writer confirmed with Ly that there are no SHANA / PHI on file currently. Ly requesting a call back from RN to discuss situation in more detail and aware that clinic are not able to share info only take info.    Action Taken: Message routed to:  Other: P PSYCHIATRY NURSE-[facility]    Travel Screening: Not Applicable           Follow Up:  Writer called sister back. Unable to disclose information as no SHANA. Patient's sister, Ly, states that she used to attend all of patient's psychiatric appointments in the past.     Sister reports concerns that patient has been in a \"mental breakdown\" for 2 weeks, which she states usually only last around 2 days. Sister states that patient has been more vocal on social media about it (which is not typical), so she is getting a lot of messages from others who are concerned. Sister states that patient's breakdowns are usually triggered by financial difficulties. Sister believes that patient has been taking his medications, but does not feel that they are working.     Sister reports that patient has been delusional and posting a lot of \"dark thoughts.\" She states he also swears a lot when having an episode and he has been more verbally aggressive and texting family at 2AM. Sister reports patient has been talking about being possessed and killing 300 people in " "war in his past life. She also states that patient has made statements about not wanting to live, but that he won't do anything to harm himself. She states these things are typical for patient when is having an episode, but reiterates that it generally does not last this long and he does not usually share it on social media this way.      Sister reports that her family's response is to bring patient to the hospital, but sister states she is trying to avoid this as it has not gone well in the past. She does not believe that patient is an imminent risk to himself or others at this time and writer discussed COPE, EmPATH and ED options with sister if safety concerns arise. Sister verbalized understanding. Sister requests that we do not tell patient that she called, as patient believes that everyone is out to get him and already thinks everyone is trying to send him to the hospital.      Sister states that patient was doing well on Saturday. She reports he has been blocking and unblocking her to communicate. She states that patient called her yesterday to tell her that he's \"disowning the entire family.\" Sister reports that family sometimes tries to be \"too real\" with patient and he is unable to process it.      Patient has an appointment with provider tomorrow 8/25 at 12:00. Routed to provider for review.                                   "

## 2022-08-25 ENCOUNTER — VIRTUAL VISIT (OUTPATIENT)
Dept: PSYCHIATRY | Facility: CLINIC | Age: 32
End: 2022-08-25
Attending: NURSE PRACTITIONER
Payer: COMMERCIAL

## 2022-08-25 DIAGNOSIS — F20.9 SCHIZOPHRENIA, UNSPECIFIED TYPE (H): ICD-10-CM

## 2022-08-25 DIAGNOSIS — F32.A DEPRESSION, UNSPECIFIED DEPRESSION TYPE: ICD-10-CM

## 2022-08-25 PROCEDURE — 90833 PSYTX W PT W E/M 30 MIN: CPT | Mod: 95 | Performed by: NURSE PRACTITIONER

## 2022-08-25 PROCEDURE — 99214 OFFICE O/P EST MOD 30 MIN: CPT | Mod: 95 | Performed by: NURSE PRACTITIONER

## 2022-08-25 RX ORDER — BUPROPION HYDROCHLORIDE 450 MG/1
450 TABLET, FILM COATED, EXTENDED RELEASE ORAL DAILY
Qty: 30 TABLET | Refills: 2 | Status: SHIPPED | OUTPATIENT
Start: 2022-08-25 | End: 2022-10-27

## 2022-08-25 RX ORDER — ESCITALOPRAM OXALATE 10 MG/1
10 TABLET ORAL DAILY
Qty: 30 TABLET | Refills: 2 | Status: SHIPPED | OUTPATIENT
Start: 2022-08-25 | End: 2022-10-27

## 2022-08-25 RX ORDER — ARIPIPRAZOLE 20 MG/1
20 TABLET ORAL DAILY
Qty: 30 TABLET | Refills: 2 | Status: SHIPPED | OUTPATIENT
Start: 2022-08-25 | End: 2022-10-27

## 2022-08-25 NOTE — TELEPHONE ENCOUNTER
Writer attempted to call patient's sister, Ly, at [phone] to follow up and inquire about any additional concerns. LVM requesting a call back and provided the main clinic number.      Tatiana Vanessa APRN CNP  You 3 minutes ago (1:02 PM)     NEY Cunha,   I reviewed the concerns with Guero today.  He declined to sign an SHANA for Ly, and he is denying acute concerns or increase in psychosis symptoms, but does report an increase in depression.  We are going to continue to monitor for now (declined med changes) and also agreed to schedule with [place] for therapy next week to work on stress reduction, depression, and symptom monitoring.       Can you follow up with Ly to see if there are additional concerns that she has?  She should feel free to continue to reach out with concerns and we will work on getting him to sign an SHANA.       Thanks,   Sofia    Message text
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
## 2022-08-25 NOTE — PATIENT INSTRUCTIONS
**For crisis resources, please see the information at the end of this document**   Patient Education    Thank you for coming to the [facility] MENTAL HEALTH & ADDICTION [place] CLINIC.     Lab Testing:  If you had lab testing today and your results are reassuring or normal they will be mailed to you or sent through [employer] within 7 days. If the lab tests need quick action we will call you with the results. The phone number we will call with results is # [phone]. If this is not the best number please call our clinic and change the number.     Medication Refills:  If you need any refills please call your pharmacy and they will contact us. Our fax number for refills is [phone].   Three business days of notice are needed for general medication refill requests.   Five business days of notice are needed for controlled substance refill requests.   If you need to change to a different pharmacy, please contact the new pharmacy directly. The new pharmacy will help you get your medications transferred.     Contact Us:  Please call [phone] during business hours (8-5:00 M-F).   If you have medication related questions after clinic hours, or on the weekend, please call [phone].     Financial Assistance [phone]   Medical Records [phone]       MENTAL HEALTH CRISIS RESOURCES:  For a emergency help, please call 911 or go to the nearest Emergency Department.     Emergency Walk-In Options:   EmPATH Unit @ [facility] ([place]): [phone] - Specialized mental health emergency area designed to be calming  [facility] ([place]): [phone]  [facility] ([place]): [phone]  [facility]): [phone]    [facility] Crisis Information:   [place]: [phone]  [place]: [phone]  [place] ([place]US[place]) - Adult: [phone]     Child: [phone]  [place] - Adult: [phone]     Child: [phone]  [place]:  [phone]  List of all [facility] resources:   https://[place].gov/dhs/people-we-serve/adults/health-care/mental-health/resources/crisis-contacts.jsp    National Crisis Information:   Crisis Text Line: Text  [place]  to 623521  Suicide & Crisis Lifeline: 988  National Suicide Prevention Lifeline: 1-232-766-TALK (1-134.729.9934)       For online chat options, visit https://suicidepreventionlifeline.org/chat/  Poison Control Center: 7-972-060-4411  Trans Lifeline: 7-346-355-1775 - Hotline for transgender people of all ages  The Vinnie Project: 7-349-423-1654 - Hotline for LGBT youth     For Non-Emergency Support:   Fast Tracker: Mental Health & Substance Use Disorder Resources -   https://www.Boats.comckRe.nooblen.org/

## 2022-08-26 NOTE — TELEPHONE ENCOUNTER
Sister returned call and stated that patient had told her that he had an appointment yesterday and had received a medication refill. She states historically, patient has always done better right after his appointments, but wonders if he possibly ran out of medication, contributing to the increase in symptoms. Sister states that mother saw patient today and reported that he appeared to be doing better and she wanted to thank provider for helping him. Sister states she is going to try to talk to patient about signing a SHANA for her.    Encouraged sister to continue to reach out to the clinic with any questions or concerns.

## 2022-08-31 ENCOUNTER — VIRTUAL VISIT (OUTPATIENT)
Dept: PSYCHIATRY | Facility: CLINIC | Age: 32
End: 2022-08-31
Attending: SOCIAL WORKER
Payer: COMMERCIAL

## 2022-08-31 DIAGNOSIS — F20.9 SCHIZOPHRENIA, UNSPECIFIED TYPE (H): Primary | ICD-10-CM

## 2022-08-31 PROCEDURE — 90832 PSYTX W PT 30 MINUTES: CPT | Mod: 95 | Performed by: SOCIAL WORKER
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
## 2022-09-14 ENCOUNTER — VIRTUAL VISIT (OUTPATIENT)
Dept: PSYCHIATRY | Facility: CLINIC | Age: 32
End: 2022-09-14
Attending: SOCIAL WORKER
Payer: COMMERCIAL

## 2022-09-14 DIAGNOSIS — F20.9 SCHIZOPHRENIA, UNSPECIFIED TYPE (H): Primary | ICD-10-CM

## 2022-09-14 PROCEDURE — 90832 PSYTX W PT 30 MINUTES: CPT | Mod: 95 | Performed by: SOCIAL WORKER

## 2022-09-14 NOTE — PROGRESS NOTES
[facility]  Psychiatry Clinic  First Episode of Psychosis - Strengths Program  Clinician Contact & Progress Note   For Individual Resiliency Training (IRT) & Psychotherapy     Patient: Guero Carter (1990)     MRN: [id number]  Diagnosis(es): Schizophrenia, unspecified type (H) [F20.9]  Clinician: AP Anglin  Service Type: 74007 psychotherapy (23-37 min. with patient and/or family)  Prolonged Care for this visit is not indicated.  Clinical work consists of family therapy.     Time of service:    Date:  9/14/22    Start Time:  112      End Time:  132    Video- Visit Details   Type of service:  video visit for individual therapy    Reason for video visit:  COVID-19 public health recommendations on in-person sessions  Originating Site (patient location):  [facility]   Location- Friend or family home  Distant Site (provider location):  HIPAA compliant location- Remote location  Mode of Communication:  Secure real time interactive audio and visual telecommunication system via [employer]  Consent:  Patient has given verbal consent for video visit?: Yes     People present:   Patient   AP Anglin     Intervention:  Motivational Interviewing   Connect info and skills with personal goals  Promote hope and positive expectations    Educational Teaching Strategies   Relate information to client's experience  Ask questions to check comprehension  Break down information into small chunks  Adopt client's language     CBT   Reinforcement and shaping (positive feedback for steps towards goals and gains in knowledge & skills)  Recognizing the positive (Visualize, write, or discuss the best parts of the day to promote positive thinking patterns)      IRT Module(s) Addressed:  Module 7 - Building a Bridging to Your Goals    Did the client complete the home practice option(s) from the previous session:   Not Applicable    Techniques utilized:   [place] announced at beginning of  session  Review of goal  Review of previous meeting  Summarize progress made in current session  Identified urgent concerns  Review of strengths, barriers, objectives, and interventions  Illicit client/family feedback    Mental Status Exam:  Alertness: alert  and oriented  Appearance: awake, alert and adequately groomed  Behavior/Demeanor: cooperative, pleasant and calm, with good eye contact   Speech: normal  Language: no problems and good  Psychomotor: normal or unremarkable  Mood: description consistent with euthymia  Thought Process/Associations:  goal oriented unremarkable  Thought Content:  no evidence of suicidal ideation or homicidal ideation  Perception:  Reports delusions;  Denies auditory hallucinations  Insight: limited and gaining/ maintaining insight is challenging  Judgment: adequate for safety  Cognition: concentration: intact  fund of knowledge: appropriate;     JOSE-7 and PHQ-9:  Last PHQ-9 7/13/2022   1.  Little interest or pleasure in doing things 3   2.  Feeling down, depressed, or hopeless 2   3.  Trouble falling or staying asleep, or sleeping too much 2   4.  Feeling tired or having little energy 2   5.  Poor appetite or overeating 1   6.  Feeling bad about yourself 2   7.  Trouble concentrating 2   8.  Moving slowly or restless 2   Q9: Thoughts of better off dead/self-harm past 2 weeks 0   PHQ-9 Total Score 16   Difficulty at work, home, or with people [facility] Protocol Risk Identification:  1) Have you wished you were dead or wished you could go to sleep and not wake up? No  2) Have you actually had any thoughts about killing yourself? No  If YES to 2, answer questions 3, 4, 5, 6  If NO to 2, go directly to question 6  3) Have you thought about how you might do this? No  4) Have you had any intension of acting on these thoughts of killing yourself, as opposed to you have the thoughts but you definitely would not act on them? No  5) Have you started to work out or worked out the  "details of how to kill yourself? Do you intend to carry out this plan? No  Always Ask Question 6  6) Have you done anything, started to do anything, or prepared to do anything to end your life? No      Assessment/Progress Note:  I met with Guero for an IRT session.  The focus of today's session was promoting return to functioning in emotional regulation, thought process and social relationships, provide supportive therapy and psychoeducation and promoting a return to previous functioning. Patient did not report any changes to medications. Delroy reports that he had forgotten about the appointment and was at his father's home thus, a short video check-in was completed to assess for safety and address any immediate concerns. Delroy states that he is doing well and reports that school is exceptionally easy this semester as he has A's in both classes. He denies SI/HI and denies symptoms of psychosis at this time but does report that last week his stress levels were high due to financial stressors but he received a payment last Friday and paid some bills which alleviated his stress. When asked about creating a budget, he reports that he was not interested at this time and made some \"adult decisions\" in regards to his spending habits. Delroy reports that his sleep quality has improved and medication adherence helps him fall asleep at night. He shared about difficulties setting boundaries with some siblings and reports ongoing stress within his family dynamics but stated that he did not feel comfortable discussing this while at his father's home. 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    This writer utilized therapeutic techniques of Supportive, Motivational Interviewing and Insight-oriented.     With regards to safety, Guero reported the following:    Suicidal ideation: Denies.     Self-harm: Thoughts - Denies.     Homicidal or violent ideation: " Denies.    Discussed overall safety plan should symptoms feel unmanageable or safety concerns become imminent to include: Get Help in a Crisis in [facility]   Call [phone] if you or someone close to you is having a mental health crisis. The [facility] Mobile Crisis Response team will help you. The line is open all day, every day, and the call is free.     Overall Guero was attentive and engaged throughout the session.  Helpful clinical techniques utilized during today's appointment appeared to be Supportive, Motivational Interviewing and Insight-oriented.  As of today's appt insight to their mental illness appears fair.  Symptom assessment, safety assessment, discussion and identification of coping strategies, and exploration of material in IRT modules was all in support of Guero's self-identified goal(s) of building self-worth, increasing self-esteem, and improving communication skills, as identified in most recent BEH Treatment Plan. Progress toward goal completion seems adequate and fair.    Plan/Referrals:   Guero is participating in coordinated speciality care via the Strengths Program within our clinic.  Will meet with Guero monthly  for Individual Resiliency training, therapy, and support aimed at maximizing Guero's opportunity for recovery from psychosis.    Next session: 10/12 at 1pm    Crisis Numbers:   Provided routinely in AVS     After hours:  [phone]    Treatment plan last completed on: 7/27/22  Next treatment plan update due by: 10/27/22      Please EPIC message with any questions or concerns.    PROVIDER: AP Anglin      Answers for HPI/ROS submitted by the patient on 7/13/2022  If you checked off any problems, how difficult have these problems made it for you to do your work, take care of things at home, or get along with other people?: Very difficult  PHQ9 TOTAL SCORE: 16

## 2022-09-20 ENCOUNTER — VIRTUAL VISIT (OUTPATIENT)
Dept: PSYCHIATRY | Facility: CLINIC | Age: 32
End: 2022-09-20
Attending: NURSE PRACTITIONER
Payer: COMMERCIAL

## 2022-09-20 DIAGNOSIS — F20.9 SCHIZOPHRENIA, UNSPECIFIED TYPE (H): Primary | ICD-10-CM

## 2022-09-20 PROCEDURE — 90833 PSYTX W PT W E/M 30 MIN: CPT | Mod: 95 | Performed by: NURSE PRACTITIONER

## 2022-09-20 PROCEDURE — 99214 OFFICE O/P EST MOD 30 MIN: CPT | Mod: 95 | Performed by: NURSE PRACTITIONER

## 2022-09-20 NOTE — PATIENT INSTRUCTIONS
**For crisis resources, please see the information at the end of this document**   Patient Education    Thank you for coming to the [facility] MENTAL HEALTH & ADDICTION [place] CLIN[facility].     Lab Testing:  If you had lab testing today and your results are reassuring or normal they will be mailed to you or sent through [employer] within 7 days. If the lab tests need quick action we will call you with the results. The phone number we will call with results is # [phone]. If this is not the best number please call our clinic and change the number.     Medication Refills:  If you need any refills please call your pharmacy and they will contact us. Our fax number for refills is [phone].   Three business days of notice are needed for general medication refill requests.   Five business days of notice are needed for controlled substance refill requests.   If you need to change to a different pharmacy, please contact the new pharmacy directly. The new pharmacy will help you get your medications transferred.     Contact Us:  Please call [phone] during business hours (8-5:00 M-F).   If you have medication related questions after clinic hours, or on the weekend, please call [phone].     Financial Assistance [phone]   Medical Records [phone]       MENTAL HEALTH CRISIS RESOURCES:  For a emergency help, please call 911 or go to the nearest Emergency Department.     Emergency Walk-In Options:   EmPATH Unit @ [facility] ([place]): [phone] - Specialized mental health emergency area designed to be calming  [facility] ([place]): [phone]  [facility] ([place]): [phone]  [facility]): [phone]    [facility] Crisis Information:   [place]: [phone]  [place]: [phone]  [place] ([place]) - Adult: [phone]     Child: [phone]  [place] - Adult: [phone]     Child: [phone]  [place]:  [phone]  List of all [facility] resources:   https://[place].gov/dhs/people-we-serve/adults/health-care/mental-health/resources/crisis-contacts.jsp    National Crisis Information:   Crisis Text Line: Text  [place]  to 900924  Suicide & Crisis Lifeline: 988  National Suicide Prevention Lifeline: 3-492-908-TALK (1-947.327.1054)       For online chat options, visit https://suicidepreventionlifeline.org/chat/  Poison Control Center: 0-775-640-7440  Trans Lifeline: 3-197-968-2651 - Hotline for transgender people of all ages  The Vinnie Project: 5-721-805-1008 - Hotline for LGBT youth     For Non-Emergency Support:   Fast Tracker: Mental Health & Substance Use Disorder Resources -   https://www.[employer].org/

## 2022-09-20 NOTE — NURSING NOTE
QNRS were not assigned, PHQ was not done per department protocol.  Guero stated no change to medications. We did not go over due to time of check in.   China Walker VF
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
## 2022-10-06 ENCOUNTER — VIRTUAL VISIT (OUTPATIENT)
Dept: PSYCHIATRY | Facility: CLINIC | Age: 32
End: 2022-10-06
Attending: NURSE PRACTITIONER
Payer: COMMERCIAL

## 2022-10-06 DIAGNOSIS — R63.5 WEIGHT GAIN: ICD-10-CM

## 2022-10-06 DIAGNOSIS — F20.9 SCHIZOPHRENIA, UNSPECIFIED TYPE (H): Primary | ICD-10-CM

## 2022-10-06 PROCEDURE — 90833 PSYTX W PT W E/M 30 MIN: CPT | Mod: GT | Performed by: NURSE PRACTITIONER

## 2022-10-06 PROCEDURE — 99214 OFFICE O/P EST MOD 30 MIN: CPT | Mod: GT | Performed by: NURSE PRACTITIONER

## 2022-10-06 ASSESSMENT — ANXIETY QUESTIONNAIRES
3. WORRYING TOO MUCH ABOUT DIFFERENT THINGS: SEVERAL DAYS
8. IF YOU CHECKED OFF ANY PROBLEMS, HOW DIFFICULT HAVE THESE MADE IT FOR YOU TO DO YOUR WORK, TAKE CARE OF THINGS AT HOME, OR GET ALONG WITH OTHER PEOPLE?: EXTREMELY DIFFICULT
GAD7 TOTAL SCORE: 12
2. NOT BEING ABLE TO STOP OR CONTROL WORRYING: SEVERAL DAYS
GAD7 TOTAL SCORE: 12
3. WORRYING TOO MUCH ABOUT DIFFERENT THINGS: SEVERAL DAYS
GAD7 TOTAL SCORE: 12
IF YOU CHECKED OFF ANY PROBLEMS ON THIS QUESTIONNAIRE, HOW DIFFICULT HAVE THESE PROBLEMS MADE IT FOR YOU TO DO YOUR WORK, TAKE CARE OF THINGS AT HOME, OR GET ALONG WITH OTHER PEOPLE: EXTREMELY DIFFICULT
1. FEELING NERVOUS, ANXIOUS, OR ON EDGE: NEARLY EVERY DAY
6. BECOMING EASILY ANNOYED OR IRRITABLE: MORE THAN HALF THE DAYS
6. BECOMING EASILY ANNOYED OR IRRITABLE: MORE THAN HALF THE DAYS
GAD7 TOTAL SCORE: 12
2. NOT BEING ABLE TO STOP OR CONTROL WORRYING: SEVERAL DAYS
5. BEING SO RESTLESS THAT IT IS HARD TO SIT STILL: NOT AT ALL
GAD7 TOTAL SCORE: 12
4. TROUBLE RELAXING: MORE THAN HALF THE DAYS
7. FEELING AFRAID AS IF SOMETHING AWFUL MIGHT HAPPEN: NEARLY EVERY DAY
5. BEING SO RESTLESS THAT IT IS HARD TO SIT STILL: NOT AT ALL
IF YOU CHECKED OFF ANY PROBLEMS ON THIS QUESTIONNAIRE, HOW DIFFICULT HAVE THESE PROBLEMS MADE IT FOR YOU TO DO YOUR WORK, TAKE CARE OF THINGS AT HOME, OR GET ALONG WITH OTHER PEOPLE: EXTREMELY DIFFICULT
7. FEELING AFRAID AS IF SOMETHING AWFUL MIGHT HAPPEN: NEARLY EVERY DAY
8. IF YOU CHECKED OFF ANY PROBLEMS, HOW DIFFICULT HAVE THESE MADE IT FOR YOU TO DO YOUR WORK, TAKE CARE OF THINGS AT HOME, OR GET ALONG WITH OTHER PEOPLE?: EXTREMELY DIFFICULT
7. FEELING AFRAID AS IF SOMETHING AWFUL MIGHT HAPPEN: NEARLY EVERY DAY
4. TROUBLE RELAXING: MORE THAN HALF THE DAYS
GAD7 TOTAL SCORE: 12
7. FEELING AFRAID AS IF SOMETHING AWFUL MIGHT HAPPEN: NEARLY EVERY DAY
1. FEELING NERVOUS, ANXIOUS, OR ON EDGE: NEARLY EVERY DAY
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
## 2022-10-06 NOTE — PATIENT INSTRUCTIONS
**For crisis resources, please see the information at the end of this document**   Patient Education    Thank you for coming to the [facility] [place] [facility].     Lab Testing:  If you had lab testing today and your results are reassuring or normal they will be mailed to you or sent through [employer] within 7 days. If the lab tests need quick action we will call you with the results. The phone number we will call with results is # [phone]. If this is not the best number please call our clinic and change the number.     Medication Refills:  If you need any refills please call your pharmacy and they will contact us. Our fax number for refills is [phone].   Three business days of notice are needed for general medication refill requests.   Five business days of notice are needed for controlled substance refill requests.   If you need to change to a different pharmacy, please contact the new pharmacy directly. The new pharmacy will help you get your medications transferred.     Contact Us:  Please call [phone] during business hours (8-5:00 M-F).   If you have medication related questions after clinic hours, or on the weekend, please call [phone].     Financial Assistance [phone]   Medical Records [phone]       MENTAL HEALTH CRISIS RESOURCES:  For a emergency help, please call 911 or go to the nearest Emergency Department.     Emergency Walk-In Options:   EmPATH Unit @ [facility] ([place]): [phone] - Specialized mental health emergency area designed to be calming  [facility] ([place]): [phone]  [facility] ([place]): [phone]  [facility]): [phone]    [facility] Crisis Information:   [place]: [phone]  [place]: [phone]  [place] ([place]) - Adult: [phone]     Child: [phone]  [place] - Adult: [phone]     Child: [phone]  [place]:  [phone]  List of all [facility] resources:   https://[place].gov/dhs/people-we-serve/adults/health-care/mental-health/resources/crisis-contacts.jsp    National Crisis Information:   Crisis Text Line: Text  [place]  to 352395  Suicide & Crisis Lifeline: 988  National Suicide Prevention Lifeline: 9-446-422-TALK (1-597.952.6452)       For online chat options, visit https://suicidepreventionlifeline.org/chat/  Poison Control Center: 4-944-148-8277  Trans Lifeline: 8-099-288-4090 - Hotline for transgender people of all ages  The [employer]: 6-261-696-6037 - Hotline for LGBT youth     For Non-Emergency Support:   Fast Tracker: Mental Health & Substance Use Disorder Resources -   https://www.[employer].org/

## 2022-10-10 ENCOUNTER — TELEPHONE (OUTPATIENT)
Dept: PSYCHIATRY | Facility: CLINIC | Age: 32
End: 2022-10-10

## 2022-10-10 NOTE — TELEPHONE ENCOUNTER
MTM referral from: [facility] visit (referral by provider)    MTM referral outreach attempt #2 on October 10, 2022 at 11:42 AM      Outcome: Patient is not reachable after several attempts, will route to MTM Pharmacist/Provider as an FYI. Thank you for the referral.     Vanessa Salinas, [facility]

## 2022-10-12 ENCOUNTER — VIRTUAL VISIT (OUTPATIENT)
Dept: PSYCHIATRY | Facility: CLINIC | Age: 32
End: 2022-10-12
Attending: SOCIAL WORKER
Payer: COMMERCIAL

## 2022-10-12 DIAGNOSIS — F20.9 SCHIZOPHRENIA, UNSPECIFIED TYPE (H): Primary | ICD-10-CM

## 2022-10-12 PROCEDURE — 90832 PSYTX W PT 30 MINUTES: CPT | Mod: 95 | Performed by: SOCIAL WORKER
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
## 2022-10-26 ENCOUNTER — BEH TREATMENT PLAN (OUTPATIENT)
Dept: PSYCHIATRY | Facility: CLINIC | Age: 32
End: 2022-10-26

## 2022-10-26 ENCOUNTER — VIRTUAL VISIT (OUTPATIENT)
Dept: PSYCHIATRY | Facility: CLINIC | Age: 32
End: 2022-10-26
Attending: SOCIAL WORKER
Payer: COMMERCIAL

## 2022-10-26 DIAGNOSIS — F20.9 SCHIZOPHRENIA, UNSPECIFIED TYPE (H): Primary | ICD-10-CM

## 2022-10-26 PROCEDURE — 90832 PSYTX W PT 30 MINUTES: CPT | Mod: 95 | Performed by: SOCIAL WORKER

## 2022-10-26 NOTE — PROGRESS NOTES
[facility]  Psychiatry Clinic  First Episode of Psychosis - Strengths Program  Clinician Contact & Progress Note   For Individual Resiliency Training (IRT) & Psychotherapy     Patient: Guero Carter (1990)     MRN: [id number]  Diagnosis(es): Schizophrenia, unspecified type (H) [F20.9]  Clinician: AP Anglin  Service Type: 32899 psychotherapy (23-37 min. with patient and/or family)  Prolonged Care for this visit is not indicated.  Clinical work consists of family therapy.     Time of service:    Date:  10/26/22    Start Time:  1102      End Time:  1137    Video- Visit Details   Type of service:  video visit for individual therapy    Reason for video visit:  COVID-19 public health recommendations on in-person sessions  Originating Site (patient location):  [facility]   Location- Patient's home  Distant Site (provider location):  HIPAA compliant location- Remote location  Mode of Communication:  Secure real time interactive audio and visual telecommunication system via [employer]  Consent:  Patient has given verbal consent for video visit?: Yes     People present:   Patient   AP Anglin     Intervention:  Motivational Interviewing   Connect info and skills with personal goals  Promote hope and positive expectations    Educational Teaching Strategies   Review of written material/education  Relate information to client's experience  Ask questions to check comprehension  Break down information into small chunks  Adopt client's language     CBT   Reframe Cognitive Distortions (become aware of which distortions you are most vulnerable to, identifying and challenging our harmful automatic thoughts)  Recognizing the positive (Visualize, write, or discuss the best parts of the day to promote positive thinking patterns)      IRT Module(s) Addressed:  Module 7 - Building a Bridging to Your Goals  Laurent Potts's final IRT session with This Clinician     Did the client  complete the home practice option(s) from the previous session:   Not Applicable    Techniques utilized:   [place] announced at beginning of session  Review of goal  Problem-solving practice  Summarize progress made in current session  Identified urgent concerns  Review of strengths, barriers, objectives, and interventions  Illicit client/family feedback    Mental Status Exam:  Alertness: alert , oriented   Appearance: awake, alert and adequately groomed  Behavior/Demeanor: cooperative and calm, with good eye contact   Speech: increased rate  Language: intact and no problems  Psychomotor: normal or unremarkable  Mood: agitated, elevated and grandiose  Thought Process/Associations:  disorganized difficult to follow  Thought Content:  no evidence of suicidal ideation or homicidal ideation  Perception:  Reports delusions;  Denies auditory hallucinations and visual hallucinations  Insight: gaining/ maintaining insight is challenging  Judgment: adequate for safety  Cognition: attention span: intact  fund of knowledge: appropriate;     JOSE-7 and PHQ-9:  Last PHQ-9 7/13/2022   1.  Little interest or pleasure in doing things 3   2.  Feeling down, depressed, or hopeless 2   3.  Trouble falling or staying asleep, or sleeping too much 2   4.  Feeling tired or having little energy 2   5.  Poor appetite or overeating 1   6.  Feeling bad about yourself 2   7.  Trouble concentrating 2   8.  Moving slowly or restless 2   Q9: Thoughts of better off dead/self-harm past 2 weeks 0   PHQ-9 Total Score 16   Difficulty at work, home, or with people -     JOSE-7  10/6/2022   1. Feeling nervous, anxious, or on edge 3   2. Not being able to stop or control worrying 1   3. Worrying too much about different things 1   4. Trouble relaxing 2   5. Being so restless that it is hard to sit still 0   6. Becoming easily annoyed or irritable 2   7. Feeling afraid, as if something awful might happen 3   JOSE-7 Total Score 12   If you checked any problems,  how difficult have they made it for you to do your work, take care of things at home, or get along with other people? Extremely difficult       [place] Protocol Risk Identification:  1) Have you wished you were dead or wished you could go to sleep and not wake up? No  2) Have you actually had any thoughts about killing yourself? No  If YES to 2, answer questions 3, 4, 5, 6  If NO to 2, go directly to question 6  3) Have you thought about how you might do this? No  4) Have you had any intension of acting on these thoughts of killing yourself, as opposed to you have the thoughts but you definitely would not act on them? No  5) Have you started to work out or worked out the details of how to kill yourself? Do you intend to carry out this plan? No  Always Ask Question 6  6) Have you done anything, started to do anything, or prepared to do anything to end your life? No      Assessment/Progress Note:  I met with Guero for an IRT session.  The focus of today's session was provide supportive therapy and psychoeducation, completing Delroy's treatment plan update, and processing his final IRT session with This Clinician. Patient did not report any changes to medications   This writer utilized therapeutic techniques of Cognitive-Behavioral, Supportive, Motivational Interviewing and Insight-oriented.     With regards to safety, Guero reported the following:    Suicidal ideation: He reports thoughts of suicide but denies plan, intent, means    Self-harm: Thoughts - Denies.     Homicidal or violent ideation: Denies.    Discussed overall safety plan should symptoms feel unmanageable or safety concerns become imminent to include: Get Help in a Crisis in [facility]   Call [phone] if you or someone close to you is having a mental health crisis. The [facility] Mobile Crisis Response team will help you. The line is open all day, every day, and the call is free.     Overall Guero was cooperative and distracted throughout the  session.  Helpful clinical techniques utilized during today's appointment appeared to be Supportive, Motivational Interviewing and Insight-oriented.  As of today's appt insight to their mental illness appears fair.  Symptom assessment, safety assessment, discussion and identification of coping strategies, and exploration of material in IRT modules was all in support of Guero's self-identified goal(s) of building self-worth, increasing self-esteem, and improving communication skills, as identified in most recent BEH Treatment Plan. Progress toward goal completion seems adequate.    Plan/Referrals:   Guero is participating in coordinated speciality care via the Strengths Program within our clinic.  Will meet with Guero monthly  for Individual Resiliency training, therapy, and support aimed at maximizing Guero's opportunity for recovery from psychosis.    Next session: TBD    Crisis Numbers:   Provided routinely in AVS     After hours:  [phone]    Treatment plan last completed on: 10/26/22  Next treatment plan update due by: 1/26/23  Treatment plan was updated at this visit.   Acknowledged consent of current treatment plan was not signed d/t telemedicine       Please EPIC message with any questions or concerns.    PROVIDER: AP Anglin      Answers for HPI/ROS submitted by the patient on 7/13/2022  If you checked off any problems, how difficult have these problems made it for you to do your work, take care of things at home, or get along with other people?: Very difficult  PHQ9 TOTAL SCORE: 16    Answers for HPI/ROS submitted by the patient on 10/6/2022  JOSE 7 TOTAL SCORE: 12
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
## 2022-10-27 ENCOUNTER — VIRTUAL VISIT (OUTPATIENT)
Dept: PSYCHIATRY | Facility: CLINIC | Age: 32
End: 2022-10-27
Attending: NURSE PRACTITIONER
Payer: COMMERCIAL

## 2022-10-27 DIAGNOSIS — F32.A DEPRESSION, UNSPECIFIED DEPRESSION TYPE: ICD-10-CM

## 2022-10-27 DIAGNOSIS — F20.9 SCHIZOPHRENIA, UNSPECIFIED TYPE (H): ICD-10-CM

## 2022-10-27 PROCEDURE — 99214 OFFICE O/P EST MOD 30 MIN: CPT | Mod: 95 | Performed by: NURSE PRACTITIONER

## 2022-10-27 RX ORDER — ESCITALOPRAM OXALATE 10 MG/1
10 TABLET ORAL DAILY
Qty: 30 TABLET | Refills: 2 | Status: SHIPPED | OUTPATIENT
Start: 2022-10-27 | End: 2022-12-08

## 2022-10-27 RX ORDER — BUPROPION HYDROCHLORIDE 450 MG/1
450 TABLET, FILM COATED, EXTENDED RELEASE ORAL DAILY
Qty: 30 TABLET | Refills: 2 | Status: SHIPPED | OUTPATIENT
Start: 2022-10-27 | End: 2022-12-08

## 2022-10-27 RX ORDER — ARIPIPRAZOLE 20 MG/1
20 TABLET ORAL DAILY
Qty: 30 TABLET | Refills: 2 | Status: SHIPPED | OUTPATIENT
Start: 2022-10-27 | End: 2022-12-08
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
## 2022-10-27 NOTE — PATIENT INSTRUCTIONS
**For crisis resources, please see the information at the end of this document**   Patient Education    Thank you for coming to the [facility] [place] [facility].     Lab Testing:  If you had lab testing today and your results are reassuring or normal they will be mailed to you or sent through Swift[employer](TM) by [employer] within 7 days. If the lab tests need quick action we will call you with the results. The phone number we will call with results is # [phone]. If this is not the best number please call our clinic and change the number.     Medication Refills:  If you need any refills please call your pharmacy and they will contact us. Our fax number for refills is [phone].   Three business days of notice are needed for general medication refill requests.   Five business days of notice are needed for controlled substance refill requests.   If you need to change to a different pharmacy, please contact the new pharmacy directly. The new pharmacy will help you get your medications transferred.     Contact Us:  Please call [phone] during business hours (8-5:00 M-F).   If you have medication related questions after clinic hours, or on the weekend, please call [phone].     Financial Assistance [phone]   Medical Records [phone]       MENTAL HEALTH CRISIS RESOURCES:  For a emergency help, please call 911 or go to the nearest Emergency Department.     Emergency Walk-In Options:   EmPATH Unit @ [facility] ([place]): [phone] - Specialized mental health emergency area designed to be calming  [facility] ([place]): [phone]  [facility] ([place]): [phone]  [facility]): [phone]    [facility] Crisis Information:   [place]: [phone]  [place]: [phone]  [place] ([place]) - Adult: [phone]     Child: [phone]  [place] - Adult: [phone]     Child: [phone]  [place]:  [phone]  List of all [facility] resources:   https://[place].gov/dhs/people-we-serve/adults/health-care/mental-health/resources/crisis-contacts.jsp    National Crisis Information:   Crisis Text Line: Text  [place]  to 116504  Suicide & Crisis Lifeline: 988  National Suicide Prevention Lifeline: 0-538-457-TALK (1-368.994.4648)       For online chat options, visit https://suicidepreventionlifeline.org/chat/  Poison Control Center: 8-907-086-1698  Trans Lifeline: 4-862-146-0754 - Hotline for transgender people of all ages  The Vinnie Project: 5-382-597-4695 - Hotline for LGBT youth     For Non-Emergency Support:   Fast Tracker: Mental Health & Substance Use Disorder Resources -   https://www.NetcipiackSoloStocksn.org/

## 2022-11-08 ENCOUNTER — TELEPHONE (OUTPATIENT)
Dept: PSYCHIATRY | Facility: CLINIC | Age: 32
End: 2022-11-08

## 2022-11-08 NOTE — TELEPHONE ENCOUNTER
Received 4 page fax including signed SHANA from [employer] [facility] requesting provider completion of Behavioral Health Capacity Questionnaire. Writer sent message to Sofia Vanessa DNP for more info. Jayne Burgess, EMT    Forms emailed to provider.

## 2022-11-10 NOTE — TELEPHONE ENCOUNTER
On August 24, 2021, at 10:00 AM, writer called patient at work to confirm Virtual Visit. Writer unable to make contact with patient. Writer left detailed voice message for call back. [phone] left as call back number. Alonzo Maki EMT    On August 24, 2021, at 10:15 AM, writer called patient at mobile to confirm Virtual Visit. Writer unable to make contact with patient. Writer left detailed voice message for call back. [phone] left as call back number. Ronaldo Santiago, EMT      
yes

## 2022-11-17 NOTE — TELEPHONE ENCOUNTER
"----- Message from LOTTIE Spence CNP sent at 11/15/2022 11:11 AM CST -----  Regarding: Disability forms  It sounds like the secure message to [employer] didn't work.  Here are the answers to the form questions.  Please let em know if you have any questions!      1. Please describe any restrictions:  a. Mr. Carter will have significant difficulty with tasks that require sustained attention, working shifts longer than 6 hours, and working in interpersonal settings with coworkers (due to frequent paranoid delusions).    2. Please describe any limitations:  a. Additional limitations include working a full time schedule and keeping a consistent work schedule (frequent missed days due to exacerbation of psychosis and depression symptoms)  3. Psychosocial stressors causing pt to leave work â \" NA  4. Events causing pt to leave work â \" exacerbation of psychotic and depression symptoms, with several delusions involving coworkers or work related activities   5. Alcohol or drug use â \" no  6. Personality disorder traits â \" none  7. Cognitive complaints â \" Mr. Tavares  s primary diagnosis is schizophrenia, and cognitive impairments (verbal memory, working memory, problem solving, executive functioning) are a core symptom of schizophrenia that he experiences.   8. Cognitive disease measurement â \" NA  9. Treatment modalities â \" Mr. Carter is prescribed antipsychotic and antidepressant medications and is also engaged in individual therapy.    10. Return to work as goal of treatment â \" No  11. Barriers to returning to work â \" history of psychosis and depression symptom exacerbation in work settings, likely secondary to increased stress and social interactions; frequent symptom exacerbations over the last 6 months  12. Part or full time capacity â \" Full time, no; part time â \" possibly at under 20 hrs per week, frequent psychiatric assessment would be required   13. When do you predict they can return to work on " "a full time basis â \" Unknown, will reassess every 6 months; recommend prolonged (i.e. 6 month) symptom stability prior to return to work  14. No      "

## 2022-11-17 NOTE — TELEPHONE ENCOUNTER
11/17/22 1500   Signing Clinician's Name / Credentials   Signing clinician's name / credentials Marilou Acevedo, PT, DPT   Functional Gait Assessment (NIMESH Stewart, BUCKY Whitfield., et al. (2004))   1. GAIT LEVEL SURFACE 3   2. CHANGE IN GAIT SPEED 3   3. GAIT WITH HORIZONTAL HEAD TURNS 2   4. GAIT WITH VERTICAL HEAD TURNS 2   5. GAIT AND PIVOT TURN 2   6. STEP OVER OBSTACLE 1   7. GAIT WITH NARROW BASE OF SUPPORT 0   8. GAIT WITH EYES CLOSED 1   9. AMBULATING BACKWARDS 2   10. STEPS 2   Total Functional Gait Assessment Score   TOTAL SCORE: (MAXIMUM SCORE 30) 18     Functional Gait Assessment (FGA): The FGA assesses postural stability during various walking tasks.   Gait assistive device used: none      Patient Score: 18/30 - Pt at inc risk for falling with dynamic gait activities.     Scores of <22/30 have been correlated with predicting falls in community-dwelling older adults according to Terry & Richard 2010.   Scores of <18/30 have been correlated with increased risk for falls in patients with Parkinsons Disease according to [place], Nunn et al 2014.  Minimal Detectable Change for patients with acute/chronic stroke = 4.2 according to Felicitas & Pavanschdelmy 2009  Minimal Detectable Change for patients with vestibular disorder = 8 according to Terry & Richard 2010     Assessment (rationale for performing, application to patient s function & care plan): s/p peripheral neuropathy with demonstrated balance impairments, repeated as has demonstrated improved gait, balance and overall functional mobility since initial assessment.     Reviewed, signed forms were faxed to [facility] at [phone]. Additionally requested was all Progress Notes from March 2022 forward. Progress notes from 3/15/22 (8 pages) and 3/16/2022 (6 pages) was printed and faxed along with the forms. A Quick Disclosure was entered. The patient was notified and informed of the fax.Anastasiya Espinoza LPN

## 2022-11-17 NOTE — TELEPHONE ENCOUNTER
Printed provider answers and attached to form. Both were signed by provider and 6 pages were faxed back to [employer] at [facility] at [phone]. Forms were sent to scanning and hard copy is being held in nurse triage until scanning is confirmed. Jayne Burgess, EMT

## 2022-12-08 ENCOUNTER — VIRTUAL VISIT (OUTPATIENT)
Dept: PSYCHIATRY | Facility: CLINIC | Age: 32
End: 2022-12-08
Attending: NURSE PRACTITIONER
Payer: COMMERCIAL

## 2022-12-08 DIAGNOSIS — F20.9 SCHIZOPHRENIA, UNSPECIFIED TYPE (H): ICD-10-CM

## 2022-12-08 DIAGNOSIS — F32.A DEPRESSION, UNSPECIFIED DEPRESSION TYPE: ICD-10-CM

## 2022-12-08 PROCEDURE — 99214 OFFICE O/P EST MOD 30 MIN: CPT | Mod: 95 | Performed by: NURSE PRACTITIONER

## 2022-12-08 PROCEDURE — 90833 PSYTX W PT W E/M 30 MIN: CPT | Mod: 95 | Performed by: NURSE PRACTITIONER

## 2022-12-08 RX ORDER — ESCITALOPRAM OXALATE 5 MG/1
15 TABLET ORAL DAILY
Qty: 90 TABLET | Refills: 2 | Status: SHIPPED | OUTPATIENT
Start: 2022-12-08 | End: 2023-03-09

## 2022-12-08 RX ORDER — ARIPIPRAZOLE 20 MG/1
20 TABLET ORAL DAILY
Qty: 30 TABLET | Refills: 2 | Status: SHIPPED | OUTPATIENT
Start: 2022-12-08 | End: 2023-03-09

## 2022-12-08 RX ORDER — BUPROPION HYDROCHLORIDE 450 MG/1
450 TABLET, FILM COATED, EXTENDED RELEASE ORAL DAILY
Qty: 30 TABLET | Refills: 2 | Status: SHIPPED | OUTPATIENT
Start: 2022-12-08 | End: 2023-03-09
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
## 2022-12-08 NOTE — PATIENT INSTRUCTIONS
**For crisis resources, please see the information at the end of this document**   Patient Education    Thank you for coming to the [facility] [place] [facility].     Lab Testing:  If you had lab testing today and your results are reassuring or normal they will be mailed to you or sent through [employer] within 7 days. If the lab tests need quick action we will call you with the results. The phone number we will call with results is # [phone]. If this is not the best number please call our clinic and change the number.     Medication Refills:  If you need any refills please call your pharmacy and they will contact us. Our fax number for refills is [phone].   Three business days of notice are needed for general medication refill requests.   Five business days of notice are needed for controlled substance refill requests.   If you need to change to a different pharmacy, please contact the new pharmacy directly. The new pharmacy will help you get your medications transferred.     Contact Us:  Please call [phone] during business hours (8-5:00 M-F).   If you have medication related questions after clinic hours, or on the weekend, please call [phone].     Financial Assistance [phone]   Medical Records [phone]       MENTAL HEALTH CRISIS RESOURCES:  For a emergency help, please call 911 or go to the nearest Emergency Department.     Emergency Walk-In Options:   EmPATH Unit @ [facility] ([place]): [phone] - Specialized mental health emergency area designed to be calming  [facility] ([place]): [phone]  [facility] ([place]): [phone]  [facility]): [phone]    [facility] Crisis Information:   [place]: [phone]  [place]: [phone]  [place] ([place]) - Adult: [phone]     Child: [phone]  [place] - Adult: [phone]     Child: [phone]  [place]:  [phone]  List of all [facility] resources:   https://[place].gov/dhs/people-we-serve/adults/health-care/mental-health/resources/crisis-contacts.jsp    National Crisis Information:   Crisis Text Line: Text  [place]  to 740213  Suicide & Crisis Lifeline: 988  National Suicide Prevention Lifeline: 5-228-137-TALK (1-484.242.5624)       For online chat options, visit https://suicidepreventionlifeline.org/chat/  Poison Control Center: 9-092-896-8071  Trans Lifeline: 6-826-348-0897 - Hotline for transgender people of all ages  The Vinnie Project: 9-989-752-2500 - Hotline for LGBT youth     For Non-Emergency Support:   Fast Tracker: Mental Health & Substance Use Disorder Resources -   https://www.Recovery Technology SolutionsckOpTripn.org/

## 2023-01-05 ENCOUNTER — VIRTUAL VISIT (OUTPATIENT)
Dept: PSYCHIATRY | Facility: CLINIC | Age: 33
End: 2023-01-05
Attending: NURSE PRACTITIONER
Payer: COMMERCIAL

## 2023-01-05 DIAGNOSIS — F20.9 SCHIZOPHRENIA, UNSPECIFIED TYPE (H): Primary | ICD-10-CM

## 2023-01-05 PROCEDURE — 90833 PSYTX W PT W E/M 30 MIN: CPT | Mod: 95 | Performed by: NURSE PRACTITIONER

## 2023-01-05 PROCEDURE — 99214 OFFICE O/P EST MOD 30 MIN: CPT | Mod: 95 | Performed by: NURSE PRACTITIONER

## 2023-01-05 ASSESSMENT — ANXIETY QUESTIONNAIRES
7. FEELING AFRAID AS IF SOMETHING AWFUL MIGHT HAPPEN: NEARLY EVERY DAY
7. FEELING AFRAID AS IF SOMETHING AWFUL MIGHT HAPPEN: NEARLY EVERY DAY
GAD7 TOTAL SCORE: 16
2. NOT BEING ABLE TO STOP OR CONTROL WORRYING: NEARLY EVERY DAY
4. TROUBLE RELAXING: MORE THAN HALF THE DAYS
6. BECOMING EASILY ANNOYED OR IRRITABLE: MORE THAN HALF THE DAYS
GAD7 TOTAL SCORE: 16
5. BEING SO RESTLESS THAT IT IS HARD TO SIT STILL: MORE THAN HALF THE DAYS
8. IF YOU CHECKED OFF ANY PROBLEMS, HOW DIFFICULT HAVE THESE MADE IT FOR YOU TO DO YOUR WORK, TAKE CARE OF THINGS AT HOME, OR GET ALONG WITH OTHER PEOPLE?: SOMEWHAT DIFFICULT
1. FEELING NERVOUS, ANXIOUS, OR ON EDGE: MORE THAN HALF THE DAYS
3. WORRYING TOO MUCH ABOUT DIFFERENT THINGS: MORE THAN HALF THE DAYS
IF YOU CHECKED OFF ANY PROBLEMS ON THIS QUESTIONNAIRE, HOW DIFFICULT HAVE THESE PROBLEMS MADE IT FOR YOU TO DO YOUR WORK, TAKE CARE OF THINGS AT HOME, OR GET ALONG WITH OTHER PEOPLE: SOMEWHAT DIFFICULT
GAD7 TOTAL SCORE: 16

## 2023-01-05 NOTE — PATIENT INSTRUCTIONS
**For crisis resources, please see the information at the end of this document**   Patient Education    Thank you for coming to the [facility] [place] [facility].     Lab Testing:  If you had lab testing today and your results are reassuring or normal they will be mailed to you or sent through [employer] within 7 days. If the lab tests need quick action we will call you with the results. The phone number we will call with results is # [phone]. If this is not the best number please call our clinic and change the number.     Medication Refills:  If you need any refills please call your pharmacy and they will contact us. Our fax number for refills is [phone].   Three business days of notice are needed for general medication refill requests.   Five business days of notice are needed for controlled substance refill requests.   If you need to change to a different pharmacy, please contact the new pharmacy directly. The new pharmacy will help you get your medications transferred.     Contact Us:  Please call [phone] during business hours (8-5:00 M-F).   If you have medication related questions after clinic hours, or on the weekend, please call [phone].     Financial Assistance [phone]   Medical Records [phone]       MENTAL HEALTH CRISIS RESOURCES:  For a emergency help, please call 911 or go to the nearest Emergency Department.     Emergency Walk-In Options:   EmPATH Unit @ [facility] ([place]): [phone] - Specialized mental health emergency area designed to be calming  Formerly [facility] ([place]): [phone]  [facility] ([place]): [phone]  [facility]): [phone]    [facility] Crisis Information:   [place]: [phone]  [place]: [phone]  [place] ([place]) - Adult: [phone]     Child: [phone]  [place] - Adult: [phone]     Child: [phone]  [place]:  [phone]  List of all [facility] resources:   https://[place].gov/dhs/people-we-serve/adults/health-care/mental-health/resources/crisis-contacts.jsp    National Crisis Information:   Crisis Text Line: Text  [place]  to 031717  Suicide & Crisis Lifeline: 988  National Suicide Prevention Lifeline: 5-485-818-TALK (1-450.229.4206)       For online chat options, visit https://suicidepreventionlifeline.org/chat/  Poison Control Center: 6-895-357-3457  Trans Lifeline: 9-116-858-5787 - Hotline for transgender people of all ages  The [employer]: 8-519-456-3717 - Hotline for LGBT youth     For Non-Emergency Support:   Fast Tracker: Mental Health & Substance Use Disorder Resources -   https://www.[employer].org/

## 2023-01-05 NOTE — NURSING NOTE
Patient declined qnrs over the phone. Prefers to complete in [employer].    Aggie Bernard,  VVF
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
## 2023-01-24 ENCOUNTER — TRANSFERRED RECORDS (OUTPATIENT)
Dept: PSYCHIATRY | Facility: CLINIC | Age: 33
End: 2023-01-24

## 2023-01-26 ENCOUNTER — TELEPHONE (OUTPATIENT)
Dept: PSYCHIATRY | Facility: CLINIC | Age: 33
End: 2023-01-26
Payer: COMMERCIAL

## 2023-01-26 LAB
BUN SERPL-MCNC: 12 MG/DL
CALCIUM SERPL-MCNC: 8.9 MG/DL
CHLORIDE SERPLBLD-SCNC: 104 MMOL/L
CHOLEST SERPL-MCNC: 218 MG/DL
CHOLEST/HDLC SERPL: 4.84 {RATIO}
EGFR: >60 ML/MIN/1.73M2
GLUCOSE SERPL-MCNC: 98 MG/DL (ref 70–99)
HDLC SERPL-MCNC: 45 MG/DL
LDL CHOLESTEROL: 142 MG/DL
OTHER: 0.83 MG/DL
POTASSIUM SERPL-SCNC: 4.3 MMOL/L
SODIUM SERPL-SCNC: 140 MMOL/L
TOTAL CO2: 25.4 MMOL/L
TRIGL SERPL-MCNC: 153 MG/DL
VLDL CHOLESTEROL: 31 MG/DL

## 2023-01-26 NOTE — TELEPHONE ENCOUNTER
Received results of CMP and Lipid Panel from [facility] drawn on 1/24/23 at 4:14 PM  Results entered into EMR and sent to scanning on January 26, 2023 by Alonzo Maki, HENOK.  Routed to Sofia Vanessa and Psych Nurse Mathew for review.

## 2023-02-16 ENCOUNTER — VIRTUAL VISIT (OUTPATIENT)
Dept: PSYCHIATRY | Facility: CLINIC | Age: 33
End: 2023-02-16
Attending: NURSE PRACTITIONER
Payer: COMMERCIAL

## 2023-02-16 DIAGNOSIS — I10 HYPERTENSION, UNSPECIFIED TYPE: Primary | ICD-10-CM

## 2023-02-16 PROCEDURE — G0463 HOSPITAL OUTPT CLINIC VISIT: HCPCS | Mod: PN,GT | Performed by: NURSE PRACTITIONER

## 2023-02-16 PROCEDURE — 90833 PSYTX W PT W E/M 30 MIN: CPT | Mod: 95 | Performed by: NURSE PRACTITIONER

## 2023-02-16 PROCEDURE — 99214 OFFICE O/P EST MOD 30 MIN: CPT | Mod: 95 | Performed by: NURSE PRACTITIONER

## 2023-02-16 NOTE — Clinical Note
Amandeep Cunha and Dawn, I'm concerned about Guero's blood pressure and his recently not having an RX for lisinopril (see note for discussion today).  If he does not go to urgent care or get a PCP appt in the next week, he agrees to come into the clinic for a blood pressure check.  Could you please reach out to him to get this scheduled for next week?  If blood pressure is significantly elevated I will be making some changes to his Wellbutrin dose.  Thanks! Sofia

## 2023-02-16 NOTE — NURSING NOTE
Is the patient currently in the state of [place]? YES    Visit mode:VIDEO    If the visit is dropped, the patient can be reconnected by: VIDEO VISIT: Text to cell phone: [phone]    Will anyone else be joining the visit? NO      How would you like to obtain your AVS? MyChart    Are changes needed to the allergy or medication list? NO   Patient denies any changes since echeck-in regarding medication and allergies and states all information entered during echeck-in remains accurate.    Comments or concerns regarding today's visit: none    [place] CHANG
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
## 2023-02-16 NOTE — PATIENT INSTRUCTIONS
**For crisis resources, please see the information at the end of this document**   Patient Education    Thank you for coming to the [facility] [place] [facility].     Lab Testing:  If you had lab testing today and your results are reassuring or normal they will be mailed to you or sent through [employer] within 7 days. If the lab tests need quick action we will call you with the results. The phone number we will call with results is # [phone]. If this is not the best number please call our clinic and change the number.     Medication Refills:  If you need any refills please call your pharmacy and they will contact us. Our fax number for refills is [phone].   Three business days of notice are needed for general medication refill requests.   Five business days of notice are needed for controlled substance refill requests.   If you need to change to a different pharmacy, please contact the new pharmacy directly. The new pharmacy will help you get your medications transferred.     Contact Us:  Please call [phone] during business hours (8-5:00 M-F).   If you have medication related questions after clinic hours, or on the weekend, please call [phone].     Financial Assistance [phone]   Medical Records [phone]       MENTAL HEALTH CRISIS RESOURCES:  For a emergency help, please call 911 or go to the nearest Emergency Department.     Emergency Walk-In Options:   EmPATH Unit @ [facility] ([place]): [phone] - Specialized mental health emergency area designed to be calming  [facility] [place] ([place]): [phone]  [facility] ([place]): [phone]  [facility]): [phone]    [facility] Crisis Information:   [place]: [phone]  [place]: [phone]  [place] - Adult: [phone]     Child: [phone]  [place] - Adult: [phone]     Child: [phone]  [place]:  [phone]  List of all [facility] resources:   https://[place].gov/dhs/people-we-serve/adults/health-care/mental-health/resources/crisis-contacts.jsp    National Crisis Information:   Crisis Text Line: Text  [place]  to 951718  Suicide & Crisis Lifeline: 988  National Suicide Prevention Lifeline: 9-155-804-TALK (1-609.977.3869)       For online chat options, visit https://suicidepreventionlifeline.org/chat/  Poison Control Center: 0-750-015-5021  Trans Lifeline: 8-655-850-5785 - Hotline for transgender people of all ages  The [employer]: 1-833-731-7937 - Hotline for LGBT youth     For Non-Emergency Support:   Fast Tracker: Mental Health & Substance Use Disorder Resources -   https://www.TrafficCastck51credit.comn.org/

## 2023-02-24 ENCOUNTER — TELEPHONE (OUTPATIENT)
Dept: PSYCHIATRY | Facility: CLINIC | Age: 33
End: 2023-02-24
Payer: COMMERCIAL

## 2023-02-24 NOTE — TELEPHONE ENCOUNTER
----- Message -----   From: Rasheeda TatianaLOTTIE Gan CNP   Sent: 2/16/2023   2:43 PM CST   To: Dawn Sanchez RN, Guerline Bowen RN     Hi Guerline and Dawn,   I'm concerned about Guero's blood pressure and his recently not having an RX for lisinopril (see note for discussion today).  If he does not go to urgent care or get a PCP appt in the next week, he agrees to come into the clinic for a blood pressure check.  Could you please reach out to him to get this scheduled for next week?  If blood pressure is significantly elevated I will be making some changes to his Wellbutrin dose.   Thanks!   Sofia Mays attempted to call patient as it does not appear that patient has made an appointment with his PCP or gone to urgent care to have his vital signs taken. LVM reminding patient to contact the clinic to schedule a nurse only visit to have his blood pressure checked. Provided main clinic number for call back.

## 2023-02-28 NOTE — TELEPHONE ENCOUNTER
Writer attempted to call patient again. LVM requesting he call the main clinic number to schedule a nurse visit to obtain his blood pressure.

## 2023-03-09 ENCOUNTER — VIRTUAL VISIT (OUTPATIENT)
Dept: PSYCHIATRY | Facility: CLINIC | Age: 33
End: 2023-03-09
Attending: NURSE PRACTITIONER
Payer: COMMERCIAL

## 2023-03-09 DIAGNOSIS — F20.9 SCHIZOPHRENIA, UNSPECIFIED TYPE (H): ICD-10-CM

## 2023-03-09 DIAGNOSIS — F32.A DEPRESSION, UNSPECIFIED DEPRESSION TYPE: ICD-10-CM

## 2023-03-09 PROCEDURE — G0463 HOSPITAL OUTPT CLINIC VISIT: HCPCS | Mod: PN,GT | Performed by: NURSE PRACTITIONER

## 2023-03-09 PROCEDURE — 99214 OFFICE O/P EST MOD 30 MIN: CPT | Mod: VID | Performed by: NURSE PRACTITIONER

## 2023-03-09 PROCEDURE — 90833 PSYTX W PT W E/M 30 MIN: CPT | Mod: VID | Performed by: NURSE PRACTITIONER

## 2023-03-09 RX ORDER — BUPROPION HYDROCHLORIDE 150 MG/1
450 TABLET ORAL EVERY MORNING
Qty: 60 TABLET | Refills: 0 | Status: SHIPPED | OUTPATIENT
Start: 2023-03-09 | End: 2023-04-06

## 2023-03-09 RX ORDER — ESCITALOPRAM OXALATE 5 MG/1
15 TABLET ORAL DAILY
Qty: 90 TABLET | Refills: 2 | Status: SHIPPED | OUTPATIENT
Start: 2023-03-09 | End: 2023-04-06

## 2023-03-09 RX ORDER — ARIPIPRAZOLE 20 MG/1
20 TABLET ORAL DAILY
Qty: 30 TABLET | Refills: 2 | Status: SHIPPED | OUTPATIENT
Start: 2023-03-09 | End: 2023-04-06
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
## 2023-03-09 NOTE — NURSING NOTE
Is the patient currently in the state of [place]? YES    Visit mode:VIDEO    If the visit is dropped, the patient can be reconnected by: VIDEO VISIT: Text to cell phone: [phone]    Will anyone else be joining the visit? NO      How would you like to obtain your AVS? MyChart    Are changes needed to the allergy or medication list? NO    Reason for visit: Follow-up

## 2023-03-09 NOTE — PATIENT INSTRUCTIONS
**For crisis resources, please see the information at the end of this document**   Patient Education    Thank you for coming to the [facility] [place] [facility].     Lab Testing:  If you had lab testing today and your results are reassuring or normal they will be mailed to you or sent through [employer] within 7 days. If the lab tests need quick action we will call you with the results. The phone number we will call with results is # [phone]. If this is not the best number please call our clinic and change the number.     Medication Refills:  If you need any refills please call your pharmacy and they will contact us. Our fax number for refills is [phone].   Three business days of notice are needed for general medication refill requests.   Five business days of notice are needed for controlled substance refill requests.   If you need to change to a different pharmacy, please contact the new pharmacy directly. The new pharmacy will help you get your medications transferred.     Contact Us:  Please call [phone] during business hours (8-5:00 M-F).   If you have medication related questions after clinic hours, or on the weekend, please call [phone].     Financial Assistance [phone]   Medical Records [phone]       MENTAL HEALTH CRISIS RESOURCES:  For a emergency help, please call 911 or go to the nearest Emergency Department.     Emergency Walk-In Options:   EmPATH Unit @ [facility] ([place]): [phone] - Specialized mental health emergency area designed to be calming  formerly [facility] ([place]): [phone]  [facility] ([place]): [phone]  [facility]): [phone]    [facility] Crisis Information:   [place]: [phone]  [place]: [phone]  [place] ([place]) - Adult: [phone]     Child: [phone]  [place] - Adult: [phone]     Child: [phone]  [place]:  [phone]  List of all [facility] resources:   https://[place].gov/dhs/people-we-serve/adults/health-care/mental-health/resources/crisis-contacts.jsp    National Crisis Information:   Crisis Text Line: Text  [place]  to 143870  Suicide & Crisis Lifeline: 988  National Suicide Prevention Lifeline: 1-727-382-TALK (1-100.489.9437)       For online chat options, visit https://suicidepreventionlifeline.org/chat/  Poison Control Center: 3-911-918-9841  Trans Lifeline: 3-980-383-4772 - Hotline for transgender people of all ages  The [employer] Project: 8-613-388-2696 - Hotline for LGBT youth     For Non-Emergency Support:   Fast Tracker: Mental Health & Substance Use Disorder Resources -   https://www.[employer].comn.org/

## 2023-03-24 ENCOUNTER — TELEPHONE (OUTPATIENT)
Dept: PSYCHIATRY | Facility: CLINIC | Age: 33
End: 2023-03-24
Payer: COMMERCIAL

## 2023-03-24 NOTE — TELEPHONE ENCOUNTER
Sofia,    The questions for the Behavorial health  Capacity questionnaires are the same from encounter 11-8-2022 .    Please review and let me know how you would like to proceed with the questions.    Sophia Salinas on 3/24/2023 at 12:49 PM

## 2023-03-24 NOTE — TELEPHONE ENCOUNTER
Received 6 pages from Muna. Needing page 2 and 3 complete. Behavioral health capacity questionnaire.      Sophia Salinas on 3/24/2023 at 6:33 AM

## 2023-03-28 NOTE — TELEPHONE ENCOUNTER
Formed signed by Dr. Sun.     Formed faxed to Palak Espinosa  At [phone]    Form sent to scan and forms held in triage until scanned in chart.      Sophia Salinas on 3/28/2023 at 1:31 PM

## 2023-03-28 NOTE — TELEPHONE ENCOUNTER
Received questions and answers through e-mail from Guerline (ÁLVARO). Per Sofia, no changes to answers based on encounter on 11-8-2022. Writer printed answers and attached to questionnaires.    Claim number is [id number].    Awaiting signature. Form in nurse triage waiting to be sign.    Sophia Salinas on 3/28/2023 at 10:37 AM

## 2023-04-04 ENCOUNTER — TELEPHONE (OUTPATIENT)
Dept: PSYCHIATRY | Facility: CLINIC | Age: 33
End: 2023-04-04
Payer: COMMERCIAL

## 2023-04-04 NOTE — TELEPHONE ENCOUNTER
----- Message from LOTTIE Spence CNP sent at 4/4/2023  9:14 AM CDT -----  Regarding: Pre-visit follow up  Hi Guerline,  Could you reach out to Guero and assess whether he has gotten back on his hypertension medication ?  If not, I would like him to be seen in person on Thursday instead of virtually so we can check his BP.      Thanks,  Sofia    Follow Up:  Writer attempted to call patient to follow up on the above. Writer also sent a [employer] message. LVM requesting that patient call the clinic or respond to [employer] message.

## 2023-04-06 ENCOUNTER — VIRTUAL VISIT (OUTPATIENT)
Dept: PSYCHIATRY | Facility: CLINIC | Age: 33
End: 2023-04-06
Attending: PSYCHIATRY & NEUROLOGY
Payer: COMMERCIAL

## 2023-04-06 VITALS
DIASTOLIC BLOOD PRESSURE: 84 MMHG | SYSTOLIC BLOOD PRESSURE: 138 MMHG | BODY MASS INDEX: 31.11 KG/M2 | HEART RATE: 73 BPM | WEIGHT: 247.6 LBS

## 2023-04-06 DIAGNOSIS — I10 HYPERTENSION, UNSPECIFIED TYPE: Primary | ICD-10-CM

## 2023-04-06 DIAGNOSIS — F32.A DEPRESSION, UNSPECIFIED DEPRESSION TYPE: ICD-10-CM

## 2023-04-06 DIAGNOSIS — F20.9 SCHIZOPHRENIA, UNSPECIFIED TYPE (H): ICD-10-CM

## 2023-04-06 PROCEDURE — 99214 OFFICE O/P EST MOD 30 MIN: CPT | Performed by: NURSE PRACTITIONER

## 2023-04-06 PROCEDURE — 90833 PSYTX W PT W E/M 30 MIN: CPT | Performed by: NURSE PRACTITIONER

## 2023-04-06 RX ORDER — ESCITALOPRAM OXALATE 5 MG/1
15 TABLET ORAL DAILY
Qty: 90 TABLET | Refills: 2 | Status: SHIPPED | OUTPATIENT
Start: 2023-04-06 | End: 2023-05-18

## 2023-04-06 RX ORDER — ARIPIPRAZOLE 20 MG/1
20 TABLET ORAL DAILY
Qty: 30 TABLET | Refills: 2 | Status: SHIPPED | OUTPATIENT
Start: 2023-04-06 | End: 2023-05-18

## 2023-04-06 RX ORDER — BUPROPION HYDROCHLORIDE 150 MG/1
450 TABLET ORAL EVERY MORNING
Qty: 60 TABLET | Refills: 0 | Status: SHIPPED | OUTPATIENT
Start: 2023-04-06 | End: 2023-05-18

## 2023-04-06 RX ORDER — LISINOPRIL 10 MG/1
10 TABLET ORAL DAILY
Qty: 30 TABLET | Refills: 0 | Status: SHIPPED | OUTPATIENT
Start: 2023-04-06 | End: 2023-05-18

## 2023-04-06 ASSESSMENT — PAIN SCALES - GENERAL: PAINLEVEL: NO PAIN (0)

## 2023-04-06 NOTE — PATIENT INSTRUCTIONS
**For crisis resources, please see the information at the end of this document**   Patient Education    Thank you for coming to the [facility] [place] [facility].     Lab Testing:  If you had lab testing today and your results are reassuring or normal they will be mailed to you or sent through [employer] within 7 days. If the lab tests need quick action we will call you with the results. The phone number we will call with results is # [phone]. If this is not the best number please call our clinic and change the number.     Medication Refills:  If you need any refills please call your pharmacy and they will contact us. Our fax number for refills is [phone].   Three business days of notice are needed for general medication refill requests.   Five business days of notice are needed for controlled substance refill requests.   If you need to change to a different pharmacy, please contact the new pharmacy directly. The new pharmacy will help you get your medications transferred.     Contact Us:  Please call [phone] during business hours (8-5:00 M-F).   If you have medication related questions after clinic hours, or on the weekend, please call [phone].     Financial Assistance [phone]   Medical Records [phone]       MENTAL HEALTH CRISIS RESOURCES:  For a emergency help, please call 911 or go to the nearest Emergency Department.     Emergency Walk-In Options:   EmPATH Unit @ [facility] ([place]): [phone] - Specialized mental health emergency area designed to be calming  [facility] ([place]): [phone]  [facility] ([place]): [phone]  [facility]): [phone]    [facility] Crisis Information:   [place]: [phone]  [place]: [phone]  [place] ([place]) - Adult: [phone]     Child: [phone]  [place] - Adult: [phone]     Child: [phone]  [place]:  [phone]  List of all [facility] resources:   https://[place].gov/dhs/people-we-serve/adults/health-care/mental-health/resources/crisis-contacts.jsp    National Crisis Information:   Crisis Text Line: Text  [place]  to 933438  Suicide & Crisis Lifeline: 988  National Suicide Prevention Lifeline: 0-158-650-TALK (1-521.683.8069)       For online chat options, visit https://suicidepreventionlifeline.org/chat/  Poison Control Center: 8-938-907-8293  Trans Lifeline: 8-590-802-7173 - Hotline for transgender people of all ages  The Vinnie Project: 4-342-192-1279 - Hotline for LGBT youth     For Non-Emergency Support:   Fast Tracker: Mental Health & Substance Use Disorder Resources -   https://www.[employer]n.org/
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
## 2023-05-18 ENCOUNTER — VIRTUAL VISIT (OUTPATIENT)
Dept: PSYCHIATRY | Facility: CLINIC | Age: 33
End: 2023-05-18
Attending: NURSE PRACTITIONER
Payer: COMMERCIAL

## 2023-05-18 DIAGNOSIS — I10 HYPERTENSION, UNSPECIFIED TYPE: ICD-10-CM

## 2023-05-18 DIAGNOSIS — F32.A DEPRESSION, UNSPECIFIED DEPRESSION TYPE: ICD-10-CM

## 2023-05-18 DIAGNOSIS — F20.9 SCHIZOPHRENIA, UNSPECIFIED TYPE (H): ICD-10-CM

## 2023-05-18 PROCEDURE — 90833 PSYTX W PT W E/M 30 MIN: CPT | Mod: VID | Performed by: NURSE PRACTITIONER

## 2023-05-18 PROCEDURE — 99214 OFFICE O/P EST MOD 30 MIN: CPT | Mod: VID | Performed by: NURSE PRACTITIONER

## 2023-05-18 RX ORDER — ARIPIPRAZOLE 20 MG/1
20 TABLET ORAL DAILY
Qty: 30 TABLET | Refills: 5 | Status: SHIPPED | OUTPATIENT
Start: 2023-05-18 | End: 2023-10-12

## 2023-05-18 RX ORDER — ESCITALOPRAM OXALATE 5 MG/1
15 TABLET ORAL DAILY
Qty: 90 TABLET | Refills: 5 | Status: SHIPPED | OUTPATIENT
Start: 2023-05-18 | End: 2023-10-12

## 2023-05-18 RX ORDER — BUPROPION HYDROCHLORIDE 150 MG/1
450 TABLET ORAL EVERY MORNING
Qty: 90 TABLET | Refills: 5 | Status: SHIPPED | OUTPATIENT
Start: 2023-05-18 | End: 2023-10-12

## 2023-05-18 RX ORDER — LISINOPRIL 10 MG/1
10 TABLET ORAL DAILY
Qty: 30 TABLET | Refills: 0 | Status: SHIPPED | OUTPATIENT
Start: 2023-05-18

## 2023-05-18 ASSESSMENT — PATIENT HEALTH QUESTIONNAIRE - PHQ9
SUM OF ALL RESPONSES TO PHQ QUESTIONS 1-9: 10
SUM OF ALL RESPONSES TO PHQ QUESTIONS 1-9: 10
10. IF YOU CHECKED OFF ANY PROBLEMS, HOW DIFFICULT HAVE THESE PROBLEMS MADE IT FOR YOU TO DO YOUR WORK, TAKE CARE OF THINGS AT HOME, OR GET ALONG WITH OTHER PEOPLE: SOMEWHAT DIFFICULT

## 2023-05-18 NOTE — NURSING NOTE
Is the patient currently in the state of [place]? YES    Visit mode:VIDEO    If the visit is dropped, the patient can be reconnected by: VIDEO VISIT: Text to cell phone: [phone]    Will anyone else be joining the visit? NO      How would you like to obtain your AVS? MyChart    Are changes needed to the allergy or medication list? NO    Reason for visit: Video Visit
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
## 2023-07-15 ENCOUNTER — HEALTH MAINTENANCE LETTER (OUTPATIENT)
Age: 33
End: 2023-07-15

## 2023-07-25 ENCOUNTER — TELEPHONE (OUTPATIENT)
Dept: PSYCHIATRY | Facility: CLINIC | Age: 33
End: 2023-07-25
Payer: COMMERCIAL
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
## 2023-07-25 NOTE — TELEPHONE ENCOUNTER
[facility] Call Center    Phone Message    May a detailed message be left on voicemail: yes     Reason for Call: Other: Concern over Patient's current mental health     Caller stated she knows she does not have consent on file to communicate with provider or RN about patient, but wanted to relay some information regarding her son. Caller is very concerned her son has not taken his medications because when she picked up his medication from the pharmacy they let her know he had not picked up his medications in two months. Caller stated her son recently was approved for medicinal marijuana and believes he is using this out of control. Caller stated her son has recently had violent outbursts, came home with a big gash on his elbow but did not remember how he got this. Caller stated her son recently bought a sword from [employer] and she is concerned for his safety and for other's safety as well. Caller stated one hour he will be fine and she feels safe, the next hour she does not feel safe around patient. Caller stated family members have also expressed concern and caller is very upset at this moment. Caller states this has been happening for several weeks. Caller is hoping she can speak to RN about this some more.    Action Taken: Message routed to:  Other: [facility]    Travel Screening: Not Applicable

## 2023-07-25 NOTE — TELEPHONE ENCOUNTER
Writer called Catalina and informed her that the Strengths team is still working on developing a plan for Guero to meet with a provider. Confirmed the team should be reaching out to her and Guero tomorrow or Thursday. Also informed her that AP Broussard will be reaching out to offer support and other resources.     Reiterated when to call [facility] or 911. Mom voiced understanding. She shared that Guero is currently at home in his apartment. He just sent a picture of some artwork he completed and seems to be very calm at this time.    Elisa Mistry RN on 7/25/2023 at 5:57 PM

## 2023-07-26 NOTE — TELEPHONE ENCOUNTER
Writer called patient's mother to update that scheduling had reached out to set up an appointment for tomorrow. Mother states that patient typically sleeps until 1:00PM and she will try to encourage him to contact the clinic. Writer also provided mother with clinic after-hours number.

## 2023-07-27 ENCOUNTER — TELEPHONE (OUTPATIENT)
Dept: PSYCHIATRY | Facility: CLINIC | Age: 33
End: 2023-07-27

## 2023-07-27 ENCOUNTER — VIRTUAL VISIT (OUTPATIENT)
Dept: PSYCHIATRY | Facility: CLINIC | Age: 33
End: 2023-07-27
Attending: PSYCHIATRY & NEUROLOGY
Payer: COMMERCIAL

## 2023-07-27 DIAGNOSIS — F20.9 SCHIZOPHRENIA, UNSPECIFIED TYPE (H): Primary | ICD-10-CM

## 2023-07-27 DIAGNOSIS — F32.A DEPRESSION, UNSPECIFIED DEPRESSION TYPE: ICD-10-CM

## 2023-07-27 PROCEDURE — 99214 OFFICE O/P EST MOD 30 MIN: CPT | Mod: VID | Performed by: STUDENT IN AN ORGANIZED HEALTH CARE EDUCATION/TRAINING PROGRAM

## 2023-07-27 NOTE — NURSING NOTE
Is the patient currently in the state of [place]? YES    Visit mode:VIDEO    If the visit is dropped, the patient can be reconnected by: VIDEO VISIT: Text to cell phone: [phone]    Will anyone else be joining the visit? NO      How would you like to obtain your AVS? MyChart    Are changes needed to the allergy or medication list? NO    Reason for visit: RECHECK
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
## 2023-07-27 NOTE — TELEPHONE ENCOUNTER
Writer was informed around 9 am this morning that intake/scheduling have not been able to reach the patient to confirm the appt for Guero today. Although, they did receive a voice message from him saying he would be willing to meet with the provider.     Writer called Catalina, patient's mother for an update on the patient and to see if she has any updates on whether Guero can attend the appt today. She informed this writer that Guero has agreed to the appt. He apparently sent a text to his mother saying he will attend. He will set an alarm for 1 pm to make sure he can attend this.    Catalina went onto say the last few days, Guero has been calm. She believes this is because he likely got a refill for the medical cannabis. Although, she said he'll have days like this and will then randomly go into a fit of rage and become verbally aggressive. Catalina reports since his first episode of psychosis, he has never been verbally or physically abusive. She reports this is the first time he has been verbally abusive towards her and other family members.    Writer inquired about other substance use. She mentioned he has a history of using epsom salts and is slightly concerned there's a possibility he is using meth. Catalina shared she's almost certain her other son is using meth. Guero and this brother have been spending a lot of time together.     Overall, mom feels Guero is currently calm and safe. But she did share that other family members don't feel this way and feel more action is needed to help Guero. She no longer feels supported by the family and is now trying to manage this on her own. Mom inquired about in-home supports to check on the patient and help set up medications. She also inquired about day treatment or other programming for the patient. Writer explained the provider will need to assess his readiness for these things at the appt before making recommendations. She voiced understanding.     Writer agreed to  stay in touch with mom and Catalina will reach out if she or family have any further concerns for the patient. She understands when it's appropriate to call [facility] and/or 911.    Elisa Mistry RN on 7/27/2023 at 2:24 PM

## 2023-07-27 NOTE — PROGRESS NOTES
"Virtual Visit Details    Type of service:  Video Visit   Video Start Time: {video visit start/end time for provider to select:828561}  Video End Time:{video visit start/end time for provider to select:058386}    Originating Location (pt. Location): {video visit patient location:627026::\"Home\"}  {PROVIDER LOCATION On-site should be selected for visits conducted from your clinic location or adjoining [facility] hospital, academic office, or other nearby [facility] building. Off-site should be selected for all other provider locations, including home:186104}  Distant Location (provider location):  {virtual location provider:030181}  Platform used for Video Visit: {Virtual Visit Platforms:114870::\"ShopSpot\"}    "

## 2023-07-27 NOTE — TELEPHONE ENCOUNTER
[facility] Family Peer Support  A Part of the [facility] First Episode of Psychosis Program     Patient Name: Guero Carter  /Age:  1990 (33 year old)  Date of Encounter: 23  Length of Contact: 41 minutes    This writer reached out to offer resources and support to patient's mother, Catalina, at the request of AP Kerr.     Catalina shared that she is concerned about Guero's level of functioning and safe behaviors in the community.  Guero is currently living in his own apartment, so it is challenging for her to know how he is doing every day.  However, she spoke with him today and he seemed calm.    Catalina remains hopeful that Guero will agree to engage in therapy and has observed that therapy seems to have a stabilizing effect on his emotional state.    Catalina has the information for calling [facility] and said that the nurses she spoke to were very helpful.  She feels supported by the [facility]reng[facility] program and appreciated the family peer support.    Kristine Barksdale CFPS  Strengths Family Peer    [Billing Code 53T5792 for Nonbillable Service as family peer support is a nonbillable service]

## 2023-07-27 NOTE — PATIENT INSTRUCTIONS
**For crisis resources, please see the information at the end of this document**   Patient Education    Thank you for coming to the [facility] [place] [facility].     Lab Testing:  If you had lab testing today and your results are reassuring or normal they will be mailed to you or sent through [facility] within 7 days. If the lab tests need quick action we will call you with the results. The phone number we will call with results is # [phone]. If this is not the best number please call our clinic and change the number.     Medication Refills:  If you need any refills please call your pharmacy and they will contact us. Our fax number for refills is [phone].   Three business days of notice are needed for general medication refill requests.   Five business days of notice are needed for controlled substance refill requests.   If you need to change to a different pharmacy, please contact the new pharmacy directly. The new pharmacy will help you get your medications transferred.     Contact Us:  Please call [phone] during business hours (8-5:00 M-F).   If you have medication related questions after clinic hours, or on the weekend, please call [phone].     Financial Assistance [phone]   Medical Records [phone]       MENTAL HEALTH CRISIS RESOURCES:  For a emergency help, please call 911 or go to the nearest Emergency Department.     Emergency Walk-In Options:   EmPATH Unit @ [facility] ([place]): [phone] - Specialized mental health emergency area designed to be calming  [facility] ([place]): [phone]  [facility] ([place]): [phone]  [facility]): [phone]    [facility] Crisis Information:   [place]: [phone]  [place]: [phone]  [place] ([place]) - Adult: [phone]     Child: [phone]  [place] - Adult: [phone]     Child: [phone]  [place]:  [phone]  List of all [facility] resources:   https://[place].gov/dhs/people-we-serve/adults/health-care/mental-health/resources/crisis-contacts.jsp    National Crisis Information:   Crisis Text Line: Text  [place]  to 451059  Suicide & Crisis Lifeline: 988  National Suicide Prevention Lifeline: 9-238-986-TALK (1-115.307.2859)       For online chat options, visit https://suicidepreventionlifeline.org/chat/  Poison Control Center: 5-449-661-3139  Trans Lifeline: 1-548-489-5298 - Hotline for transgender people of all ages  The [employer] Project: 8-912-123-7685 - Hotline for LGBT youth     For Non-Emergency Support:   Fast Tracker: Mental Health & Substance Use Disorder Resources -   https://www.[facility].org/

## 2023-07-31 NOTE — TELEPHONE ENCOUNTER
Writer called Catalina and provided update that Guero met with Dr. Stallings versus Dr. Fleming. Mom was already aware of this, as Guero told his sister. Per mom, Guero really liked Dr. Stallings and was very happy with the visit. She reports he's doing a lot better since the appt and is taking his meds as prescribed. He understands being around his brother who is mentally unstable is a big trigger for him and has been trying to limit his time with him.     At this time, mom asked about treatment or therapy to help Guero and mentioned he needs to be consistently monitored. Informed her that Dr. Stallings feels the patient doesn't meet criteria for this, as he presented so well on Thursday and that he has to go off what he observes and hears from the patient. Mom voiced understanding. Explained that Guero can reach out if he would like a referral to therapy and the team would be happy to assist. She voiced understanding.    Lastly, writer confirmed Catalina can call anytime she has concerns for Guero.     Elisa Mistry RN on 7/31/2023 at 10:02 AM

## 2023-08-13 DIAGNOSIS — F20.9 SCHIZOPHRENIA, UNSPECIFIED TYPE (H): ICD-10-CM

## 2023-08-13 DIAGNOSIS — I10 HYPERTENSION, UNSPECIFIED TYPE: ICD-10-CM

## 2023-08-16 RX ORDER — LISINOPRIL 10 MG/1
10 TABLET ORAL DAILY
Qty: 30 TABLET | Refills: 0 | OUTPATIENT
Start: 2023-08-16

## 2023-08-23 ENCOUNTER — TELEPHONE (OUTPATIENT)
Dept: PSYCHIATRY | Facility: CLINIC | Age: 33
End: 2023-08-23
Payer: COMMERCIAL

## 2023-08-23 NOTE — TELEPHONE ENCOUNTER
Received 7 pages including SHANA from [email or web address] . They are requesting :      Medical records : dates from 2/7/2023-their last office visit     2. Complete the health capacity questions and sign and date         Sophia Salinas on 8/23/2023 at 2:14 PM

## 2023-08-25 NOTE — TELEPHONE ENCOUNTER
Dr. Stallings reviewed forms questionnaires and no changes is made to questions. Writer will review with Anastasiya and have Dr. Stallings sign the forms .    Sophia Salinas on 8/25/2023 at 8:34 AM

## 2023-08-29 NOTE — TELEPHONE ENCOUNTER
Forms over viewed with Queenie Cruz to fax medical records from March to present . No changes on the Capacity questionnaire , Answers attached to forms.     A quick disclosure was documented     Awaiting for signature.    Sophia Salinas on 8/29/2023 at 10:25 AM

## 2023-09-01 NOTE — TELEPHONE ENCOUNTER
Forms signed by Dr. Stallings. Faxed forms and progress notes  to [facility] at [phone].    Sent to scan and copy held in nurse triage until scan in chart       Sophia Salinas on 9/1/2023 at 2:52 PM

## 2023-09-07 ENCOUNTER — TELEPHONE (OUTPATIENT)
Dept: PSYCHIATRY | Facility: CLINIC | Age: 33
End: 2023-09-07
Payer: COMMERCIAL

## 2023-09-07 NOTE — TELEPHONE ENCOUNTER
[facility] Call Center    Phone Message    May a detailed message be left on voicemail: yes     Reason for Call: Other: mom called wanting to speak with a nurse regarding her son she did not give me any information but she would like Guerline to call her back as soon as she can     Action Taken: Other: nurse pool    Travel Screening: Not Applicable

## 2023-09-07 NOTE — TELEPHONE ENCOUNTER
"Writer returned patient's mother's call (Consent to Communicate on file). Mother states that patient is not doing well as of yesterday. She states he had been doing really well since his last visit with Dr. Stallings, but started texting profanities to her again yesterday. Mother does not feel there are safety concerns currently, but states patient is also texting other family members who tell mother to \"do something about it.\"     Mother states patient's disability was not renewed and believes this is a trigger. She states he has gone on interviews, but is afraid he is going to be homeless. Mother states that typically when patient is scared, he becomes more symptomatic.     Mother does not want patient to know that she called us, but is hoping that we are able to get patient in to be seen by a provider soon, as she states patient always does well after he meets with a provider. Writer also discussed calling 911, COPE or bringing patient to the nearest emergency department if there are safety concerns.   "

## 2023-09-08 NOTE — TELEPHONE ENCOUNTER
Received a call from patient's mother. She states she went to visit patient last night and he had been out walking his dog and seemed to have calmed down. Mother states that she is going to see him again tonight, but states he called 2-3 hours ago stating he was feeling depressed, but mikal for safety. Mother states his mood improved after she Venmo'd him some money.     Mother encouraged patient to make an appointment and he told her that he called the clinic, but was unable to get an appointment. Mom states patient showed her that he has an appointment with Sofia Vanessa on 10/12, which mother states is a positive sign as he is more willing to engage with her about his treatment.     Writer again reviewed emergency services with mother such as calling 911, COPE, and presenting to the nearest ED or EmPATH and mother voiced understanding.

## 2023-09-11 DIAGNOSIS — F20.9 SCHIZOPHRENIA, UNSPECIFIED TYPE (H): ICD-10-CM

## 2023-09-11 DIAGNOSIS — I10 HYPERTENSION, UNSPECIFIED TYPE: ICD-10-CM

## 2023-09-13 NOTE — TELEPHONE ENCOUNTER
Writer spoke with patient's mother who states that patient is doing great this week. She states she just picked up his medication refills for him and he has been calling her twice per day. She states that he has asked her to help manage his medications to keep him on task, which mother states is progress, as he he has been resistant to her help in the past. Mother also states that patient went out golfing with his brother today, which is normally a trigger, and this went well. She reports patient has not had any anger outbursts all week.    Mother states that patient is looking for help in his job searches and daily activities, but he needs help establishing this. Mother believes patient has had long term disability for the past 2 years and states he has had to appeal in the past to continue it. She states patient has applied for SSDI multiple times, but has been denied. Writer also provided [employer] website per Megan's suggestion.    Mother states that patient has told her he would like to see a counselor weekly. Per scheduling, they attempted to reach out to patient to schedule with [facility] provider, Dr. Caraballo (covering patient's care) for med management and patient returned call stating that he would like to see Jakob for counseling.

## 2023-09-14 RX ORDER — LISINOPRIL 10 MG/1
10 TABLET ORAL DAILY
Qty: 30 TABLET | Refills: 0 | OUTPATIENT
Start: 2023-09-13

## 2023-09-14 NOTE — TELEPHONE ENCOUNTER
"Medication requested:  Lisinopril Oral Tablet 10 MG   Last refilled: 5/18/23  Qty: 30/0      Last seen: 7/27/23 \" I agreed to refill lisinopril for a one month supply to get him to PCP appointment, he agrees to follow up with PCP as soon as possible.   RTC: 2 months  Cancel: 0  No-show: 0  Next appt: 10/12/23    Refill decision:    Refill denied send to primary care who manages this med     "

## 2023-10-07 DIAGNOSIS — I10 HYPERTENSION, UNSPECIFIED TYPE: ICD-10-CM

## 2023-10-07 DIAGNOSIS — F20.9 SCHIZOPHRENIA, UNSPECIFIED TYPE (H): ICD-10-CM

## 2023-10-10 RX ORDER — LISINOPRIL 10 MG/1
10 TABLET ORAL DAILY
Qty: 30 TABLET | Refills: 0 | OUTPATIENT
Start: 2023-10-10

## 2023-10-11 ASSESSMENT — ANXIETY QUESTIONNAIRES
GAD7 TOTAL SCORE: 11
7. FEELING AFRAID AS IF SOMETHING AWFUL MIGHT HAPPEN: NEARLY EVERY DAY
3. WORRYING TOO MUCH ABOUT DIFFERENT THINGS: SEVERAL DAYS
5. BEING SO RESTLESS THAT IT IS HARD TO SIT STILL: NOT AT ALL
4. TROUBLE RELAXING: MORE THAN HALF THE DAYS
GAD7 TOTAL SCORE: 11
IF YOU CHECKED OFF ANY PROBLEMS ON THIS QUESTIONNAIRE, HOW DIFFICULT HAVE THESE PROBLEMS MADE IT FOR YOU TO DO YOUR WORK, TAKE CARE OF THINGS AT HOME, OR GET ALONG WITH OTHER PEOPLE: SOMEWHAT DIFFICULT
6. BECOMING EASILY ANNOYED OR IRRITABLE: MORE THAN HALF THE DAYS
1. FEELING NERVOUS, ANXIOUS, OR ON EDGE: MORE THAN HALF THE DAYS
2. NOT BEING ABLE TO STOP OR CONTROL WORRYING: SEVERAL DAYS

## 2023-10-11 ASSESSMENT — PATIENT HEALTH QUESTIONNAIRE - PHQ9
10. IF YOU CHECKED OFF ANY PROBLEMS, HOW DIFFICULT HAVE THESE PROBLEMS MADE IT FOR YOU TO DO YOUR WORK, TAKE CARE OF THINGS AT HOME, OR GET ALONG WITH OTHER PEOPLE: SOMEWHAT DIFFICULT
SUM OF ALL RESPONSES TO PHQ QUESTIONS 1-9: 11
SUM OF ALL RESPONSES TO PHQ QUESTIONS 1-9: 11

## 2023-10-12 ENCOUNTER — VIRTUAL VISIT (OUTPATIENT)
Dept: PSYCHIATRY | Facility: CLINIC | Age: 33
End: 2023-10-12
Attending: NURSE PRACTITIONER
Payer: COMMERCIAL

## 2023-10-12 DIAGNOSIS — F20.9 SCHIZOPHRENIA, UNSPECIFIED TYPE (H): ICD-10-CM

## 2023-10-12 DIAGNOSIS — F32.A DEPRESSION, UNSPECIFIED DEPRESSION TYPE: ICD-10-CM

## 2023-10-12 PROCEDURE — 90833 PSYTX W PT W E/M 30 MIN: CPT | Mod: VID | Performed by: NURSE PRACTITIONER

## 2023-10-12 PROCEDURE — 99214 OFFICE O/P EST MOD 30 MIN: CPT | Mod: VID | Performed by: NURSE PRACTITIONER

## 2023-10-12 RX ORDER — ARIPIPRAZOLE 20 MG/1
20 TABLET ORAL DAILY
Qty: 30 TABLET | Refills: 5 | Status: SHIPPED | OUTPATIENT
Start: 2023-10-12

## 2023-10-12 RX ORDER — ESCITALOPRAM OXALATE 5 MG/1
15 TABLET ORAL DAILY
Qty: 90 TABLET | Refills: 5 | Status: SHIPPED | OUTPATIENT
Start: 2023-10-12 | End: 2024-02-22

## 2023-10-12 RX ORDER — BUPROPION HYDROCHLORIDE 300 MG/1
300 TABLET ORAL EVERY MORNING
Qty: 30 TABLET | Refills: 1 | Status: SHIPPED | OUTPATIENT
Start: 2023-10-12 | End: 2024-02-22

## 2023-10-12 RX ORDER — BUPROPION HYDROCHLORIDE 450 MG/1
450 TABLET, FILM COATED, EXTENDED RELEASE ORAL EVERY MORNING
Qty: 30 TABLET | Refills: 0 | Status: SHIPPED | OUTPATIENT
Start: 2023-10-12 | End: 2024-02-22

## 2023-10-12 NOTE — PATIENT INSTRUCTIONS
Guero,  Fan to see you today.  Congrats on your  new job.   As discussed, please continue your current wellbutrin dose for 1 month, then we will decrease the Wellbutrin to 300 mg daily.   Contact us for any questions or concerns.  Tatiana Vanessa CNP, 10/12/2023 2:30 PM        **For crisis resources, please see the information at the end of this document**   Patient Education    Thank you for coming to the [facility] [place] [facility].     Lab Testing:  If you had lab testing today and your results are reassuring or normal they will be mailed to you or sent through D-Share within 7 days. If the lab tests need quick action we will call you with the results. The phone number we will call with results is # [phone]. If this is not the best number please call our clinic and change the number.     Medication Refills:  If you need any refills please call your pharmacy and they will contact us. Our fax number for refills is [phone].   Three business days of notice are needed for general medication refill requests.   Five business days of notice are needed for controlled substance refill requests.   If you need to change to a different pharmacy, please contact the new pharmacy directly. The new pharmacy will help you get your medications transferred.     Contact Us:  Please call [phone] during business hours (8-5:00 M-F).   If you have medication related questions after clinic hours, or on the weekend, please call [phone].     Financial Assistance [phone]   Medical Records [phone]       MENTAL HEALTH CRISIS RESOURCES:  For a emergency help, please call 911 or go to the nearest Emergency Department.     Emergency Walk-In Options:   Em[facility] Unit @ [facility] ([place]): [phone] - Specialized mental health emergency area designed to be calming  [facility] ([place]): [phone]  [facility]iat[facility]  ([place]): [phone]  [facility] ([place]): [phone]    [facility] Crisis Information:   [place]: [phone]  [place]: [phone]  [place] ([place]) - Adult: [phone]     Child: [phone]  [place] - Adult: [phone]     Child: [phone]  [place]: [phone]  List of all [facility] resources:   https://[place].[facility]/dhs/people-we-serve/adults/health-care/mental-health/resources/crisis-contacts.jsp    National Crisis Information:   Crisis Text Line: Text  [place]  to 701425  Suicide & Crisis Lifeline: 988  National Suicide Prevention Lifeline: 1-008-866-TALK (1-195.291.6956)       For online chat options, visit https://suicidepreventionlifeline.org/chat/  Poison Control Center: 1-186.950.2449  Trans Lifeline: 1-290.814.1500 - Hotline for transgender people of all ages  The [employer] Project: 0-232-496-0061 - Hotline for LGBT youth     For Non-Emergency Support:   Fast Tracker: Mental Health & Substance Use Disorder Resources -   https://www.eblizzckPin or Pegn.org/
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
## 2023-10-12 NOTE — Clinical Note
Guero is interested in seeing a therapist r/t family stressors/relationships a nd how this impacts mood and psychosis - any availability in our program or should I refer to the general therapy referral? Thanks!

## 2023-10-12 NOTE — NURSING NOTE
Is the patient currently in the state of [place]? YES    Visit mode:VIDEO    If the visit is dropped, the patient can be reconnected by: VIDEO VISIT: Text to cell phone:   Telephone Information:   Mobile [phone]       Will anyone else be joining the visit? NO  (If patient encounters technical issues they should call [phone] :[id number])    How would you like to obtain your AVS? MyChart    Are changes needed to the allergy or medication list? Pt stated no changes to allergies and Pt stated no med changes    Reason for visit: LUNA ROMERO

## 2023-11-30 ENCOUNTER — TELEPHONE (OUTPATIENT)
Dept: PSYCHIATRY | Facility: CLINIC | Age: 33
End: 2023-11-30

## 2023-11-30 NOTE — TELEPHONE ENCOUNTER
[facility] Family Peer Support  A Part of the [facility] First Episode of Psychosis Program     Patient Name: Guero Carter  /Age:  1990 (33 year old)  Date of Encounter: 23  Length of Contact: 15 minutes    This writer reached out to offer resources and support to patient's mother, Catalina.     Catalina shared the Delroy is doing well.  Currently, she is more concerned about his older brother who is living with untreated schizoaffective disorder.  Crisis resources were provided.  This writer offered support and encouragement.    Kristine Barksdale CFPS  [facility] Family Peer    [Billing Code 95D0470 for Nonbillable Service as family peer support is a nonbillable service]

## 2023-12-06 ENCOUNTER — TELEPHONE (OUTPATIENT)
Dept: PSYCHIATRY | Facility: CLINIC | Age: 33
End: 2023-12-06
Payer: COMMERCIAL
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
## 2023-12-06 NOTE — TELEPHONE ENCOUNTER
Writer called patient to check in and offer a sooner appointment.    Patient reports he does have medication on hand and has been taking it, but states that when his mother went to get a second refill, she was told they needed provider authorization. Writer will contact pharmacy to follow up on this.    Writer offered sooner appointment with provider, but patient declined this, stating he feels he needs a therapy appointment to discuss his concerns. Patient denies any safety concerns at this time. Patient's speech clear and coherent with regular rate and rhythm.
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
## 2023-12-07 NOTE — TELEPHONE ENCOUNTER
Writer called pharmacy to inquire about barriers to getting medications filled and they initially stated that patient needs a prior authorization for Lexapro and bupropion, but stated this could be overridden by sending in bupropion using 150 mg and 300 mg tablets and Lexapro split into 10 and 5 mg tablets.     Pharmacy staff stated that the Abilify goes through insurance, but patient is not eligible for refill until 12/13.     Last filled:  Lexapro 10/15 for 90 day supply  Bupropion 10/7 for 30 day supply.     Writer had pharmacy run the 300 mg dose and they confirmed it went through insurance.     Writer attempted to call patient to discuss and ensure he has been taking the 450 mg Wellbutrin. LVM requesting a call back and advised that writer would also send a Verafin message.

## 2023-12-08 ENCOUNTER — MYC MEDICAL ADVICE (OUTPATIENT)
Dept: PSYCHIATRY | Facility: CLINIC | Age: 33
End: 2023-12-08
Payer: COMMERCIAL

## 2023-12-13 NOTE — CONFIDENTIAL NOTE
"Received call back from patient's mother. She states she called patient and he is \"pretty aggressive.\" She denies that he is threatening to hurt her, but she states he has not worked all week, so she assumes he was either fired or quit work. He told her he was starving and mother offered to bring him groceries at 6:00 and patient told her she could bring them tomorrow and told her that she is not welcome. Mother states he talks like this all the time, so it's not much different than he was last week.   He's not any different than he was last week. He talks like this all the time. Mother also states that patient will not commit to seeing provider tomorrow. Writer advised that recommendation at this time is COPE or a welfare check. Mother verbalized understanding.     Patient told mother that he's going to go to his friends to \"survive,\" but mother is unsure whether he is actually leaving home or not. Mother states that patient's current address is:     [address] [place] [address] [address]  [place], [place]     Mother asks that we request a CIT trained officer if not the mobile crisis team. Mother was unsure whether patient's matthew espino was in the apartment, but states he had it out in public yesterday. Mother also notes that patient has a dog that is very protective. She states the dog has never bitten anyone, but does bark. Mom states she will be in the vicinity of patient's home in case patient is brought into the hospital and the dog needs to be cared for.     Mom (Catalina) -  # [phone]    Writer called 911 to request a welfare check. They plan to go out to assess patient.     "

## 2023-12-13 NOTE — TELEPHONE ENCOUNTER
Writer attempted to call patient to check in/follow up on [employer] message. LVM requesting a call back at the main clinic number.     Patient has therapy and med management appointments tomorrow.
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
## 2023-12-14 ENCOUNTER — TELEPHONE (OUTPATIENT)
Dept: PSYCHIATRY | Facility: CLINIC | Age: 33
End: 2023-12-14
Payer: COMMERCIAL

## 2023-12-14 DIAGNOSIS — F20.9 SCHIZOPHRENIA, UNSPECIFIED TYPE (H): Primary | ICD-10-CM

## 2023-12-14 RX ORDER — OLANZAPINE 5 MG/1
5 TABLET ORAL 2 TIMES DAILY
Qty: 60 TABLET | Refills: 2 | Status: SHIPPED | OUTPATIENT
Start: 2023-12-14 | End: 2023-12-15

## 2023-12-14 NOTE — TELEPHONE ENCOUNTER
[facility] Call Center    Phone Message    May a detailed message be left on voicemail: n/a     Reason for Call:   Patient's mom is calling back to update about the welfare check that happened yesterday. The police felt that Guero was fine so they did not take him. She will be meeting with Guero buck at 4pm to have supper with him. She also confirmed that he will still meet with Sofia Vanessa for his 1pm appointment today.    She requested a call back from care team. [phone]    Writer rescheduled the appointment back onto provider's schedule.    Action Taken: Message routed to:  Other: psychiatry nurse ana cristina, Sofia Vanessa    Travel Screening: Not Applicable

## 2023-12-14 NOTE — TELEPHONE ENCOUNTER
Phone call to Guero 10 minutes after appointment time, no answer, left voicemail asking for call back.  Offered later visit or visit early next week.     Then called Guero's mom, Catalina.  Catalina confirmed documented history from telephone notes with [facility] team yesterday and earlier today.  She reports that Yanis symptoms have increased over the last week or so, and that historical triggers for this are returning to full time work.  She reports he has had the bhupinder sword for about 6 months.  This is usually kept in his home, however last week he did bring it to the [facility].  She is not aware of any threats to harm specific people.  She reports this was assessed further yesterday by law enforcement when they went to his apartment, and they did not feel he was at imminent risk of harm to self or others.  She reports law enforcement is also aware of his previous statements about risk of being shot by law enforcement.  Catalina reports that Guero does not have access to guns.  Recommended that she discuss removing the sword from Guero's possession for now.  She agrees to try this, and believes she can minimally convince him not to take the sword out of his home.      Mom plans to visit Guero buck around 4 PM to bring him groceries.  If she has concerns at this time or in the future about decompensation or safety risk, she will contact the police for another welfare check, or reach COPE.  She does not want to pursue hospitalization due to prior negative experiences, unless he requires a 72 hr hold.  She reports Guero usually de-escalates fairly quickly when she can get him face to face.  She also reports he is willing to take PRN zyprexa for symptom exacerbation.  Has given him this from old prescriptions.  I will send an updated order in today.  She believes he will be resistant to PHP or DTP, will wait to talk with Guero prior to submitting referral.      She does not believe Guero is currently drinking.      Discussed referral to  team to help initiate case management.      Mom plans to update the clinic after meeting with Guero this evening, and she will encourage him to schedule with me.     Tatiana Vanessa CNP, 12/14/2023 2:21 PM

## 2023-12-15 ENCOUNTER — TELEPHONE (OUTPATIENT)
Dept: PSYCHIATRY | Facility: CLINIC | Age: 33
End: 2023-12-15
Payer: COMMERCIAL

## 2023-12-15 DIAGNOSIS — F20.9 SCHIZOPHRENIA, UNSPECIFIED TYPE (H): ICD-10-CM

## 2023-12-15 RX ORDER — OLANZAPINE 5 MG/1
5 TABLET ORAL 2 TIMES DAILY
Qty: 60 TABLET | Refills: 2 | Status: SHIPPED | OUTPATIENT
Start: 2023-12-15

## 2023-12-15 NOTE — TELEPHONE ENCOUNTER
"Received incoming call from patient's mother with an update. She states she did contact patient yesterday evening. She states he reached out to her requesting that she  his medications for him and stated he would take them. Mother states [facility] was out of stock and requested PRN Zyprexa be sent to [facility].     Mother states that patient was doing better than Wednesday. She states he does carry a knife in his pocket and remains paranoid, but did not bring the samrobini sword. Mother states that he refused to go inside the grocery store with her, but met her at [facility] and transferred the groceries to his car. Mother reports patient states he is \"too fragile to be around people because it might be too upsetting to him.\" She states he afraid he will have a heart attack.     Mother states she will also be in contact with patient today and over the weekend. Reviewed emergency resources with mother again and she agrees to contact 911 or COPE if she has concerns. Mother states she will try to get patient to call and make a follow up appointment with provider. Per mother, patient states he missed his appointment because he did not remember telling her he would still attend at 1:00 after cancelling the appointment.           "

## 2023-12-18 ENCOUNTER — TELEPHONE (OUTPATIENT)
Dept: PSYCHIATRY | Facility: CLINIC | Age: 33
End: 2023-12-18
Payer: COMMERCIAL

## 2023-12-18 NOTE — TELEPHONE ENCOUNTER
Mom called back with an update that they were able to speak with Guero and determined that he is safe in his apartment at the time. They did make a note on his file that he is experiencing a mental health crisis so that other officers are aware if they need to follow up. They did let mom know that he is in [facility] and gave her the [facility] crisis number. Nursing will check back in with mom tomorrow. She will call if any changes before then.

## 2023-12-18 NOTE — TELEPHONE ENCOUNTER
Mom called and states that patient has been harassing his siblings over the weekend with violent texts. She does not know exactly what the texts contain but can find out if needed. His phone is now broken so she went to his house yesterday and he would not let her in and was screaming that the  were after him and that they are going to kill him. She did call the police to let them know that he was having mental health issues to put it on their radar but did not ask for them to come over. She is going to try to go back over there today and see if he will take his medications. She is concerned for his safety and unsure what to do. Discussed calling [place] or 911 when she got there.     Discussed with provider and POD and they agree that COPE would be the best plan to have patient assessed at this time if able.     Called mom back after a few mins to see if she was there.She is at his apartment and he is not answering and his dog is not barking. His car is there. She states that there is some blood in the hallway that she thinks may have been there last night. Apartment people would not let her go in but will give keys to the police. She states that last night she made a comment to her that if any one or the police come to the door he karen end it. Expressed to mom that calling the police to do a welfare check would be the best thing right now. She was hesitant because he may just be sleeping or not home. Explained that with everything that she is saying there is concern for his safety and she agreed to allow writer to call. She will wait there for them.     911 called and asked for a welfare check. They will send someone right away. Gave them mom's number and writer's direct number.      Patient's address  [address]  [place], [place]

## 2023-12-19 NOTE — TELEPHONE ENCOUNTER
Mom called after going to son's house tonight.  She could hear him yelling down the reddy before she got to his door.  When she knocked on his door he asked her if she brought any weed.  She saw the bag of groceries from yesterday still sitting, there he took the drain out but not for food.  She asked if she can see the dog and he said no.  He said that the dog has broken tail and broken ribs.  She does not know if this is true.  But she still has not heard the dog bark.  She also said the dog food was still sitting outside.  She offered him a new phone, he refused to open the door but told her she could leave it outside the window and he would grab it from there.  He seemed agitated, but was not making any threats or yelling at her.  Writer advised mom to call [facility].  She states she will call them tomorrow and have them come out there with her.

## 2023-12-19 NOTE — TELEPHONE ENCOUNTER
"Writer spoke with provider who is able to offer patient a 60 minute appointment (ideally video visit in clinic) between:    12:30-3:00 today 12/19  10:00-1:00 tomorrow 12/20    Writer called patient's mother to check in and discuss the above. Mother shares that they had the police out yesterday and they ended up leaving as patient was calm, but states this was overall a good experience. Mother states patient asked her if his family still loves him, which mother believes is a positive sign. Mother states there have been no changes since 2:00 yesterday. She is unsure whether patient has been taking his PRN Zyprexa, but believes he might be as the reason he didn't answer the door yesterday was due to sleeping so hard. Mother states she went back to patient's apartment to drop off groceries and patient had her leave the groceries by the door.     Mother states she tried to discuss an appointment with provider, but patient states he fired provider. When mother suggested he \"rehire\" provider, patient became agitated, so mother did not mention it again. Mother states she is planning to go to patient's apartment at 3:00 today to check in with him and will mentioned the available appointment between 10 and 1 tomorrow, but does not think patient will be willing to make this appointment.     Mother states she took a week and a half off of work to help care for patient during this time.   "

## 2023-12-20 ENCOUNTER — TELEPHONE (OUTPATIENT)
Dept: PSYCHIATRY | Facility: CLINIC | Age: 33
End: 2023-12-20
Payer: COMMERCIAL

## 2023-12-20 NOTE — TELEPHONE ENCOUNTER
Received call from patient's mother with an update. She states she went to patient's apartment this afternoon with her other son, Ed. and he has not been opening his apartment door for her, but did come to the window and open the curtains for her. She states he normally will not do this. She states he is now requesting food, etc be left by his window. Mother states patient thanked her for the food today. Mother states she requested to see the dog today and patient state the dog was sleeping. Mother states typically patient has a knife or sword in hand, but he did not have these today. Mother states that patient was overall calmer today.    Mother states that last night when she called tearful, she was upset with the environment at patient's apartment. She states some of patient's neighbors kept inviting mother into their apartment, and mother felt threatened. Mother states she opted not to call [facility] to check on patient with her as her other son, Ed went with her.     Mother plans to check on patient again tomorrow between 11AM and 1PM.

## 2023-12-21 ENCOUNTER — TELEPHONE (OUTPATIENT)
Dept: PSYCHIATRY | Facility: CLINIC | Age: 33
End: 2023-12-21
Payer: COMMERCIAL

## 2023-12-21 NOTE — TELEPHONE ENCOUNTER
"Pt discussed in interdisciplinary team meeting today, providers in agreement with need for ED assessment and inpatient hospitalization for safety concerns (to both self and others) in context of untreated psychotic symptoms including disorganized behavior, paranoid delusions, and suicidal ideation written as a specific threat to himself in message to mother. Writer called 911 and spoke to [facility] department ([phone]) who verbalized understanding of the above, stating they had already visited pt multiple times (in collaboration with pt mother and mental health crisis team) in past day in an attempt to bring to ED. Pt reportedly barricaded himself in his apartment and will not come out, PD states they have clear protocol to not enter without permission, even with understanding of risk of imminent harm. They state they will \"continue to monitor situation\" but not intervene at this time. Encouraged to reach out to treatment team if this changes. Will discuss with team.  "
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
## 2023-12-21 NOTE — TELEPHONE ENCOUNTER
[facility] Call Center    Phone Message    May a detailed message be left on voicemail: yes     Reason for Call: Other: Pt's mother called back to follow-up with Guerline with an non-urgent update.     Action Taken: Other: [facility] [place] psych pool    Travel Screening: Not Applicable

## 2023-12-21 NOTE — TELEPHONE ENCOUNTER
"Writer returned mother's call. Mother states that she went to patient's apartment and they had 2 crisis workers, 3 squad cars, and ambulance and a fire truck. They knocked on patient's door and he refused to open the door and requested they go around to the window. Patient currently has the door barricaded. They told patient that he needed to open the door, but patient refused, so they left.     Mother states one of the crisis team members told mother that patient will \"eventually get evicted and the  will come and do what they need to do to take him by force.\"    Mother states she did learn that when patient was paranoid about the  coming after him, they did actually try to approach him in a parking lot as the police were looking into a car theft. Patient took off and that is when he went home and barricaded himself in his apartment. The blood that mother saw was reportedly from the dog's tail getting slammed in the door.    Mother plans to check on patient later today and bring him food.  "

## 2023-12-22 NOTE — TELEPHONE ENCOUNTER
Writer called [facility] DEC per admission protocol to advise of possible admission over the weekend and provide collateral and team recommendation for patient hospitalization if he presents to the ED.

## 2023-12-22 NOTE — TELEPHONE ENCOUNTER
"[facility]   APS Report      [facility] incident occurred: [place]   Date of disclosure to provider: 12/8/23 to 12/21/23  Electronic report made on (must be within 24 hours of disclosure): 12/21/2023   Your form has been submitted to the [place] [facility] ([facility]).    Required notifications to Law Enforcement and Emergency Protective Services will be made by [facility]. This report will be referred to the [employer] by [facility]. If the  requested information regarding the status of this report, the information will come from the [employer] responsible. This notification will not come from [facility].    If you want to print or save this form in PDF format for your records, click the \"Print\" button below. If you want a reference number for your report, record and save the \"Report ID\" below.    Date/Time Submitted: Thu Dec 21 2023 23:19:10   Web Report Number: [id number]        [facility] is not able to provide any further information regarding the status of submitted reports.    Brief description of reported incident: Threat to self & others, not meeting his own personal needs, not seeking or agreeable to medical intervention, suicidal, mental health decompensating - psychotic, suicidal, agitated, paranoid, poor judgement, impulsive, lacks insight, carries a sword and knife for protection, has threatened to cut his head off with sword.     Response/Plan:   I assume that APS will then consult with Law Enforcement to make a determination on next steps.     Consider report to [facility] if LE and APS is unable to support Guero further through his mental health crisis.      Team will need to notify [facility] ED/DEC of potential arrival of this patient over the weekend/Holiday and our support for admission.     Will route to other relevant provider(s) as an FYI.   Please call or EPIC message with any questions or concerns.    AP Kerr   "

## 2023-12-26 ENCOUNTER — TELEPHONE (OUTPATIENT)
Dept: PSYCHIATRY | Facility: CLINIC | Age: 33
End: 2023-12-26
Payer: COMMERCIAL
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
## 2023-12-28 NOTE — TELEPHONE ENCOUNTER
Phone call with mom, Catalina, for approx 35 minutes.   Mom reports that Guero continues to refuse to come out of his apartment or answer the door.  He will answer his window, and family  has been visiting him daily and brining him food.  Mom reports that he was previously carrying a knife on him when answering window, but has not done so in several days.  He is also no longer making threats to hurt himself or others.  She describes his apartment as very unkept and he will not allow anyone in to clean.  She also remains concerned about the welfare of his dog who is pregnant.      Mom does not know who is prescribing the medical cannabis.  She does think he is calmer when using it.  Discussed risks of medical cannabis for psychosis symptom exacerbation, and she agrees to encourage him to discontinue this, will consider CBD over the counter as harm reduction step.  She does not know whether he is taking medications, but suspects he is not.     The police have been out several times, as well as [facility] crisis team.  Mom reports that she had a very bad experience with the crisis team and is hesitant to call again.      Mom is considering asking Guero to come to stay with her for a few days tomorrow when her grandchildren leave.  We discussed other options including [facility] and crisis residential.  She doesn't think he will agree at this time but will discuss with him.  She will also encourage him to make an appointment with me or another provider in the clinic.  She is also planning to buy him a new phone this week so he can communicate more easily with her and the care team.  She understands recommendations to contact police, 911, or crisis teams as needed for concern of further decompensation or risk of harm to self or others.      Tatiana Vanessa, CNP, 12/28/2023 1:27 PM

## 2023-12-29 ENCOUNTER — TELEPHONE (OUTPATIENT)
Dept: PSYCHIATRY | Facility: CLINIC | Age: 33
End: 2023-12-29
Payer: COMMERCIAL

## 2023-12-29 NOTE — TELEPHONE ENCOUNTER
"Received incoming call from patient's mother. She states she dropped patient off some lunch and reports that \"nothing has changed.\" She states patient is not threatening himself or others, but he is still angry. She states that he is still using marijuana and states that at first it made him relaxed and happy, but now he is more agitated again.     Mother states that he still has the dog and she has not delivered her puppies yet, but patient refuses to give the dog to mom or let her take the dog for a walk. She also states that patient keeps requesting that family drop off sheet rock and tools for him so that he can fix his walls. Per mother, patient would now allow brother in to help with this. Mother states they will not give him any tools.     Mother states that patient had another sword delivered to her home and she is worried that patient will become upset with her if she does not give him the sword. Writer advised mother not to give patient the sword and to call 911 immediately if he becomes agitated or threatening about this. Mother verbalized understanding.     Mother states patient also purchased a wig and a strange outfit that he has been wearing.     Discussed continuing to contact emergency resources for safety concerns and goal of getting patient to the hospital. Mother verbalized understanding. Mother also states she will try to get patient a phone to use this weekend and will continue to encourage him to make an appointment with provider.   "

## 2024-01-05 ENCOUNTER — TELEPHONE (OUTPATIENT)
Dept: PSYCHIATRY | Facility: CLINIC | Age: 34
End: 2024-01-05
Payer: COMMERCIAL

## 2024-01-05 NOTE — TELEPHONE ENCOUNTER
"Writer called patient's mother for an update. Mother states that patient was doing better up until today. She states he doesn't have his knives or sword out and is not making threats, but he is angry again calling mom and screaming. Mom states she did get patient a flip phone and will give us this number as soon as possible. Mother plans to go to patient's home to drop off dinner. Mother is unsure whether patient is taking his medications. Update: Mother called writer back after initial call and stated she had called patient to let him know she would be visiting and he was much calmer than earlier.    Patient's dog had 8 puppies last Friday. Mother states that every day patient will say he is going to give them to mom, but then changes his mind. Mother states he has been enjoying the puppies and feels they have been providing him some piece. Mother has been bringing dog food over for patient. Mother states the puppies seem to be okay and patient will allow her to take them to the car and take pictures. Mother does not want to take the puppies without patient's dog as they are nursing. Mother is also concerned about taking the dog as she is afraid this will push patient over the edge as this is his emotional support animal. Mother states that if patient does not agree to give up the puppies, she will push harder to take the puppies in a week and bottle feed them at her home. Writer discussed possibly getting animal control involved and mother states she is not comfortable with this, but \"wouldn't be mad at us.\"    Mother states the [place] Police contacted [facility] who are going to help patient with his rent and a CADI waiver.     Mother also states there was one day this week that patient stated he was going to call to set up an appointment with Sofia and told mother that he likes provider, but the next day stated he wasn't coming back.     Mother also notes there was one night where " patient was being rude to her prior to bringing him food and mother states she told him she would not be stopping to bring food. Mother states that she felt guilty about this and brought the food back and patient blew up on her. Writer discussed APS suggestion of potentially motivating patient to come out of the home by not bringing him food and mother acknowledged that she has been given this advice before, but has a difficult time with it.    Mother states patient has not asked about the sword he had sent to her home, but did send a third one, which he also has not brought up. Mother states she brought the new swords to someone else's home in case patient were to show up at her house.     Reminded mother to utilize emergency resources if needed and that writer would touch base with her early next week.

## 2024-01-05 NOTE — TELEPHONE ENCOUNTER
1/5/2024 3:32 PM   Received follow-up from [facility] Protection  They screened the case and did not open it.  Ultimately, this is a mental health concern that should involved [facility].  APS is a voluntary service, event if they do decide to open the case. Continued reports can be made in good bobby as being mandated reporters.      In discussion with APS, a few ideas were presented:  - If mom is providing him food, this is enabling him from leaving the home.  If he left the home to access food, would he be easier intercepted by law enforcement if there is a transport hold in place - or would be naturally present as a concern to the community and Law enforcement is called to intervene. Alternatively, if he continued to isolate and not want to leave the house, he may agree to help for food.     - His pregnant dog. Is the health and wellbeing of his dog of concern. New puppies and recently post-partum dogs will need veterinary care. Would he agree to help from animal protection for whelping the puppies.  Is there proof that he is mistreating the dog, for animal protection to intervene.        Plan:  Coordinated with [place] Rob who will elicit an update from mom and share the above details from adult Trinity. Writer will contact animal control if warranted following mom's update.

## 2024-01-08 NOTE — TELEPHONE ENCOUNTER
Caller returning RN's call. Caller stated she will be with patient for the next 30 minutes and will call RN back after.

## 2024-01-08 NOTE — TELEPHONE ENCOUNTER
"Received call back from patient's mother. She continues to drop food off every day for patient. She states his mood has been up and down, but he has not been making threats. Mother states she feels guilty taking patient's dog and puppies, but understands it is for the best at this time.    Mother states patient stepped on his new phone and broke this one as well. He can still make and answer calls, but he is unable to see who it is. Mother states he called her yelling about this over the weekend. Mother provided patient's cell number: [phone]. She states that he has typically been hanging up the first and second time someone calls. Mother asks that if we do call patient, that it not be before noon. She states that 1:30-2PM is the best time. Mother also does not think patient's voicemail is set up. Mother also continues to provider patient with the clinic number.     Mother also states that patient has been asking her to call clinic for him. Mother states she will try to call with him to make an appointment with provider. She states patient is worried that provider is made at him and has reassured him that she is not. Mother states patient has also made comments about not being able to talk to anyone because he gets angry too fast, but that he has been trying not to. Mother feels that patient is starting to improve. She also thinks he may have taken one of his medications this weekend, but she is unsure.     Mother has also asked patient about leaving the apartment and patient states he is \"working on it.\"    Mother states she will call with an update on Wednesday.   "

## 2024-01-08 NOTE — TELEPHONE ENCOUNTER
Writer called patient's mother to check in. Patient states that the weekend went well and she now has the puppies and patient's dog. She states he may have started on his medications, but she is unsure. She states he told her he was going to.     Mother requested a call back after 2:30 as she is currently in a meeting.
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
## 2024-01-18 ENCOUNTER — TELEPHONE (OUTPATIENT)
Dept: PSYCHIATRY | Facility: CLINIC | Age: 34
End: 2024-01-18
Payer: COMMERCIAL

## 2024-01-18 NOTE — TELEPHONE ENCOUNTER
Hello,  I am helping to cover Sofia's inbox.  Do they need refills of any of the medications?  It doesn't appear so when looking at the refills available.  I don't want to miss anything that needs to be addressed.  Or is this FYI for Sofia?  Thank you!     Jaleesa Jaramillo, KRISTIN, APRN, FMHNP-BC,

## 2024-01-18 NOTE — TELEPHONE ENCOUNTER
"Writer called patient's mother to check in. Mother states that patient is no longer being evicted at this time and that the worker from [facility] was able to help get January covered, which has given them a couple extra weeks to sort things out. They are currently working on February and ensuring patient has electricity. The worker said their first priority is to secure the apartment and electricity, and then they will work on patient's mental health.     Mother reports she is still visiting 1-2 times per day. She reports that patient is out of his marijuana and has been angrier. She states that patient was very upset with her on Monday as he remembered the swords he had sent to her home. When mother told him she would not be bringing them for him, he became upset and accused her of taking them from him. She states he was so angry that he completely broke his phone. Mother states he has since been sending emails. She states that he has not made any explicit threats to harm himself or others, but sent an email earlier this week that stated \"I love you, mom, but I hate my life. Goodbye.\" Mother states she will get these emails hours later and has to drive over there to check on him due to not having a phone. She plans to purchase him another flip phone today and she plans to present patient's emails to police to see if there is anything they can do to help.     Mother states that yesterday, patient came \"flying out the window at her.\" She states that this is the first time she has seen him leave his apartment. She denies that he attempted to harm her, but she asked him if he wanted to go to her home to see the puppies and he replied \"nope.\" She asked patient if he'd allow her to go inside to help him clean and he said \"nope.\" She states that she encouraged him to go back inside as it was cold and patient did this.    She states that patient's medications are due again. She does not want to " give him full bottles due to his emails, but states she wants to try to bring him his medications one day at a time when she goes to visit him.     Mother denies any other questions or concerns at this time.

## 2024-02-01 ENCOUNTER — TELEPHONE (OUTPATIENT)
Dept: PSYCHIATRY | Facility: CLINIC | Age: 34
End: 2024-02-01
Payer: COMMERCIAL

## 2024-02-01 NOTE — TELEPHONE ENCOUNTER
Writer called patient's mother to check in on patient's status. She states that no much has changed with patient. She states Joel from [facility] is still working on finances for patient and they will hear more tomorrow.    Mother states patient still has not left his apartment and mother still has patient's dog and puppies. She states patient did let one of his brothers in for 1 hour and brother helped clean up a bit. Patient had moldy food everywhere in his apartment and had thrown groceries away that mother had bought him. Mother states she may stop going daily to bring him food if he is not eating it.    Mother states patient is still fixated on getting marijuana. She states she had purchased him another new phone, but he broke it in 3 days and mother does not want to purchase him another one. She states patient is still unwilling to make an appointment with provider. She states that patient has not been taking medications. She denies trying to bring patient medications daily per last discussion, but states she may try this.     Mother states that she has spoken at length with police a couple of times as patient will send her an email 4-5 nights per week with suicidal statements, but they have told her the emails do not warrant a hold.     Mother states a doctor who she knows that works in addiction medicine suggested bringing patient's dog back so that he is not always alone. Mother is considering doing this while keeping the puppies.

## 2024-02-05 ENCOUNTER — TELEPHONE (OUTPATIENT)
Dept: PSYCHIATRY | Facility: CLINIC | Age: 34
End: 2024-02-05
Payer: COMMERCIAL

## 2024-02-05 ENCOUNTER — MEDICAL CORRESPONDENCE (OUTPATIENT)
Dept: HEALTH INFORMATION MANAGEMENT | Facility: CLINIC | Age: 34
End: 2024-02-05

## 2024-02-05 NOTE — TELEPHONE ENCOUNTER
Discussed case with Sweta Urbina RN and Cait JOHNS.  Cait will contact police departments to relay assessment of worsening symptoms per collateral report, and recommendation that patient needs further behavioral health evaluation and treatment. Can pursue hold if accepted by police.     Tatiana Vanessa, CNP, 2/5/2024 11:48 AM
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
## 2024-02-05 NOTE — TELEPHONE ENCOUNTER
Attempted to call patient's mother to update her.  No answer. Left voicemail to call back clinic at [phone].

## 2024-02-05 NOTE — TELEPHONE ENCOUNTER
Called back patient's mother Catalina.  Per care team request, gave her the phone number for [facility] Civil Commitment Unit to consult.  Catalina appreciated this information and said that she would follow up later today.  She had no other updates or concerns at this time.

## 2024-02-05 NOTE — TELEPHONE ENCOUNTER
Attempted to call patient at phone number provided by patient's mother.  No answer. Left voicemail to call back clinic at [phone].
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
## 2024-02-05 NOTE — TELEPHONE ENCOUNTER
Phone call to [place] [employer] department ([phone]).  LVM with my direct contact information & Nurse Triage contact.    Would like to explore:  - Is there anything we can do to be supportive to them, to aid in hospitalization? (Ie  hold).  - Would they like us to involve [facility]  office?    __________________________________________________________________  Contacted [facility] Civil Commitment Unit to consult.      Sofia Vanessa and any member of our mental health clinical team has not seen Guero in 4 months for an accurate assessment- all information has been relayed through his mom.  However, we will participate where we are able to support his safety. Mom can contact the Prepetition Screening Unit noted below to petition for commitment as an alternative or additional measure to guardianship.     We work to protect people who are struggling with mental illness, developmental disability or chemical dependency from harming themselves or others, while ensuring they receive the treatment they need.    If your concern is not immediately life-threatening, call [facility] at [phone]. They can help you assess your loved one s mental health needs and, if necessary, arrange for his/her transportation to a hospital for further assessment.    Any adult or interested party may prepare and present a petition for commitment of an individual. Typically, a treating hospital or health care facility serves as a petitioner. A doctor must certify that the patient is mentally ill or chemically dependent and likely to cause substantial harm to themselves or others. The petition and all of the attachments that describe the patient s recent behaviors and reasons for supporting commitment are then forwarded to the Health and Wellness Department - Adult Mental Health Division Prepetition Screening Unit (PPSU). If the PPSU rejects the petition, the petitioner is  notified and may appeal the decision directly to the [facility] s Office. If the PPSU recommends commitment, it drafts a report and submits it to the Commitment Unit in the [facility] s Office.    For more information call the Prepetition Screening Unit at [phone].

## 2024-02-06 ENCOUNTER — TELEPHONE (OUTPATIENT)
Dept: PSYCHIATRY | Facility: CLINIC | Age: 34
End: 2024-02-06
Payer: COMMERCIAL

## 2024-02-06 NOTE — TELEPHONE ENCOUNTER
Mom, Catalina called to say that the patient is still alive and in his apartment.  Patient told Mom that he is getting our calls, but isn't answering them.  She was informed of the transport/ hold that was sent to [place] [employer].

## 2024-02-08 ENCOUNTER — TELEPHONE (OUTPATIENT)
Dept: PSYCHIATRY | Facility: CLINIC | Age: 34
End: 2024-02-08
Payer: COMMERCIAL

## 2024-02-08 NOTE — TELEPHONE ENCOUNTER
[facility]    Phone Message    May a detailed message be left on voicemail: yes     Reason for Call: Other: Pt's mother called asking to speak with the nurse to provide her daily update.      Action Taken: Other: [place] [facility] psych pool    Travel Screening: Not Applicable

## 2024-02-08 NOTE — TELEPHONE ENCOUNTER
Writer returned call to patient's mother. She states she filed for guardianship today and requested that it be expedited.     She also states that she had her boss at [employer] reach out to the supervisor from [facility], Nirav Barajas, and they are going to try to get a group together to help come up with a plan.     Mother states that she is still bringing patient food 1-2 times per day and that patient looks physically sicker, the apartment smells terrible and there is mold.     Mother states that patient had been using a free ezekiel to communicate, but this is now .     Mother states patient's apartment has gotten a  to get patient evicted, but someone would still have to go into his apartment, which they refuse to do.

## 2024-02-09 NOTE — CONFIDENTIAL NOTE
"[facility]   APS Report      [place] incident occurred: [place]  Date of disclosure to provider: 2/8/2024   Written report submitted on (must be with 72 hours of disclosure): 2/9/2024 3:38 PM     Brief description of reported incident:   Your form has been submitted to the [place] [facility] ([facility]).    Required notifications to Law Enforcement and Emergency Protective Services will be made by [facility]. This report will be referred to the [employer] Investig[employer] by [facility]. If the  requested information regarding the status of this report, the information will come from the Lead Investigative Agency responsible. This notification will not come from [facility].    If you want to print or save this form in PDF format for your records, click the \"Print\" button below. If you want a reference number for your report, record and save the \"Report ID\" below.    Date/Time Submitted:   Fri Feb 09 2024 15:37:43      Web Report Number: [id number]     [facility] is not able to provide any further information regarding the status of submitted reports.      Will route to other relevant provider(s) as an FYI.   Please call or EPIC message with any questions or concerns.    AP Kerr   "

## 2024-02-14 NOTE — CONFIDENTIAL NOTE
EVERT from [facility] adult [place] with an update that they will not be opening the case.  However, this report was one of several that they received.  As a result they did send out [facility] and [place] police dept for further assessment.  The did suggest getting [facility] involved, as they seem to have more authority if needing to remove someone from their home.

## 2024-02-16 ENCOUNTER — TELEPHONE (OUTPATIENT)
Dept: PSYCHIATRY | Facility: CLINIC | Age: 34
End: 2024-02-16
Payer: COMMERCIAL

## 2024-02-16 NOTE — TELEPHONE ENCOUNTER
"Writer called patient's mother and she confirms she did not contact [facility] as she was only planning to do this if he did not come to the window.     Mother states that patient appears to have a fever and his is red/flushed. She asked patient if he would go to the doctor's with her and initially said he would consider it, but then said he was afraid the \"terrorists would try to do stuff to him.\"     Mother states she is going back to patient's at 2:30PM today with a pizza and she thinks he may let her in because he told her he \"wants to chat with someone before he dies.\"     Writer advised that clinic would be reaching out to the 's department and she states that the crisis team had told her they'd like to be contacted if the  or police were to go out. Mother states her contact with [facility] is Nirav Barajas ([phone]).     Mother also requests that we provide the police/ with her number if they go ([phone]).    Mother also states that she gave several family members contact info for the clinic and other contacts, so they may be reaching out as well.   "

## 2024-02-16 NOTE — TELEPHONE ENCOUNTER
"[facility] Call Center    Phone Message    May a detailed message be left on voicemail: yes     Reason for Call: Other: patient update     Action Taken: Message routed to:  Other: nursing david Vanessa pt    Mom calling. She says that after patient told her he was going to kill himself she went over to his residence to check on him. She said that he came to the window, and looks sick, both emotionally and physically. Mom says that pt has a fever, but he won't go to the hospital because he says that \"the terrorists will do stuff to him.\" Mom stated she does not need a call back regarding this, that this is just an update.      Travel Screening: Not Applicable                                                         "
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
## 2024-02-16 NOTE — TELEPHONE ENCOUNTER
Received another call from patient's mother. She states she dropped the pizza off for patient, but he would not let mom in and would not come out. She states not much as changed and he was texting her in the parking lot saying he wants to die and wants her to help him. Mother states that she is less worried about patient than she was this morning as he does not look as weak and was was more angry. Mother states that patient may also be dehydrated as she does not believe he has been drinking the water in his apartment and she has not been bringing him bottled water, as he usually requests juice and soda.     Mother states she is driving home, but if the police are willing to go out, she will go back out to patient's apartment.    Mother states she plans to reach out to Nando Ro at the [facility] on Monday.

## 2024-02-16 NOTE — TELEPHONE ENCOUNTER
PC to [place] [employer] [phone]. LVM requesting call back to get an update on their collaborative plans with [facility] to support getting Guero getting emergency care.     Call to 911 for a wellness check given mom's reports today.  They are sending out officers.

## 2024-02-19 ENCOUNTER — TELEPHONE (OUTPATIENT)
Dept: PSYCHIATRY | Facility: CLINIC | Age: 34
End: 2024-02-19
Payer: COMMERCIAL

## 2024-02-19 NOTE — TELEPHONE ENCOUNTER
"2/16/24 at 5pm     Return call from [facility] Dept Chief.  They share that collaborative efforts with [facility] have not resulted in a change of their plans.  They are awaiting result of emergency guardianship.  Though, he agrees that guardianship will not change the ability to enter Guero's residence and intervene involuntarily unless warranted by their current ethical standards.     Return call from responding officer at [place] [employer].   He reports having been in contact with pt's mother and is aware of the threatening and concerning text messages.  This officer spoke directly to Guero via pt's window, who reportedly presented \"the best I've ever seen him\" according this this officer who has responded frequently to pt's home.  Guero declined help for both is physical and mental health.  Guero denied having a fever today.    "

## 2024-02-19 NOTE — TELEPHONE ENCOUNTER
Received the following emails to the psychiatrynurse email:    Ly Reddy <[email or web address]>    psychiatrynurse    ATTENTION: This email was sent to your [facility] account from an external source. Please use extra caution before clicking links, opening attachments, or replying to or forwarding this email.  I am reaching out in regards to your patient, Guero Carter, who is my brother. You have been working with my mother, Catalina Carter, who has been trying to get him into a hospital for a month. His recent state has shown that his symptoms have escalated, as his sister I have been around him his entire life and have never seen him as bad as he is now - threatening his own life and others as much as he is today. My mother has been urgently contacting all the local police departments and hospitals trying to get him help. They claim they will not go into his apartments for some reason  We are asking that you get him the urgent help he needs. He is extremely sick and every day it seems to get worse. He is delusional because of his schizophrenic state. When he is medicated properly, he does have more of a normal life and his delusions mostly go away. However, he hasn t been properly medicated for two years now. And it has only gotten worse. Please help get him into the hospital.  >   > I called [facility] who said that based on this and the evidence we have in text messages and voicemails that normally  would take them to the hospital, is there something we are missing from your team to be able to do so?  >   > Once again, please help!  > Ly Reddy    ____________________________________________________________________________    Amber Carter <[email or web address]    psychiatrynurse    ATTENTION: This email was sent to your [facility] account from an external source. Please use extra caution before clicking links, opening attachments, or replying to or forwarding this  email.    My Mother asked that I contact in an effort to get my brother help. He has mental illness but has had long periods of employment and stability/medication in the past. Currently he is so unwell he has barricaded himself in his apartment. Please help! Sincerely, Amber Carter    --  Amber Carter RN, LSN, YRN, [facility]  Licensed School Nurse for [place] and [facility]   Phone: [phone]   [place] fax [phone]  [place] fax [phone]  [email or web address]   Call 911 if you or someone else is in immediate, physical danger.        For [facility] Mobile Crisis Response Service, call [phone].  The call is free.  Available 24 hours/day, 7 days/week.  For [facility] Mobile Crisis, 24 hours/day, 7 days/week, call [place]    One number for all ages. Call [phone].        Text HOME to [phone] to be connected with a counselor who will help defuse the crisis and connect the texter to local resources. Available 24 hours/day, 7 days/week.     National Suicide Prevention Lifeline: 988 Available 24 hours/day, 7 days/week.

## 2024-02-20 ENCOUNTER — TELEPHONE (OUTPATIENT)
Dept: PSYCHIATRY | Facility: CLINIC | Age: 34
End: 2024-02-20
Payer: COMMERCIAL

## 2024-02-20 NOTE — TELEPHONE ENCOUNTER
"Follow-up phone call from [facility].   Ultimately, they do not have much more authority or ability to intervene differently than how [employer] [place] [employer] has been.      They do have in their records information about Guero's mental state and to contact [employer] [facility] before visiting his home for any reason.  Though a \"collaboration\" between the two law enforcement agencies does not exist specifically to this patient.     Related to the eviction-  [place] [facility] would be able to intervene if the Eviction Notice was written \"to remove the individual from the property by all means necessary\".  At which point they would force him out of the home, and then respond to his mental health needs once out of the property.  I am not aware of the eviction hearing timeline or updates to the status of this.      "

## 2024-02-20 NOTE — TELEPHONE ENCOUNTER
"2/20/2024 12:18 PM     Phone call to [facility] Pre-petition supervisiorRuperto, [phone]. LVMM.   Calling to consult about filing for pre-petition screening for civil commitment on an outpatient basis when we have not seen the individual for several months.  We have significant collateral reports from mom each week, and have been in contact with [place] [employer] consistently in attempts to intervene.      Any adult or interested party may prepare and present a petition for commitment of an individual. Typically, a treating hospital or health care facility serves as a petitioner. A doctor must certify that the patient is mentally ill or chemically dependent and likely to cause substantial harm to themselves or others. The petition and all of the attachments that describe the patient s recent behaviors and reasons for supporting commitment are then forwarded to the Health and Wellness Department - Adult Mental Health Division Prepetition Screening Unit (PPSU). If the PPSU rejects the petition, the petitioner is notified and may appeal the decision directly to the [facility] s Office. If the PPSU recommends commitment, it drafts a report and submits it to the Commitment Unit in the [facility] s Office.  For more information call the Prepetition Screening Unit at [phone].      Phone call to [facility], Mike, [phone]. LVM.  Of note, calling via the main line led to a lot of gatekeeping with the administrative support in order to get to the point to leave a message with a . Per this individual, there is not an open case number or added information related to our patient on their file, and it only notes \"deliver notices (such as orders or warrants) with caution\".  Calling to better understand the collaborative efforts between them & the [place] [employer] in supporting this patient's continued immiment risk and mental health decompensation.  Highlighting " the noted barrier that [place] [employer] will not enter the pt's home under any circumstance.  Ask if the [facility]'s authority provides a differnt intervention, how we as an outpatient psychiatry clinic can best collaborate with the 's office, what is the best way our 3 entities can collaborate effectively together, highlighting that we will send a peace officers hold at any point as well.       Recommendations  When RNCC next communicates with Mom, suggest that she and her family contact the [facility] Civil Committment Unit noted above, in addition to communicating their concerns to the [place] Ombudsman's office -  Contact [facility]D by e-mail: [email or web address].  Contact OMHDD by phone [phone] or [phone]    [place] (Region [place]) Voice: [phone] / Cell: [phone] / [email or web address].    Ombudsman definition: The Ombudsman ([id number]-boqm) is an independent governmental official who receives complaints against government (and government regulated) agencies and/or its officials, who investigates, and who if the complaints are justified, takes action to remedy the complaints.    The OMHDD assists with the following:  concerns or complaints about services,  questions about rights,  grievances,  access to appropriate services,  ideas for making services better,  review guardian actions; and  general questions or the need for information concerning services for persons with mental or developmental disabilities, chemical dependence or emotional disturbance.    Plan  I will continue to bridge the gap of those intervening to support Guero in getting emergency support for continued imminent risk.

## 2024-02-20 NOTE — TELEPHONE ENCOUNTER
Mom called to update that she has contacted media and has an appointment with them. She updated that there is no change with patient or situation. He continues to make suicidal statements to her. She continues to report this to the police and crisis team. She does have guardianship hearing coming up, but not until the end of March. She has not looked into petitioning. She doesn't think that she is willing to be the one to petition at this point. Mom also plans to call [facility] Dept tomorrow as she has been dealing with [facility] Police up until now. Mom will continue to update clinic and reach out as needed.

## 2024-02-21 NOTE — TELEPHONE ENCOUNTER
Received another call from patient's mother. She states that patient's sister went to the [facility] in [place] to get medications for patient to try to convince him to take the medications, but pharmacy had told her they had been canceled.     Writer called patient's pharmacy to inquire whether patient has remaining refills on file. They confirmed that patient has refills of escitalopram, bupopion, and aripiprazole on file. They do not have and olanzapine refills for patient.

## 2024-02-21 NOTE — TELEPHONE ENCOUNTER
Received incoming call from patient's mother, requesting the number for the [facility]. Writer provided this to her.     Mother also states that she spoke with a  and they are going to talk with someone and get back to her Friday, as they feel the story could cause more harm than good for patient. Mother will keep us updated.    Mother also confirmed receipt of [facility] contacts.

## 2024-02-21 NOTE — TELEPHONE ENCOUNTER
Writer called patient's mother to update on actions taken by Cait Horner. Writer also emailed mother contact information for the [facility] at [email or web address]. Mother denies any new questions or concerns at this time and will provide other family members with the [facility] contact information as well.

## 2024-02-22 ENCOUNTER — TELEPHONE (OUTPATIENT)
Dept: PSYCHIATRY | Facility: CLINIC | Age: 34
End: 2024-02-22
Payer: COMMERCIAL

## 2024-02-22 DIAGNOSIS — F20.9 SCHIZOPHRENIA, UNSPECIFIED TYPE (H): Primary | ICD-10-CM

## 2024-02-22 RX ORDER — BUPROPION HYDROCHLORIDE 150 MG/1
150 TABLET ORAL EVERY MORNING
Qty: 30 TABLET | Refills: 0 | Status: SHIPPED | OUTPATIENT
Start: 2024-02-22

## 2024-02-22 RX ORDER — BUPROPION HYDROCHLORIDE 150 MG/1
150 TABLET ORAL EVERY MORNING
Qty: 30 TABLET | Refills: 0 | Status: SHIPPED | OUTPATIENT
Start: 2024-02-22 | End: 2024-02-22

## 2024-02-22 NOTE — TELEPHONE ENCOUNTER
"Phone call with Guero Arnold's mom.   She reports that she and Guero's sister Ly have been trying to get Guero to restart medications.  Catalina offered Zyprexa 5 mg yesterday, but he declined.   Discussed dosing of Abilify with plan to restart at 10 mg daily x 3-5 days, then can return to 20 mg daily.  She thinks he will want to restart the Wellbutrin and asks for an updated prescription of this.  Discussed that priority will be to restart the Abilify OR zyprexa, but if he is restarting an antipsychotic and taking these regularly, it would be fine to also restart Wellbutrin if this is his preference.  He can restart Wellbutrin at 150 mg daily.  We will discontinue lexapro for now to reduce polypharmacy in the context of not following up for care.     Catalina continues to work with [facility] police and [facility] crisis.  She has been talking to a supervisor at the crisis center and will send these contacts to us.  She reports she is \"uncomfortable\" with moving forward with a commitment, but will understand if the care team thinks this is necessary or will be helpful.  She reports she does not like having strangers or the state making decisions for him.  We discussed the limits of a MH commitment, including that if granted, it may not change the ability of the police or the crisis team to remove Guero from his apartment if they do not feel he is at high enough imminent risk to do so.  She reports he continues to make regular, intermittent suicidal statements.  She does not want police or crisis to intervene until her other sons are home from a fishing trip next week.  She reports eviction will not be occurring at this time which she is glad about because she does not want this on his record.      Encouraged Catalina to continue to encourage Guero to follow up with an appointment so we can better assist with getting him to a higher level of care.      Tatiana Vanessa, CNP, 2/22/2024 12:15 PM            "

## 2024-02-27 NOTE — TELEPHONE ENCOUNTER
Received the following email in psychiatrynurse email on 2/22/2024:    Dr. Vanessa-    The people I have been working with at [facility] are:    Nirav Barajas, Mental Health Supervisor    Phone: [phone]    Email: [email or web address]       [facility] (Kathy) [facility] Manager    Health and Wellness    [phone]        [email or web address]    Thank you for the prescriptions. I will try to get Guero back on [employer] this week-end.    Catalina

## 2024-02-29 ENCOUNTER — TELEPHONE (OUTPATIENT)
Dept: PSYCHIATRY | Facility: CLINIC | Age: 34
End: 2024-02-29
Payer: COMMERCIAL

## 2024-02-29 NOTE — TELEPHONE ENCOUNTER
Writer returned call to patient's mother. Mother states she went to court for patient's eviction as he would not show up and the  is going to order the 's office to remove patient as soon as possible (likely Monday at the earliest). She states they asked mother what she would like done and she told them she would like the patient removed. The  is going to instruct the  to coordinate with mother. Mother advised them that there is a transport hold on file there from provider and they may contact the clinic/Sofia Vanessa to help coordinate for safety. Patient will not be warned of the eviction.     Mother states that they have not been able to get patient to take his medications. She reports patient was doing well for about 48 hours, but when mother asked him to call in for court today, he escalated.

## 2024-02-29 NOTE — TELEPHONE ENCOUNTER
Health Call Center    Phone Message    May a detailed message be left on voicemail: yes     Reason for Call: This patient's mother called to give Dr. Vanessa's nurse an update on this patient. Please call back at [phone].     Action Taken: Other: Nurses.     Travel Screening: Not Applicable

## 2024-03-04 ENCOUNTER — TELEPHONE (OUTPATIENT)
Dept: PSYCHIATRY | Facility: CLINIC | Age: 34
End: 2024-03-04
Payer: COMMERCIAL

## 2024-03-04 NOTE — TELEPHONE ENCOUNTER
Received incoming call Sgt. Haroon Enrique with [facility] to advise that patient is going to be the subject of an eviction and that their end goal is for patient to be secured in an ambulance, uninjured and transported to a psychiatric facility. Mother had provided them with our number to collaborate. He states they have spoken with a tactical unit with more access/training and less lethal tools than the 's department has.     Writer advised of transport hold and Sgt. Enrique feels this would be very helpful. He will call the clinic closer to the time patient will be removed from his apartment. He states realistically, the soonest this can happen would be 3/7 or 3/8. They are currently waiting on the Writ of Recovery of Premise to give them authority to remove person from dwelling. Once that's received, things will move quickly. They plan to have all teams (including crisis) out there to surround and secure the building.    Per Sgt. Enrique, patient did come out of his apartment over the weekend and met his mother at her car door. He also wondered if provider had any thoughts about things to use or avoid to convince patient to come out voluntarily. He plans to call mother today as well.    Fax #: [phone]  Mobile #: [phone]  Email: [email or web address].  Civil Process General #: [phone] M-F 8-4:30PM

## 2024-03-07 NOTE — TELEPHONE ENCOUNTER
Left a detailed voicemail message for Sgt. Haroon Enrique with [facility] - [phone], introducing self, reason for call. Writer requested call back. Included writer's direct contact information and availability.      Iva responded promptly with a call back.  He hasn't received the legal instrument of the Writ of Recovery of Premise available yet to evict Guero yet. The goal is for next week, and won't be on Tuesday, though hopefully before Friday.  Confirmed that this will only occur M-F and not the weekends.  The  has waived the right of [place] being notified ahead of time of the planned eviction.     Discussed how to use Guero's specific goals and communication strategies that may be helpful in the context of supporting Guero in the moment, his notable history, and deputies' language choices.  Sgt. Enrique took notes and plans to prepare those who will be involved.     Writer also discussed our preference for a  facility for coordinated care planning, and [facility] over the [facility]. Noted that we can aid in the admission process if at a [facility].  Mom shared with  that she'd prefer [facility] (or [place]?).       They have my direct contact and will contact me, and I'll connect with all team members involved, when they receive the document. At that point we will send the transport hold on the day of eviction, per their request.

## 2024-03-08 ENCOUNTER — TELEPHONE (OUTPATIENT)
Dept: PSYCHIATRY | Facility: CLINIC | Age: 34
End: 2024-03-08
Payer: COMMERCIAL

## 2024-03-08 NOTE — TELEPHONE ENCOUNTER
Patients mother called saying that it's been a tough week. She reported that at one point police discussed using a swat team and gas to enter Guero's apartment, but that plan has since been cancelled.    Paras mother said that the primary reason she called was to ask that unless there is a specific reason to have Guero taken to [facility], she would prefer to have him taken to [facility] because it is much closer to family.   Mom also said that Guero had moments of clarity this week, with up to 30 minutes of time when he was calm and willing to communicate with mom.  He did become agitated again, but this was an improvement according to his mother.

## 2024-03-11 ENCOUNTER — TELEPHONE (OUTPATIENT)
Dept: PSYCHIATRY | Facility: CLINIC | Age: 34
End: 2024-03-11
Payer: COMMERCIAL
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
## 2024-03-12 ENCOUNTER — TELEPHONE (OUTPATIENT)
Dept: PSYCHIATRY | Facility: CLINIC | Age: 34
End: 2024-03-12
Payer: COMMERCIAL

## 2024-03-12 NOTE — TELEPHONE ENCOUNTER
Called back doctor at desk number below. Discussed Foster plan on how to restart medications in 2/22/24 telephone encounter. She states that patient is currently still intubated and that they will be trying to extubate him today. He currently has pneumonia and may be on the medical unit for a while. Plan is to transfer to psych unit but mom would like him to agree to go voluntarily. Writer shared teams concern for safety and ability to care for self if he was to choose not to. Dr agreed that they may need to petition for commitment but wanted to see if our team was in agreement since mom is not. Will have Jewels reach out also. She was given direct number if any more concerns come up.

## 2024-03-12 NOTE — TELEPHONE ENCOUNTER
[facility] Call Center    Phone Message    May a detailed message be left on voicemail: yes     Reason for Call: Other: Provider/RN call request to coordinate care     Caller stated she is the Doctor caring for patient currently at [facility]. Caller stated the patient is currently intubated and they are working on getting the patient extubated and would like to speak with someone concerning the patient's medications and what medications would be ok to start them on.    Caller said ok to call cell at [phone] or her work desk phone at [phone].    Action Taken: Message routed to:  Other: [facility] Nurse pool    Travel Screening: Not Applicable

## 2024-03-12 NOTE — TELEPHONE ENCOUNTER
Phone call with Dr. Italia Baer to coordinate care.   Reviewed brief history of Guero's diagnosis, treatment, psychosis symptoms, and experiences of medical/psychiatric care.  Her team is planning for inpatient admission and may need to pursue a MH commitment with Tab, and is working with Guero's family.  In the event of a Bar[facility] being necessary, discussed medications that could be helpful to include including Abilify, Invega, Zyprexa, Clozapine, Haldol.  Will continue care coordination as needed while Guero is admitted.      Tatiana Vanessa, CNP, 3/12/2024 12:54 PM

## 2024-03-12 NOTE — TELEPHONE ENCOUNTER
"Phone call with Dr. Italia Baer to coordinate care.   Provided brief history of care in our program and the difficulties experienced in the last several months, including the high concern safety risks.  Discussed our recommendation for supporting a petition for commitment and samuels and our willingness to support his ongoing recovery following hospitalization.  Discussed mom's intention to avoid commitment as she pursues emergency guardianship, and highlighted concern that the purpose of guardianship while supporting someone with mental illness may be misunderstood by mom.  And concern discussed that her decision making may not always align with the treatment team, and shared examples of when mom did not want to get involved in order to preserve her relationship with Guero.      Per YUAN HARO, \"Sometimes people believe that guardianship will fix problems during a mental health crisis or will prevent a crisis. However, guardianship o?en does not fix these problems.Guardians can agree to a hospital admission that is ordered by a physician. Guardians cannot force someone to take medication. Only a court order can require a person to take medicine. Guardianship is not a good way to force a loved one to get mental health treatment against their will.\"    For that reason, we do not see this situations are a \"Guardianship or Commitment\" rather a \"commitment at minimum or both\".         Cait Horner, LICSW   3/12/2024 2:24 PM   "

## 2024-03-15 ENCOUNTER — TELEPHONE (OUTPATIENT)
Dept: PSYCHIATRY | Facility: CLINIC | Age: 34
End: 2024-03-15
Payer: COMMERCIAL

## 2024-03-15 NOTE — TELEPHONE ENCOUNTER
Writer received incoming phone call from Catalina, patient's mother. She was expecting to speak with ÁLVARO Cunha or ÁLVARO Sol. She asked that writer just update the team that patient is still intubated. His pneumonia has worsened and he's going to be transferred to [facility]'s ICU, as it is better equipped to treat Guero than [facility]'s ICU. Writer agreed to pass along the update.    Elisa Mistry RN on 3/15/2024 at 1:13 PM

## 2024-04-08 ENCOUNTER — TELEPHONE (OUTPATIENT)
Dept: PSYCHIATRY | Facility: CLINIC | Age: 34
End: 2024-04-08
Payer: COMMERCIAL

## 2024-04-08 NOTE — TELEPHONE ENCOUNTER
Writer called mom, Catalina, to check on status of Guero's hospitalization at [place].  Catalina shared that Guero will be moved to [facility] in [place] on Wednesday, April 10. Originally he was supposed to move to [facility] facility in [place] last week but Catalina was concerned that the facility had overnight passes and that she wanted Guero to go to the [facility] as it does not have overnight passes and is closer to her and Guero's grandparents. She thinks he will be at [facility] for the next three months and then will see a provider at [facility] as the clinic is closer to home.  Writer discussed [facility]'s no-show and attendance policies with Catalina but she did not have any concerns regarding those at this time.  She was not sure if Guero would be returning to the [facility] program.    Catalina stated that Guero has his court date on May 1 for the felony charges related to his hospitalization.     Olinda Cedeño, [facility]  Discharge

## 2024-04-11 ENCOUNTER — TELEPHONE (OUTPATIENT)
Dept: PSYCHIATRY | Facility: CLINIC | Age: 34
End: 2024-04-11

## 2024-04-12 ENCOUNTER — TELEPHONE (OUTPATIENT)
Dept: PSYCHIATRY | Facility: CLINIC | Age: 34
End: 2024-04-12

## 2024-04-12 NOTE — TELEPHONE ENCOUNTER
[facility] Family Peer Support  A Part of the [facility] First Episode of Psychosis Program     Patient Name: Guero Carter  /Age:  1990 (33 year old)  Date of Encounter: 24  Length of Contact: 2 minutes    This writer reached out to offer resources and support to patient's mother, Catalina.     At the time of this call, Catalina's grandchildren had just arrived at her house so it wasn't a good time to talk.  This writer invited her to call back at a more convenient time.    Kristine Barksdale CFPS  [facility] Family Peer    [Billing Code 83L0728 for Nonbillable Service as family peer support is a nonbillable service]

## 2024-04-12 NOTE — TELEPHONE ENCOUNTER
[facility] Family Peer Support  A Part of the [facility] First Episode of Psychosis Program     Patient Name: Guero Carter  /Age:  1990 (33 year old)  Date of Encounter: 24  Length of Contact: 3 minutes    This writer reached out to offer resources and support to patient;s mother, Catalina.     [facility] of this call[facility].  This writer suggested calling her the next day, 24, to follow-up.    Kristine Barksdale CFPS  [facility] Family Peer    [Billing Code 62H4507 for Nonbillable Service as family peer support is a nonbillable service]

## 2024-09-01 ENCOUNTER — HEALTH MAINTENANCE LETTER (OUTPATIENT)
Age: 34
End: 2024-09-01
